# Patient Record
Sex: MALE | Race: BLACK OR AFRICAN AMERICAN | NOT HISPANIC OR LATINO | Employment: UNEMPLOYED | URBAN - METROPOLITAN AREA
[De-identification: names, ages, dates, MRNs, and addresses within clinical notes are randomized per-mention and may not be internally consistent; named-entity substitution may affect disease eponyms.]

---

## 2023-02-05 ENCOUNTER — HOSPITAL ENCOUNTER (INPATIENT)
Facility: HOSPITAL | Age: 38
LOS: 9 days | Discharge: NURSING, REHAB OR LONG-TERM CARE | DRG: 956 | End: 2023-02-14
Attending: EMERGENCY MEDICINE | Admitting: SURGERY
Payer: MEDICARE

## 2023-02-05 ENCOUNTER — ANESTHESIA (OUTPATIENT)
Dept: SURGERY | Facility: HOSPITAL | Age: 38
DRG: 956 | End: 2023-02-05
Payer: MEDICARE

## 2023-02-05 ENCOUNTER — ANESTHESIA EVENT (OUTPATIENT)
Dept: SURGERY | Facility: HOSPITAL | Age: 38
DRG: 956 | End: 2023-02-05
Payer: MEDICARE

## 2023-02-05 DIAGNOSIS — I25.10 CAD (CORONARY ARTERY DISEASE): ICD-10-CM

## 2023-02-05 DIAGNOSIS — S09.90XA INJURY OF HEAD, INITIAL ENCOUNTER: ICD-10-CM

## 2023-02-05 DIAGNOSIS — S02.0XXB OPEN FRACTURE OF FRONTAL BONE, INITIAL ENCOUNTER: ICD-10-CM

## 2023-02-05 DIAGNOSIS — Z91.89 1 GUN IN HOME: ICD-10-CM

## 2023-02-05 DIAGNOSIS — S62.92XB OPEN FRACTURE OF LEFT HAND, INITIAL ENCOUNTER: ICD-10-CM

## 2023-02-05 DIAGNOSIS — S72.002B: Primary | ICD-10-CM

## 2023-02-05 DIAGNOSIS — S01.01XA LACERATION OF SCALP, INITIAL ENCOUNTER: ICD-10-CM
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
## 2023-02-05 DIAGNOSIS — S72.352B TYPE I OR II OPEN DISPLACED COMMINUTED FRACTURE OF SHAFT OF LEFT FEMUR, INITIAL ENCOUNTER: ICD-10-CM

## 2023-02-05 LAB
ANION GAP SERPL CALC-SCNC: 12 MEQ/L
BASOPHILS # BLD AUTO: 0.06 X10(3)/MCL (ref 0–0.2)
BASOPHILS NFR BLD AUTO: 0.4 %
BUN SERPL-MCNC: 11.4 MG/DL (ref 8.9–20.6)
CALCIUM SERPL-MCNC: 8.5 MG/DL (ref 8.4–10.2)
CHLORIDE SERPL-SCNC: 106 MMOL/L (ref 98–107)
CO2 SERPL-SCNC: 22 MMOL/L (ref 22–29)
CORRECTED TEMPERATURE (PCO2): 43 MMHG (ref 35–45)
CORRECTED TEMPERATURE (PH): 7.36 (ref 7.35–7.45)
CORRECTED TEMPERATURE (PO2): 143 MMHG (ref 80–100)
CREAT SERPL-MCNC: 1.19 MG/DL (ref 0.73–1.18)
CREAT/UREA NIT SERPL: 10
EOSINOPHIL # BLD AUTO: 0.03 X10(3)/MCL (ref 0–0.9)
EOSINOPHIL NFR BLD AUTO: 0.2 %
ERYTHROCYTE [DISTWIDTH] IN BLOOD BY AUTOMATED COUNT: 13.6 % (ref 11.5–17)
GFR SERPLBLD CREATININE-BSD FMLA CKD-EPI: >60 MLS/MIN/1.73/M2
GLUCOSE SERPL-MCNC: 104 MG/DL (ref 74–100)
HCO3 UR-SCNC: 24.3 MMOL/L (ref 22–26)
HCT VFR BLD AUTO: 40.3 % (ref 42–52)
HGB BLD-MCNC: 13.4 G/DL (ref 12–16)
HGB BLD-MCNC: 13.7 GM/DL (ref 14–18)
IMM GRANULOCYTES # BLD AUTO: 0.06 X10(3)/MCL (ref 0–0.04)
IMM GRANULOCYTES NFR BLD AUTO: 0.4 %
LACTATE SERPL-SCNC: 0.9 MMOL/L (ref 0.5–2.2)
LYMPHOCYTES # BLD AUTO: 1.7 X10(3)/MCL (ref 0.6–4.6)
LYMPHOCYTES NFR BLD AUTO: 10.9 %
MAGNESIUM SERPL-MCNC: 2.6 MG/DL (ref 1.6–2.6)
MCH RBC QN AUTO: 28.7 PG
MCHC RBC AUTO-ENTMCNC: 34 MG/DL (ref 33–36)
MCV RBC AUTO: 84.3 FL (ref 80–94)
MONOCYTES # BLD AUTO: 1.58 X10(3)/MCL (ref 0.1–1.3)
MONOCYTES NFR BLD AUTO: 10.1 %
NEUTROPHILS # BLD AUTO: 12.18 X10(3)/MCL (ref 2.1–9.2)
NEUTROPHILS NFR BLD AUTO: 78 %
NRBC BLD AUTO-RTO: 0 %
PCO2 BLDA: 43 MMHG (ref 35–45)
PH SMN: 7.36 [PH] (ref 7.35–7.45)
PHOSPHATE SERPL-MCNC: 5.5 MG/DL (ref 2.3–4.7)
PLATELET # BLD AUTO: 263 X10(3)/MCL (ref 130–400)
PMV BLD AUTO: 10.6 FL (ref 7.4–10.4)
PO2 BLDA: 143 MMHG (ref 80–100)
POC BASE DEFICIT: -1.3 MMOL/L (ref -2–2)
POC COHB: 1.9 %
POC IONIZED CALCIUM: 1.02 MMOL/L (ref 1.12–1.23)
POC METHB: 1.3 % (ref 0.4–1.5)
POC O2HB: 96.4 % (ref 94–97)
POC SATURATED O2: 99.1 %
POC TEMPERATURE: 37 °C
POTASSIUM BLD-SCNC: 4.1 MMOL/L (ref 3.5–5)
POTASSIUM SERPL-SCNC: 3.7 MMOL/L (ref 3.5–5.1)
RBC # BLD AUTO: 4.78 X10(6)/MCL (ref 4.7–6.1)
SODIUM BLD-SCNC: 135 MMOL/L (ref 137–145)
SODIUM SERPL-SCNC: 140 MMOL/L (ref 136–145)
SPECIMEN SOURCE: ABNORMAL
TROPONIN I SERPL-MCNC: 8.44 NG/ML (ref 0–0.04)
WBC # SPEC AUTO: 15.6 X10(3)/MCL (ref 4.5–11.5)

## 2023-02-05 PROCEDURE — 85025 COMPLETE CBC W/AUTO DIFF WBC: CPT | Performed by: STUDENT IN AN ORGANIZED HEALTH CARE EDUCATION/TRAINING PROGRAM

## 2023-02-05 PROCEDURE — 37799 UNLISTED PX VASCULAR SURGERY: CPT

## 2023-02-05 PROCEDURE — 99223 1ST HOSP IP/OBS HIGH 75: CPT | Mod: ,,, | Performed by: NEUROLOGICAL SURGERY

## 2023-02-05 PROCEDURE — 20000000 HC ICU ROOM

## 2023-02-05 PROCEDURE — 63600175 PHARM REV CODE 636 W HCPCS

## 2023-02-05 PROCEDURE — 99900031 HC PATIENT EDUCATION (STAT)

## 2023-02-05 PROCEDURE — 27201423 OPTIME MED/SURG SUP & DEVICES STERILE SUPPLY: Performed by: ORTHOPAEDIC SURGERY

## 2023-02-05 PROCEDURE — 63600175 PHARM REV CODE 636 W HCPCS: Performed by: NURSE ANESTHETIST, CERTIFIED REGISTERED

## 2023-02-05 PROCEDURE — 37000008 HC ANESTHESIA 1ST 15 MINUTES: Performed by: ORTHOPAEDIC SURGERY

## 2023-02-05 PROCEDURE — 93005 ELECTROCARDIOGRAM TRACING: CPT

## 2023-02-05 PROCEDURE — 84484 ASSAY OF TROPONIN QUANT: CPT | Performed by: STUDENT IN AN ORGANIZED HEALTH CARE EDUCATION/TRAINING PROGRAM

## 2023-02-05 PROCEDURE — 99291 CRITICAL CARE FIRST HOUR: CPT | Mod: 25

## 2023-02-05 PROCEDURE — C1713 ANCHOR/SCREW BN/BN,TIS/BN: HCPCS | Performed by: ORTHOPAEDIC SURGERY

## 2023-02-05 PROCEDURE — 27000221 HC OXYGEN, UP TO 24 HOURS

## 2023-02-05 PROCEDURE — 94761 N-INVAS EAR/PLS OXIMETRY MLT: CPT

## 2023-02-05 PROCEDURE — 25000003 PHARM REV CODE 250: Performed by: NURSE ANESTHETIST, CERTIFIED REGISTERED

## 2023-02-05 PROCEDURE — 36000711: Performed by: ORTHOPAEDIC SURGERY

## 2023-02-05 PROCEDURE — 93010 ELECTROCARDIOGRAM REPORT: CPT | Mod: ,,, | Performed by: INTERNAL MEDICINE

## 2023-02-05 PROCEDURE — 25500020 PHARM REV CODE 255: Performed by: SURGERY

## 2023-02-05 PROCEDURE — 11012 DEB SKIN BONE AT FX SITE: CPT | Mod: 51,,, | Performed by: ORTHOPAEDIC SURGERY

## 2023-02-05 PROCEDURE — 63600175 PHARM REV CODE 636 W HCPCS: Performed by: SURGERY

## 2023-02-05 PROCEDURE — 99900035 HC TECH TIME PER 15 MIN (STAT)

## 2023-02-05 PROCEDURE — 27245 TREAT THIGH FRACTURE: CPT | Mod: LT,,, | Performed by: ORTHOPAEDIC SURGERY

## 2023-02-05 PROCEDURE — 36000710: Performed by: ORTHOPAEDIC SURGERY

## 2023-02-05 PROCEDURE — 25000003 PHARM REV CODE 250: Performed by: STUDENT IN AN ORGANIZED HEALTH CARE EDUCATION/TRAINING PROGRAM

## 2023-02-05 PROCEDURE — 83605 ASSAY OF LACTIC ACID: CPT | Performed by: SURGERY

## 2023-02-05 PROCEDURE — 80048 BASIC METABOLIC PNL TOTAL CA: CPT | Performed by: STUDENT IN AN ORGANIZED HEALTH CARE EDUCATION/TRAINING PROGRAM

## 2023-02-05 PROCEDURE — 83735 ASSAY OF MAGNESIUM: CPT | Performed by: STUDENT IN AN ORGANIZED HEALTH CARE EDUCATION/TRAINING PROGRAM

## 2023-02-05 PROCEDURE — 63600175 PHARM REV CODE 636 W HCPCS: Performed by: PHYSICIAN ASSISTANT

## 2023-02-05 PROCEDURE — 94002 VENT MGMT INPAT INIT DAY: CPT

## 2023-02-05 PROCEDURE — 25000003 PHARM REV CODE 250

## 2023-02-05 PROCEDURE — 37000009 HC ANESTHESIA EA ADD 15 MINS: Performed by: ORTHOPAEDIC SURGERY

## 2023-02-05 PROCEDURE — 27245 PR OPEN FIX INTER/SUBTROCH FX,IMPLNT: ICD-10-PCS | Mod: LT,,, | Performed by: ORTHOPAEDIC SURGERY

## 2023-02-05 PROCEDURE — 25000003 PHARM REV CODE 250: Performed by: SURGERY

## 2023-02-05 PROCEDURE — 99223 PR INITIAL HOSPITAL CARE,LEVL III: ICD-10-PCS | Mod: ,,, | Performed by: NEUROLOGICAL SURGERY

## 2023-02-05 PROCEDURE — 82803 BLOOD GASES ANY COMBINATION: CPT

## 2023-02-05 PROCEDURE — 84100 ASSAY OF PHOSPHORUS: CPT | Performed by: STUDENT IN AN ORGANIZED HEALTH CARE EDUCATION/TRAINING PROGRAM

## 2023-02-05 PROCEDURE — 36620 INSERTION CATHETER ARTERY: CPT | Performed by: ANESTHESIOLOGY

## 2023-02-05 PROCEDURE — 93010 EKG 12-LEAD: ICD-10-PCS | Mod: ,,, | Performed by: INTERNAL MEDICINE

## 2023-02-05 PROCEDURE — 20800000 HC ICU TRAUMA

## 2023-02-05 PROCEDURE — G0390 TRAUMA RESPONS W/HOSP CRITI: HCPCS

## 2023-02-05 PROCEDURE — 63600175 PHARM REV CODE 636 W HCPCS: Performed by: ORTHOPAEDIC SURGERY

## 2023-02-05 PROCEDURE — 63600175 PHARM REV CODE 636 W HCPCS: Performed by: STUDENT IN AN ORGANIZED HEALTH CARE EDUCATION/TRAINING PROGRAM

## 2023-02-05 PROCEDURE — 11012 PR DEBRIDE ASSOC OPEN FX/DISLO SKIN/MUS/BONE: ICD-10-PCS | Mod: 51,,, | Performed by: ORTHOPAEDIC SURGERY

## 2023-02-05 DEVICE — IMPLANTABLE DEVICE: Type: IMPLANTABLE DEVICE | Site: FEMUR | Status: FUNCTIONAL

## 2023-02-05 DEVICE — SCREW XL25 IM NAIL 42X5MM: Type: IMPLANTABLE DEVICE | Site: FEMUR | Status: FUNCTIONAL

## 2023-02-05 RX ORDER — MIDAZOLAM HYDROCHLORIDE 1 MG/ML
INJECTION INTRAMUSCULAR; INTRAVENOUS
Status: DISCONTINUED | OUTPATIENT
Start: 2023-02-05 | End: 2023-02-05

## 2023-02-05 RX ORDER — BISACODYL 10 MG
10 SUPPOSITORY, RECTAL RECTAL DAILY PRN
Status: DISCONTINUED | OUTPATIENT
Start: 2023-02-05 | End: 2023-02-14 | Stop reason: HOSPADM

## 2023-02-05 RX ORDER — TRANEXAMIC ACID 100 MG/ML
INJECTION, SOLUTION INTRAVENOUS CONTINUOUS PRN
Status: DISCONTINUED | OUTPATIENT
Start: 2023-02-05 | End: 2023-02-05

## 2023-02-05 RX ORDER — MEPERIDINE HYDROCHLORIDE 25 MG/ML
12.5 INJECTION INTRAMUSCULAR; INTRAVENOUS; SUBCUTANEOUS ONCE
Status: CANCELLED | OUTPATIENT
Start: 2023-02-05 | End: 2023-02-05

## 2023-02-05 RX ORDER — OXYCODONE HYDROCHLORIDE 5 MG/1
5 TABLET ORAL EVERY 4 HOURS PRN
Status: DISCONTINUED | OUTPATIENT
Start: 2023-02-05 | End: 2023-02-14 | Stop reason: HOSPADM

## 2023-02-05 RX ORDER — PHENYLEPHRINE HYDROCHLORIDE 10 MG/ML
INJECTION INTRAVENOUS
Status: DISCONTINUED | OUTPATIENT
Start: 2023-02-05 | End: 2023-02-05

## 2023-02-05 RX ORDER — HYDROMORPHONE HYDROCHLORIDE 2 MG/ML
0.5 INJECTION, SOLUTION INTRAMUSCULAR; INTRAVENOUS; SUBCUTANEOUS
Status: DISCONTINUED | OUTPATIENT
Start: 2023-02-05 | End: 2023-02-08

## 2023-02-05 RX ORDER — INSULIN ASPART 100 [IU]/ML
1-10 INJECTION, SOLUTION INTRAVENOUS; SUBCUTANEOUS EVERY 6 HOURS PRN
Status: DISCONTINUED | OUTPATIENT
Start: 2023-02-05 | End: 2023-02-14 | Stop reason: HOSPADM

## 2023-02-05 RX ORDER — FENTANYL CITRATE 50 UG/ML
INJECTION, SOLUTION INTRAMUSCULAR; INTRAVENOUS
Status: DISCONTINUED | OUTPATIENT
Start: 2023-02-05 | End: 2023-02-05

## 2023-02-05 RX ORDER — ROCURONIUM BROMIDE 10 MG/ML
INJECTION, SOLUTION INTRAVENOUS
Status: DISCONTINUED | OUTPATIENT
Start: 2023-02-05 | End: 2023-02-05

## 2023-02-05 RX ORDER — METHOCARBAMOL 750 MG/1
750 TABLET, FILM COATED ORAL 3 TIMES DAILY
Status: DISCONTINUED | OUTPATIENT
Start: 2023-02-05 | End: 2023-02-06

## 2023-02-05 RX ORDER — ENOXAPARIN SODIUM 100 MG/ML
40 INJECTION SUBCUTANEOUS EVERY 12 HOURS
Status: DISCONTINUED | OUTPATIENT
Start: 2023-02-05 | End: 2023-02-06

## 2023-02-05 RX ORDER — SODIUM CHLORIDE 9 MG/ML
INJECTION, SOLUTION INTRAVENOUS CONTINUOUS
Status: DISCONTINUED | OUTPATIENT
Start: 2023-02-05 | End: 2023-02-08

## 2023-02-05 RX ORDER — HYDROMORPHONE HYDROCHLORIDE 2 MG/ML
0.4 INJECTION, SOLUTION INTRAMUSCULAR; INTRAVENOUS; SUBCUTANEOUS EVERY 5 MIN PRN
Status: CANCELLED | OUTPATIENT
Start: 2023-02-05

## 2023-02-05 RX ORDER — DEXMEDETOMIDINE HYDROCHLORIDE 4 UG/ML
0-1.4 INJECTION, SOLUTION INTRAVENOUS CONTINUOUS
Status: DISCONTINUED | OUTPATIENT
Start: 2023-02-05 | End: 2023-02-07

## 2023-02-05 RX ORDER — FENTANYL CITRATE 50 UG/ML
INJECTION, SOLUTION INTRAMUSCULAR; INTRAVENOUS
Status: COMPLETED
Start: 2023-02-05 | End: 2023-02-05

## 2023-02-05 RX ORDER — FAMOTIDINE 20 MG/1
20 TABLET, FILM COATED ORAL 2 TIMES DAILY
Status: DISCONTINUED | OUTPATIENT
Start: 2023-02-05 | End: 2023-02-08

## 2023-02-05 RX ORDER — METOPROLOL TARTRATE 1 MG/ML
5 INJECTION, SOLUTION INTRAVENOUS ONCE
Status: COMPLETED | OUTPATIENT
Start: 2023-02-05 | End: 2023-02-05

## 2023-02-05 RX ORDER — QUETIAPINE FUMARATE 100 MG/1
200 TABLET, FILM COATED ORAL DAILY
Status: DISCONTINUED | OUTPATIENT
Start: 2023-02-05 | End: 2023-02-14 | Stop reason: HOSPADM

## 2023-02-05 RX ORDER — CEFAZOLIN SODIUM 1 G/3ML
INJECTION, POWDER, FOR SOLUTION INTRAMUSCULAR; INTRAVENOUS
Status: DISCONTINUED | OUTPATIENT
Start: 2023-02-05 | End: 2023-02-05

## 2023-02-05 RX ORDER — DOCUSATE SODIUM 100 MG/1
100 CAPSULE, LIQUID FILLED ORAL 2 TIMES DAILY
Status: DISCONTINUED | OUTPATIENT
Start: 2023-02-05 | End: 2023-02-14 | Stop reason: HOSPADM

## 2023-02-05 RX ORDER — TRANEXAMIC ACID 100 MG/ML
INJECTION, SOLUTION INTRAVENOUS
Status: COMPLETED
Start: 2023-02-05 | End: 2023-02-05

## 2023-02-05 RX ORDER — FLUOXETINE HYDROCHLORIDE 20 MG/1
40 CAPSULE ORAL DAILY
Status: DISCONTINUED | OUTPATIENT
Start: 2023-02-06 | End: 2023-02-14 | Stop reason: HOSPADM

## 2023-02-05 RX ORDER — LEVETIRACETAM 500 MG/1
500 TABLET ORAL 2 TIMES DAILY
Status: DISCONTINUED | OUTPATIENT
Start: 2023-02-05 | End: 2023-02-05

## 2023-02-05 RX ORDER — ONDANSETRON 2 MG/ML
4 INJECTION INTRAMUSCULAR; INTRAVENOUS ONCE
Status: CANCELLED | OUTPATIENT
Start: 2023-02-05 | End: 2023-02-05

## 2023-02-05 RX ORDER — LORAZEPAM 1 MG/1
1 TABLET ORAL EVERY 6 HOURS PRN
Status: DISCONTINUED | OUTPATIENT
Start: 2023-02-05 | End: 2023-02-06

## 2023-02-05 RX ORDER — TALC
6 POWDER (GRAM) TOPICAL NIGHTLY PRN
Status: DISCONTINUED | OUTPATIENT
Start: 2023-02-05 | End: 2023-02-14 | Stop reason: HOSPADM

## 2023-02-05 RX ORDER — POLYETHYLENE GLYCOL 3350 17 G/17G
17 POWDER, FOR SOLUTION ORAL 2 TIMES DAILY
Status: DISCONTINUED | OUTPATIENT
Start: 2023-02-05 | End: 2023-02-14 | Stop reason: HOSPADM

## 2023-02-05 RX ORDER — PROPOFOL 10 MG/ML
INJECTION, EMULSION INTRAVENOUS
Status: COMPLETED
Start: 2023-02-05 | End: 2023-02-05

## 2023-02-05 RX ORDER — GLUCAGON 1 MG
1 KIT INJECTION
Status: DISCONTINUED | OUTPATIENT
Start: 2023-02-05 | End: 2023-02-14 | Stop reason: HOSPADM

## 2023-02-05 RX ORDER — LAMOTRIGINE 25 MG/1
25 TABLET ORAL DAILY
Status: DISCONTINUED | OUTPATIENT
Start: 2023-02-06 | End: 2023-02-14 | Stop reason: HOSPADM

## 2023-02-05 RX ORDER — ONDANSETRON 2 MG/ML
4 INJECTION INTRAMUSCULAR; INTRAVENOUS EVERY 6 HOURS PRN
Status: DISCONTINUED | OUTPATIENT
Start: 2023-02-05 | End: 2023-02-14 | Stop reason: HOSPADM

## 2023-02-05 RX ORDER — MORPHINE SULFATE 4 MG/ML
2 INJECTION, SOLUTION INTRAMUSCULAR; INTRAVENOUS
Status: DISPENSED | OUTPATIENT
Start: 2023-02-05 | End: 2023-02-08

## 2023-02-05 RX ORDER — CEFAZOLIN SODIUM 2 G/50ML
2 SOLUTION INTRAVENOUS
Status: COMPLETED | OUTPATIENT
Start: 2023-02-05 | End: 2023-02-06

## 2023-02-05 RX ORDER — ACETAMINOPHEN 325 MG/1
650 TABLET ORAL EVERY 4 HOURS
Status: DISPENSED | OUTPATIENT
Start: 2023-02-05 | End: 2023-02-10

## 2023-02-05 RX ORDER — VANCOMYCIN HYDROCHLORIDE 1 G/20ML
INJECTION, POWDER, LYOPHILIZED, FOR SOLUTION INTRAVENOUS
Status: DISCONTINUED | OUTPATIENT
Start: 2023-02-05 | End: 2023-02-05 | Stop reason: HOSPADM

## 2023-02-05 RX ADMIN — HYDROMORPHONE HYDROCHLORIDE 0.5 MG: 2 INJECTION, SOLUTION INTRAMUSCULAR; INTRAVENOUS; SUBCUTANEOUS at 03:02

## 2023-02-05 RX ADMIN — PROPOFOL: 10 INJECTION, EMULSION INTRAVENOUS at 10:02

## 2023-02-05 RX ADMIN — ACETAMINOPHEN 650 MG: 325 TABLET, FILM COATED ORAL at 02:02

## 2023-02-05 RX ADMIN — POLYETHYLENE GLYCOL 3350 17 G: 17 POWDER, FOR SOLUTION ORAL at 09:02

## 2023-02-05 RX ADMIN — METOPROLOL TARTRATE 5 MG: 1 INJECTION, SOLUTION INTRAVENOUS at 10:02

## 2023-02-05 RX ADMIN — ROCURONIUM BROMIDE 80 MG: 10 INJECTION, SOLUTION INTRAVENOUS at 11:02

## 2023-02-05 RX ADMIN — IOPAMIDOL 100 ML: 755 INJECTION, SOLUTION INTRAVENOUS at 11:02

## 2023-02-05 RX ADMIN — SODIUM CHLORIDE: 9 INJECTION, SOLUTION INTRAVENOUS at 11:02

## 2023-02-05 RX ADMIN — MIDAZOLAM HYDROCHLORIDE 2 MG: 1 INJECTION, SOLUTION INTRAMUSCULAR; INTRAVENOUS at 11:02

## 2023-02-05 RX ADMIN — METHOCARBAMOL TABLETS 750 MG: 750 TABLET, COATED ORAL at 09:02

## 2023-02-05 RX ADMIN — DEXMEDETOMIDINE HYDROCHLORIDE 0.6 MCG/KG/HR: 400 INJECTION INTRAVENOUS at 06:02

## 2023-02-05 RX ADMIN — TRANEXAMIC ACID 0.64 MG/KG/HR: 100 INJECTION, SOLUTION INTRAVENOUS at 11:02

## 2023-02-05 RX ADMIN — FENTANYL CITRATE 100 MCG: 50 INJECTION, SOLUTION INTRAMUSCULAR; INTRAVENOUS at 11:02

## 2023-02-05 RX ADMIN — TRANEXAMIC ACID: 100 INJECTION, SOLUTION INTRAVENOUS at 10:02

## 2023-02-05 RX ADMIN — CEFAZOLIN SODIUM 2 G: 2 SOLUTION INTRAVENOUS at 01:02

## 2023-02-05 RX ADMIN — CEFAZOLIN SODIUM 2 G: 2 SOLUTION INTRAVENOUS at 09:02

## 2023-02-05 RX ADMIN — QUETIAPINE FUMARATE 200 MG: 100 TABLET ORAL at 04:02

## 2023-02-05 RX ADMIN — FENTANYL CITRATE 100 MCG: 50 INJECTION, SOLUTION INTRAMUSCULAR; INTRAVENOUS at 10:02

## 2023-02-05 RX ADMIN — ACETAMINOPHEN 650 MG: 325 TABLET, FILM COATED ORAL at 09:02

## 2023-02-05 RX ADMIN — CEFAZOLIN 2 G: 330 INJECTION, POWDER, FOR SOLUTION INTRAMUSCULAR; INTRAVENOUS at 11:02

## 2023-02-05 RX ADMIN — SODIUM CHLORIDE, POTASSIUM CHLORIDE, SODIUM LACTATE AND CALCIUM CHLORIDE 1000 ML: 600; 310; 30; 20 INJECTION, SOLUTION INTRAVENOUS at 09:02

## 2023-02-05 RX ADMIN — ACETAMINOPHEN 650 MG: 325 TABLET, FILM COATED ORAL at 06:02

## 2023-02-05 RX ADMIN — FENTANYL CITRATE 50 MCG: 50 INJECTION, SOLUTION INTRAMUSCULAR; INTRAVENOUS at 11:02

## 2023-02-05 RX ADMIN — SODIUM CHLORIDE, POTASSIUM CHLORIDE, SODIUM LACTATE AND CALCIUM CHLORIDE 1000 ML: 600; 310; 30; 20 INJECTION, SOLUTION INTRAVENOUS at 10:02

## 2023-02-05 RX ADMIN — DEXMEDETOMIDINE HYDROCHLORIDE 0.6 MCG/KG/HR: 400 INJECTION INTRAVENOUS at 09:02

## 2023-02-05 RX ADMIN — SODIUM CHLORIDE, SODIUM GLUCONATE, SODIUM ACETATE, POTASSIUM CHLORIDE AND MAGNESIUM CHLORIDE: 526; 502; 368; 37; 30 INJECTION, SOLUTION INTRAVENOUS at 11:02

## 2023-02-05 RX ADMIN — FAMOTIDINE 20 MG: 20 TABLET, FILM COATED ORAL at 09:02

## 2023-02-05 RX ADMIN — PROPOFOL 50 MCG/KG/MIN: 10 INJECTION, EMULSION INTRAVENOUS at 01:02

## 2023-02-05 RX ADMIN — ENOXAPARIN SODIUM 40 MG: 40 INJECTION SUBCUTANEOUS at 04:02

## 2023-02-05 RX ADMIN — METHOCARBAMOL TABLETS 750 MG: 750 TABLET, COATED ORAL at 03:02

## 2023-02-05 RX ADMIN — PHENYLEPHRINE HYDROCHLORIDE 100 MCG: 10 INJECTION INTRAVENOUS at 11:02

## 2023-02-05 RX ADMIN — DOCUSATE SODIUM 100 MG: 100 CAPSULE, LIQUID FILLED ORAL at 09:02
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
## 2023-02-05 NOTE — ANESTHESIA PREPROCEDURE EVALUATION
02/05/2023  Gera Chavez is a 37 y.o., male   Pre-operative evaluation for Procedure(s) (LRB):  INSERTION, INTRAMEDULLARY NOEL, FEMUR (Left)    Resp 20   SpO2 96%     No past medical history on file.    Patient Active Problem List   Diagnosis    Fracture of proximal end of left femur, open type I or II, initial encounter       Review of patient's allergies indicates:  Not on File    No current outpatient medications    No past surgical history on file.    Social History     Socioeconomic History    Marital status: Single       No results found for: WBC, HGB, HCT, MCV, PLT       BMP  No results found for: NA, K, CHLORIDE, CO2, GLUCOSE, BUN, CREATININE, CALCIUM, ANIONGAP, ESTGFRAFRICA, EGFRNONAA     INR  No results for input(s): PT, INR, PROTIME, APTT in the last 72 hours.        Diagnostic Studies:      EKG:  No results found for this or any previous visit.    .      Pre-op Assessment          Review of Systems      Physical Exam  General: Presents as Level 1 Trauma from ER. Intubated on Propofol  Airway:  Mallampati: II   Mouth Opening: Normal  TM Distance: Normal  Tongue: Normal    Dental:  Intact    Chest/Lungs:  Clear to auscultation, Normal Respiratory Rate    Heart:  Rate: Normal  Rhythm: Regular Rhythm        Anesthesia Plan  Type of Anesthesia, risks & benefits discussed:    Anesthesia Type: Gen ETT  Intra-op Monitoring Plan: Standard ASA Monitors and Art Line  Post Op Pain Control Plan: multimodal analgesia  Induction:  IV and Inhalation  Airway Plan: Direct, Post-Induction  Informed Consent: Informed consent signed with the Patient and all parties understand the risks and agree with anesthesia plan.  All questions answered.   ASA Score: 2 Emergent  Day of Surgery Review of History & Physical: H&P Update referred to the surgeon/provider.  Anesthesia Plan Notes: Emergency. Post op Vent  Planned    Ready For Surgery From Anesthesia Perspective.     .
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
## 2023-02-05 NOTE — TRANSFER OF CARE
"Anesthesia Transfer of Care Note    Patient: Gera Chavez    Procedure(s) Performed: Procedure(s) (LRB):  INSERTION, INTRAMEDULLARY NOEL, FEMUR (Left)    Patient location: ICU    Anesthesia Type: general    Transport from OR: Continuos invasive BP monitoring in transport. Transported from OR intubated on 100% O2 by AMBU with assisted ventilation. Continuous ECG monitoring in transport. Continuous SpO2 monitoring in transport. Upon arrival to PACU/ICU, patient attached to ventilator and auscultated to confirm bilateral breath sounds and adequate TV    Post pain: adequate analgesia    Post assessment: no apparent anesthetic complications    Post vital signs: stable    Level of consciousness: sedated    Nausea/Vomiting: no nausea/vomiting    Complications: none    Transfer of care protocol was followed      Last vitals:   Visit Vitals  BP (!) 172/112   Pulse 102   Temp 36.8 °C (98.2 °F) (Oral)   Resp (!) 24   Ht 6' 3" (1.905 m)   Wt 100 kg (220 lb 7.4 oz)   SpO2 99%   BMI 27.56 kg/m²     "

## 2023-02-05 NOTE — NURSING
Nurses Note -- 4 Eyes      2/5/2023   1:00 PM      Skin assessed during: Admit      [x] No Pressure Injuries Present    [x]Prevention Measures Documented      [] Yes- Altered Skin Integrity Present or Discovered   [] LDA Added if Not in Epic (Describe Wound)   [] New Altered Skin Integrity was Present on Admit and Documented in LDA   [] Wound Image Taken    Wound Care Consulted? No    Attending Nurse:  Bandar Larios RN     Second RN/Staff Member:  Grey Rosa RN

## 2023-02-05 NOTE — ANESTHESIA POSTPROCEDURE EVALUATION
Anesthesia Post Evaluation    Patient: Gera Chavez    Procedure(s) Performed: Procedure(s) (LRB):  INSERTION, INTRAMEDULLARY NOEL, FEMUR (Left)    Final Anesthesia Type: general      Patient location during evaluation: ICU  Patient participation: No - Unable to Participate, Intubation  Level of consciousness: awake  Post-procedure vital signs: reviewed and stable  Pain management: adequate  Airway patency: patent    PONV status at discharge: vomiting (controlled) and nausea (controlled)  Anesthetic complications: no      Cardiovascular status: hemodynamically stable  Respiratory status: intubated and ventilator  Hydration status: euvolemic  Follow-up not needed.  Comments:              Vitals Value Taken Time   /92 02/05/23 1401   Temp 36.7 °C (98 °F) 02/05/23 1300   Pulse 105 02/05/23 1410   Resp 21 02/05/23 1410   SpO2 97 % 02/05/23 1410   Vitals shown include unvalidated device data.      No case tracking events are documented in the log.      Pain/Mp Score: Pain Rating Prior to Med Admin: 0 (2/5/2023  1:51 PM)

## 2023-02-05 NOTE — OP NOTE
OPERATIVE REPORT    Patient: Gera Chavez   : 1985    MRN: [id number]  Date: 2023      Surgeon:Tuan Zepeda DO  Assistant: Tono Mars was essential, part of the procedure including deep hardware placement and deep closure.  No senior assistant was availible  Preoperative Diagnosis: : Open left subtrochanteric femur fracture  Postoperative Diagnosis: Same  Procedure:  Treatment left subtrochanteric femur fracture with intramedullary nail CPT 51150  Excisional debridement open fracture left femur-CPT 55917  Anesthesiologist: Brian Bird MD  OR Staff: Circulator: Trice Jones RN; Lisha Schuster RN  Physician Assistant: Kailey Mars PA-C  Radiology Technologist: RT Blayne  Scrub Person: Rod Salmon  Implants: * No implants in log *  EBL: 200cc  Complications: None  Disposition: To PACU, stable    Indications: Gera Chavez is a 37 y.o. male presenting with the aforementioned injuries. The procedure is indicated to best obtain and maintain stability of the femur while allowing early ROM.  The patient is intubated and sedated.  Patient is intubated patient is brought to the OR emergently for open fracture included risks and benefits, expected outcomes, and alternatives to surgical intervention.  Specific risks discussed included, but were not limited to: superficial or deep infection, wound healing complications, DVT/PE, significant bleeding requiring transfusion, damage to named anatomic structures in the immediate area including named neurovascular structures, implant failure, and general risks of anesthesia.  The patient voiced understanding and written as well as verbal consent was obtained by myself prior to the procedure.    Procedure in Detail:  The patient's left lower extremity was marked by me in the preoperative area.  He was taken to the operating room, and after satisfactory induction of anesthesia, the patient was placed in the supine position  with a small bump under the ipsilateral hip.   The ipsilateral lower extremity was then sterilely prepped and draped in the usual sterile fashion.  Standard time out procedure was performed confirming the correct surgeon, site, side, patient ID, procedure, and administration of antibiotics.       Attention is drawn to the left thigh patient has a wound in his groin area this area is inspected and left open.  We created incision in the lateral aspect completed excisional debridement of bone muscle fascia necrotic tissue with a 15 blade rongeur and curette.  This was copiously irrigated.    A small longitudinal incision was made proximal to the tip of the greater trochanter. Incision continued through the fascia of the abductors. Bovie was used for hemostasis.  We bluntly felt down to the tip of the greater trochanter, and a guide wire was placed in the appropriate starting position at the greater trochanter.  Once this position was confirmed with  C-arm  in multiple planes, the wire was advanced into the proximal femur. The starting reamer was then used through protected soft tissues to create the entry hole over the guide wire.  Next, a long beaded-tip reaming wire was placed.  C-arm images in multiple planes confirmed successful crossing of the fracture site and intramedullary location of the wire in the distal segment.  Femur length was measured over the wire.  Reduction of the fracture maintained using open techniques, and the canal was sequentially reamed over the wire to a final size of 1.5mm over the final nail size.  A Synthes Recon nail was assembled to its jig, and inserted over the guide wire.  The nail was seated fully, and the wire was removed.  C-arm images confirmed good alignment and reduction of the fracture.      This fracture is highly comminuted.  With the nail at the appropriate depth, the jig was used to place 2 proximal interlocking screws. These screws were placed in recon fashion.  C-arm  confirmed good position of these screws. Two distal interlocking screw were then placed through a small incision using free-hand perfect Quechan technique.      Final C-arm images confirmed good reduction and alignment of the fracture, and good position of all hardware.  The leg clinically appeared to have normal rotational alignment as compared to C-arm images of the opposite side.     The hip was evaluated under C-arm fluoroscopy and no fractures were noted.  The knee was examined and did not show any signs of ligamentous laxity.       All wounds were copiously irrigated and closed in a layered fashion after it was.  Vancomycin was placed in the surgical wound bed.  Sterile soft dressings were applied.  The patient remained stable throughout the entire procedure, and was awakened from anesthesia and extubated without obvious complication.  He was transported to the recovery room in apparently stable condition.    All sponge and needle counts were correct at the end of the case.  I was present and participated in all aspects of the procedure.    Prognosis:  The patient will be kept WBAT on the ipsilateral extremity.  DVT prophylaxis is indicated for this patient and procedure.  The patient is at risk of pain, infection, and nonunion, and we will watch for all of these, amongst other issues, as the patient continues to heal.      This note/OR report was created with the assistance of  voice recognition software or phone  dictation.  There may be transcription errors as a result of using this technology however minimal. Effort has been made to assure accuracy of transcription but any obvious errors or omissions should be clarified with the author of the document.       Tuan Zepeda, DO  Orthopedic Trauma Surgery
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
## 2023-02-06 LAB
ALBUMIN SERPL-MCNC: 2.7 G/DL (ref 3.5–5)
ALBUMIN/GLOB SERPL: 1.2 RATIO (ref 1.1–2)
ALP SERPL-CCNC: 44 UNIT/L (ref 40–150)
ALT SERPL-CCNC: 36 UNIT/L (ref 0–55)
APTT PPP: 32.9 SECONDS (ref 23.2–33.7)
APTT PPP: 35.5 SECONDS (ref 23.2–33.7)
AST SERPL-CCNC: 125 UNIT/L (ref 5–34)
AV INDEX (PROSTH): 0.74
AV MEAN GRADIENT: 5 MMHG
AV PEAK GRADIENT: 9 MMHG
AV VALVE AREA: 3.37 CM2
AV VELOCITY RATIO: 0.73
BASOPHILS # BLD AUTO: 0.02 X10(3)/MCL (ref 0–0.2)
BASOPHILS # BLD AUTO: 0.06 X10(3)/MCL (ref 0–0.2)
BASOPHILS NFR BLD AUTO: 0.3 %
BASOPHILS NFR BLD AUTO: 0.6 %
BILIRUBIN DIRECT+TOT PNL SERPL-MCNC: 0.7 MG/DL
BSA FOR ECHO PROCEDURE: 2.3 M2
BUN SERPL-MCNC: 12.7 MG/DL (ref 8.9–20.6)
CALCIUM SERPL-MCNC: 7.3 MG/DL (ref 8.4–10.2)
CHLORIDE SERPL-SCNC: 110 MMOL/L (ref 98–107)
CO2 SERPL-SCNC: 21 MMOL/L (ref 22–29)
CORRECTED TEMPERATURE (PCO2): 38 MMHG (ref 35–45)
CORRECTED TEMPERATURE (PCO2): 47 MMHG (ref 35–45)
CORRECTED TEMPERATURE (PH): 7.35 (ref 7.35–7.45)
CORRECTED TEMPERATURE (PH): 7.42 (ref 7.35–7.45)
CORRECTED TEMPERATURE (PO2): 82 MMHG (ref 80–100)
CORRECTED TEMPERATURE (PO2): 89 MMHG (ref 80–100)
CREAT SERPL-MCNC: 1.06 MG/DL (ref 0.73–1.18)
CRP SERPL-MCNC: 136.8 MG/L
CV ECHO LV RWT: 0.49 CM
DOP CALC AO PEAK VEL: 1.5 M/S
DOP CALC AO VTI: 27 CM
DOP CALC LVOT AREA: 4.5 CM2
DOP CALC LVOT DIAMETER: 2.4 CM
DOP CALC LVOT PEAK VEL: 1.1 M/S
DOP CALC LVOT STROKE VOLUME: 90.88 CM3
DOP CALC MV VTI: 15 CM
DOP CALCLVOT PEAK VEL VTI: 20.1 CM
E WAVE DECELERATION TIME: 190 MSEC
E/A RATIO: 1.49
E/E' RATIO: 10.1 M/S
ECHO LV POSTERIOR WALL: 1.21 CM (ref 0.6–1.1)
EJECTION FRACTION: 55 %
EOSINOPHIL # BLD AUTO: 0.03 X10(3)/MCL (ref 0–0.9)
EOSINOPHIL # BLD AUTO: 0.04 X10(3)/MCL (ref 0–0.9)
EOSINOPHIL NFR BLD AUTO: 0.4 %
EOSINOPHIL NFR BLD AUTO: 0.4 %
ERYTHROCYTE [DISTWIDTH] IN BLOOD BY AUTOMATED COUNT: 13.7 % (ref 11.5–17)
ERYTHROCYTE [DISTWIDTH] IN BLOOD BY AUTOMATED COUNT: 13.8 % (ref 11.5–17)
FRACTIONAL SHORTENING: 28 % (ref 28–44)
GFR SERPLBLD CREATININE-BSD FMLA CKD-EPI: >60 MLS/MIN/1.73/M2
GLOBULIN SER-MCNC: 2.2 GM/DL (ref 2.4–3.5)
GLUCOSE SERPL-MCNC: 117 MG/DL (ref 74–100)
HCO3 UR-SCNC: 24.6 MMOL/L (ref 22–26)
HCO3 UR-SCNC: 25.9 MMOL/L (ref 22–26)
HCT VFR BLD AUTO: 27.1 % (ref 42–52)
HCT VFR BLD AUTO: 29 % (ref 42–52)
HGB BLD-MCNC: 10.1 G/DL (ref 12–16)
HGB BLD-MCNC: 10.4 G/DL (ref 12–16)
HGB BLD-MCNC: 9.2 GM/DL (ref 14–18)
HGB BLD-MCNC: 9.8 GM/DL (ref 14–18)
IMM GRANULOCYTES # BLD AUTO: 0.02 X10(3)/MCL (ref 0–0.04)
IMM GRANULOCYTES # BLD AUTO: 0.06 X10(3)/MCL (ref 0–0.04)
IMM GRANULOCYTES NFR BLD AUTO: 0.3 %
IMM GRANULOCYTES NFR BLD AUTO: 0.6 %
INR BLD: 1.32 (ref 0–1.3)
INTERVENTRICULAR SEPTUM: 1.01 CM (ref 0.6–1.1)
LEFT ATRIUM SIZE: 3.9 CM
LEFT ATRIUM VOLUME INDEX MOD: 26.8 ML/M2
LEFT ATRIUM VOLUME MOD: 61.3 CM3
LEFT INTERNAL DIMENSION IN SYSTOLE: 3.53 CM (ref 2.1–4)
LEFT VENTRICLE DIASTOLIC VOLUME INDEX: 48.91 ML/M2
LEFT VENTRICLE DIASTOLIC VOLUME: 112 ML
LEFT VENTRICLE MASS INDEX: 88 G/M2
LEFT VENTRICLE SYSTOLIC VOLUME INDEX: 22.7 ML/M2
LEFT VENTRICLE SYSTOLIC VOLUME: 51.9 ML
LEFT VENTRICULAR INTERNAL DIMENSION IN DIASTOLE: 4.89 CM (ref 3.5–6)
LEFT VENTRICULAR MASS: 202.36 G
LV LATERAL E/E' RATIO: 10.1 M/S
LV SEPTAL E/E' RATIO: 10.1 M/S
LVOT MG: 3 MMHG
LVOT MV: 0.74 CM/S
LYMPHOCYTES # BLD AUTO: 1.55 X10(3)/MCL (ref 0.6–4.6)
LYMPHOCYTES # BLD AUTO: 1.74 X10(3)/MCL (ref 0.6–4.6)
LYMPHOCYTES NFR BLD AUTO: 16.1 %
LYMPHOCYTES NFR BLD AUTO: 20.7 %
MCH RBC QN AUTO: 28.6 PG
MCH RBC QN AUTO: 29.1 PG
MCHC RBC AUTO-ENTMCNC: 33.8 MG/DL (ref 33–36)
MCHC RBC AUTO-ENTMCNC: 33.9 MG/DL (ref 33–36)
MCV RBC AUTO: 84.5 FL (ref 80–94)
MCV RBC AUTO: 85.8 FL (ref 80–94)
MONOCYTES # BLD AUTO: 0.8 X10(3)/MCL (ref 0.1–1.3)
MONOCYTES # BLD AUTO: 0.87 X10(3)/MCL (ref 0.1–1.3)
MONOCYTES NFR BLD AUTO: 10.7 %
MONOCYTES NFR BLD AUTO: 8 %
MV MEAN GRADIENT: 1 MMHG
MV PEAK A VEL: 0.68 M/S
MV PEAK E VEL: 1.01 M/S
MV PEAK GRADIENT: 2 MMHG
MV VALVE AREA BY CONTINUITY EQUATION: 6.06 CM2
NEUTROPHILS # BLD AUTO: 5.05 X10(3)/MCL (ref 2.1–9.2)
NEUTROPHILS # BLD AUTO: 8.07 X10(3)/MCL (ref 2.1–9.2)
NEUTROPHILS NFR BLD AUTO: 67.6 %
NEUTROPHILS NFR BLD AUTO: 74.3 %
NRBC BLD AUTO-RTO: 0 %
NRBC BLD AUTO-RTO: 0 %
PCO2 BLDA: 38 MMHG (ref 35–45)
PCO2 BLDA: 47 MMHG (ref 35–45)
PH SMN: 7.35 [PH] (ref 7.35–7.45)
PH SMN: 7.42 [PH] (ref 7.35–7.45)
PLATELET # BLD AUTO: 192 X10(3)/MCL (ref 130–400)
PLATELET # BLD AUTO: 201 X10(3)/MCL (ref 130–400)
PMV BLD AUTO: 10.5 FL (ref 7.4–10.4)
PMV BLD AUTO: 10.6 FL (ref 7.4–10.4)
PO2 BLDA: 82 MMHG (ref 80–100)
PO2 BLDA: 89 MMHG (ref 80–100)
POC BASE DEFICIT: 0 MMOL/L (ref -2–2)
POC BASE DEFICIT: 0.2 MMOL/L (ref -2–2)
POC COHB: 2 %
POC COHB: 2.4 %
POC IONIZED CALCIUM: 1.01 MMOL/L (ref 1.12–1.23)
POC IONIZED CALCIUM: 1.03 MMOL/L (ref 1.12–1.23)
POC METHB: 0.9 % (ref 0.4–1.5)
POC METHB: 1.3 % (ref 0.4–1.5)
POC O2HB: 95.4 % (ref 94–97)
POC O2HB: 95.7 % (ref 94–97)
POC SATURATED O2: 95.4 %
POC SATURATED O2: 97 %
POC TEMPERATURE: 37 °C
POC TEMPERATURE: 37 °C
POCT GLUCOSE: 121 MG/DL (ref 70–110)
POTASSIUM BLD-SCNC: 4 MMOL/L (ref 3.5–5)
POTASSIUM BLD-SCNC: 4.1 MMOL/L (ref 3.5–5)
POTASSIUM SERPL-SCNC: 4.1 MMOL/L (ref 3.5–5.1)
PREALB SERPL-MCNC: 22.4 MG/DL (ref 18–45)
PROT SERPL-MCNC: 4.9 GM/DL (ref 6.4–8.3)
PROTHROMBIN TIME: 16.2 SECONDS (ref 12.5–14.5)
RA PRESSURE: 3 MMHG
RA WIDTH: 4.43 CM
RBC # BLD AUTO: 3.16 X10(6)/MCL (ref 4.7–6.1)
RBC # BLD AUTO: 3.43 X10(6)/MCL (ref 4.7–6.1)
RV MID DIAMA: 26.5 CM
SODIUM BLD-SCNC: 137 MMOL/L (ref 137–145)
SODIUM BLD-SCNC: 137 MMOL/L (ref 137–145)
SODIUM SERPL-SCNC: 140 MMOL/L (ref 136–145)
SPECIMEN SOURCE: ABNORMAL
SPECIMEN SOURCE: ABNORMAL
TDI LATERAL: 0.1 M/S
TDI SEPTAL: 0.1 M/S
TDI: 0.1 M/S
TRICUSPID ANNULAR PLANE SYSTOLIC EXCURSION: 2.75 CM
TROPONIN I SERPL-MCNC: 3.79 NG/ML (ref 0–0.04)
TROPONIN I SERPL-MCNC: 3.94 NG/ML (ref 0–0.04)
TROPONIN I SERPL-MCNC: 4.96 NG/ML (ref 0–0.04)
TROPONIN I SERPL-MCNC: 8.51 NG/ML (ref 0–0.04)
WBC # SPEC AUTO: 10.8 X10(3)/MCL (ref 4.5–11.5)
WBC # SPEC AUTO: 7.5 X10(3)/MCL (ref 4.5–11.5)

## 2023-02-06 PROCEDURE — 25000003 PHARM REV CODE 250: Performed by: STUDENT IN AN ORGANIZED HEALTH CARE EDUCATION/TRAINING PROGRAM

## 2023-02-06 PROCEDURE — 82803 BLOOD GASES ANY COMBINATION: CPT

## 2023-02-06 PROCEDURE — 94003 VENT MGMT INPAT SUBQ DAY: CPT

## 2023-02-06 PROCEDURE — 37799 UNLISTED PX VASCULAR SURGERY: CPT

## 2023-02-06 PROCEDURE — 86140 C-REACTIVE PROTEIN: CPT

## 2023-02-06 PROCEDURE — 84134 ASSAY OF PREALBUMIN: CPT

## 2023-02-06 PROCEDURE — 94640 AIRWAY INHALATION TREATMENT: CPT

## 2023-02-06 PROCEDURE — 94660 CPAP INITIATION&MGMT: CPT | Mod: XB

## 2023-02-06 PROCEDURE — 63600175 PHARM REV CODE 636 W HCPCS

## 2023-02-06 PROCEDURE — 80053 COMPREHEN METABOLIC PANEL: CPT

## 2023-02-06 PROCEDURE — 85730 THROMBOPLASTIN TIME PARTIAL: CPT | Performed by: SURGERY

## 2023-02-06 PROCEDURE — 63600175 PHARM REV CODE 636 W HCPCS: Performed by: SURGERY

## 2023-02-06 PROCEDURE — 85025 COMPLETE CBC W/AUTO DIFF WBC: CPT

## 2023-02-06 PROCEDURE — 85610 PROTHROMBIN TIME: CPT | Performed by: NURSE PRACTITIONER

## 2023-02-06 PROCEDURE — 63600175 PHARM REV CODE 636 W HCPCS: Performed by: NURSE PRACTITIONER

## 2023-02-06 PROCEDURE — 84484 ASSAY OF TROPONIN QUANT: CPT | Performed by: STUDENT IN AN ORGANIZED HEALTH CARE EDUCATION/TRAINING PROGRAM

## 2023-02-06 PROCEDURE — 85730 THROMBOPLASTIN TIME PARTIAL: CPT | Performed by: NURSE PRACTITIONER

## 2023-02-06 PROCEDURE — 63600175 PHARM REV CODE 636 W HCPCS: Performed by: STUDENT IN AN ORGANIZED HEALTH CARE EDUCATION/TRAINING PROGRAM

## 2023-02-06 PROCEDURE — 94761 N-INVAS EAR/PLS OXIMETRY MLT: CPT

## 2023-02-06 PROCEDURE — 85025 COMPLETE CBC W/AUTO DIFF WBC: CPT | Performed by: NURSE PRACTITIONER

## 2023-02-06 PROCEDURE — 27000221 HC OXYGEN, UP TO 24 HOURS

## 2023-02-06 PROCEDURE — 20800000 HC ICU TRAUMA

## 2023-02-06 PROCEDURE — 63600175 PHARM REV CODE 636 W HCPCS: Performed by: PHYSICIAN ASSISTANT

## 2023-02-06 PROCEDURE — 99900031 HC PATIENT EDUCATION (STAT)

## 2023-02-06 PROCEDURE — 99900035 HC TECH TIME PER 15 MIN (STAT)

## 2023-02-06 PROCEDURE — 25000242 PHARM REV CODE 250 ALT 637 W/ HCPCS: Performed by: SURGERY

## 2023-02-06 PROCEDURE — 20000000 HC ICU ROOM

## 2023-02-06 PROCEDURE — 27000190 HC CPAP FULL FACE MASK W/VALVE

## 2023-02-06 PROCEDURE — 25000242 PHARM REV CODE 250 ALT 637 W/ HCPCS: Performed by: STUDENT IN AN ORGANIZED HEALTH CARE EDUCATION/TRAINING PROGRAM

## 2023-02-06 PROCEDURE — 25000003 PHARM REV CODE 250

## 2023-02-06 RX ORDER — NOREPINEPHRINE BITARTRATE/D5W 8 MG/250ML
0-3 PLASTIC BAG, INJECTION (ML) INTRAVENOUS CONTINUOUS
Status: DISCONTINUED | OUTPATIENT
Start: 2023-02-06 | End: 2023-02-07

## 2023-02-06 RX ORDER — METHOCARBAMOL 100 MG/ML
750 INJECTION, SOLUTION INTRAMUSCULAR; INTRAVENOUS EVERY 8 HOURS
Status: COMPLETED | OUTPATIENT
Start: 2023-02-06 | End: 2023-02-09

## 2023-02-06 RX ORDER — BUDESONIDE 0.5 MG/2ML
0.5 INHALANT ORAL EVERY 12 HOURS
Status: DISCONTINUED | OUTPATIENT
Start: 2023-02-06 | End: 2023-02-11

## 2023-02-06 RX ORDER — HEPARIN SODIUM,PORCINE/D5W 25000/250
20 INTRAVENOUS SOLUTION INTRAVENOUS CONTINUOUS
Status: DISCONTINUED | OUTPATIENT
Start: 2023-02-06 | End: 2023-02-08

## 2023-02-06 RX ORDER — DIPHENHYDRAMINE HYDROCHLORIDE 50 MG/ML
25 INJECTION INTRAMUSCULAR; INTRAVENOUS EVERY 6 HOURS PRN
Status: DISCONTINUED | OUTPATIENT
Start: 2023-02-06 | End: 2023-02-14 | Stop reason: HOSPADM

## 2023-02-06 RX ORDER — METOPROLOL TARTRATE 25 MG/1
25 TABLET, FILM COATED ORAL 2 TIMES DAILY
Status: DISCONTINUED | OUTPATIENT
Start: 2023-02-06 | End: 2023-02-14 | Stop reason: HOSPADM

## 2023-02-06 RX ADMIN — METHOCARBAMOL 750 MG: 100 INJECTION INTRAMUSCULAR; INTRAVENOUS at 09:02

## 2023-02-06 RX ADMIN — ACETAMINOPHEN 650 MG: 325 TABLET, FILM COATED ORAL at 01:02

## 2023-02-06 RX ADMIN — DEXMEDETOMIDINE HYDROCHLORIDE 0.5 MCG/KG/HR: 400 INJECTION INTRAVENOUS at 07:02

## 2023-02-06 RX ADMIN — FAMOTIDINE 20 MG: 20 TABLET, FILM COATED ORAL at 08:02

## 2023-02-06 RX ADMIN — HYDROMORPHONE HYDROCHLORIDE 0.5 MG: 2 INJECTION, SOLUTION INTRAMUSCULAR; INTRAVENOUS; SUBCUTANEOUS at 05:02

## 2023-02-06 RX ADMIN — OXYCODONE 5 MG: 5 TABLET ORAL at 08:02

## 2023-02-06 RX ADMIN — MORPHINE SULFATE 2 MG: 4 INJECTION INTRAVENOUS at 10:02

## 2023-02-06 RX ADMIN — NOREPINEPHRINE BITARTRATE 0.04 MCG/KG/MIN: 8 INJECTION, SOLUTION INTRAVENOUS at 01:02

## 2023-02-06 RX ADMIN — ENOXAPARIN SODIUM 40 MG: 40 INJECTION SUBCUTANEOUS at 09:02

## 2023-02-06 RX ADMIN — POLYETHYLENE GLYCOL 3350 17 G: 17 POWDER, FOR SOLUTION ORAL at 08:02

## 2023-02-06 RX ADMIN — RACEPINEPHRINE HYDROCHLORIDE 0.5 ML: 11.25 SOLUTION RESPIRATORY (INHALATION) at 08:02

## 2023-02-06 RX ADMIN — BUDESONIDE 0.5 MG: 0.5 INHALANT RESPIRATORY (INHALATION) at 08:02

## 2023-02-06 RX ADMIN — DEXMEDETOMIDINE HYDROCHLORIDE 1 MCG/KG/HR: 400 INJECTION INTRAVENOUS at 05:02

## 2023-02-06 RX ADMIN — CEFAZOLIN SODIUM 2 G: 2 SOLUTION INTRAVENOUS at 05:02

## 2023-02-06 RX ADMIN — ACETAMINOPHEN 650 MG: 325 TABLET, FILM COATED ORAL at 09:02

## 2023-02-06 RX ADMIN — HEPARIN SODIUM 20 UNITS/KG/HR: 10000 INJECTION, SOLUTION INTRAVENOUS at 05:02

## 2023-02-06 RX ADMIN — RACEPINEPHRINE HYDROCHLORIDE 0.5 ML: 11.25 SOLUTION RESPIRATORY (INHALATION) at 04:02

## 2023-02-06 RX ADMIN — BUDESONIDE 0.5 MG: 0.5 INHALANT RESPIRATORY (INHALATION) at 09:02

## 2023-02-06 RX ADMIN — DOCUSATE SODIUM 100 MG: 100 CAPSULE, LIQUID FILLED ORAL at 08:02

## 2023-02-06 RX ADMIN — METHOCARBAMOL 750 MG: 100 INJECTION INTRAMUSCULAR; INTRAVENOUS at 02:02

## 2023-02-06 RX ADMIN — DEXMEDETOMIDINE HYDROCHLORIDE 0.5 MCG/KG/HR: 400 INJECTION INTRAVENOUS at 09:02

## 2023-02-06 NOTE — PROGRESS NOTES
"Cardiology would like to begin heparin bolus and drip per protocol.    Spoke with Dr. Scott Pacheco from a neurosurgical perspective to begin.    Last CT head negative with the exception of the following that needs to be added formally as an addendum by Radiology:  "Linear right frontal fracture".  "
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
## 2023-02-06 NOTE — PT/OT/SLP PROGRESS
Occupational Therapy      Patient Name:  Gera Chavez   MRN:  [id number]    Positioning consult received.  Positioning assessment completed.  PUP kit ordered.  No pressure related skin breakdown noted.  RN present for 4 eyes.  Attempted to utilize turn feature on bed but discontinued due to safety concerns and poor positioning of pt (likely related to body habitus).    Pt on bipap and not medically appropriate on this date to assess ADLs or functional mobility.  OT to f/u.    Restraints reapplied and all lines intact upon OT departure.  2/6/2023

## 2023-02-06 NOTE — PLAN OF CARE
Pt sleeping hard, unable to awake with verbal stimuli for Initial dc assessment. Will try again tomorrrow.
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
## 2023-02-07 LAB
ALBUMIN SERPL-MCNC: 2.5 G/DL (ref 3.5–5)
ALBUMIN/GLOB SERPL: 1.1 RATIO (ref 1.1–2)
ALP SERPL-CCNC: 53 UNIT/L (ref 40–150)
ALT SERPL-CCNC: 34 UNIT/L (ref 0–55)
APTT PPP: 73.3 SECONDS (ref 23.2–33.7)
APTT PPP: 76.1 SECONDS (ref 23.2–33.7)
APTT PPP: 79.9 SECONDS (ref 23.2–33.7)
AST SERPL-CCNC: 116 UNIT/L (ref 5–34)
BASOPHILS # BLD AUTO: 0.04 X10(3)/MCL (ref 0–0.2)
BASOPHILS NFR BLD AUTO: 0.5 %
BILIRUBIN DIRECT+TOT PNL SERPL-MCNC: 0.6 MG/DL
BUN SERPL-MCNC: 13.2 MG/DL (ref 8.9–20.6)
CALCIUM SERPL-MCNC: 7.5 MG/DL (ref 8.4–10.2)
CHLORIDE SERPL-SCNC: 112 MMOL/L (ref 98–107)
CO2 SERPL-SCNC: 22 MMOL/L (ref 22–29)
CORRECTED TEMPERATURE (PCO2): 36 MMHG (ref 35–45)
CORRECTED TEMPERATURE (PH): 7.43 (ref 7.35–7.45)
CORRECTED TEMPERATURE (PO2): 73 MMHG (ref 80–100)
CREAT SERPL-MCNC: 0.87 MG/DL (ref 0.73–1.18)
CRP SERPL-MCNC: >240 MG/L
EOSINOPHIL # BLD AUTO: 0.04 X10(3)/MCL (ref 0–0.9)
EOSINOPHIL NFR BLD AUTO: 0.5 %
ERYTHROCYTE [DISTWIDTH] IN BLOOD BY AUTOMATED COUNT: 13.7 % (ref 11.5–17)
GFR SERPLBLD CREATININE-BSD FMLA CKD-EPI: >60 MLS/MIN/1.73/M2
GLOBULIN SER-MCNC: 2.3 GM/DL (ref 2.4–3.5)
GLUCOSE SERPL-MCNC: 110 MG/DL (ref 74–100)
HCO3 UR-SCNC: 23.9 MMOL/L (ref 22–26)
HCT VFR BLD AUTO: 23.9 % (ref 42–52)
HGB BLD-MCNC: 8.2 GM/DL (ref 14–18)
HGB BLD-MCNC: 8.3 G/DL (ref 12–16)
IMM GRANULOCYTES # BLD AUTO: 0.04 X10(3)/MCL (ref 0–0.04)
IMM GRANULOCYTES NFR BLD AUTO: 0.5 %
LYMPHOCYTES # BLD AUTO: 2.01 X10(3)/MCL (ref 0.6–4.6)
LYMPHOCYTES NFR BLD AUTO: 23.2 %
MAGNESIUM SERPL-MCNC: 2.4 MG/DL (ref 1.6–2.6)
MCH RBC QN AUTO: 29.3 PG
MCHC RBC AUTO-ENTMCNC: 34.3 MG/DL (ref 33–36)
MCV RBC AUTO: 85.4 FL (ref 80–94)
MONOCYTES # BLD AUTO: 0.7 X10(3)/MCL (ref 0.1–1.3)
MONOCYTES NFR BLD AUTO: 8.1 %
NEUTROPHILS # BLD AUTO: 5.83 X10(3)/MCL (ref 2.1–9.2)
NEUTROPHILS NFR BLD AUTO: 67.2 %
NRBC BLD AUTO-RTO: 0 %
PCO2 BLDA: 36 MMHG (ref 35–45)
PH SMN: 7.43 [PH] (ref 7.35–7.45)
PHOSPHATE SERPL-MCNC: 1.9 MG/DL (ref 2.3–4.7)
PLATELET # BLD AUTO: 169 X10(3)/MCL (ref 130–400)
PMV BLD AUTO: 10.6 FL (ref 7.4–10.4)
PO2 BLDA: 73 MMHG (ref 80–100)
POC BASE DEFICIT: -0.3 MMOL/L (ref -2–2)
POC COHB: 2.1 %
POC IONIZED CALCIUM: 1.05 MMOL/L (ref 1.12–1.23)
POC METHB: 1.1 % (ref 0.4–1.5)
POC O2HB: 95.4 % (ref 94–97)
POC SATURATED O2: 94.9 %
POC TEMPERATURE: 37 °C
POCT GLUCOSE: 117 MG/DL (ref 70–110)
POTASSIUM BLD-SCNC: 3.9 MMOL/L (ref 3.5–5)
POTASSIUM SERPL-SCNC: 4.3 MMOL/L (ref 3.5–5.1)
PREALB SERPL-MCNC: 15.9 MG/DL (ref 18–45)
PROT SERPL-MCNC: 4.8 GM/DL (ref 6.4–8.3)
RBC # BLD AUTO: 2.8 X10(6)/MCL (ref 4.7–6.1)
SODIUM BLD-SCNC: 135 MMOL/L (ref 137–145)
SODIUM SERPL-SCNC: 143 MMOL/L (ref 136–145)
SPECIMEN SOURCE: ABNORMAL
TROPONIN I SERPL-MCNC: 2.12 NG/ML (ref 0–0.04)
TROPONIN I SERPL-MCNC: 3 NG/ML (ref 0–0.04)
WBC # SPEC AUTO: 8.7 X10(3)/MCL (ref 4.5–11.5)

## 2023-02-07 PROCEDURE — 94761 N-INVAS EAR/PLS OXIMETRY MLT: CPT

## 2023-02-07 PROCEDURE — 94640 AIRWAY INHALATION TREATMENT: CPT

## 2023-02-07 PROCEDURE — 80053 COMPREHEN METABOLIC PANEL: CPT

## 2023-02-07 PROCEDURE — 85730 THROMBOPLASTIN TIME PARTIAL: CPT | Performed by: SURGERY

## 2023-02-07 PROCEDURE — 93010 ELECTROCARDIOGRAM REPORT: CPT | Mod: ,,, | Performed by: INTERNAL MEDICINE

## 2023-02-07 PROCEDURE — 97167 OT EVAL HIGH COMPLEX 60 MIN: CPT

## 2023-02-07 PROCEDURE — 63600175 PHARM REV CODE 636 W HCPCS: Performed by: NURSE PRACTITIONER

## 2023-02-07 PROCEDURE — 85730 THROMBOPLASTIN TIME PARTIAL: CPT | Performed by: STUDENT IN AN ORGANIZED HEALTH CARE EDUCATION/TRAINING PROGRAM

## 2023-02-07 PROCEDURE — 86140 C-REACTIVE PROTEIN: CPT

## 2023-02-07 PROCEDURE — 27000221 HC OXYGEN, UP TO 24 HOURS

## 2023-02-07 PROCEDURE — 83735 ASSAY OF MAGNESIUM: CPT | Performed by: NURSE PRACTITIONER

## 2023-02-07 PROCEDURE — 51701 INSERT BLADDER CATHETER: CPT

## 2023-02-07 PROCEDURE — 82803 BLOOD GASES ANY COMBINATION: CPT

## 2023-02-07 PROCEDURE — 25000242 PHARM REV CODE 250 ALT 637 W/ HCPCS: Performed by: SURGERY

## 2023-02-07 PROCEDURE — 85025 COMPLETE CBC W/AUTO DIFF WBC: CPT

## 2023-02-07 PROCEDURE — 97163 PT EVAL HIGH COMPLEX 45 MIN: CPT

## 2023-02-07 PROCEDURE — 84484 ASSAY OF TROPONIN QUANT: CPT | Performed by: STUDENT IN AN ORGANIZED HEALTH CARE EDUCATION/TRAINING PROGRAM

## 2023-02-07 PROCEDURE — 84100 ASSAY OF PHOSPHORUS: CPT | Performed by: STUDENT IN AN ORGANIZED HEALTH CARE EDUCATION/TRAINING PROGRAM

## 2023-02-07 PROCEDURE — 37799 UNLISTED PX VASCULAR SURGERY: CPT

## 2023-02-07 PROCEDURE — 63600175 PHARM REV CODE 636 W HCPCS

## 2023-02-07 PROCEDURE — 99900035 HC TECH TIME PER 15 MIN (STAT)

## 2023-02-07 PROCEDURE — 92610 EVALUATE SWALLOWING FUNCTION: CPT

## 2023-02-07 PROCEDURE — 25000003 PHARM REV CODE 250

## 2023-02-07 PROCEDURE — 63600175 PHARM REV CODE 636 W HCPCS: Performed by: STUDENT IN AN ORGANIZED HEALTH CARE EDUCATION/TRAINING PROGRAM

## 2023-02-07 PROCEDURE — 93005 ELECTROCARDIOGRAM TRACING: CPT

## 2023-02-07 PROCEDURE — 93010 EKG 12-LEAD: ICD-10-PCS | Mod: ,,, | Performed by: INTERNAL MEDICINE

## 2023-02-07 PROCEDURE — 63600175 PHARM REV CODE 636 W HCPCS: Performed by: SURGERY

## 2023-02-07 PROCEDURE — 25000003 PHARM REV CODE 250: Performed by: STUDENT IN AN ORGANIZED HEALTH CARE EDUCATION/TRAINING PROGRAM

## 2023-02-07 PROCEDURE — 51798 US URINE CAPACITY MEASURE: CPT

## 2023-02-07 PROCEDURE — 84134 ASSAY OF PREALBUMIN: CPT

## 2023-02-07 PROCEDURE — 11000001 HC ACUTE MED/SURG PRIVATE ROOM

## 2023-02-07 RX ADMIN — HEPARIN SODIUM 20 UNITS/KG/HR: 10000 INJECTION, SOLUTION INTRAVENOUS at 05:02

## 2023-02-07 RX ADMIN — LAMOTRIGINE 25 MG: 25 TABLET ORAL at 08:02

## 2023-02-07 RX ADMIN — METHOCARBAMOL 750 MG: 100 INJECTION INTRAMUSCULAR; INTRAVENOUS at 01:02

## 2023-02-07 RX ADMIN — METHOCARBAMOL 750 MG: 100 INJECTION INTRAMUSCULAR; INTRAVENOUS at 10:02

## 2023-02-07 RX ADMIN — OXYCODONE 5 MG: 5 TABLET ORAL at 03:02

## 2023-02-07 RX ADMIN — METOPROLOL TARTRATE 25 MG: 25 TABLET, FILM COATED ORAL at 08:02

## 2023-02-07 RX ADMIN — OXYCODONE 5 MG: 5 TABLET ORAL at 09:02

## 2023-02-07 RX ADMIN — BUDESONIDE 0.5 MG: 0.5 INHALANT RESPIRATORY (INHALATION) at 08:02

## 2023-02-07 RX ADMIN — BUDESONIDE 0.5 MG: 0.5 INHALANT RESPIRATORY (INHALATION) at 11:02

## 2023-02-07 RX ADMIN — POLYETHYLENE GLYCOL 3350 17 G: 17 POWDER, FOR SOLUTION ORAL at 08:02

## 2023-02-07 RX ADMIN — ACETAMINOPHEN 650 MG: 325 TABLET, FILM COATED ORAL at 10:02

## 2023-02-07 RX ADMIN — HEPARIN SODIUM 20 UNITS/KG/HR: 10000 INJECTION, SOLUTION INTRAVENOUS at 03:02

## 2023-02-07 RX ADMIN — DEXMEDETOMIDINE HYDROCHLORIDE 0.7 MCG/KG/HR: 400 INJECTION INTRAVENOUS at 03:02

## 2023-02-07 RX ADMIN — FLUOXETINE HYDROCHLORIDE 40 MG: 20 CAPSULE ORAL at 08:02

## 2023-02-07 RX ADMIN — DEXMEDETOMIDINE HYDROCHLORIDE 0.5 MCG/KG/HR: 400 INJECTION INTRAVENOUS at 05:02

## 2023-02-07 RX ADMIN — FAMOTIDINE 20 MG: 20 TABLET, FILM COATED ORAL at 08:02

## 2023-02-07 RX ADMIN — QUETIAPINE FUMARATE 200 MG: 100 TABLET ORAL at 08:02

## 2023-02-07 RX ADMIN — SODIUM CHLORIDE: 9 INJECTION, SOLUTION INTRAVENOUS at 05:02

## 2023-02-07 RX ADMIN — ACETAMINOPHEN 650 MG: 325 TABLET, FILM COATED ORAL at 05:02

## 2023-02-07 RX ADMIN — HYDROMORPHONE HYDROCHLORIDE 0.5 MG: 2 INJECTION, SOLUTION INTRAMUSCULAR; INTRAVENOUS; SUBCUTANEOUS at 07:02

## 2023-02-07 RX ADMIN — ACETAMINOPHEN 650 MG: 325 TABLET, FILM COATED ORAL at 09:02

## 2023-02-07 RX ADMIN — DOCUSATE SODIUM 100 MG: 100 CAPSULE, LIQUID FILLED ORAL at 08:02

## 2023-02-07 RX ADMIN — OXYCODONE 5 MG: 5 TABLET ORAL at 08:02

## 2023-02-07 RX ADMIN — MORPHINE SULFATE 2 MG: 4 INJECTION INTRAVENOUS at 10:02

## 2023-02-07 RX ADMIN — METHOCARBAMOL 750 MG: 100 INJECTION INTRAMUSCULAR; INTRAVENOUS at 05:02

## 2023-02-07 RX ADMIN — Medication 6 MG: at 08:02

## 2023-02-07 RX ADMIN — ACETAMINOPHEN 650 MG: 325 TABLET, FILM COATED ORAL at 01:02
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
## 2023-02-07 NOTE — NURSING
Nurses Note -- 4 Eyes      2/7/2023   5:22 AM      Skin assessed during: Daily Assessment      [x] No Pressure Injuries Present    [x]Prevention Measures Documented      [] Yes- Altered Skin Integrity Present or Discovered   [] LDA Added if Not in Epic (Describe Wound)   [] New Altered Skin Integrity was Present on Admit and Documented in LDA   [] Wound Image Taken    Wound Care Consulted? No    Attending Nurse:  Talia Mae RN     Second RN/Staff Member:  Jazmyn Valenzuela RN
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
## 2023-02-07 NOTE — NURSING
Nurses Note -- 4 Eyes      2/6/2022  4:00 PM      Skin assessed during: Daily Assessment      [x] No Pressure Injuries Present    [x]Prevention Measures Documented      [] Yes- Altered Skin Integrity Present or Discovered   [] LDA Added if Not in Epic (Describe Wound)   [] New Altered Skin Integrity was Present on Admit and Documented in LDA   [] Wound Image Taken    Wound Care Consulted? No    Attending Nurse:  Davide Conway RN     Second RN/Staff Member: IFRAH Cardona RN

## 2023-02-07 NOTE — PROGRESS NOTES
"Inpatient Nutrition Evaluation    Admit Date: 2/5/2023   Total duration of encounter: 2 days    Nutrition Recommendation/Prescription     Continue current diet as tolerated.  If po intake <50% of meals, will need to consider adding ONS.    Nutrition Assessment     Chart Review    Reason Seen: malnutrition screening tool (MST)    Malnutrition Screening Tool Results   Have you recently lost weight without trying?: Unsure  Have you been eating poorly because of a decreased appetite?: No   MST Score: 2     Diagnosis:  Left proximal femoral shaft fracture  Left 5th metacarpal fracture  Pulmonary contusion  Multiple scalp lacerations    Relevant Medical History: none noted    Nutrition-Related Medications: NS @ 75ml/hr, docusate, miralax    Nutrition-Related Labs:  2/7 Cl 112, Phos 1.9, PAB 15.9, CRP >240    Diet Order: Diet Easy to Chew  Oral Supplement Order: none  Appetite/Oral Intake: good/not applicable  Factors Affecting Nutritional Intake: none identified  Food/Cheondoism/Cultural Preferences: none reported  Food Allergies: none reported       Wound(s):   traumatic injuries noted    Comments    2/7/23: Noted MST indicates unsure wt loss PTA. No wt changes. Pt currently with good appetite, awaiting first solid food meal.    Anthropometrics    Height: 6' 3" (190.5 cm)    Last Weight: 100 kg (220 lb 7.4 oz) (02/05/23 1015)    BMI (Calculated): 27.6  BMI Classification: overweight (BMI 25-29.9)        Ideal Body Weight (IBW), Male: 196 lb     % Ideal Body Weight, Male (lb): 112.48 %                          Usual Weight Provided By: not applicable    Wt Readings from Last 3 Encounters:   02/05/23 1015 100 kg (220 lb 7.4 oz)      Weight Change(s) Since Admission:  Admit Weight: 100 kg (220 lb 7.4 oz) (02/05/23 1015)    Patient Education    Not applicable.    Monitoring & Evaluation     Dietitian will monitor energy intake.  Nutrition Risk/Follow-Up: low (follow-up in 5-7 days)  Patients assigned 'low nutrition risk' " status do not qualify for a full nutritional assessment but will be monitored and re-evaluated in a 5-7 day time period. Please consult if re-evaluation needed sooner.

## 2023-02-07 NOTE — NURSING
Nurses Note -- 4 Eyes      2/7/2023   5:13 PM      Skin assessed during: Daily Assessment      [x] No Pressure Injuries Present    [x]Prevention Measures Documented      [] Yes- Altered Skin Integrity Present or Discovered   [] LDA Added if Not in Epic (Describe Wound)   [] New Altered Skin Integrity was Present on Admit and Documented in LDA   [] Wound Image Taken    Wound Care Consulted? No    Attending Nurse:  Davide Conway RN     Second RN/Staff Member:  MARIANA Moreno RN

## 2023-02-07 NOTE — PLAN OF CARE
Problem: Occupational Therapy  Goal: Occupational Therapy Goal  Description: Goals to be met by: 3/7/23     Patient will increase functional independence with ADLs by performing:    UE Dressing with Modified [place].  LE Dressing with Modified [place].  Toileting from toilet with Modified [place] for hygiene and clothing management.   Toilet transfer to toilet with Modified [place].    Outcome: Ongoing, Progressing

## 2023-02-07 NOTE — PT/OT/SLP EVAL
"Speech Language Pathology Department  Clinical Swallow Evaluation    Patient Name:  Gera Chavez   MRN:  [id number]  Admitting Diagnosis: Fracture of proximal end of left femur, open type I or II, initial encounter    Recommendations:     General recommendations:  SLP follow up x1 and Speech/Language and Cognitive Evaluation  Diet recommendations:  Easy to Chew Diet - IDDSI Level 7, Liquid Diet Level: Thin   Swallow strategies/precautions: small bites/sips and slow rate  Precautions: Standard,      History:     No past medical history on file.  Past Surgical History:   Procedure Laterality Date    INTRAMEDULLARY RODDING OF FEMUR Left 2/5/2023    Procedure: INSERTION, INTRAMEDULLARY NOEL, FEMUR;  Surgeon: Tuan Zepeda DO;  Location: [facility];  Service: Orthopedics;  Laterality: Left;     SAH  Prior Intubation HX:  s/p extubation  Home Diet: Regular and thin liquids  Current Method of Nutrition: NPO  Patient complaint: denies    Subjective     Patient awake and alert.  Patient goals: "to eat"   Spiritual/Cultural/Yazidi Beliefs/Practices that affect care: no  Pain/Comfort:  0  Respiratory Status: oxymask    Objective:     Oral Musculature Evaluation  Oral Musculature: WFL  Dentition: present and adequate  Secretion Management: adequate    Consistency Fed By Oral Symptoms Pharyngeal Symptoms   Thin liquid by straw SLP None None   Puree SLP None None   Chewable solid SLP None None     Assessment:     No signs/sx of aspiration observed at bedside. REC: initiate diet.     Goals:   Multidisciplinary Problems       SLP Goals       Not on file                  Patient Education:     Patient provided with verbal education regarding POC.  Understanding was verbalized.    Plan:     SLP Follow-Up:      Patient to be seen:      Plan of Care expires:     Plan of Care reviewed with:  patient      Time Tracking:     SLP Treatment Date:    2/7/23  Speech Start Time:   0900  Speech Stop Time:      0915  Speech Total Time (min):   " 15    Billable minutes:  Swallow and Oral Function Evaluation, 15      02/07/2023
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
## 2023-02-07 NOTE — PT/OT/SLP PROGRESS
Occupational Therapy      Patient Name:  Gera Chavez   MRN:  [id number]    Discussed with ortho MD and ortho PA, pt is NWB L hand.  OT to fabricate splint 2/8 per ortho request.      2/7/2023
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
## 2023-02-07 NOTE — PLAN OF CARE
Problem: Physical Therapy  Goal: Physical Therapy Goal  Description: Goals to be met by: 23     Patient will increase functional independence with mobility by performin. Supine to sit with Stand-by Assistance  2. Sit to supine with Stand-by Assistance  3. Sit to stand transfer with Stand-by Assistance  4. Gait  x 100 feet with Modified [place] using LRAD.. pending tolerance, ortho clearance of hand, and devices provided in half-way    Outcome: Ongoing, Progressing

## 2023-02-07 NOTE — PLAN OF CARE
Problem: Adult Inpatient Plan of Care  Goal: Plan of Care Review  Outcome: Ongoing, Progressing  Goal: Patient-Specific Goal (Individualized)  Outcome: Ongoing, Progressing  Goal: Absence of Hospital-Acquired Illness or Injury  Outcome: Ongoing, Progressing  Goal: Optimal Comfort and Wellbeing  Outcome: Ongoing, Progressing  Goal: Readiness for Transition of Care  Outcome: Ongoing, Progressing     Problem: Infection  Goal: Absence of Infection Signs and Symptoms  Outcome: Ongoing, Progressing     Problem: Impaired Wound Healing  Goal: Optimal Wound Healing  Outcome: Ongoing, Progressing     Problem: Fall Injury Risk  Goal: Absence of Fall and Fall-Related Injury  Outcome: Ongoing, Progressing     Problem: Skin Injury Risk Increased  Goal: Skin Health and Integrity  Outcome: Ongoing, Progressing     Problem: Adjustment to Illness (Delirium)  Goal: Optimal Coping  Outcome: Ongoing, Progressing     Problem: Altered Behavior (Delirium)  Goal: Improved Behavioral Control  Outcome: Ongoing, Progressing     Problem: Attention and Thought Clarity Impairment (Delirium)  Goal: Improved Attention and Thought Clarity  Outcome: Ongoing, Progressing     Problem: Sleep Disturbance (Delirium)  Goal: Improved Sleep  Outcome: Ongoing, Progressing     Problem: Pain Acute  Goal: Acceptable Pain Control and Functional Ability  Outcome: Ongoing, Progressing
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
## 2023-02-08 LAB
ALBUMIN SERPL-MCNC: 2.3 G/DL (ref 3.5–5)
ALBUMIN/GLOB SERPL: 0.7 RATIO (ref 1.1–2)
ALP SERPL-CCNC: 46 UNIT/L (ref 40–150)
ALT SERPL-CCNC: 32 UNIT/L (ref 0–55)
APPEARANCE UR: CLEAR
APTT PPP: 53.5 SECONDS (ref 23.2–33.7)
AST SERPL-CCNC: 93 UNIT/L (ref 5–34)
BACTERIA #/AREA URNS AUTO: NORMAL /HPF
BASOPHILS # BLD AUTO: 0.07 X10(3)/MCL (ref 0–0.2)
BASOPHILS NFR BLD AUTO: 0.7 %
BILIRUB UR QL STRIP.AUTO: NEGATIVE MG/DL
BILIRUBIN DIRECT+TOT PNL SERPL-MCNC: 0.7 MG/DL
BUN SERPL-MCNC: 9 MG/DL (ref 8.9–20.6)
CALCIUM SERPL-MCNC: 8.4 MG/DL (ref 8.4–10.2)
CHLORIDE SERPL-SCNC: 107 MMOL/L (ref 98–107)
CO2 SERPL-SCNC: 18 MMOL/L (ref 22–29)
COLOR UR AUTO: YELLOW
CREAT SERPL-MCNC: 0.71 MG/DL (ref 0.73–1.18)
EOSINOPHIL # BLD AUTO: 0.06 X10(3)/MCL (ref 0–0.9)
EOSINOPHIL NFR BLD AUTO: 0.6 %
ERYTHROCYTE [DISTWIDTH] IN BLOOD BY AUTOMATED COUNT: 13.3 % (ref 11.5–17)
GFR SERPLBLD CREATININE-BSD FMLA CKD-EPI: >60 MLS/MIN/1.73/M2
GLOBULIN SER-MCNC: 3.3 GM/DL (ref 2.4–3.5)
GLUCOSE SERPL-MCNC: 113 MG/DL (ref 74–100)
GLUCOSE UR QL STRIP.AUTO: ABNORMAL MG/DL
HCT VFR BLD AUTO: 21.4 % (ref 42–52)
HGB BLD-MCNC: 7.4 GM/DL (ref 14–18)
IMM GRANULOCYTES # BLD AUTO: 0.19 X10(3)/MCL (ref 0–0.04)
IMM GRANULOCYTES NFR BLD AUTO: 1.8 %
KETONES UR QL STRIP.AUTO: ABNORMAL MG/DL
LEUKOCYTE ESTERASE UR QL STRIP.AUTO: NEGATIVE UNIT/L
LYMPHOCYTES # BLD AUTO: 1.59 X10(3)/MCL (ref 0.6–4.6)
LYMPHOCYTES NFR BLD AUTO: 15.3 %
MCH RBC QN AUTO: 29 PG
MCHC RBC AUTO-ENTMCNC: 34.6 MG/DL (ref 33–36)
MCV RBC AUTO: 83.9 FL (ref 80–94)
MONOCYTES # BLD AUTO: 0.79 X10(3)/MCL (ref 0.1–1.3)
MONOCYTES NFR BLD AUTO: 7.6 %
NEUTROPHILS # BLD AUTO: 7.71 X10(3)/MCL (ref 2.1–9.2)
NEUTROPHILS NFR BLD AUTO: 74 %
NITRITE UR QL STRIP.AUTO: NEGATIVE
NRBC BLD AUTO-RTO: 0 %
PH UR STRIP.AUTO: 5.5 [PH]
PHOSPHATE SERPL-MCNC: 2.5 MG/DL (ref 2.3–4.7)
PLATELET # BLD AUTO: 162 X10(3)/MCL (ref 130–400)
PMV BLD AUTO: 11.9 FL (ref 7.4–10.4)
POCT GLUCOSE: 116 MG/DL (ref 70–110)
POCT GLUCOSE: 133 MG/DL (ref 70–110)
POTASSIUM SERPL-SCNC: 3.8 MMOL/L (ref 3.5–5.1)
PROT SERPL-MCNC: 5.6 GM/DL (ref 6.4–8.3)
PROT UR QL STRIP.AUTO: ABNORMAL MG/DL
RBC # BLD AUTO: 2.55 X10(6)/MCL (ref 4.7–6.1)
RBC #/AREA URNS AUTO: <5 /HPF
RBC UR QL AUTO: NEGATIVE UNIT/L
SODIUM SERPL-SCNC: 136 MMOL/L (ref 136–145)
SP GR UR STRIP.AUTO: 1.02 (ref 1–1.03)
SQUAMOUS #/AREA URNS AUTO: <5 /HPF
UROBILINOGEN UR STRIP-ACNC: 1 MG/DL
WBC # SPEC AUTO: 10.4 X10(3)/MCL (ref 4.5–11.5)
WBC #/AREA URNS AUTO: <5 /HPF

## 2023-02-08 PROCEDURE — 51798 US URINE CAPACITY MEASURE: CPT

## 2023-02-08 PROCEDURE — 85730 THROMBOPLASTIN TIME PARTIAL: CPT | Performed by: STUDENT IN AN ORGANIZED HEALTH CARE EDUCATION/TRAINING PROGRAM

## 2023-02-08 PROCEDURE — 99900035 HC TECH TIME PER 15 MIN (STAT)

## 2023-02-08 PROCEDURE — 20800000 HC ICU TRAUMA

## 2023-02-08 PROCEDURE — 63600175 PHARM REV CODE 636 W HCPCS: Performed by: SURGERY

## 2023-02-08 PROCEDURE — 94640 AIRWAY INHALATION TREATMENT: CPT

## 2023-02-08 PROCEDURE — 25000003 PHARM REV CODE 250: Performed by: STUDENT IN AN ORGANIZED HEALTH CARE EDUCATION/TRAINING PROGRAM

## 2023-02-08 PROCEDURE — 63600175 PHARM REV CODE 636 W HCPCS: Performed by: NURSE PRACTITIONER

## 2023-02-08 PROCEDURE — 25000003 PHARM REV CODE 250

## 2023-02-08 PROCEDURE — 11000001 HC ACUTE MED/SURG PRIVATE ROOM

## 2023-02-08 PROCEDURE — 97530 THERAPEUTIC ACTIVITIES: CPT

## 2023-02-08 PROCEDURE — 92523 SPEECH SOUND LANG COMPREHEN: CPT

## 2023-02-08 PROCEDURE — 25000003 PHARM REV CODE 250: Performed by: NURSE PRACTITIONER

## 2023-02-08 PROCEDURE — 85025 COMPLETE CBC W/AUTO DIFF WBC: CPT

## 2023-02-08 PROCEDURE — 63600175 PHARM REV CODE 636 W HCPCS: Performed by: STUDENT IN AN ORGANIZED HEALTH CARE EDUCATION/TRAINING PROGRAM

## 2023-02-08 PROCEDURE — 80053 COMPREHEN METABOLIC PANEL: CPT

## 2023-02-08 PROCEDURE — 97535 SELF CARE MNGMENT TRAINING: CPT

## 2023-02-08 PROCEDURE — 81001 URINALYSIS AUTO W/SCOPE: CPT | Performed by: STUDENT IN AN ORGANIZED HEALTH CARE EDUCATION/TRAINING PROGRAM

## 2023-02-08 PROCEDURE — 25000242 PHARM REV CODE 250 ALT 637 W/ HCPCS: Performed by: SURGERY

## 2023-02-08 PROCEDURE — 84100 ASSAY OF PHOSPHORUS: CPT | Performed by: STUDENT IN AN ORGANIZED HEALTH CARE EDUCATION/TRAINING PROGRAM

## 2023-02-08 PROCEDURE — 94761 N-INVAS EAR/PLS OXIMETRY MLT: CPT

## 2023-02-08 RX ORDER — FLUOXETINE HYDROCHLORIDE 20 MG/1
CAPSULE ORAL
COMMUNITY
End: 2023-08-14

## 2023-02-08 RX ORDER — CLONAZEPAM 1 MG/1
1 TABLET ORAL 2 TIMES DAILY PRN
Status: ON HOLD | COMMUNITY
End: 2023-06-26 | Stop reason: HOSPADM

## 2023-02-08 RX ORDER — DEXTROAMPHETAMINE SACCHARATE, AMPHETAMINE ASPARTATE MONOHYDRATE, DEXTROAMPHETAMINE SULFATE AND AMPHETAMINE SULFATE 6.25; 6.25; 6.25; 6.25 MG/1; MG/1; MG/1; MG/1
25 CAPSULE, EXTENDED RELEASE ORAL 2 TIMES DAILY
Status: ON HOLD | COMMUNITY
End: 2023-06-19

## 2023-02-08 RX ORDER — INSULIN GLARGINE 100 [IU]/ML
INJECTION, SOLUTION SUBCUTANEOUS
COMMUNITY
Start: 2023-01-18 | End: 2024-01-29

## 2023-02-08 RX ORDER — AZITHROMYCIN 250 MG/1
500 TABLET, FILM COATED ORAL DAILY
Status: ON HOLD | COMMUNITY
End: 2023-06-19

## 2023-02-08 RX ORDER — ENOXAPARIN SODIUM 100 MG/ML
40 INJECTION SUBCUTANEOUS EVERY 12 HOURS
Status: DISCONTINUED | OUTPATIENT
Start: 2023-02-08 | End: 2023-02-14 | Stop reason: HOSPADM

## 2023-02-08 RX ORDER — TESTOSTERONE CYPIONATE 200 MG/ML
INJECTION, SOLUTION INTRAMUSCULAR
COMMUNITY
Start: 2023-01-11 | End: 2023-08-14

## 2023-02-08 RX ORDER — AMOXICILLIN AND CLAVULANATE POTASSIUM 875; 125 MG/1; MG/1
1 TABLET, FILM COATED ORAL
Status: ON HOLD | COMMUNITY
End: 2023-06-26 | Stop reason: HOSPADM

## 2023-02-08 RX ORDER — LAMOTRIGINE 25 MG/1
TABLET ORAL
COMMUNITY
End: 2023-08-14

## 2023-02-08 RX ORDER — POTASSIUM CHLORIDE 1500 MG/1
TABLET, EXTENDED RELEASE ORAL
COMMUNITY
End: 2023-08-14

## 2023-02-08 RX ORDER — LISINOPRIL 40 MG/1
40 TABLET ORAL
COMMUNITY
Start: 2023-01-19 | End: 2023-08-14

## 2023-02-08 RX ORDER — QUETIAPINE FUMARATE 200 MG/1
200 TABLET, FILM COATED ORAL NIGHTLY
COMMUNITY
Start: 2023-01-19 | End: 2023-09-14

## 2023-02-08 RX ORDER — MIRTAZAPINE 7.5 MG/1
TABLET, FILM COATED ORAL
COMMUNITY
End: 2023-08-14

## 2023-02-08 RX ORDER — ATORVASTATIN CALCIUM 20 MG/1
20 TABLET, FILM COATED ORAL DAILY
COMMUNITY
End: 2023-09-14

## 2023-02-08 RX ORDER — ALBUTEROL SULFATE 90 UG/1
2 AEROSOL, METERED RESPIRATORY (INHALATION) EVERY 4 HOURS PRN
COMMUNITY
Start: 2023-01-11 | End: 2023-08-14

## 2023-02-08 RX ORDER — PROCHLORPERAZINE MALEATE 10 MG
TABLET ORAL
Status: ON HOLD | COMMUNITY
End: 2023-06-26 | Stop reason: HOSPADM

## 2023-02-08 RX ORDER — AMLODIPINE BESYLATE 5 MG/1
5 TABLET ORAL DAILY
COMMUNITY
End: 2024-01-29

## 2023-02-08 RX ORDER — LATANOPROST 50 UG/ML
1 SOLUTION/ DROPS OPHTHALMIC DAILY
COMMUNITY

## 2023-02-08 RX ORDER — FLUOXETINE HYDROCHLORIDE 40 MG/1
40 CAPSULE ORAL DAILY
COMMUNITY

## 2023-02-08 RX ORDER — HYDRALAZINE HYDROCHLORIDE 20 MG/ML
10 INJECTION INTRAMUSCULAR; INTRAVENOUS EVERY 4 HOURS PRN
Status: DISCONTINUED | OUTPATIENT
Start: 2023-02-08 | End: 2023-02-08

## 2023-02-08 RX ORDER — INSULIN GLARGINE 100 [IU]/ML
INJECTION, SOLUTION SUBCUTANEOUS
COMMUNITY
End: 2024-01-29

## 2023-02-08 RX ORDER — TAMSULOSIN HYDROCHLORIDE 0.4 MG/1
0.4 CAPSULE ORAL DAILY
Status: DISCONTINUED | OUTPATIENT
Start: 2023-02-08 | End: 2023-02-14 | Stop reason: HOSPADM

## 2023-02-08 RX ORDER — IPRATROPIUM BROMIDE 42 UG/1
SPRAY, METERED NASAL
COMMUNITY
Start: 2023-01-11 | End: 2023-08-14

## 2023-02-08 RX ORDER — HYDROMORPHONE HYDROCHLORIDE 2 MG/ML
0.5 INJECTION, SOLUTION INTRAMUSCULAR; INTRAVENOUS; SUBCUTANEOUS
Status: DISCONTINUED | OUTPATIENT
Start: 2023-02-08 | End: 2023-02-10

## 2023-02-08 RX ADMIN — LAMOTRIGINE 25 MG: 25 TABLET ORAL at 08:02

## 2023-02-08 RX ADMIN — POLYETHYLENE GLYCOL 3350 17 G: 17 POWDER, FOR SOLUTION ORAL at 08:02

## 2023-02-08 RX ADMIN — TAMSULOSIN HYDROCHLORIDE 0.4 MG: 0.4 CAPSULE ORAL at 08:02

## 2023-02-08 RX ADMIN — HYDROMORPHONE HYDROCHLORIDE 0.5 MG: 2 INJECTION, SOLUTION INTRAMUSCULAR; INTRAVENOUS; SUBCUTANEOUS at 08:02

## 2023-02-08 RX ADMIN — METHOCARBAMOL 750 MG: 100 INJECTION INTRAMUSCULAR; INTRAVENOUS at 09:02

## 2023-02-08 RX ADMIN — ACETAMINOPHEN 650 MG: 325 TABLET, FILM COATED ORAL at 02:02

## 2023-02-08 RX ADMIN — OXYCODONE 5 MG: 5 TABLET ORAL at 10:02

## 2023-02-08 RX ADMIN — METHOCARBAMOL 750 MG: 100 INJECTION INTRAMUSCULAR; INTRAVENOUS at 06:02

## 2023-02-08 RX ADMIN — QUETIAPINE FUMARATE 200 MG: 100 TABLET ORAL at 08:02

## 2023-02-08 RX ADMIN — METOPROLOL TARTRATE 25 MG: 25 TABLET, FILM COATED ORAL at 08:02

## 2023-02-08 RX ADMIN — OXYCODONE 5 MG: 5 TABLET ORAL at 03:02

## 2023-02-08 RX ADMIN — FLUOXETINE HYDROCHLORIDE 40 MG: 20 CAPSULE ORAL at 08:02

## 2023-02-08 RX ADMIN — METHOCARBAMOL 750 MG: 100 INJECTION INTRAMUSCULAR; INTRAVENOUS at 02:02

## 2023-02-08 RX ADMIN — BUDESONIDE 0.5 MG: 0.5 INHALANT RESPIRATORY (INHALATION) at 07:02

## 2023-02-08 RX ADMIN — ACETAMINOPHEN 650 MG: 325 TABLET, FILM COATED ORAL at 06:02

## 2023-02-08 RX ADMIN — ENOXAPARIN SODIUM 40 MG: 40 INJECTION SUBCUTANEOUS at 08:02

## 2023-02-08 RX ADMIN — DOCUSATE SODIUM 100 MG: 100 CAPSULE, LIQUID FILLED ORAL at 08:02

## 2023-02-08 RX ADMIN — BUDESONIDE 0.5 MG: 0.5 INHALANT RESPIRATORY (INHALATION) at 08:02

## 2023-02-08 RX ADMIN — OXYCODONE 5 MG: 5 TABLET ORAL at 11:02

## 2023-02-08 RX ADMIN — ACETAMINOPHEN 650 MG: 325 TABLET, FILM COATED ORAL at 10:02
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
## 2023-02-08 NOTE — PLAN OF CARE
Problem: Physical Therapy  Goal: Physical Therapy Goal  Description: Goals to be met by: 23     Patient will increase functional independence with mobility by performin. Supine to sit with Stand-by Assistance  2. Sit to supine with Stand-by Assistance  3. Sit to stand transfer with Stand-by Assistance using platform walker vs equipment that will be allowed in [facility]  4. Gait  x 100 feet with Modified [place] using platform walker vs equipment that will be allowed in [facility]  Outcome: Ongoing, Progressing

## 2023-02-08 NOTE — PLAN OF CARE
Problem: Adult Inpatient Plan of Care  Goal: Plan of Care Review  Outcome: Ongoing, Progressing  Goal: Patient-Specific Goal (Individualized)  Outcome: Ongoing, Progressing  Goal: Absence of Hospital-Acquired Illness or Injury  Outcome: Ongoing, Progressing  Goal: Optimal Comfort and Wellbeing  Outcome: Ongoing, Progressing  Goal: Readiness for Transition of Care  Outcome: Ongoing, Progressing     Problem: Infection  Goal: Absence of Infection Signs and Symptoms  Outcome: Ongoing, Progressing     Problem: Impaired Wound Healing  Goal: Optimal Wound Healing  Outcome: Ongoing, Progressing     Problem: Fall Injury Risk  Goal: Absence of Fall and Fall-Related Injury  Outcome: Ongoing, Progressing     Problem: Skin Injury Risk Increased  Goal: Skin Health and Integrity  Outcome: Ongoing, Progressing     Problem: Adjustment to Illness (Delirium)  Goal: Optimal Coping  Outcome: Ongoing, Progressing     Problem: Pain Acute  Goal: Acceptable Pain Control and Functional Ability  Outcome: Ongoing, Progressing
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
## 2023-02-08 NOTE — PT/OT/SLP PROGRESS
Physical Therapy  Treatment    Gera Chavez   MRN: [id number]   Admitting Diagnosis: Fracture of proximal end of left femur, open type I or II, initial encounter    PT Received On: 02/08/23  PT Start Time: 0935     PT Stop Time: 0955    PT Total Time (min): 20 min       Billable Minutes:  Therapeutic Activity 20    Treatment Type: Treatment  PT/PTA: PT     PTA Visit Number: 1       General Precautions: Standard,    Orthopedic Precautions:  (WBAT L LE, pending WBing orders from ortho for L hand)  Braces:    Respiratory Status: Room air    Spiritual, Cultural Beliefs, Adventist Practices, Values that Affect Care: no    Subjective:  Communicated with RN prior to session.    Functional Mobility:  Sup>sit, mod A. Sitting balance, SBA. Sit>std w/ platform walker, min A for safety. Pt took a few steps to bedside commode for +BM, mod A for t/f. Pt then performed sit>std w/ platform walker from commode, min A. Pt took a few steps to bedside recliner w/ mod A for safety. Pt positioned for comfort in bedside recliner w/ B LE elevated.      Patient left up in chair with all lines intact.    Assessment:  Gera Chavez is a 37 y.o. male with better tolerance to activity today and was able to take some steps with a platform walker. Pt would benefit from rehab placement if he does not go to long term.. I am also unsure what equipment pt will be able to have in long term. Platform walker is the ideal DME pt would need for discharge if this is possible..    Rehab identified problem list/impairments: weakness, impaired endurance, impaired functional mobility, impaired balance, pain    Rehab potential is good.    Activity tolerance: Good    Discharge recommendations:  (pt would benefit from rehab placement if pt does not go to long term)      Barriers to discharge:      Equipment recommendations:       GOALS:   Multidisciplinary Problems       Physical Therapy Goals          Problem: Physical Therapy    Goal Priority Disciplines Outcome Goal  Variances Interventions   Physical Therapy Goal     PT, PT/OT Ongoing, Progressing     Description: Goals to be met by: 23     Patient will increase functional independence with mobility by performin. Supine to sit with Stand-by Assistance  2. Sit to supine with Stand-by Assistance  3. Sit to stand transfer with Stand-by Assistance using platform walker vs equipment that will be allowed in correction  4. Gait  x 100 feet with Modified [place] using platform walker vs equipment that will be allowed in correction                       PLAN:    Patient to be seen 6 x/week to address the above listed problems via gait training, therapeutic activities, therapeutic exercises  Plan of Care expires: 23  Plan of Care reviewed with: patient         2023
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
## 2023-02-08 NOTE — NURSING
Nurses Note -- 4 Eyes      2/8/2023   4:54 AM      Skin assessed during: Daily Assessment      [x] No Pressure Injuries Present    [x]Prevention Measures Documented      [] Yes- Altered Skin Integrity Present or Discovered   [] LDA Added if Not in Epic (Describe Wound)   [] New Altered Skin Integrity was Present on Admit and Documented in LDA   [] Wound Image Taken    Wound Care Consulted? No    Attending Nurse:  Talia Mae RN     Second RN/Staff Member:  Jazmyn Valenzuela RN

## 2023-02-08 NOTE — PT/OT/SLP EVAL
"Speech Language Pathology Department  Cognitive-Communication Evaluation    Patient Name:  Gera Chavez   MRN:  [id number]  Admitting Diagnosis: Fracture of proximal end of left femur, open type I or II, initial encounter    Recommendations:     General recommendations:  Speech/Language and Cognitive Evaluation  Communication strategies:  provide increased time to answer    History:     No past medical history on file.  Past Surgical History:   Procedure Laterality Date    INTRAMEDULLARY RODDING OF FEMUR Left 2/5/2023    Procedure: INSERTION, INTRAMEDULLARY NOEL, FEMUR;  Surgeon: Tuan Zepeda DO;  Location: [facility];  Service: Orthopedics;  Laterality: Left;       Previous level of Function  Education: some high school  Occupation: unemployed  Lives: alone  Handed: Left  Glasses: no  Hearing Aids: no    Subjective     Patient awake, alert, and cooperative.  Patient goals: "to get better"   Spiritual/Cultural/[employer]ist Beliefs/Practices that affect care: no  Pain/Comfort: Pain Rating 1: 0/10    Objective:     SPEECH PRODUCTION  Phoneme Production: wfl  Voice Quality: wfl  Voice Production: wfl  Speech Rate: wfl  Loudness: wfl  Speech Intelligibility  Known Context: Greater that 90%  Unknown Context: Greater that 90%    AUDITORY COMPREHENSION  Identification:  Body parts: 100%  Objects: 100%  Following Directions:  1-Step: 100%  2-Step: 100%  Yes/No Questions:  Biographical: 100%  Environmental: 100%  Simple: 100%  Complex: 100%    VERBAL EXPRESSION  Automatic Speech:  Days of the week: 100%  Months of the year: 100%  Repetition:  Phonemes: 100%  Multi-syllabic words: 100%  Phrases: 100%  Phrase Completion: 100%  Confrontation Naming  Body Parts: 100%  Objects: 100%  Pictures: 100%  Wh- Questions:  Object name: 100%  Object function: 100%    COGNITION  Orientation:  Person: 100%  Place: impaired  Time: impaired  Situation: impaired  Memory:  Immediate: impaired  Delayed: impaired  Short Term: impaired  Long Term: " 90%  Problem Solving  Functional simple: impaired  Functional complex: impaired  Organization:  Convergent thinking: wnl  Divergent thinking: wnl    Assessment:     Pt presents with cognitive-linguistic deficits warranting further testing. Goals and POC to be determined once testing is completed.     Goals:   Multidisciplinary Problems       SLP Goals       Not on file                  Patient Education:     Patient provided with verbal education regarding POC.  Understanding was verbalized.    Plan:     SLP Follow-Up:      Patient to be seen:      Plan of Care expires:     Plan of Care reviewed with:  patient      Time Tracking:     SLP Treatment Date:    2/8/23  Speech Start Time:   0820  Speech Stop Time:      0840  Speech Total Time (min):   20    Billable minutes:  Evaluation of Speech Sound Production with Comprehension and Expression, 20 02/08/2023

## 2023-02-09 LAB
ALBUMIN SERPL-MCNC: 2.5 G/DL (ref 3.5–5)
ALBUMIN/GLOB SERPL: 0.7 RATIO (ref 1.1–2)
ALP SERPL-CCNC: 45 UNIT/L (ref 40–150)
ALT SERPL-CCNC: 31 UNIT/L (ref 0–55)
AST SERPL-CCNC: 69 UNIT/L (ref 5–34)
BASOPHILS # BLD AUTO: 0.02 X10(3)/MCL (ref 0–0.2)
BASOPHILS NFR BLD AUTO: 0.3 %
BILIRUBIN DIRECT+TOT PNL SERPL-MCNC: 1.1 MG/DL
BUN SERPL-MCNC: 5.9 MG/DL (ref 8.9–20.6)
CALCIUM SERPL-MCNC: 8.5 MG/DL (ref 8.4–10.2)
CHLORIDE SERPL-SCNC: 105 MMOL/L (ref 98–107)
CO2 SERPL-SCNC: 21 MMOL/L (ref 22–29)
CREAT SERPL-MCNC: 0.76 MG/DL (ref 0.73–1.18)
EOSINOPHIL # BLD AUTO: 0.07 X10(3)/MCL (ref 0–0.9)
EOSINOPHIL NFR BLD AUTO: 1.2 %
ERYTHROCYTE [DISTWIDTH] IN BLOOD BY AUTOMATED COUNT: 13.3 % (ref 11.5–17)
GFR SERPLBLD CREATININE-BSD FMLA CKD-EPI: >60 MLS/MIN/1.73/M2
GLOBULIN SER-MCNC: 3.6 GM/DL (ref 2.4–3.5)
GLUCOSE SERPL-MCNC: 117 MG/DL (ref 74–100)
HCT VFR BLD AUTO: 21 % (ref 42–52)
HGB BLD-MCNC: 7.3 GM/DL (ref 14–18)
IMM GRANULOCYTES # BLD AUTO: 0.04 X10(3)/MCL (ref 0–0.04)
IMM GRANULOCYTES NFR BLD AUTO: 0.7 %
LYMPHOCYTES # BLD AUTO: 1.23 X10(3)/MCL (ref 0.6–4.6)
LYMPHOCYTES NFR BLD AUTO: 21.3 %
MCH RBC QN AUTO: 29.3 PG
MCHC RBC AUTO-ENTMCNC: 34.8 MG/DL (ref 33–36)
MCV RBC AUTO: 84.3 FL (ref 80–94)
MONOCYTES # BLD AUTO: 0.63 X10(3)/MCL (ref 0.1–1.3)
MONOCYTES NFR BLD AUTO: 10.9 %
NEUTROPHILS # BLD AUTO: 3.78 X10(3)/MCL (ref 2.1–9.2)
NEUTROPHILS NFR BLD AUTO: 65.6 %
NRBC BLD AUTO-RTO: 0.7 %
PLATELET # BLD AUTO: 214 X10(3)/MCL (ref 130–400)
PMV BLD AUTO: 10.5 FL (ref 7.4–10.4)
POTASSIUM SERPL-SCNC: 3.9 MMOL/L (ref 3.5–5.1)
PROT SERPL-MCNC: 6.1 GM/DL (ref 6.4–8.3)
RBC # BLD AUTO: 2.49 X10(6)/MCL (ref 4.7–6.1)
SODIUM SERPL-SCNC: 135 MMOL/L (ref 136–145)
WBC # SPEC AUTO: 5.8 X10(3)/MCL (ref 4.5–11.5)

## 2023-02-09 PROCEDURE — 94660 CPAP INITIATION&MGMT: CPT

## 2023-02-09 PROCEDURE — 99900031 HC PATIENT EDUCATION (STAT)

## 2023-02-09 PROCEDURE — 11000001 HC ACUTE MED/SURG PRIVATE ROOM

## 2023-02-09 PROCEDURE — 97535 SELF CARE MNGMENT TRAINING: CPT | Mod: CO

## 2023-02-09 PROCEDURE — 63600175 PHARM REV CODE 636 W HCPCS: Performed by: NURSE PRACTITIONER

## 2023-02-09 PROCEDURE — 99900035 HC TECH TIME PER 15 MIN (STAT)

## 2023-02-09 PROCEDURE — 25000003 PHARM REV CODE 250

## 2023-02-09 PROCEDURE — 27000221 HC OXYGEN, UP TO 24 HOURS

## 2023-02-09 PROCEDURE — 25000003 PHARM REV CODE 250: Performed by: STUDENT IN AN ORGANIZED HEALTH CARE EDUCATION/TRAINING PROGRAM

## 2023-02-09 PROCEDURE — 85025 COMPLETE CBC W/AUTO DIFF WBC: CPT

## 2023-02-09 PROCEDURE — 94761 N-INVAS EAR/PLS OXIMETRY MLT: CPT

## 2023-02-09 PROCEDURE — 25000003 PHARM REV CODE 250: Performed by: NURSE PRACTITIONER

## 2023-02-09 PROCEDURE — 94640 AIRWAY INHALATION TREATMENT: CPT

## 2023-02-09 PROCEDURE — 25000242 PHARM REV CODE 250 ALT 637 W/ HCPCS: Performed by: SURGERY

## 2023-02-09 PROCEDURE — 97110 THERAPEUTIC EXERCISES: CPT | Mod: CQ

## 2023-02-09 PROCEDURE — 80053 COMPREHEN METABOLIC PANEL: CPT

## 2023-02-09 PROCEDURE — 20800000 HC ICU TRAUMA

## 2023-02-09 PROCEDURE — 63600175 PHARM REV CODE 636 W HCPCS: Performed by: SURGERY

## 2023-02-09 RX ADMIN — DOCUSATE SODIUM 100 MG: 100 CAPSULE, LIQUID FILLED ORAL at 09:02

## 2023-02-09 RX ADMIN — TAMSULOSIN HYDROCHLORIDE 0.4 MG: 0.4 CAPSULE ORAL at 09:02

## 2023-02-09 RX ADMIN — OXYCODONE 5 MG: 5 TABLET ORAL at 12:02

## 2023-02-09 RX ADMIN — ENOXAPARIN SODIUM 40 MG: 40 INJECTION SUBCUTANEOUS at 08:02

## 2023-02-09 RX ADMIN — ACETAMINOPHEN 650 MG: 325 TABLET, FILM COATED ORAL at 02:02

## 2023-02-09 RX ADMIN — FLUOXETINE HYDROCHLORIDE 40 MG: 20 CAPSULE ORAL at 09:02

## 2023-02-09 RX ADMIN — METOPROLOL TARTRATE 25 MG: 25 TABLET, FILM COATED ORAL at 09:02

## 2023-02-09 RX ADMIN — BUDESONIDE 0.5 MG: 0.5 INHALANT RESPIRATORY (INHALATION) at 08:02

## 2023-02-09 RX ADMIN — Medication 6 MG: at 11:02

## 2023-02-09 RX ADMIN — METOPROLOL TARTRATE 25 MG: 25 TABLET, FILM COATED ORAL at 08:02

## 2023-02-09 RX ADMIN — ENOXAPARIN SODIUM 40 MG: 40 INJECTION SUBCUTANEOUS at 09:02

## 2023-02-09 RX ADMIN — POLYETHYLENE GLYCOL 3350 17 G: 17 POWDER, FOR SOLUTION ORAL at 09:02

## 2023-02-09 RX ADMIN — QUETIAPINE FUMARATE 200 MG: 100 TABLET ORAL at 09:02

## 2023-02-09 RX ADMIN — OXYCODONE 5 MG: 5 TABLET ORAL at 06:02

## 2023-02-09 RX ADMIN — OXYCODONE 5 MG: 5 TABLET ORAL at 10:02

## 2023-02-09 RX ADMIN — LAMOTRIGINE 25 MG: 25 TABLET ORAL at 09:02

## 2023-02-09 RX ADMIN — BUDESONIDE 0.5 MG: 0.5 INHALANT RESPIRATORY (INHALATION) at 07:02

## 2023-02-09 RX ADMIN — ACETAMINOPHEN 650 MG: 325 TABLET, FILM COATED ORAL at 09:02

## 2023-02-09 RX ADMIN — ACETAMINOPHEN 650 MG: 325 TABLET, FILM COATED ORAL at 06:02

## 2023-02-09 RX ADMIN — METHOCARBAMOL 750 MG: 100 INJECTION INTRAMUSCULAR; INTRAVENOUS at 05:02

## 2023-02-09 NOTE — PLAN OF CARE
Problem: Adult Inpatient Plan of Care  Goal: Plan of Care Review  Outcome: Ongoing, Progressing  Goal: Patient-Specific Goal (Individualized)  Outcome: Ongoing, Progressing  Goal: Absence of Hospital-Acquired Illness or Injury  Outcome: Ongoing, Progressing  Goal: Optimal Comfort and Wellbeing  Outcome: Ongoing, Progressing  Goal: Readiness for Transition of Care  Outcome: Ongoing, Progressing    Attempted to call patient's mother but no answer. Message left instructing to call back for further questions  .

## 2023-02-09 NOTE — PLAN OF CARE
Spoke to  Peter Mortensen [phone] this am in regards to dc plan. Pt is not booked or arrested. He stated to dc pt as appropriate but to please keep him or  Duane Devine [phone] aware of dc plan and when pt will be dc. I informed him PT/OT recommending acute rehab and that pt requesting to go to [facility] for acute rehab and that his mother will drive him to acute rehab. Peter stated he has been in communication with pt's mom and ok if pt dc to acute rehab in [place] and someone drive pt to facility. He asked that the acute rehab facility have his and Duane's contact info and notify them when leaving acute rehab facility.  List of acute rehab provided to pt and he is in agreement with anyone of the facilities in the area that can accept him.  Referral sent to [facility] and [facility] in [place] via [email or web address].  I left a VM with pt's mom to return my call to verify if she would drive pt to rehab. Pt provided me his 2 sister's contact info to call  Yesi Hinojosa (older sister) [phone]; Chioma Chavez (younger sister) [phone].  I spoke to Yesi by phone and she stated that pt's mom would drive pt to rehab.

## 2023-02-09 NOTE — PT/OT/SLP PROGRESS
Occupational Therapy  Treatment    Gera Chavez   MRN: [id number]   Admitting Diagnosis: Fracture of proximal end of left femur, open type I or II, initial encounter    OT Date of Treatment: 02/09/23   OT Start Time: 0830  OT Stop Time: 0857  OT Total Time (min): 27 min     Billable Minutes:  Self Care/Home Management 2  Total Minutes: 27           HUBERT Visit Number: 2    General Precautions: Standard, fall  Orthopedic Precautions:    Braces:           Subjective:  Communicated with RN prior to session.  118HR, 96o2, 129/74  Pain 8/10 L LE    Objective:  Pt. UIC upon entry.  (Sit to stand-Mod A)  Pt. Ambulating from BS chair to sink HHA with adherence given to NWB of L hand however pivoting at times due to increased pain. Pt. Requiring Min A for standing balance at sink while performing grooming task (brushing teeth) with minimal assistance required for setup.   Pt. Then t/f to BS chair with Mod A.  Patient found with: SCD    Patient left up in chair with all lines intact and call button in reach    ASSESSMENT:  Gera Chavez is a 37 y.o. male with a medical diagnosis of Fracture of proximal end of left femur, open type I or II, initial encounter Limited endurance and activity tolerance due to increased pain in L LE. Continue POC    Rehab potential is good    Activity tolerance: Good    Discharge recommendations:       Equipment recommendations:       GOALS:   Multidisciplinary Problems       Occupational Therapy Goals          Problem: Occupational Therapy    Goal Priority Disciplines Outcome Interventions   Occupational Therapy Goal     OT, PT/OT Ongoing, Progressing    Description: Goals to be met by: 3/7/23     Patient will increase functional independence with ADLs by performing:    UE Dressing with Modified [place].  LE Dressing with Modified [place].  Toileting from toilet with Modified [place] for hygiene and clothing management.   Toilet transfer to toilet with Modified [place].                          Plan:  Patient to be seen 5 x/week to address the above listed problems via self-care/home management  Plan of Care expires: 03/07/23  Plan of Care reviewed with: patient         02/09/2023

## 2023-02-09 NOTE — PT/OT/SLP PROGRESS
Physical Therapy Treatment    Patient Name:  Gera Chavez   MRN:  [id number]    Recommendations:     Discharge Recommendations:  (pt would benefit from rehab placement if pt does not go to [facility])  Discharge Equipment Recommendations:    Barriers to discharge:  medical condition,placement    Assessment:     Gera Chavez is a 37 y.o. male admitted with a medical diagnosis of Fracture of proximal end of left femur, open type I or II, initial encounter.  He presents with the following impairments/functional limitations: weakness, impaired endurance, gait instability, impaired functional mobility, impaired self care skills .  TX limited secondary to low H&H and c/o of dizziness upon standing.    Rehab Prognosis: Good; patient would benefit from acute skilled PT services to address these deficits and reach maximum level of function.    Recent Surgery: Procedure(s) (LRB):  INSERTION, INTRAMEDULLARY NOEL, FEMUR (Left) 4 Days Post-Op    Plan:     During this hospitalization, patient to be seen 6 x/week to address the identified rehab impairments via gait training, therapeutic activities, therapeutic exercises and progress toward the following goals:    Plan of Care Expires:  03/20/23    Subjective     Chief Complaint: dizziness, LLE pain  Patient/Family Comments/goals:   Pain/Comfort:         Objective:     Communicated with RN prior to session.  Patient found up in chair with   upon PT entry to room.     General Precautions: Standard,    Orthopedic Precautions: WBAT LLE, NWB L Hand, able to WB through elbow)  Braces:    Respiratory Status: Room air     Functional Mobility:  Transfers:     Sit to Stand:  moderate assistance with platform walker      AM-PAC 6 CLICK MOBILITY          Treatment & Education:  Performed sit<>stand TF with PRW modAx2. Performed AAROM LLE and AROM RLE 10X1 (Hip flex, DF/PF, knee flex, abd)    Patient left up in chair with all lines intact, call button in reach, and RN notified..    GOALS:    Multidisciplinary Problems       Physical Therapy Goals          Problem: Physical Therapy    Goal Priority Disciplines Outcome Goal Variances Interventions   Physical Therapy Goal     PT, PT/OT Ongoing, Progressing     Description: Goals to be met by: 23     Patient will increase functional independence with mobility by performin. Supine to sit with Stand-by Assistance  2. Sit to supine with Stand-by Assistance  3. Sit to stand transfer with Stand-by Assistance using platform walker vs equipment that will be allowed in group home  4. Gait  x 100 feet with Modified [place] using platform walker vs equipment that will be allowed in group home                       Time Tracking:     PT Received On: 23  PT Start Time: 1451     PT Stop Time: 1510  PT Total Time (min): 19 min     Billable Minutes: Therapeutic Exercise 19    Treatment Type: Treatment  PT/PTA: PTA     PTA Visit Number: 2     2023
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
## 2023-02-10 LAB
ABO + RH BLD: NORMAL
ABORH RETYPE: NORMAL
ALBUMIN SERPL-MCNC: 2.5 G/DL (ref 3.5–5)
ALBUMIN/GLOB SERPL: 0.9 RATIO (ref 1.1–2)
ALP SERPL-CCNC: 44 UNIT/L (ref 40–150)
ALT SERPL-CCNC: 32 UNIT/L (ref 0–55)
AST SERPL-CCNC: 72 UNIT/L (ref 5–34)
BASOPHILS # BLD AUTO: 0.04 X10(3)/MCL (ref 0–0.2)
BASOPHILS NFR BLD AUTO: 0.5 %
BILIRUBIN DIRECT+TOT PNL SERPL-MCNC: 1.1 MG/DL
BLD PROD TYP BPU: NORMAL
BLOOD UNIT EXPIRATION DATE: NORMAL
BLOOD UNIT TYPE CODE: 7300
BUN SERPL-MCNC: 6.7 MG/DL (ref 8.9–20.6)
CALCIUM SERPL-MCNC: 8.3 MG/DL (ref 8.4–10.2)
CHLORIDE SERPL-SCNC: 105 MMOL/L (ref 98–107)
CO2 SERPL-SCNC: 21 MMOL/L (ref 22–29)
CREAT SERPL-MCNC: 0.72 MG/DL (ref 0.73–1.18)
CROSSMATCH INTERPRETATION: NORMAL
DISPENSE STATUS: NORMAL
EOSINOPHIL # BLD AUTO: 0.2 X10(3)/MCL (ref 0–0.9)
EOSINOPHIL NFR BLD AUTO: 2.4 %
ERYTHROCYTE [DISTWIDTH] IN BLOOD BY AUTOMATED COUNT: 13.4 % (ref 11.5–17)
GFR SERPLBLD CREATININE-BSD FMLA CKD-EPI: >60 MLS/MIN/1.73/M2
GLOBULIN SER-MCNC: 2.9 GM/DL (ref 2.4–3.5)
GLUCOSE SERPL-MCNC: 111 MG/DL (ref 74–100)
GROUP & RH: NORMAL
HCT VFR BLD AUTO: 18.3 % (ref 42–52)
HGB BLD-MCNC: 6.4 GM/DL (ref 14–18)
IMM GRANULOCYTES # BLD AUTO: 0.1 X10(3)/MCL (ref 0–0.04)
IMM GRANULOCYTES NFR BLD AUTO: 1.2 %
INDIRECT COOMBS GEL: NORMAL
LYMPHOCYTES # BLD AUTO: 1.85 X10(3)/MCL (ref 0.6–4.6)
LYMPHOCYTES NFR BLD AUTO: 22.6 %
MCH RBC QN AUTO: 29.5 PG
MCHC RBC AUTO-ENTMCNC: 35 MG/DL (ref 33–36)
MCV RBC AUTO: 84.3 FL (ref 80–94)
MONOCYTES # BLD AUTO: 1.02 X10(3)/MCL (ref 0.1–1.3)
MONOCYTES NFR BLD AUTO: 12.5 %
NEUTROPHILS # BLD AUTO: 4.96 X10(3)/MCL (ref 2.1–9.2)
NEUTROPHILS NFR BLD AUTO: 60.8 %
NRBC BLD AUTO-RTO: 2.3 %
PLATELET # BLD AUTO: 252 X10(3)/MCL (ref 130–400)
PMV BLD AUTO: 10.8 FL (ref 7.4–10.4)
POTASSIUM SERPL-SCNC: 3.6 MMOL/L (ref 3.5–5.1)
PROT SERPL-MCNC: 5.4 GM/DL (ref 6.4–8.3)
RBC # BLD AUTO: 2.17 X10(6)/MCL (ref 4.7–6.1)
SODIUM SERPL-SCNC: 137 MMOL/L (ref 136–145)
UNIT NUMBER: NORMAL
WBC # SPEC AUTO: 8.2 X10(3)/MCL (ref 4.5–11.5)

## 2023-02-10 PROCEDURE — 27000221 HC OXYGEN, UP TO 24 HOURS

## 2023-02-10 PROCEDURE — 25000003 PHARM REV CODE 250: Performed by: NURSE PRACTITIONER

## 2023-02-10 PROCEDURE — 97116 GAIT TRAINING THERAPY: CPT | Mod: CQ

## 2023-02-10 PROCEDURE — 94640 AIRWAY INHALATION TREATMENT: CPT

## 2023-02-10 PROCEDURE — 97110 THERAPEUTIC EXERCISES: CPT | Mod: CQ

## 2023-02-10 PROCEDURE — 85025 COMPLETE CBC W/AUTO DIFF WBC: CPT

## 2023-02-10 PROCEDURE — 97535 SELF CARE MNGMENT TRAINING: CPT | Mod: CO

## 2023-02-10 PROCEDURE — 11000001 HC ACUTE MED/SURG PRIVATE ROOM

## 2023-02-10 PROCEDURE — 94660 CPAP INITIATION&MGMT: CPT

## 2023-02-10 PROCEDURE — 94761 N-INVAS EAR/PLS OXIMETRY MLT: CPT

## 2023-02-10 PROCEDURE — 25000242 PHARM REV CODE 250 ALT 637 W/ HCPCS: Performed by: SURGERY

## 2023-02-10 PROCEDURE — P9016 RBC LEUKOCYTES REDUCED: HCPCS | Performed by: STUDENT IN AN ORGANIZED HEALTH CARE EDUCATION/TRAINING PROGRAM

## 2023-02-10 PROCEDURE — 25000003 PHARM REV CODE 250

## 2023-02-10 PROCEDURE — 63600175 PHARM REV CODE 636 W HCPCS: Performed by: NURSE PRACTITIONER

## 2023-02-10 PROCEDURE — 25000003 PHARM REV CODE 250: Performed by: STUDENT IN AN ORGANIZED HEALTH CARE EDUCATION/TRAINING PROGRAM

## 2023-02-10 PROCEDURE — 99900031 HC PATIENT EDUCATION (STAT)

## 2023-02-10 PROCEDURE — 80053 COMPREHEN METABOLIC PANEL: CPT

## 2023-02-10 PROCEDURE — 86923 COMPATIBILITY TEST ELECTRIC: CPT | Performed by: STUDENT IN AN ORGANIZED HEALTH CARE EDUCATION/TRAINING PROGRAM

## 2023-02-10 PROCEDURE — 20800000 HC ICU TRAUMA

## 2023-02-10 PROCEDURE — 86900 BLOOD TYPING SEROLOGIC ABO: CPT | Performed by: STUDENT IN AN ORGANIZED HEALTH CARE EDUCATION/TRAINING PROGRAM

## 2023-02-10 PROCEDURE — 36430 TRANSFUSION BLD/BLD COMPNT: CPT

## 2023-02-10 PROCEDURE — 94799 UNLISTED PULMONARY SVC/PX: CPT

## 2023-02-10 PROCEDURE — 99900035 HC TECH TIME PER 15 MIN (STAT)

## 2023-02-10 RX ORDER — FLUOXETINE HYDROCHLORIDE 20 MG/1
40 CAPSULE ORAL DAILY
Status: DISCONTINUED | OUTPATIENT
Start: 2023-02-10 | End: 2023-02-10

## 2023-02-10 RX ORDER — QUETIAPINE FUMARATE 100 MG/1
200 TABLET, FILM COATED ORAL NIGHTLY
Status: DISCONTINUED | OUTPATIENT
Start: 2023-02-10 | End: 2023-02-10

## 2023-02-10 RX ORDER — HYDROCODONE BITARTRATE AND ACETAMINOPHEN 500; 5 MG/1; MG/1
TABLET ORAL
Status: DISCONTINUED | OUTPATIENT
Start: 2023-02-10 | End: 2023-02-14 | Stop reason: HOSPADM

## 2023-02-10 RX ORDER — MIRTAZAPINE 7.5 MG/1
7.5 TABLET, FILM COATED ORAL NIGHTLY
Status: DISCONTINUED | OUTPATIENT
Start: 2023-02-10 | End: 2023-02-14 | Stop reason: HOSPADM

## 2023-02-10 RX ORDER — CLONAZEPAM 1 MG/1
1 TABLET ORAL 2 TIMES DAILY PRN
Status: DISCONTINUED | OUTPATIENT
Start: 2023-02-10 | End: 2023-02-14 | Stop reason: HOSPADM

## 2023-02-10 RX ORDER — LISINOPRIL 20 MG/1
40 TABLET ORAL DAILY
Status: DISCONTINUED | OUTPATIENT
Start: 2023-02-10 | End: 2023-02-14 | Stop reason: HOSPADM

## 2023-02-10 RX ORDER — AMLODIPINE BESYLATE 5 MG/1
5 TABLET ORAL DAILY
Status: DISCONTINUED | OUTPATIENT
Start: 2023-02-10 | End: 2023-02-14 | Stop reason: HOSPADM

## 2023-02-10 RX ORDER — LAMOTRIGINE 25 MG/1
25 TABLET ORAL DAILY
Status: DISCONTINUED | OUTPATIENT
Start: 2023-02-10 | End: 2023-02-10

## 2023-02-10 RX ADMIN — QUETIAPINE FUMARATE 200 MG: 100 TABLET ORAL at 08:02

## 2023-02-10 RX ADMIN — OXYCODONE 5 MG: 5 TABLET ORAL at 10:02

## 2023-02-10 RX ADMIN — ENOXAPARIN SODIUM 40 MG: 40 INJECTION SUBCUTANEOUS at 09:02

## 2023-02-10 RX ADMIN — CLONAZEPAM 1 MG: 1 TABLET ORAL at 11:02

## 2023-02-10 RX ADMIN — METOPROLOL TARTRATE 25 MG: 25 TABLET, FILM COATED ORAL at 08:02

## 2023-02-10 RX ADMIN — OXYCODONE 5 MG: 5 TABLET ORAL at 05:02

## 2023-02-10 RX ADMIN — OXYCODONE 5 MG: 5 TABLET ORAL at 02:02

## 2023-02-10 RX ADMIN — LISINOPRIL 40 MG: 20 TABLET ORAL at 10:02

## 2023-02-10 RX ADMIN — ACETAMINOPHEN 650 MG: 325 TABLET, FILM COATED ORAL at 02:02

## 2023-02-10 RX ADMIN — LAMOTRIGINE 25 MG: 25 TABLET ORAL at 09:02

## 2023-02-10 RX ADMIN — FLUOXETINE HYDROCHLORIDE 40 MG: 20 CAPSULE ORAL at 08:02

## 2023-02-10 RX ADMIN — MIRTAZAPINE 7.5 MG: 7.5 TABLET ORAL at 08:02

## 2023-02-10 RX ADMIN — OXYCODONE 5 MG: 5 TABLET ORAL at 04:02

## 2023-02-10 RX ADMIN — CLONAZEPAM 1 MG: 1 TABLET ORAL at 09:02

## 2023-02-10 RX ADMIN — BUDESONIDE 0.5 MG: 0.5 INHALANT RESPIRATORY (INHALATION) at 07:02

## 2023-02-10 RX ADMIN — TAMSULOSIN HYDROCHLORIDE 0.4 MG: 0.4 CAPSULE ORAL at 08:02

## 2023-02-10 RX ADMIN — ENOXAPARIN SODIUM 40 MG: 40 INJECTION SUBCUTANEOUS at 08:02

## 2023-02-10 RX ADMIN — ACETAMINOPHEN 650 MG: 325 TABLET, FILM COATED ORAL at 09:02

## 2023-02-10 RX ADMIN — OXYCODONE 5 MG: 5 TABLET ORAL at 08:02

## 2023-02-10 RX ADMIN — BUDESONIDE 0.5 MG: 0.5 INHALANT RESPIRATORY (INHALATION) at 08:02

## 2023-02-10 RX ADMIN — ACETAMINOPHEN 650 MG: 325 TABLET, FILM COATED ORAL at 05:02

## 2023-02-10 RX ADMIN — AMLODIPINE BESYLATE 5 MG: 5 TABLET ORAL at 10:02
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
## 2023-02-10 NOTE — PT/OT/SLP PROGRESS
Physical Therapy Treatment    Patient Name:  Gera Chavez   MRN:  [id number]    Recommendations:     Discharge Recommendations:  (pt would benefit from rehab placement if pt does not go to long term)  Discharge Equipment Recommendations:    Barriers to discharge:  placement    Assessment:     Gera Chavez is a 37 y.o. male admitted with a medical diagnosis of Fracture of proximal end of left femur, open type I or II, initial encounter.  He presents with the following impairments/functional limitations: weakness, impaired endurance, impaired self care skills, impaired functional mobility, gait instability, impaired balance, decreased coordination.    Rehab Prognosis: Good; patient would benefit from acute skilled PT services to address these deficits and reach maximum level of function.    Recent Surgery: Procedure(s) (LRB):  INSERTION, INTRAMEDULLARY NOEL, FEMUR (Left) 5 Days Post-Op    Plan:     During this hospitalization, patient to be seen 6 x/week to address the identified rehab impairments via gait training, therapeutic activities, therapeutic exercises and progress toward the following goals:    Plan of Care Expires:  03/20/23    Subjective     Chief Complaint: none   Patient/Family Comments/goals: RN  Pain/Comfort:         Objective:     Communicated with RN prior to session.  Patient found up in chair with   upon PT entry to room.     General Precautions: Standard,    Orthopedic Precautions:  (WBAT L LE, pending WBing orders from ortho for L hand)  Braces:    Respiratory Status: Room air  BP with Activity:125/84  HR: 120  O2:99%     Functional Mobility:  Transfers:     Sit to Stand:  moderate assistance with platform walker  Gait: Pt amb 30ft with PRW Amber. Cuing for L knee ext in stance phase   Balance: Standing balance with PRW Amber. Seated rest break needed.      AM-PAC 6 CLICK MOBILITY          Treatment & Education:  Performed BLE knee flex/ext 10x1 while UIC.    Patient left up in chair with all lines  intact and call button in reach..    GOALS:   Multidisciplinary Problems       Physical Therapy Goals          Problem: Physical Therapy    Goal Priority Disciplines Outcome Goal Variances Interventions   Physical Therapy Goal     PT, PT/OT Ongoing, Progressing     Description: Goals to be met by: 23     Patient will increase functional independence with mobility by performin. Supine to sit with Stand-by Assistance  2. Sit to supine with Stand-by Assistance  3. Sit to stand transfer with Stand-by Assistance using platform walker vs equipment that will be allowed in senior living  4. Gait  x 100 feet with Modified [place] using platform walker vs equipment that will be allowed in senior living                       Time Tracking:     PT Received On: 02/10/23  PT Start Time: 1216     PT Stop Time: 1239  PT Total Time (min): 23 min     Billable Minutes: Gait Training 15 and Therapeutic Exercise 8    Treatment Type: Treatment  PT/PTA: PTA     PTA Visit Number: 3     02/10/2023

## 2023-02-10 NOTE — PT/OT/SLP PROGRESS
Occupational Therapy  Treatment    Gera Chavez   MRN: [id number]   Admitting Diagnosis: Fracture of proximal end of left femur, open type I or II, initial encounter    OT Date of Treatment: 02/10/23   OT Start Time: 0830  OT Stop Time: 0857  OT Total Time (min): 27 min     Billable Minutes:  Self Care/Home Management 2  Total Minutes: 27     OT/HUBERT: HUBERT     HUBERT Visit Number: 3    General Precautions: Standard, fall  Orthopedic Precautions:    Braces:           Subjective:  Communicated with RN prior to session.  115HR, 98o2, 145/83  Pain L LE 6/10    Objective:  Pt. UIC upon entry.  (Sit to stand-Min A)  Pt. Performing stand step/ stand pivot t/f from BS to BSC using PRW requiring Mod A-Min A during toilet t/f for safe descend. Limited WB on L LE due to pain. Pt. Requiring total A for pericare in supported stance.   Patient found with: SCD    Patient left up in chair with all lines intact and call button in reach    ASSESSMENT:  Gera Chavez is a 37 y.o. male with a medical diagnosis of Fracture of proximal end of left femur, open type I or II, initial encounter Pt. Tolerated session well overall requiring intermittent cueing for adherence to NWB of L hand.         Activity tolerance: Good    Discharge recommendations:       Equipment recommendations:       GOALS:   Multidisciplinary Problems       Occupational Therapy Goals          Problem: Occupational Therapy    Goal Priority Disciplines Outcome Interventions   Occupational Therapy Goal     OT, PT/OT Ongoing, Progressing    Description: Goals to be met by: 3/7/23     Patient will increase functional independence with ADLs by performing:    UE Dressing with Modified [place].  LE Dressing with Modified [place].  Toileting from toilet with Modified [place] for hygiene and clothing management.   Toilet transfer to toilet with Modified [place].                         Plan:  Patient to be seen 5 x/week to address the above listed problems via  self-care/home management  Plan of Care expires: 03/07/23  Plan of Care reviewed with: patient         02/10/2023

## 2023-02-11 LAB
ALBUMIN SERPL-MCNC: 2.9 G/DL (ref 3.5–5)
ALBUMIN/GLOB SERPL: 0.9 RATIO (ref 1.1–2)
ALP SERPL-CCNC: 52 UNIT/L (ref 40–150)
ALT SERPL-CCNC: 37 UNIT/L (ref 0–55)
AST SERPL-CCNC: 56 UNIT/L (ref 5–34)
BASOPHILS # BLD AUTO: 0.06 X10(3)/MCL (ref 0–0.2)
BASOPHILS NFR BLD AUTO: 0.6 %
BILIRUBIN DIRECT+TOT PNL SERPL-MCNC: 1.5 MG/DL
BUN SERPL-MCNC: 6.8 MG/DL (ref 8.9–20.6)
CALCIUM SERPL-MCNC: 8.8 MG/DL (ref 8.4–10.2)
CHLORIDE SERPL-SCNC: 104 MMOL/L (ref 98–107)
CO2 SERPL-SCNC: 23 MMOL/L (ref 22–29)
CREAT SERPL-MCNC: 0.77 MG/DL (ref 0.73–1.18)
EOSINOPHIL # BLD AUTO: 0.24 X10(3)/MCL (ref 0–0.9)
EOSINOPHIL NFR BLD AUTO: 2.3 %
ERYTHROCYTE [DISTWIDTH] IN BLOOD BY AUTOMATED COUNT: 14 % (ref 11.5–17)
GFR SERPLBLD CREATININE-BSD FMLA CKD-EPI: >60 MLS/MIN/1.73/M2
GLOBULIN SER-MCNC: 3.1 GM/DL (ref 2.4–3.5)
GLUCOSE SERPL-MCNC: 108 MG/DL (ref 74–100)
HCT VFR BLD AUTO: 24 % (ref 42–52)
HGB BLD-MCNC: 8.1 GM/DL (ref 14–18)
IMM GRANULOCYTES # BLD AUTO: 0.18 X10(3)/MCL (ref 0–0.04)
IMM GRANULOCYTES NFR BLD AUTO: 1.7 %
LYMPHOCYTES # BLD AUTO: 2.03 X10(3)/MCL (ref 0.6–4.6)
LYMPHOCYTES NFR BLD AUTO: 19.4 %
MCH RBC QN AUTO: 29.1 PG
MCHC RBC AUTO-ENTMCNC: 33.8 MG/DL (ref 33–36)
MCV RBC AUTO: 86.3 FL (ref 80–94)
MONOCYTES # BLD AUTO: 0.77 X10(3)/MCL (ref 0.1–1.3)
MONOCYTES NFR BLD AUTO: 7.3 %
NEUTROPHILS # BLD AUTO: 7.21 X10(3)/MCL (ref 2.1–9.2)
NEUTROPHILS NFR BLD AUTO: 68.7 %
NRBC BLD AUTO-RTO: 2.6 %
PLATELET # BLD AUTO: 336 X10(3)/MCL (ref 130–400)
PMV BLD AUTO: 10.3 FL (ref 7.4–10.4)
POCT GLUCOSE: 104 MG/DL (ref 70–110)
POCT GLUCOSE: 104 MG/DL (ref 70–110)
POCT GLUCOSE: 107 MG/DL (ref 70–110)
POCT GLUCOSE: 99 MG/DL (ref 70–110)
POTASSIUM SERPL-SCNC: 3.6 MMOL/L (ref 3.5–5.1)
PROT SERPL-MCNC: 6 GM/DL (ref 6.4–8.3)
RBC # BLD AUTO: 2.78 X10(6)/MCL (ref 4.7–6.1)
SODIUM SERPL-SCNC: 140 MMOL/L (ref 136–145)
WBC # SPEC AUTO: 10.5 X10(3)/MCL (ref 4.5–11.5)

## 2023-02-11 PROCEDURE — 25000003 PHARM REV CODE 250: Performed by: STUDENT IN AN ORGANIZED HEALTH CARE EDUCATION/TRAINING PROGRAM

## 2023-02-11 PROCEDURE — 11000001 HC ACUTE MED/SURG PRIVATE ROOM

## 2023-02-11 PROCEDURE — 85025 COMPLETE CBC W/AUTO DIFF WBC: CPT

## 2023-02-11 PROCEDURE — 25000003 PHARM REV CODE 250

## 2023-02-11 PROCEDURE — 63600175 PHARM REV CODE 636 W HCPCS: Performed by: NURSE PRACTITIONER

## 2023-02-11 PROCEDURE — 21400001 HC TELEMETRY ROOM

## 2023-02-11 PROCEDURE — 80053 COMPREHEN METABOLIC PANEL: CPT

## 2023-02-11 PROCEDURE — 25000003 PHARM REV CODE 250: Performed by: NURSE PRACTITIONER

## 2023-02-11 RX ORDER — POTASSIUM CHLORIDE 20 MEQ/1
40 TABLET, EXTENDED RELEASE ORAL ONCE
Status: COMPLETED | OUTPATIENT
Start: 2023-02-11 | End: 2023-02-11

## 2023-02-11 RX ADMIN — CLONAZEPAM 1 MG: 1 TABLET ORAL at 08:02

## 2023-02-11 RX ADMIN — OXYCODONE 5 MG: 5 TABLET ORAL at 11:02

## 2023-02-11 RX ADMIN — OXYCODONE 5 MG: 5 TABLET ORAL at 12:02

## 2023-02-11 RX ADMIN — METOPROLOL TARTRATE 25 MG: 25 TABLET, FILM COATED ORAL at 08:02

## 2023-02-11 RX ADMIN — AMLODIPINE BESYLATE 5 MG: 5 TABLET ORAL at 08:02

## 2023-02-11 RX ADMIN — TAMSULOSIN HYDROCHLORIDE 0.4 MG: 0.4 CAPSULE ORAL at 08:02

## 2023-02-11 RX ADMIN — ENOXAPARIN SODIUM 40 MG: 40 INJECTION SUBCUTANEOUS at 08:02

## 2023-02-11 RX ADMIN — OXYCODONE 5 MG: 5 TABLET ORAL at 08:02

## 2023-02-11 RX ADMIN — Medication 6 MG: at 08:02

## 2023-02-11 RX ADMIN — FLUOXETINE HYDROCHLORIDE 40 MG: 20 CAPSULE ORAL at 08:02

## 2023-02-11 RX ADMIN — LAMOTRIGINE 25 MG: 25 TABLET ORAL at 08:02

## 2023-02-11 RX ADMIN — OXYCODONE 5 MG: 5 TABLET ORAL at 04:02

## 2023-02-11 RX ADMIN — OXYCODONE 5 MG: 5 TABLET ORAL at 06:02

## 2023-02-11 RX ADMIN — OXYCODONE 5 MG: 5 TABLET ORAL at 01:02

## 2023-02-11 RX ADMIN — QUETIAPINE FUMARATE 200 MG: 100 TABLET ORAL at 08:02

## 2023-02-11 RX ADMIN — POTASSIUM CHLORIDE 40 MEQ: 1500 TABLET, EXTENDED RELEASE ORAL at 08:02

## 2023-02-11 RX ADMIN — LISINOPRIL 40 MG: 20 TABLET ORAL at 08:02

## 2023-02-11 NOTE — NURSING
Nurses Note -- 4 Eyes      2/11/2023   1:48 PM      Skin assessed during: Admit      [x] No Pressure Injuries Present    []Prevention Measures Documented      [] Yes- Altered Skin Integrity Present or Discovered   [] LDA Added if Not in Epic (Describe Wound)   [] New Altered Skin Integrity was Present on Admit and Documented in LDA   [] Wound Image Taken    Wound Care Consulted? No    Attending Nurse:  Gonzalez Robles LPN     Second RN/Staff Member:  Kay Thompson RN
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
## 2023-02-11 NOTE — NURSING
Nurses Note -- 4 Eyes      2/11/2023   2:14 AM      Skin assessed during: Daily Assessment      [x] No Pressure Injuries Present    [x]Prevention Measures Documented      [] Yes- Altered Skin Integrity Present or Discovered   [] LDA Added if Not in Epic (Describe Wound)   [] New Altered Skin Integrity was Present on Admit and Documented in LDA   [] Wound Image Taken    Wound Care Consulted? No    Attending Nurse:  Bella Rogers RN     Second RN/Staff Member:  Cheyenne Ernst RN

## 2023-02-11 NOTE — PLAN OF CARE
Problem: Adult Inpatient Plan of Care  Goal: Plan of Care Review  Outcome: Ongoing, Progressing  Goal: Patient-Specific Goal (Individualized)  Outcome: Ongoing, Progressing  Goal: Absence of Hospital-Acquired Illness or Injury  Outcome: Ongoing, Progressing  Goal: Optimal Comfort and Wellbeing  Outcome: Ongoing, Progressing  Goal: Readiness for Transition of Care  Outcome: Ongoing, Progressing     Problem: Infection  Goal: Absence of Infection Signs and Symptoms  Outcome: Ongoing, Progressing     Problem: Impaired Wound Healing  Goal: Optimal Wound Healing  Outcome: Ongoing, Progressing     Problem: Noninvasive Ventilation Acute  Goal: Effective Unassisted Ventilation and Oxygenation  Outcome: Ongoing, Progressing     Problem: Fall Injury Risk  Goal: Absence of Fall and Fall-Related Injury  Outcome: Ongoing, Progressing     Problem: Skin Injury Risk Increased  Goal: Skin Health and Integrity  Outcome: Ongoing, Progressing     Problem: Adjustment to Illness (Delirium)  Goal: Optimal Coping  Outcome: Ongoing, Progressing     Problem: Altered Behavior (Delirium)  Goal: Improved Behavioral Control  Outcome: Ongoing, Progressing     Problem: Attention and Thought Clarity Impairment (Delirium)  Goal: Improved Attention and Thought Clarity  Outcome: Ongoing, Progressing     Problem: Sleep Disturbance (Delirium)  Goal: Improved Sleep  Outcome: Ongoing, Progressing     Problem: Pain Acute  Goal: Acceptable Pain Control and Functional Ability  Outcome: Ongoing, Progressing

## 2023-02-11 NOTE — PLAN OF CARE
Problem: Adult Inpatient Plan of Care  Goal: Plan of Care Review  2/11/2023 1518 by Gonzalez Robles LPN  Outcome: Ongoing, Progressing  2/11/2023 1352 by Gonzalez Robles LPN  Outcome: Ongoing, Progressing  Goal: Patient-Specific Goal (Individualized)  2/11/2023 1518 by Gonzalez Robles LPN  Outcome: Ongoing, Progressing  2/11/2023 1352 by Gonzalez Robles LPN  Outcome: Ongoing, Progressing  Goal: Absence of Hospital-Acquired Illness or Injury  2/11/2023 1518 by Gonzalez Robles LPN  Outcome: Ongoing, Progressing  2/11/2023 1352 by Gonzalez Robles LPN  Outcome: Ongoing, Progressing  Goal: Optimal Comfort and Wellbeing  2/11/2023 1518 by Gonzalez Robles LPN  Outcome: Ongoing, Progressing  2/11/2023 1352 by Gonzalez Robles LPN  Outcome: Ongoing, Progressing  Goal: Readiness for Transition of Care  2/11/2023 1518 by Gonzalez Robles LPN  Outcome: Ongoing, Progressing  2/11/2023 1352 by Gonzalez Robles LPN  Outcome: Ongoing, Progressing     Problem: Infection  Goal: Absence of Infection Signs and Symptoms  2/11/2023 1518 by Gonzalez Robles LPN  Outcome: Ongoing, Progressing  2/11/2023 1352 by Gonzalez Robles LPN  Outcome: Ongoing, Progressing     Problem: Impaired Wound Healing  Goal: Optimal Wound Healing  2/11/2023 1518 by Gonzalez Robles LPN  Outcome: Ongoing, Progressing  2/11/2023 1352 by Gonzalez Robles LPN  Outcome: Ongoing, Progressing     Problem: Noninvasive Ventilation Acute  Goal: Effective Unassisted Ventilation and Oxygenation  2/11/2023 1518 by Gonzalez Robles LPN  Outcome: Ongoing, Progressing  2/11/2023 1352 by Gonzalez Robles LPN  Outcome: Ongoing, Progressing     Problem: Fall Injury Risk  Goal: Absence of Fall and Fall-Related Injury  2/11/2023 1518 by Gonzalez Robles LPN  Outcome: Ongoing, Progressing  2/11/2023 1352 by Gonzalez Robles LPN  Outcome: Ongoing, Progressing     Problem: Skin Injury Risk Increased  Goal: Skin Health and Integrity  2/11/2023 1518 by Gonzalez Robles LPN  Outcome: Ongoing, Progressing  2/11/2023  1352 by Gonzalez Robles LPN  Outcome: Ongoing, Progressing     Problem: Adjustment to Illness (Delirium)  Goal: Optimal Coping  2/11/2023 1518 by Gonzalez Robles LPN  Outcome: Ongoing, Progressing  2/11/2023 1352 by Gonzalez Robles LPN  Outcome: Ongoing, Progressing     Problem: Altered Behavior (Delirium)  Goal: Improved Behavioral Control  2/11/2023 1518 by Gonzalez Robles LPN  Outcome: Ongoing, Progressing  2/11/2023 1352 by Gonzalez Robles LPN  Outcome: Ongoing, Progressing     Problem: Attention and Thought Clarity Impairment (Delirium)  Goal: Improved Attention and Thought Clarity  2/11/2023 1518 by Gonzalez Robles LPN  Outcome: Ongoing, Progressing  2/11/2023 1352 by Gonzalez Robles LPN  Outcome: Ongoing, Progressing     Problem: Sleep Disturbance (Delirium)  Goal: Improved Sleep  2/11/2023 1518 by Gonzalez Robles LPN  Outcome: Ongoing, Progressing  2/11/2023 1352 by Gonzalez Robles LPN  Outcome: Ongoing, Progressing     Problem: Pain Acute  Goal: Acceptable Pain Control and Functional Ability  2/11/2023 1518 by Gonzalez Robles LPN  Outcome: Ongoing, Progressing  2/11/2023 1352 by Gonzalez Robles LPN  Outcome: Ongoing, Progressing

## 2023-02-11 NOTE — NURSING
Attempted to call patient's mother, Bernice, for AM update. Number unavailable. Verified number in chart.

## 2023-02-11 NOTE — PLAN OF CARE
Problem: Adult Inpatient Plan of Care  Goal: Plan of Care Review  Outcome: Ongoing, Progressing  Goal: Patient-Specific Goal (Individualized)  Outcome: Ongoing, Progressing  Goal: Absence of Hospital-Acquired Illness or Injury  Outcome: Ongoing, Progressing  Goal: Optimal Comfort and Wellbeing  Outcome: Ongoing, Progressing  Goal: Readiness for Transition of Care  Outcome: Ongoing, Progressing     Problem: Infection  Goal: Absence of Infection Signs and Symptoms  Outcome: Ongoing, Progressing     Problem: Impaired Wound Healing  Goal: Optimal Wound Healing  Outcome: Ongoing, Progressing     Problem: Noninvasive Ventilation Acute  Goal: Effective Unassisted Ventilation and Oxygenation  Outcome: Ongoing, Progressing     Problem: Fall Injury Risk  Goal: Absence of Fall and Fall-Related Injury  Outcome: Ongoing, Progressing     Problem: Skin Injury Risk Increased  Goal: Skin Health and Integrity  Outcome: Ongoing, Progressing     Problem: Adjustment to Illness (Delirium)  Goal: Optimal Coping  Outcome: Ongoing, Progressing     Problem: Altered Behavior (Delirium)  Goal: Improved Behavioral Control  Outcome: Ongoing, Progressing     Problem: Attention and Thought Clarity Impairment (Delirium)  Goal: Improved Attention and Thought Clarity  Outcome: Ongoing, Progressing     Problem: Sleep Disturbance (Delirium)  Goal: Improved Sleep  Outcome: Ongoing, Progressing     Problem: Pain Acute  Goal: Acceptable Pain Control and Functional Ability  Outcome: Ongoing, Progressing     Problem: Communication Impairment (Mechanical Ventilation, Invasive)  Goal: Effective Communication  Outcome: Met     Problem: Device-Related Complication Risk (Mechanical Ventilation, Invasive)  Goal: Optimal Device Function  Outcome: Met     Problem: Inability to Wean (Mechanical Ventilation, Invasive)  Goal: Mechanical Ventilation Liberation  Outcome: Met     Problem: Nutrition Impairment (Mechanical Ventilation, Invasive)  Goal: Optimal Nutrition  Delivery  Outcome: Met     Problem: Skin and Tissue Injury (Mechanical Ventilation, Invasive)  Goal: Absence of Device-Related Skin and Tissue Injury  Outcome: Met     Problem: Ventilator-Induced Lung Injury (Mechanical Ventilation, Invasive)  Goal: Absence of Ventilator-Induced Lung Injury  Outcome: Met     Problem: Communication Impairment (Artificial Airway)  Goal: Effective Communication  Outcome: Met     Problem: Device-Related Complication Risk (Artificial Airway)  Goal: Optimal Device Function  Outcome: Met     Problem: Skin and Tissue Injury (Artificial Airway)  Goal: Absence of Device-Related Skin or Tissue Injury  Outcome: Met     Problem: Restraint, Nonbehavioral (Nonviolent)  Goal: Absence of Harm or Injury  Outcome: Met

## 2023-02-12 LAB
ALBUMIN SERPL-MCNC: 2.8 G/DL (ref 3.5–5)
ALBUMIN/GLOB SERPL: 1 RATIO (ref 1.1–2)
ALP SERPL-CCNC: 49 UNIT/L (ref 40–150)
ALT SERPL-CCNC: 31 UNIT/L (ref 0–55)
AST SERPL-CCNC: 37 UNIT/L (ref 5–34)
BASOPHILS # BLD AUTO: 0.05 X10(3)/MCL (ref 0–0.2)
BASOPHILS NFR BLD AUTO: 0.4 %
BILIRUBIN DIRECT+TOT PNL SERPL-MCNC: 1.4 MG/DL
BUN SERPL-MCNC: 8.7 MG/DL (ref 8.9–20.6)
CALCIUM SERPL-MCNC: 8.7 MG/DL (ref 8.4–10.2)
CHLORIDE SERPL-SCNC: 104 MMOL/L (ref 98–107)
CO2 SERPL-SCNC: 24 MMOL/L (ref 22–29)
CREAT SERPL-MCNC: 0.77 MG/DL (ref 0.73–1.18)
EOSINOPHIL # BLD AUTO: 0.2 X10(3)/MCL (ref 0–0.9)
EOSINOPHIL NFR BLD AUTO: 1.6 %
ERYTHROCYTE [DISTWIDTH] IN BLOOD BY AUTOMATED COUNT: 15.1 % (ref 11.5–17)
GFR SERPLBLD CREATININE-BSD FMLA CKD-EPI: >60 MLS/MIN/1.73/M2
GLOBULIN SER-MCNC: 2.9 GM/DL (ref 2.4–3.5)
GLUCOSE SERPL-MCNC: 99 MG/DL (ref 74–100)
HCT VFR BLD AUTO: 24.8 % (ref 42–52)
HGB BLD-MCNC: 8.3 GM/DL (ref 14–18)
IMM GRANULOCYTES # BLD AUTO: 0.27 X10(3)/MCL (ref 0–0.04)
IMM GRANULOCYTES NFR BLD AUTO: 2.2 %
LYMPHOCYTES # BLD AUTO: 2.18 X10(3)/MCL (ref 0.6–4.6)
LYMPHOCYTES NFR BLD AUTO: 17.9 %
MCH RBC QN AUTO: 29 PG
MCHC RBC AUTO-ENTMCNC: 33.5 MG/DL (ref 33–36)
MCV RBC AUTO: 86.7 FL (ref 80–94)
MONOCYTES # BLD AUTO: 1.07 X10(3)/MCL (ref 0.1–1.3)
MONOCYTES NFR BLD AUTO: 8.8 %
NEUTROPHILS # BLD AUTO: 8.41 X10(3)/MCL (ref 2.1–9.2)
NEUTROPHILS NFR BLD AUTO: 69.1 %
NRBC BLD AUTO-RTO: 1.6 %
PLATELET # BLD AUTO: 338 X10(3)/MCL (ref 130–400)
PMV BLD AUTO: 10.3 FL (ref 7.4–10.4)
POTASSIUM SERPL-SCNC: 3.8 MMOL/L (ref 3.5–5.1)
PROT SERPL-MCNC: 5.7 GM/DL (ref 6.4–8.3)
RBC # BLD AUTO: 2.86 X10(6)/MCL (ref 4.7–6.1)
SODIUM SERPL-SCNC: 140 MMOL/L (ref 136–145)
WBC # SPEC AUTO: 12.2 X10(3)/MCL (ref 4.5–11.5)

## 2023-02-12 PROCEDURE — 94761 N-INVAS EAR/PLS OXIMETRY MLT: CPT

## 2023-02-12 PROCEDURE — 99900035 HC TECH TIME PER 15 MIN (STAT)

## 2023-02-12 PROCEDURE — 25000003 PHARM REV CODE 250: Performed by: STUDENT IN AN ORGANIZED HEALTH CARE EDUCATION/TRAINING PROGRAM

## 2023-02-12 PROCEDURE — 97535 SELF CARE MNGMENT TRAINING: CPT | Mod: CO

## 2023-02-12 PROCEDURE — 85025 COMPLETE CBC W/AUTO DIFF WBC: CPT

## 2023-02-12 PROCEDURE — 63600175 PHARM REV CODE 636 W HCPCS: Performed by: NURSE PRACTITIONER

## 2023-02-12 PROCEDURE — 21400001 HC TELEMETRY ROOM

## 2023-02-12 PROCEDURE — 99900031 HC PATIENT EDUCATION (STAT)

## 2023-02-12 PROCEDURE — 80053 COMPREHEN METABOLIC PANEL: CPT

## 2023-02-12 PROCEDURE — 25000003 PHARM REV CODE 250: Performed by: NURSE PRACTITIONER

## 2023-02-12 PROCEDURE — 94660 CPAP INITIATION&MGMT: CPT

## 2023-02-12 PROCEDURE — 25000003 PHARM REV CODE 250

## 2023-02-12 RX ORDER — FAMOTIDINE 20 MG/1
20 TABLET, FILM COATED ORAL 2 TIMES DAILY
Status: DISCONTINUED | OUTPATIENT
Start: 2023-02-12 | End: 2023-02-14 | Stop reason: HOSPADM

## 2023-02-12 RX ORDER — METHOCARBAMOL 500 MG/1
500 TABLET, FILM COATED ORAL 4 TIMES DAILY
Status: DISCONTINUED | OUTPATIENT
Start: 2023-02-12 | End: 2023-02-14 | Stop reason: HOSPADM

## 2023-02-12 RX ADMIN — METHOCARBAMOL TABLETS 500 MG: 500 TABLET, COATED ORAL at 08:02

## 2023-02-12 RX ADMIN — OXYCODONE 5 MG: 5 TABLET ORAL at 04:02

## 2023-02-12 RX ADMIN — AMLODIPINE BESYLATE 5 MG: 5 TABLET ORAL at 08:02

## 2023-02-12 RX ADMIN — QUETIAPINE FUMARATE 200 MG: 100 TABLET ORAL at 08:02

## 2023-02-12 RX ADMIN — METOPROLOL TARTRATE 25 MG: 25 TABLET, FILM COATED ORAL at 08:02

## 2023-02-12 RX ADMIN — CLONAZEPAM 1 MG: 1 TABLET ORAL at 08:02

## 2023-02-12 RX ADMIN — FLUOXETINE HYDROCHLORIDE 40 MG: 20 CAPSULE ORAL at 08:02

## 2023-02-12 RX ADMIN — METHOCARBAMOL TABLETS 500 MG: 500 TABLET, COATED ORAL at 04:02

## 2023-02-12 RX ADMIN — ENOXAPARIN SODIUM 40 MG: 40 INJECTION SUBCUTANEOUS at 08:02

## 2023-02-12 RX ADMIN — FAMOTIDINE 20 MG: 20 TABLET, FILM COATED ORAL at 10:02

## 2023-02-12 RX ADMIN — TAMSULOSIN HYDROCHLORIDE 0.4 MG: 0.4 CAPSULE ORAL at 08:02

## 2023-02-12 RX ADMIN — OXYCODONE 5 MG: 5 TABLET ORAL at 08:02

## 2023-02-12 RX ADMIN — LISINOPRIL 40 MG: 20 TABLET ORAL at 08:02

## 2023-02-12 RX ADMIN — OXYCODONE 5 MG: 5 TABLET ORAL at 03:02

## 2023-02-12 RX ADMIN — MIRTAZAPINE 7.5 MG: 7.5 TABLET ORAL at 08:02

## 2023-02-12 RX ADMIN — FAMOTIDINE 20 MG: 20 TABLET, FILM COATED ORAL at 08:02

## 2023-02-12 RX ADMIN — DOCUSATE SODIUM 100 MG: 100 CAPSULE, LIQUID FILLED ORAL at 09:02

## 2023-02-12 RX ADMIN — LAMOTRIGINE 25 MG: 25 TABLET ORAL at 08:02

## 2023-02-12 NOTE — PLAN OF CARE
Problem: Adult Inpatient Plan of Care  Goal: Plan of Care Review  Outcome: Ongoing, Progressing  Goal: Patient-Specific Goal (Individualized)  Outcome: Ongoing, Progressing  Goal: Absence of Hospital-Acquired Illness or Injury  Outcome: Ongoing, Progressing  Goal: Optimal Comfort and Wellbeing  Outcome: Ongoing, Progressing  Goal: Readiness for Transition of Care  Outcome: Ongoing, Progressing     Problem: Infection  Goal: Absence of Infection Signs and Symptoms  Outcome: Ongoing, Progressing     Problem: Impaired Wound Healing  Goal: Optimal Wound Healing  Outcome: Ongoing, Progressing     Problem: Fall Injury Risk  Goal: Absence of Fall and Fall-Related Injury  Outcome: Ongoing, Progressing     Problem: Skin Injury Risk Increased  Goal: Skin Health and Integrity  Outcome: Ongoing, Progressing     Problem: Pain Acute  Goal: Acceptable Pain Control and Functional Ability  Outcome: Ongoing, Progressing
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
## 2023-02-12 NOTE — PT/OT/SLP PROGRESS
Occupational Therapy   Treatment    Name: Gera Chavez  MRN: [id number]  Admitting Diagnosis:  Fracture of proximal end of left femur, open type I or II, initial encounter  7 Days Post-Op    Recommendations:     Discharge Recommendations:  (TBD)  Discharge Equipment Recommendations:  platform, walker, rolling  Barriers to discharge:   (orthopedic pxns)    Assessment:     Gera Chavez is a 37 y.o. male with a medical diagnosis of Fracture of proximal end of left femur, open type I or II, initial encounter. Performance deficits affecting function are weakness, impaired endurance, impaired self care skills, pain, orthopedic precautions.     Rehab Prognosis:  Good; patient would benefit from acute skilled OT services to address these deficits and reach maximum level of function.       Plan:     Patient to be seen 5 x/week to address the above listed problems via self-care/home management  Plan of Care Expires: 03/07/23  Plan of Care Reviewed with: patient    Subjective     Pain/Comfort:  Pain Rating 1: 3/10  Location - Side 1: Left  Location 1: hand  Pain Addressed 1: Pre-medicate for activity, Reposition  Pain Rating 2: 5/10  Location - Side 2: Left  Location 2: leg  Pain Addressed 2: Pre-medicate for activity, Reposition    Objective:     Communicated with: RN prior to session.  Patient found supine upon OT entry to room.    General Precautions: Standard, fall    Orthopedic Precautions:LUE non weight bearing, LLE weight bearing as tolerated  Braces: N/A  Respiratory Status: Room air     Occupational Performance:     Bed Mobility:    Patient completed Supine to Sit with minimum assistance  Patient completed Sit to Supine with minimum assistance     Functional Mobility/Transfers:  Patient completed Sit <> Stand Transfer with minimum assistance  with  platform walker   Patient completed Toilet Transfer Step Transfer technique with minimum assistance with  platform walker  Functional Mobility: Min    Activities of Daily  Living:  Toileting-Min A for safety with safe descend on low surface.      [facility] 6 Click ADL:      Treatment & Education:  Educated Pt on NWB on to LUE-with good adherence during tx.    Patient left HOB elevated with call button in reach    GOALS:   Multidisciplinary Problems       Occupational Therapy Goals          Problem: Occupational Therapy    Goal Priority Disciplines Outcome Interventions   Occupational Therapy Goal     OT, PT/OT Ongoing, Progressing    Description: Goals to be met by: 3/7/23     Patient will increase functional independence with ADLs by performing:    UE Dressing with Modified [place].  LE Dressing with Modified [place].  Toileting from toilet with Modified [place] for hygiene and clothing management.   Toilet transfer to toilet with Modified [place].                         Time Tracking:     OT Date of Treatment: 02/12/23  OT Start Time: 0943  OT Stop Time: 1003  OT Total Time (min): 20 min    Billable Minutes:Self Care/Home Management 20    OT/HUBERT: HUBERT GASPAR Visit Number: 4    2/12/2023

## 2023-02-13 LAB
ALBUMIN SERPL-MCNC: 2.8 G/DL (ref 3.5–5)
ALBUMIN/GLOB SERPL: 0.8 RATIO (ref 1.1–2)
ALP SERPL-CCNC: 52 UNIT/L (ref 40–150)
ALT SERPL-CCNC: 26 UNIT/L (ref 0–55)
AST SERPL-CCNC: 25 UNIT/L (ref 5–34)
BASOPHILS # BLD AUTO: 0.06 X10(3)/MCL (ref 0–0.2)
BASOPHILS NFR BLD AUTO: 0.5 %
BILIRUBIN DIRECT+TOT PNL SERPL-MCNC: 1.2 MG/DL
BUN SERPL-MCNC: 7 MG/DL (ref 8.9–20.6)
CALCIUM SERPL-MCNC: 9 MG/DL (ref 8.4–10.2)
CHLORIDE SERPL-SCNC: 102 MMOL/L (ref 98–107)
CO2 SERPL-SCNC: 25 MMOL/L (ref 22–29)
CREAT SERPL-MCNC: 0.81 MG/DL (ref 0.73–1.18)
CRP SERPL-MCNC: 63.1 MG/L
EOSINOPHIL # BLD AUTO: 0.24 X10(3)/MCL (ref 0–0.9)
EOSINOPHIL NFR BLD AUTO: 2 %
ERYTHROCYTE [DISTWIDTH] IN BLOOD BY AUTOMATED COUNT: 15.6 % (ref 11.5–17)
GFR SERPLBLD CREATININE-BSD FMLA CKD-EPI: >60 MLS/MIN/1.73/M2
GLOBULIN SER-MCNC: 3.6 GM/DL (ref 2.4–3.5)
GLUCOSE SERPL-MCNC: 127 MG/DL (ref 74–100)
HCT VFR BLD AUTO: 24.5 % (ref 42–52)
HGB BLD-MCNC: 8 GM/DL (ref 14–18)
IMM GRANULOCYTES # BLD AUTO: 0.34 X10(3)/MCL (ref 0–0.04)
IMM GRANULOCYTES NFR BLD AUTO: 2.8 %
LYMPHOCYTES # BLD AUTO: 2.51 X10(3)/MCL (ref 0.6–4.6)
LYMPHOCYTES NFR BLD AUTO: 20.6 %
MCH RBC QN AUTO: 28.6 PG
MCHC RBC AUTO-ENTMCNC: 32.7 MG/DL (ref 33–36)
MCV RBC AUTO: 87.5 FL (ref 80–94)
MONOCYTES # BLD AUTO: 1.04 X10(3)/MCL (ref 0.1–1.3)
MONOCYTES NFR BLD AUTO: 8.5 %
NEUTROPHILS # BLD AUTO: 7.98 X10(3)/MCL (ref 2.1–9.2)
NEUTROPHILS NFR BLD AUTO: 65.6 %
NRBC BLD AUTO-RTO: 2.1 %
PLATELET # BLD AUTO: 347 X10(3)/MCL (ref 130–400)
PMV BLD AUTO: 9.8 FL (ref 7.4–10.4)
POCT GLUCOSE: 104 MG/DL (ref 70–110)
POCT GLUCOSE: 117 MG/DL (ref 70–110)
POCT GLUCOSE: 143 MG/DL (ref 70–110)
POCT GLUCOSE: 151 MG/DL (ref 70–110)
POTASSIUM SERPL-SCNC: 3.7 MMOL/L (ref 3.5–5.1)
PREALB SERPL-MCNC: 21.7 MG/DL (ref 18–45)
PROT SERPL-MCNC: 6.4 GM/DL (ref 6.4–8.3)
RBC # BLD AUTO: 2.8 X10(6)/MCL (ref 4.7–6.1)
SODIUM SERPL-SCNC: 140 MMOL/L (ref 136–145)
WBC # SPEC AUTO: 12.2 X10(3)/MCL (ref 4.5–11.5)

## 2023-02-13 PROCEDURE — 96125 COGNITIVE TEST BY HC PRO: CPT

## 2023-02-13 PROCEDURE — 80053 COMPREHEN METABOLIC PANEL: CPT

## 2023-02-13 PROCEDURE — 25000003 PHARM REV CODE 250: Performed by: STUDENT IN AN ORGANIZED HEALTH CARE EDUCATION/TRAINING PROGRAM

## 2023-02-13 PROCEDURE — 86140 C-REACTIVE PROTEIN: CPT

## 2023-02-13 PROCEDURE — 97116 GAIT TRAINING THERAPY: CPT | Mod: CQ

## 2023-02-13 PROCEDURE — 25000003 PHARM REV CODE 250

## 2023-02-13 PROCEDURE — 63600175 PHARM REV CODE 636 W HCPCS: Performed by: NURSE PRACTITIONER

## 2023-02-13 PROCEDURE — 21400001 HC TELEMETRY ROOM

## 2023-02-13 PROCEDURE — 84134 ASSAY OF PREALBUMIN: CPT

## 2023-02-13 PROCEDURE — 97535 SELF CARE MNGMENT TRAINING: CPT

## 2023-02-13 PROCEDURE — 25000003 PHARM REV CODE 250: Performed by: NURSE PRACTITIONER

## 2023-02-13 PROCEDURE — 85025 COMPLETE CBC W/AUTO DIFF WBC: CPT

## 2023-02-13 RX ADMIN — TAMSULOSIN HYDROCHLORIDE 0.4 MG: 0.4 CAPSULE ORAL at 09:02

## 2023-02-13 RX ADMIN — METHOCARBAMOL TABLETS 500 MG: 500 TABLET, COATED ORAL at 12:02

## 2023-02-13 RX ADMIN — METHOCARBAMOL TABLETS 500 MG: 500 TABLET, COATED ORAL at 08:02

## 2023-02-13 RX ADMIN — METHOCARBAMOL TABLETS 500 MG: 500 TABLET, COATED ORAL at 09:02

## 2023-02-13 RX ADMIN — QUETIAPINE FUMARATE 200 MG: 100 TABLET ORAL at 09:02

## 2023-02-13 RX ADMIN — Medication 6 MG: at 08:02

## 2023-02-13 RX ADMIN — OXYCODONE 5 MG: 5 TABLET ORAL at 06:02

## 2023-02-13 RX ADMIN — FAMOTIDINE 20 MG: 20 TABLET, FILM COATED ORAL at 08:02

## 2023-02-13 RX ADMIN — METHOCARBAMOL TABLETS 500 MG: 500 TABLET, COATED ORAL at 04:02

## 2023-02-13 RX ADMIN — OXYCODONE 5 MG: 5 TABLET ORAL at 04:02

## 2023-02-13 RX ADMIN — LISINOPRIL 40 MG: 20 TABLET ORAL at 09:02

## 2023-02-13 RX ADMIN — METOPROLOL TARTRATE 25 MG: 25 TABLET, FILM COATED ORAL at 09:02

## 2023-02-13 RX ADMIN — LAMOTRIGINE 25 MG: 25 TABLET ORAL at 09:02

## 2023-02-13 RX ADMIN — CLONAZEPAM 1 MG: 1 TABLET ORAL at 08:02

## 2023-02-13 RX ADMIN — AMLODIPINE BESYLATE 5 MG: 5 TABLET ORAL at 09:02

## 2023-02-13 RX ADMIN — FAMOTIDINE 20 MG: 20 TABLET, FILM COATED ORAL at 09:02

## 2023-02-13 RX ADMIN — ENOXAPARIN SODIUM 40 MG: 40 INJECTION SUBCUTANEOUS at 09:02

## 2023-02-13 RX ADMIN — OXYCODONE 5 MG: 5 TABLET ORAL at 12:02

## 2023-02-13 RX ADMIN — MIRTAZAPINE 7.5 MG: 7.5 TABLET ORAL at 08:02

## 2023-02-13 RX ADMIN — OXYCODONE 5 MG: 5 TABLET ORAL at 09:02

## 2023-02-13 RX ADMIN — FLUOXETINE HYDROCHLORIDE 40 MG: 20 CAPSULE ORAL at 09:02

## 2023-02-13 RX ADMIN — ENOXAPARIN SODIUM 40 MG: 40 INJECTION SUBCUTANEOUS at 08:02

## 2023-02-13 RX ADMIN — OXYCODONE 5 MG: 5 TABLET ORAL at 02:02

## 2023-02-13 RX ADMIN — METOPROLOL TARTRATE 25 MG: 25 TABLET, FILM COATED ORAL at 08:02

## 2023-02-13 NOTE — PLAN OF CARE
Zuly Abdi updates me that she has arranged the Peer to Peer for tomorrow with pts insurer.   Pts mother and pts sister Arabella are at bedside and updated. Arabella tells me she is planning on calling his insurer to see if this will enhance the info that the insurer will be considering. I updated her that I thought the peer to peer was for the afternoon tomorrow. She plans to call prior.   We discussed plan if the insurer declines after the peer to peer  I will order platform walker from [employer] and have contacted [facility] an outpatient therapy provider in [place]. They confirmed they accepts pts insurer. Arabella confirms she knows where this is etc.   Mother confirms she can transport home when discharged.  Will follow up post peer to peer.

## 2023-02-13 NOTE — PT/OT/SLP PROGRESS
Physical Therapy         Treatment        Gera Chavez   MRN: [id number]     PT Received On: 02/13/23  PT Start Time: 1025     PT Stop Time: 1040    PT Total Time (min): 15 min       Billable Minutes:  Gait [id number]  Total Minutes: 15    Treatment Type: Treatment  PT/PTA: PTA     PTA Visit Number: 4       General Precautions: Standard, fall  Orthopedic Precautions: Orthopedic Precautions : LUE non weight bearing, LLE weight bearing as tolerated   Braces:      Spiritual, Cultural Beliefs, Pent[employer]alism Practices, Values that Affect Care: no    Subjective:  Communicated with NSG prior to session.    Pain/Comfort  Location - Side 1: Left  Location 1: hand  Pain Addressed 1: Reposition    Objective:  Patient found UIC with LLE propped on pillow.     Functional Mobility:  Bed Mobility:   Supine to sit: Activity did not occur   Sit to supine: Activity did not occur   Rolling: Activity did not occur   Scooting: Standby Assistance    Balance:   Static Sit: GOOD+: Takes MAXIMAL challenges from all directions.    Dynamic Sit:  GOOD-: Incosistently Maintains balance through MODERATE excursions of active trunk movement,     Static Stand: FAIR: Maintains without assist but unable to take challenges  Dynamic stand: POOR+: Needs MIN (minimal ) assist during gait    Transfer Training:  Sit to stand:Minimal Assistance with Rolling Walker and Platform . 2 trials from bedside chair. Pt required increased time.     Gait Training:  Pt amb 22ft with PFRW 2x Amber. Pt required increased time and vcs to maintain NWB on L hand.      Stair Training:  Pt ascended/descended 3 stair(s) with No Assistive Device with right handrail with Moderate Assistance.       Additional Treatment:    Activity Tolerance:  Patient limited by fatigue and Patient limited by pain    Patient left up in chair with call button in reach.    Assessment:  Gera Chavez is a 37 y.o. male with a medical diagnosis of Fracture of proximal end of left femur, open type I or II,  initial encounter. He presents with decreased safety awareness and remains a fall risk.   Pt would benefit from further rehab therapy services to increase overall independence level and assist in getting pt closer to PLOF.  CM stated she spoke wit pt family who also is wanting rehab placement. Pt is a great rehab candidate.     Rehab potential is excellent.    Activity tolerance: Excellent    Discharge recommendations: Discharge Facility/Level of Care Needs: rehabilitation facility     Equipment recommendations: Equipment Needed After Discharge: walker, rolling, platform, bath bench     GOALS:   Multidisciplinary Problems       Physical Therapy Goals          Problem: Physical Therapy    Goal Priority Disciplines Outcome Goal Variances Interventions   Physical Therapy Goal     PT, PT/OT Ongoing, Progressing     Description: Goals to be met by: 23     Patient will increase functional independence with mobility by performin. Supine to sit with Stand-by Assistance  2. Sit to supine with Stand-by Assistance  3. Sit to stand transfer with Stand-by Assistance using platform walker vs equipment that will be allowed in [facility]  4. Gait  x 100 feet with Modified [place] using platform walker vs equipment that will be allowed in [facility]                       PLAN:    Patient to be seen 6 x/week  to address the above listed problems via gait training, therapeutic activities, therapeutic exercises  Plan of Care expires: 23  Plan of Care reviewed with: patient         2023

## 2023-02-13 NOTE — PLAN OF CARE
Spoke with pt who tells me that he was thinking going to his moms home when discharged. We reviewed info on his demographic. He tells me the address [address] is where he lived long ago. He states his address in [facility] in [place].   We reviewed insurer; pt has [employer] medicare and a secondary medicaid take charge program. Pt confirms he is on medicare due to a disability. He tells me he is schizophrenic. He states he is having no issues with the schizophrenia at this time, denying any hallucinations. Pt goes to a day program on Thursdays through [employer].   Pt tells me his sister Yesi needs to be called and updated and confirms her number . She lives in [place].  He tells me his mothers number is   I called Yesi who tells me they are wanting rehab, his mother is not able to assist pt much. I told her that she should call pt to let him know this.   Followed up with referral to [facility]  spoke with Nicole who confirms she submitted to [employer] on 2/10 and has not received response yet.  I called Susan at [facility]  opt 2 ext [phone] and left voice message requesting update on the above submission by [facility].   Pt is using a left platform walker.   Becky notes;  Peter Mortensen [phone]  please keep him or  Duane Devine [phone] aware of  dc plans  Will follow for Human response. If they decline, I will order platform walker and set up outpt rehab in the [facility]. This plan reveiwed with Yesi via phone and with pt.
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
## 2023-02-13 NOTE — PT/OT/SLP EVAL
"Speech Language Pathology Department  Standardized Cognitive Testing    Patient Name:  Gera Chavez   MRN:  [id number]  Admitting Diagnosis: Fracture of proximal end of left femur, open type I or II, initial encounter    Recommendations:     General recommendations:  SLP intervention not indicated  Communication strategies:  none    History:     No past medical history on file.    Past Surgical History:   Procedure Laterality Date    INTRAMEDULLARY RODDING OF FEMUR Left 2/5/2023    Procedure: INSERTION, INTRAMEDULLARY NOEL, FEMUR;  Surgeon: Tuan Zepeda DO;  Location: [facility];  Service: Orthopedics;  Laterality: Left;       Subjective     Patient awake, alert, and oriented x4 .  Patient goals: "to get better"  Spiritual/Cultural/Adventism Beliefs/Practices that affect care: no  Pain/Comfort: Pain Rating 1: 0/10  Respiratory Status: room air     Objective:     Standardized cognitive testing was completed using the Brief Test of Head Injury (BTHI).  Results are as follows:    ITEM CLUSTER  Raw Score  Percent Correct   Orientation/Attention 7 87   Following Commands 8 100   Linguistic Organization 9 100   Reading Comprehension 4 100   Naming 4 100   Memory 10 83   Visual-Spatial Skills 5 100   BTHI Total Raw Score 47 94        Assessment:     Pt presents within normal limits on BTHI. SLP to sign off.     Goals:   Multidisciplinary Problems       SLP Goals       Not on file                  Patient Education:     Patient provided with verbal education regarding POC.  Understanding was verbalized.    Plan:     SLP Follow-Up:       Patient to be seen:      Plan of Care expires:     Plan of Care reviewed with:  patient     Time Tracking:     SLP Treatment Date:    2/13/23  Speech Start Time:   1320  Speech Stop Time:      1340  Speech Total Time (min):   20    Billable minutes:   Cognitive Skills Intervention, 20      02/13/2023           "

## 2023-02-13 NOTE — PLAN OF CARE
Bhavna from  notified at 0800 that [facility] is offering a peer to peer for rehab placment. The deadline is tomorrow at noon. The phone number is  [phone]. Notified Sonali with [facility]. She states that one of their NP will make the call.
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
## 2023-02-13 NOTE — PLAN OF CARE
Registration notified to update address to [address] [place] [address] and phone number is

## 2023-02-14 VITALS
BODY MASS INDEX: 27.41 KG/M2 | OXYGEN SATURATION: 100 % | SYSTOLIC BLOOD PRESSURE: 97 MMHG | WEIGHT: 220.44 LBS | RESPIRATION RATE: 18 BRPM | HEIGHT: 75 IN | HEART RATE: 117 BPM | TEMPERATURE: 98 F | DIASTOLIC BLOOD PRESSURE: 58 MMHG

## 2023-02-14 LAB
ALBUMIN SERPL-MCNC: 3.1 G/DL (ref 3.5–5)
ALBUMIN/GLOB SERPL: 1 RATIO (ref 1.1–2)
ALP SERPL-CCNC: 56 UNIT/L (ref 40–150)
ALT SERPL-CCNC: 25 UNIT/L (ref 0–55)
AST SERPL-CCNC: 22 UNIT/L (ref 5–34)
BASOPHILS # BLD AUTO: 0.05 X10(3)/MCL (ref 0–0.2)
BASOPHILS NFR BLD AUTO: 0.4 %
BILIRUBIN DIRECT+TOT PNL SERPL-MCNC: 1.3 MG/DL
BUN SERPL-MCNC: 7.9 MG/DL (ref 8.9–20.6)
CALCIUM SERPL-MCNC: 8.8 MG/DL (ref 8.4–10.2)
CHLORIDE SERPL-SCNC: 103 MMOL/L (ref 98–107)
CO2 SERPL-SCNC: 24 MMOL/L (ref 22–29)
CREAT SERPL-MCNC: 0.83 MG/DL (ref 0.73–1.18)
EOSINOPHIL # BLD AUTO: 0.25 X10(3)/MCL (ref 0–0.9)
EOSINOPHIL NFR BLD AUTO: 2.1 %
ERYTHROCYTE [DISTWIDTH] IN BLOOD BY AUTOMATED COUNT: 15.9 % (ref 11.5–17)
GFR SERPLBLD CREATININE-BSD FMLA CKD-EPI: >60 MLS/MIN/1.73/M2
GLOBULIN SER-MCNC: 3.1 GM/DL (ref 2.4–3.5)
GLUCOSE SERPL-MCNC: 103 MG/DL (ref 74–100)
HCT VFR BLD AUTO: 25 % (ref 42–52)
HGB BLD-MCNC: 8.2 GM/DL (ref 14–18)
IMM GRANULOCYTES # BLD AUTO: 0.33 X10(3)/MCL (ref 0–0.04)
IMM GRANULOCYTES NFR BLD AUTO: 2.8 %
LYMPHOCYTES # BLD AUTO: 2.41 X10(3)/MCL (ref 0.6–4.6)
LYMPHOCYTES NFR BLD AUTO: 20.3 %
MCH RBC QN AUTO: 29 PG
MCHC RBC AUTO-ENTMCNC: 32.8 MG/DL (ref 33–36)
MCV RBC AUTO: 88.3 FL (ref 80–94)
MONOCYTES # BLD AUTO: 1.04 X10(3)/MCL (ref 0.1–1.3)
MONOCYTES NFR BLD AUTO: 8.7 %
NEUTROPHILS # BLD AUTO: 7.81 X10(3)/MCL (ref 2.1–9.2)
NEUTROPHILS NFR BLD AUTO: 65.7 %
NRBC BLD AUTO-RTO: 1.5 %
PLATELET # BLD AUTO: 366 X10(3)/MCL (ref 130–400)
PMV BLD AUTO: 9.8 FL (ref 7.4–10.4)
POCT GLUCOSE: 116 MG/DL (ref 70–110)
POTASSIUM SERPL-SCNC: 4.1 MMOL/L (ref 3.5–5.1)
PROT SERPL-MCNC: 6.2 GM/DL (ref 6.4–8.3)
RBC # BLD AUTO: 2.83 X10(6)/MCL (ref 4.7–6.1)
SODIUM SERPL-SCNC: 137 MMOL/L (ref 136–145)
WBC # SPEC AUTO: 11.9 X10(3)/MCL (ref 4.5–11.5)

## 2023-02-14 PROCEDURE — 25000003 PHARM REV CODE 250

## 2023-02-14 PROCEDURE — 85025 COMPLETE CBC W/AUTO DIFF WBC: CPT

## 2023-02-14 PROCEDURE — 63600175 PHARM REV CODE 636 W HCPCS: Performed by: NURSE PRACTITIONER

## 2023-02-14 PROCEDURE — 97168 OT RE-EVAL EST PLAN CARE: CPT

## 2023-02-14 PROCEDURE — 94761 N-INVAS EAR/PLS OXIMETRY MLT: CPT

## 2023-02-14 PROCEDURE — 94660 CPAP INITIATION&MGMT: CPT

## 2023-02-14 PROCEDURE — 97535 SELF CARE MNGMENT TRAINING: CPT

## 2023-02-14 PROCEDURE — 99900035 HC TECH TIME PER 15 MIN (STAT)

## 2023-02-14 PROCEDURE — 25000003 PHARM REV CODE 250: Performed by: NURSE PRACTITIONER

## 2023-02-14 PROCEDURE — 25000003 PHARM REV CODE 250: Performed by: STUDENT IN AN ORGANIZED HEALTH CARE EDUCATION/TRAINING PROGRAM

## 2023-02-14 PROCEDURE — 27000221 HC OXYGEN, UP TO 24 HOURS

## 2023-02-14 PROCEDURE — 80053 COMPREHEN METABOLIC PANEL: CPT

## 2023-02-14 PROCEDURE — 97116 GAIT TRAINING THERAPY: CPT | Mod: CQ

## 2023-02-14 RX ADMIN — METHOCARBAMOL TABLETS 500 MG: 500 TABLET, COATED ORAL at 08:02

## 2023-02-14 RX ADMIN — METHOCARBAMOL TABLETS 500 MG: 500 TABLET, COATED ORAL at 04:02

## 2023-02-14 RX ADMIN — TAMSULOSIN HYDROCHLORIDE 0.4 MG: 0.4 CAPSULE ORAL at 08:02

## 2023-02-14 RX ADMIN — METHOCARBAMOL TABLETS 500 MG: 500 TABLET, COATED ORAL at 12:02

## 2023-02-14 RX ADMIN — ENOXAPARIN SODIUM 40 MG: 40 INJECTION SUBCUTANEOUS at 08:02

## 2023-02-14 RX ADMIN — FLUOXETINE HYDROCHLORIDE 40 MG: 20 CAPSULE ORAL at 08:02

## 2023-02-14 RX ADMIN — OXYCODONE 5 MG: 5 TABLET ORAL at 04:02

## 2023-02-14 RX ADMIN — METOPROLOL TARTRATE 25 MG: 25 TABLET, FILM COATED ORAL at 08:02

## 2023-02-14 RX ADMIN — FAMOTIDINE 20 MG: 20 TABLET, FILM COATED ORAL at 09:02

## 2023-02-14 RX ADMIN — OXYCODONE 5 MG: 5 TABLET ORAL at 02:02

## 2023-02-14 RX ADMIN — OXYCODONE 5 MG: 5 TABLET ORAL at 12:02

## 2023-02-14 RX ADMIN — OXYCODONE 5 MG: 5 TABLET ORAL at 05:02

## 2023-02-14 RX ADMIN — OXYCODONE 5 MG: 5 TABLET ORAL at 08:02

## 2023-02-14 RX ADMIN — LISINOPRIL 40 MG: 20 TABLET ORAL at 08:02

## 2023-02-14 RX ADMIN — AMLODIPINE BESYLATE 5 MG: 5 TABLET ORAL at 08:02

## 2023-02-14 RX ADMIN — QUETIAPINE FUMARATE 200 MG: 100 TABLET ORAL at 08:02

## 2023-02-14 RX ADMIN — LAMOTRIGINE 25 MG: 25 TABLET ORAL at 08:02
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
## 2023-02-14 NOTE — PLAN OF CARE
Problem: Adult Inpatient Plan of Care  Goal: Plan of Care Review  Outcome: Ongoing, Progressing  Goal: Patient-Specific Goal (Individualized)  Outcome: Ongoing, Progressing  Goal: Absence of Hospital-Acquired Illness or Injury  Outcome: Ongoing, Progressing     Problem: Infection  Goal: Absence of Infection Signs and Symptoms  Outcome: Ongoing, Progressing     Problem: Impaired Wound Healing  Goal: Optimal Wound Healing  Outcome: Ongoing, Progressing     Problem: Fall Injury Risk  Goal: Absence of Fall and Fall-Related Injury  Outcome: Ongoing, Progressing     Problem: Skin Injury Risk Increased  Goal: Skin Health and Integrity  Outcome: Ongoing, Progressing
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
## 2023-02-14 NOTE — PLAN OF CARE
Problem: Adult Inpatient Plan of Care  Goal: Plan of Care Review  Outcome: Met  Goal: Patient-Specific Goal (Individualized)  Outcome: Met  Goal: Absence of Hospital-Acquired Illness or Injury  Outcome: Met  Goal: Optimal Comfort and Wellbeing  Outcome: Met  Goal: Readiness for Transition of Care  Outcome: Met     Problem: Infection  Goal: Absence of Infection Signs and Symptoms  Outcome: Met     Problem: Impaired Wound Healing  Goal: Optimal Wound Healing  Outcome: Met     Problem: Fall Injury Risk  Goal: Absence of Fall and Fall-Related Injury  Outcome: Met     Problem: Skin Injury Risk Increased  Goal: Skin Health and Integrity  Outcome: Met     Problem: Pain Acute  Goal: Acceptable Pain Control and Functional Ability  Outcome: Met
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
## 2023-02-14 NOTE — PLAN OF CARE
Pt wants family to transport to [facility]. Family wants to transport.  Stephanie confirms this is fine, Nicole at [facility] says this is fine.  has discharge order and med rec done. I have faxed it to  [facility] at .   Nicole at [facility] says to have nurse call report to  at 5 pm and then send pt .   Packet completed, mother confirms she will had off to staff at [facility]

## 2023-02-14 NOTE — PT/OT/SLP PROGRESS
Physical Therapy         Treatment        Gera Chavez   MRN: [id number]     PT Received On: 02/14/23  PT Start Time: 1203     PT Stop Time: 1212    PT Total Time (min): 9 min       Billable Minutes:  Gait Training9  Total Minutes: 9    Treatment Type: Treatment  PT/PTA: PTA     PTA Visit Number: 5       General Precautions: Standard, fall  Orthopedic Precautions: Orthopedic Precautions : LUE non weight bearing, LLE weight bearing as tolerated   Braces:      Spiritual, Cultural Beliefs, Mormon Practices, Values that Affect Care: no    Subjective:  Communicated with NSG prior to session.    Pain/Comfort  Pain Rating 1: 0/10    Objective:  Patient found sitting EOB, with family present.     Functional Mobility:  Bed Mobility:     Scooting: Standby Assistance    Balance:   Static Sit: NORMAL: No deviations seen in posture held statically  Dynamic Sit:  GOOD-: Incosistently Maintains balance through MODERATE excursions of active trunk movement,     Static Stand: GOOD-: Takes MODERATE challenges from all directions inconsistently  Dynamic stand: FAIR: Needs CONTACT GUARD during gait    Transfer Training:  Sit to stand:Contact Guard Assistance with Rolling Walker and Platform . From EOB    Gait Training:  Pt amb 80ft with PFRW CGA. Pt with slow calixto but had no LOB. Pt required vcs to maintain NWB status on L hand.       Additional Treatment:      Activity Tolerance:  Patient tolerated treatment well    Patient left sitting edge of bed with call button in reach.    Assessment:  Gera Chavez is a 37 y.o. male with a medical diagnosis of Fracture of proximal end of left femur, open type I or II, initial encounter. Pt progressing well with PT.   Pt would benefit from further rehab therapy services to increase overall independence level and assist in getting pt closer to PLOF.    Pt pt not accepted into rehab, he'd need PFRW upon d/c and supervision/assistance at home to ensure safety.     Rehab potential is  excellent.    Activity tolerance: Excellent    Discharge recommendations: Discharge Facility/Level of Care Needs: rehabilitation facility, home with home health, outpatient PT     Equipment recommendations: Equipment Needed After Discharge: walker, rolling, platform, bath bench     GOALS:   Multidisciplinary Problems       Physical Therapy Goals          Problem: Physical Therapy    Goal Priority Disciplines Outcome Goal Variances Interventions   Physical Therapy Goal     PT, PT/OT Ongoing, Progressing     Description: Goals to be met by: 23     Patient will increase functional independence with mobility by performin. Supine to sit with Stand-by Assistance  2. Sit to supine with Stand-by Assistance  3. Sit to stand transfer with Stand-by Assistance using platform walker vs equipment that will be allowed in shelter  4. Gait  x 100 feet with Modified [place] using platform walker vs equipment that will be allowed in shelter                       PLAN:    Patient to be seen 6 x/week  to address the above listed problems via gait training, therapeutic activities, therapeutic exercises  Plan of Care expires: 23  Plan of Care reviewed with: patient         2023

## 2023-02-14 NOTE — PROGRESS NOTES
"Inpatient Nutrition Evaluation    Admit Date: 2/5/2023   Total duration of encounter: 9 days    Nutrition Recommendation/Prescription     Continue current diet as tolerated.  Monitor wt, labs, and intake.    Nutrition Assessment     Chart Review    Reason Seen: malnutrition screening tool (MST) and follow-up    Malnutrition Screening Tool Results   Have you recently lost weight without trying?: Unsure  Have you been eating poorly because of a decreased appetite?: No   MST Score: 2     Diagnosis:  Left proximal femoral shaft fracture  Left 5th metacarpal fracture  Pulmonary contusion  Multiple scalp lacerations    Relevant Medical History: none noted    Nutrition-Related Medications: docusate sodium    Nutrition-Related Labs:  2/7 Cl 112, Phos 1.9, PAB 15.9, CRP >240  2/14: Glu 103, GFR>60    Diet Order: Diet Easy to Chew  Oral Supplement Order: none  Appetite/Oral Intake: good/% of meals  Factors Affecting Nutritional Intake: none identified  Food/Oriental orthodox/Cultural Preferences: none reported  Food Allergies: none reported    Skin Integrity: wound, incision  Wound(s):   traumatic injuries noted    Comments    2/7/23: Noted MST indicates unsure wt loss PTA. No wt changes. Pt currently with good appetite, awaiting first solid food meal.    2/14/23: Pt reports good appetite/intake, denies n/v.     Anthropometrics    Height: 6' 3" (190.5 cm) Height Method: Stated  Last Weight: 100 kg (220 lb 7.4 oz) (02/07/23 1300) Weight Method: Estimated  BMI (Calculated): 27.6  BMI Classification: overweight (BMI 25-29.9)        Ideal Body Weight (IBW), Male: 196 lb     % Ideal Body Weight, Male (lb): 112.48 %                          Usual Weight Provided By: not applicable    Wt Readings from Last 3 Encounters:   02/07/23 1300 100 kg (220 lb 7.4 oz)   02/05/23 1015 100 kg (220 lb 7.4 oz)      Weight Change(s) Since Admission:  Admit Weight: 100 kg (220 lb 7.4 oz) (02/05/23 1015)  2/14: no new wt noted     Patient " Education    Not applicable.    Monitoring & Evaluation     Dietitian will monitor energy intake.  Nutrition Risk/Follow-Up: low (follow-up in 5-7 days)  Patients assigned 'low nutrition risk' status do not qualify for a full nutritional assessment but will be monitored and re-evaluated in a 5-7 day time period. Please consult if re-evaluation needed sooner.

## 2023-02-14 NOTE — PLAN OF CARE
Zuly Abdi completed the Peer to Peer and updates me that pt is approved for rehab.   I called [facility] and spoke with Nicole. Updates sent. She has beds and will let me know today if they can take today. They do not need a covid  I called [facility] to cancel deliver of walker with platform.   Family, pt, nurse and  updated  Will anticipate Nicole will call me soon to confirm they can take today etc. I have talked with Stephanie and pt will go by [facility].

## 2023-02-14 NOTE — PLAN OF CARE
Problem: Occupational Therapy  Goal: Occupational Therapy Goal  Description: Goals to be met by: 3/7/23     Patient will increase functional independence with ADLs by performing:    UE Dressing with Modified [place].  LE Dressing with Modified [place].  Toileting from toilet with Modified [place] for hygiene and clothing management.   Toilet transfer to toilet with Modified [place].    Outcome: Ongoing, Progressing

## 2023-02-14 NOTE — PLAN OF CARE
Walter Atkins [phone] phoned concerning patient discharge. He is the patient's . He states patient must be transported to [facility] via ambulance. Pt is placed in will call with [facility].

## 2023-02-14 NOTE — PLAN OF CARE
[facility] will call pts Reese regarding the copay for the platform walker. I called reese  and let her know, she will watch for the call  [phone] from [employer] called and confirmed she spoke with Reese and [facility] will deliver Platform walker today to pts room  I will send referral for outpt therapy to [facility]   fax  when dc.  Peer to Peer is still planned for this afternoon.

## 2023-02-14 NOTE — PT/OT/SLP RE-EVAL
Occupational Therapy   Re-evaluation    Name: Gera Chavez  MRN: [id number]  Admitting Diagnosis:  Fracture of proximal end of left femur, open type I or II, initial encounter  Recent Surgery: Procedure(s) (LRB):  INSERTION, INTRAMEDULLARY NOEL, FEMUR (Left) 9 Days Post-Op    Recommendations:     Discharge Recommendations: rehabilitation facility  Discharge Equipment Recommendations: bath bench, platform, walker, rolling  Barriers to discharge:       Assessment:     Gera Chavez is a 37 y.o. male with a medical diagnosis of Fracture of proximal end of left femur, open type I or II, initial encounter.  He presents with continued improvement in mobility and ADL's.  Performance deficits affecting function are weakness, impaired endurance, impaired self care skills, impaired functional mobility, decreased upper extremity function, decreased lower extremity function.      Rehab Prognosis:  good; patient would benefit from acute skilled OT services to address these deficits and reach maximum level of function.       Plan:     Patient to be seen 5 x/week to address the above listed problems via self-care/home management, therapeutic activities, therapeutic exercises  Plan of Care Expires: 03/07/23  Plan of Care Reviewed with: patient    Subjective     Chief Complaint: none stated  Patient/Family stated goals: wants to hopefully go to rehab  Communicated with: nsg and PTA prior to session.  Pain/Comfort:  Pain Rating 1: 0/10    Objective:     Communicated with: nsg and PTA prior to session.  Patient found sitting edge of bed with:   upon OT entry to room.    General Precautions: Standard, fall  Orthopedic Precautions: LUE non weight bearing, LLE weight bearing as tolerated  Braces:  (pre-sandy L ulnar gutter splint)  Respiratory Status: Room air    Occupational Performance:    Bed Mobility:        Functional Mobility/Transfers:  Patient completed Sit <> Stand Transfer with stand by assistance  with  platform walker    Functional Mobility: ambulated to BR with PW with SBA    Activities of Daily Living:  Doffed and donned socks EOB with mod I, increased time, able to use LUE to assist with task  Toilet transfer with SBA, grab bars    Cognitive/Visual Perceptual:  intact    Physical Exam:  RUE WFL shoulder and elbow, able to use L digits 1-3 to assist with ADL's and socks    AMPA 6 Click:  [facility] Total Score:      Treatment & Education:  Educated on L splint donning and doffing, purpose, wear at all times except skin check and cleaning; educated on safe shower transfer and use of seat in tub, safety with mobility, POC.     Patient left sitting edge of bed with call button in reach and family present    GOALS:   Multidisciplinary Problems       Occupational Therapy Goals          Problem: Occupational Therapy    Goal Priority Disciplines Outcome Interventions   Occupational Therapy Goal     OT, PT/OT Ongoing, Progressing    Description: Goals to be met by: 3/7/23     Patient will increase functional independence with ADLs by performing:    UE Dressing with Modified [place].  LE Dressing with Modified [place].  Toileting from toilet with Modified [place] for hygiene and clothing management.   Toilet transfer to toilet with Modified [place].                         History:     No past medical history on file.      Past Surgical History:   Procedure Laterality Date    INTRAMEDULLARY RODDING OF FEMUR Left 2/5/2023    Procedure: INSERTION, INTRAMEDULLARY NOEL, FEMUR;  Surgeon: Tuan Zepeda DO;  Location: [facility];  Service: Orthopedics;  Laterality: Left;       Time Tracking:     OT Date of Treatment: 02/14/23  OT Start Time: 1338  OT Stop Time: 1408  OT Total Time (min): 30 min    Billable Minutes:Re-eval 15 min   Self Care/Home Management 15 min    2/14/2023
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
## 2023-02-28 ENCOUNTER — HOSPITAL ENCOUNTER (OUTPATIENT)
Dept: RADIOLOGY | Facility: CLINIC | Age: 38
Discharge: HOME | End: 2023-02-28
Attending: ORTHOPAEDIC SURGERY
Payer: MEDICARE

## 2023-02-28 ENCOUNTER — OFFICE VISIT (OUTPATIENT)
Dept: ORTHOPEDICS | Facility: CLINIC | Age: 38
End: 2023-02-28
Payer: MEDICARE

## 2023-02-28 VITALS
SYSTOLIC BLOOD PRESSURE: 80 MMHG | HEART RATE: 101 BPM | HEIGHT: 75 IN | BODY MASS INDEX: 24.87 KG/M2 | WEIGHT: 200 LBS | TEMPERATURE: 98 F | DIASTOLIC BLOOD PRESSURE: 46 MMHG

## 2023-02-28 DIAGNOSIS — S62.92XA CLOSED FRACTURE OF LEFT HAND, INITIAL ENCOUNTER: ICD-10-CM

## 2023-02-28 DIAGNOSIS — S72.002E: Primary | ICD-10-CM

## 2023-02-28 DIAGNOSIS — S72.002E: ICD-10-CM

## 2023-02-28 DIAGNOSIS — T14.8XXA FRACTURE: ICD-10-CM

## 2023-02-28 PROCEDURE — 4010F PR ACE/ARB THEARPY RXD/TAKEN: ICD-10-PCS | Mod: CPTII,,, | Performed by: ORTHOPAEDIC SURGERY

## 2023-02-28 PROCEDURE — 99024 PR POST-OP FOLLOW-UP VISIT: ICD-10-PCS | Mod: ,,, | Performed by: ORTHOPAEDIC SURGERY

## 2023-02-28 PROCEDURE — 1159F MED LIST DOCD IN RCRD: CPT | Mod: CPTII,,, | Performed by: ORTHOPAEDIC SURGERY

## 2023-02-28 PROCEDURE — 3078F DIAST BP <80 MM HG: CPT | Mod: CPTII,,, | Performed by: ORTHOPAEDIC SURGERY

## 2023-02-28 PROCEDURE — 73130 X-RAY EXAM OF HAND: CPT | Mod: LT,,, | Performed by: ORTHOPAEDIC SURGERY

## 2023-02-28 PROCEDURE — 3074F SYST BP LT 130 MM HG: CPT | Mod: CPTII,,, | Performed by: ORTHOPAEDIC SURGERY

## 2023-02-28 PROCEDURE — 1159F PR MEDICATION LIST DOCUMENTED IN MEDICAL RECORD: ICD-10-PCS | Mod: CPTII,,, | Performed by: ORTHOPAEDIC SURGERY

## 2023-02-28 PROCEDURE — 73502 X-RAY EXAM HIP UNI 2-3 VIEWS: CPT | Mod: LT,,, | Performed by: ORTHOPAEDIC SURGERY

## 2023-02-28 PROCEDURE — 3074F PR MOST RECENT SYSTOLIC BLOOD PRESSURE < 130 MM HG: ICD-10-PCS | Mod: CPTII,,, | Performed by: ORTHOPAEDIC SURGERY

## 2023-02-28 PROCEDURE — 4010F ACE/ARB THERAPY RXD/TAKEN: CPT | Mod: CPTII,,, | Performed by: ORTHOPAEDIC SURGERY

## 2023-02-28 PROCEDURE — 73130 XR HAND COMPLETE 3 VIEW LEFT: ICD-10-PCS | Mod: LT,,, | Performed by: ORTHOPAEDIC SURGERY

## 2023-02-28 PROCEDURE — 3078F PR MOST RECENT DIASTOLIC BLOOD PRESSURE < 80 MM HG: ICD-10-PCS | Mod: CPTII,,, | Performed by: ORTHOPAEDIC SURGERY

## 2023-02-28 PROCEDURE — 3008F PR BODY MASS INDEX (BMI) DOCUMENTED: ICD-10-PCS | Mod: CPTII,,, | Performed by: ORTHOPAEDIC SURGERY

## 2023-02-28 PROCEDURE — 73502 XR HIP WITH PELVIS WHEN PERFORMED, 2 OR 3 VIEWS LEFT: ICD-10-PCS | Mod: LT,,, | Performed by: ORTHOPAEDIC SURGERY

## 2023-02-28 PROCEDURE — 99024 POSTOP FOLLOW-UP VISIT: CPT | Mod: ,,, | Performed by: ORTHOPAEDIC SURGERY

## 2023-02-28 PROCEDURE — 3008F BODY MASS INDEX DOCD: CPT | Mod: CPTII,,, | Performed by: ORTHOPAEDIC SURGERY

## 2023-02-28 RX ORDER — OXYCODONE HYDROCHLORIDE 5 MG/1
5 TABLET ORAL EVERY 6 HOURS PRN
Qty: 28 TABLET | Refills: 0 | Status: SHIPPED | OUTPATIENT
Start: 2023-02-28 | End: 2023-03-13 | Stop reason: SDUPTHER
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
## 2023-03-03 LAB
GLUCOSE SERPL-MCNC: 93 MG/DL (ref 70–110)
HCO3 UR-SCNC: 25.6 MMOL/L (ref 24–28)
HCT VFR BLD CALC: 36 %PCV (ref 36–54)
HGB BLD-MCNC: 12 G/DL
PCO2 BLDA: 39.8 MMHG (ref 35–45)
PH SMN: 7.42 [PH] (ref 7.35–7.45)
PO2 BLDA: 269 MMHG (ref 80–100)
POC BE: 1 MMOL/L
POC IONIZED CALCIUM: 1.06 MMOL/L (ref 1.06–1.42)
POC SATURATED O2: 100 % (ref 95–100)
POC TCO2: 27 MMOL/L (ref 23–27)
POTASSIUM BLD-SCNC: 4 MMOL/L (ref 3.5–5.1)
SAMPLE: ABNORMAL
SODIUM BLD-SCNC: 138 MMOL/L (ref 136–145)

## 2023-03-13 RX ORDER — OXYCODONE HYDROCHLORIDE 5 MG/1
5 TABLET ORAL EVERY 8 HOURS PRN
Qty: 21 TABLET | Refills: 0 | Status: SHIPPED | OUTPATIENT
Start: 2023-03-13 | End: 2023-03-20 | Stop reason: SDUPTHER

## 2023-03-20 RX ORDER — OXYCODONE HYDROCHLORIDE 5 MG/1
5 TABLET ORAL EVERY 8 HOURS PRN
Qty: 21 TABLET | Refills: 0 | Status: SHIPPED | OUTPATIENT
Start: 2023-03-20 | End: 2023-03-27

## 2023-05-09 ENCOUNTER — ANESTHESIA EVENT (OUTPATIENT)
Dept: SURGERY | Facility: HOSPITAL | Age: 38
End: 2023-05-09
Payer: MEDICARE

## 2023-05-09 ENCOUNTER — HOSPITAL ENCOUNTER (OUTPATIENT)
Facility: HOSPITAL | Age: 38
Discharge: HOME | End: 2023-05-09
Attending: ORTHOPAEDIC SURGERY | Admitting: ORTHOPAEDIC SURGERY
Payer: MEDICARE

## 2023-05-09 ENCOUNTER — HOSPITAL ENCOUNTER (OUTPATIENT)
Dept: RADIOLOGY | Facility: CLINIC | Age: 38
Discharge: HOME | End: 2023-05-09
Attending: ORTHOPAEDIC SURGERY
Payer: MEDICARE

## 2023-05-09 ENCOUNTER — HOSPITAL ENCOUNTER (OUTPATIENT)
Dept: RADIOLOGY | Facility: CLINIC | Age: 38
Discharge: HOME | End: 2023-05-09
Attending: PHYSICIAN ASSISTANT
Payer: MEDICARE

## 2023-05-09 ENCOUNTER — ANESTHESIA (OUTPATIENT)
Dept: SURGERY | Facility: HOSPITAL | Age: 38
End: 2023-05-09
Payer: MEDICARE

## 2023-05-09 ENCOUNTER — OFFICE VISIT (OUTPATIENT)
Dept: ORTHOPEDICS | Facility: CLINIC | Age: 38
End: 2023-05-09
Payer: MEDICARE

## 2023-05-09 VITALS
BODY MASS INDEX: 24.87 KG/M2 | HEART RATE: 103 BPM | SYSTOLIC BLOOD PRESSURE: 167 MMHG | WEIGHT: 200 LBS | DIASTOLIC BLOOD PRESSURE: 86 MMHG | HEIGHT: 75 IN

## 2023-05-09 DIAGNOSIS — S72.002B: Primary | ICD-10-CM

## 2023-05-09 DIAGNOSIS — S62.92XA CLOSED FRACTURE OF LEFT HAND, INITIAL ENCOUNTER: ICD-10-CM

## 2023-05-09 DIAGNOSIS — S72.002E: Primary | ICD-10-CM

## 2023-05-09 DIAGNOSIS — S72.002E: ICD-10-CM

## 2023-05-09 LAB — POCT GLUCOSE: 84 MG/DL (ref 70–110)

## 2023-05-09 PROCEDURE — 3077F SYST BP >= 140 MM HG: CPT | Mod: CPTII,,, | Performed by: ORTHOPAEDIC SURGERY

## 2023-05-09 PROCEDURE — 4010F ACE/ARB THERAPY RXD/TAKEN: CPT | Mod: CPTII,,, | Performed by: ORTHOPAEDIC SURGERY

## 2023-05-09 PROCEDURE — 25000003 PHARM REV CODE 250

## 2023-05-09 PROCEDURE — 71000033 HC RECOVERY, INTIAL HOUR: Performed by: ORTHOPAEDIC SURGERY

## 2023-05-09 PROCEDURE — 3077F PR MOST RECENT SYSTOLIC BLOOD PRESSURE >= 140 MM HG: ICD-10-PCS | Mod: CPTII,,, | Performed by: ORTHOPAEDIC SURGERY

## 2023-05-09 PROCEDURE — 37000008 HC ANESTHESIA 1ST 15 MINUTES: Performed by: ORTHOPAEDIC SURGERY

## 2023-05-09 PROCEDURE — 25000003 PHARM REV CODE 250: Performed by: NURSE ANESTHETIST, CERTIFIED REGISTERED

## 2023-05-09 PROCEDURE — 99024 PR POST-OP FOLLOW-UP VISIT: ICD-10-PCS | Mod: ,,, | Performed by: ORTHOPAEDIC SURGERY

## 2023-05-09 PROCEDURE — 71000015 HC POSTOP RECOV 1ST HR: Performed by: ORTHOPAEDIC SURGERY

## 2023-05-09 PROCEDURE — 4010F PR ACE/ARB THEARPY RXD/TAKEN: ICD-10-PCS | Mod: CPTII,,, | Performed by: ORTHOPAEDIC SURGERY

## 2023-05-09 PROCEDURE — 20680 PR REMOVAL DEEP IMPLANT: ICD-10-PCS | Mod: AS,,, | Performed by: PHYSICIAN ASSISTANT

## 2023-05-09 PROCEDURE — 25000003 PHARM REV CODE 250: Performed by: PHYSICIAN ASSISTANT

## 2023-05-09 PROCEDURE — 88300 SURGICAL PATH GROSS: CPT | Performed by: ORTHOPAEDIC SURGERY

## 2023-05-09 PROCEDURE — 1159F MED LIST DOCD IN RCRD: CPT | Mod: CPTII,,, | Performed by: ORTHOPAEDIC SURGERY

## 2023-05-09 PROCEDURE — 99024 POSTOP FOLLOW-UP VISIT: CPT | Mod: ,,, | Performed by: ORTHOPAEDIC SURGERY

## 2023-05-09 PROCEDURE — 37000009 HC ANESTHESIA EA ADD 15 MINS: Performed by: ORTHOPAEDIC SURGERY

## 2023-05-09 PROCEDURE — 73130 X-RAY EXAM OF HAND: CPT | Mod: LT,,, | Performed by: PHYSICIAN ASSISTANT

## 2023-05-09 PROCEDURE — D9220A PRA ANESTHESIA: Mod: CRNA,,, | Performed by: NURSE ANESTHETIST, CERTIFIED REGISTERED

## 2023-05-09 PROCEDURE — 20680 PR REMOVAL DEEP IMPLANT: ICD-10-PCS | Mod: ,,, | Performed by: ORTHOPAEDIC SURGERY

## 2023-05-09 PROCEDURE — 36000707: Performed by: ORTHOPAEDIC SURGERY

## 2023-05-09 PROCEDURE — 63600175 PHARM REV CODE 636 W HCPCS: Performed by: NURSE ANESTHETIST, CERTIFIED REGISTERED

## 2023-05-09 PROCEDURE — D9220A PRA ANESTHESIA: Mod: ANES,,, | Performed by: STUDENT IN AN ORGANIZED HEALTH CARE EDUCATION/TRAINING PROGRAM

## 2023-05-09 PROCEDURE — 20680 REMOVAL OF IMPLANT DEEP: CPT | Mod: AS,,, | Performed by: PHYSICIAN ASSISTANT

## 2023-05-09 PROCEDURE — D9220A PRA ANESTHESIA: ICD-10-PCS | Mod: CRNA,,, | Performed by: NURSE ANESTHETIST, CERTIFIED REGISTERED

## 2023-05-09 PROCEDURE — 25000003 PHARM REV CODE 250: Performed by: ORTHOPAEDIC SURGERY

## 2023-05-09 PROCEDURE — 82962 GLUCOSE BLOOD TEST: CPT | Performed by: ORTHOPAEDIC SURGERY

## 2023-05-09 PROCEDURE — 3008F BODY MASS INDEX DOCD: CPT | Mod: CPTII,,, | Performed by: ORTHOPAEDIC SURGERY

## 2023-05-09 PROCEDURE — 73502 XR HIP WITH PELVIS WHEN PERFORMED, 2 OR 3 VIEWS LEFT: ICD-10-PCS | Mod: LT,,, | Performed by: ORTHOPAEDIC SURGERY

## 2023-05-09 PROCEDURE — 20680 REMOVAL OF IMPLANT DEEP: CPT | Mod: ,,, | Performed by: ORTHOPAEDIC SURGERY

## 2023-05-09 PROCEDURE — 3008F PR BODY MASS INDEX (BMI) DOCUMENTED: ICD-10-PCS | Mod: CPTII,,, | Performed by: ORTHOPAEDIC SURGERY

## 2023-05-09 PROCEDURE — 3079F DIAST BP 80-89 MM HG: CPT | Mod: CPTII,,, | Performed by: ORTHOPAEDIC SURGERY

## 2023-05-09 PROCEDURE — 73130 XR HAND COMPLETE 3 VIEW LEFT: ICD-10-PCS | Mod: LT,,, | Performed by: PHYSICIAN ASSISTANT

## 2023-05-09 PROCEDURE — 3079F PR MOST RECENT DIASTOLIC BLOOD PRESSURE 80-89 MM HG: ICD-10-PCS | Mod: CPTII,,, | Performed by: ORTHOPAEDIC SURGERY

## 2023-05-09 PROCEDURE — 1159F PR MEDICATION LIST DOCUMENTED IN MEDICAL RECORD: ICD-10-PCS | Mod: CPTII,,, | Performed by: ORTHOPAEDIC SURGERY

## 2023-05-09 PROCEDURE — D9220A PRA ANESTHESIA: ICD-10-PCS | Mod: ANES,,, | Performed by: STUDENT IN AN ORGANIZED HEALTH CARE EDUCATION/TRAINING PROGRAM

## 2023-05-09 PROCEDURE — 73502 X-RAY EXAM HIP UNI 2-3 VIEWS: CPT | Mod: LT,,, | Performed by: ORTHOPAEDIC SURGERY

## 2023-05-09 PROCEDURE — 36000706: Performed by: ORTHOPAEDIC SURGERY

## 2023-05-09 RX ORDER — ONDANSETRON 2 MG/ML
4 INJECTION INTRAMUSCULAR; INTRAVENOUS EVERY 6 HOURS PRN
Status: DISCONTINUED | OUTPATIENT
Start: 2023-05-09 | End: 2023-05-09 | Stop reason: HOSPADM

## 2023-05-09 RX ORDER — MIDAZOLAM HYDROCHLORIDE 1 MG/ML
INJECTION INTRAMUSCULAR; INTRAVENOUS
Status: DISCONTINUED | OUTPATIENT
Start: 2023-05-09 | End: 2023-05-09

## 2023-05-09 RX ORDER — FENTANYL CITRATE 50 UG/ML
INJECTION, SOLUTION INTRAMUSCULAR; INTRAVENOUS
Status: DISCONTINUED | OUTPATIENT
Start: 2023-05-09 | End: 2023-05-09

## 2023-05-09 RX ORDER — HYDROCODONE BITARTRATE AND ACETAMINOPHEN 7.5; 325 MG/1; MG/1
1 TABLET ORAL EVERY 6 HOURS PRN
Qty: 28 TABLET | Refills: 0 | Status: SHIPPED | OUTPATIENT
Start: 2023-05-09 | End: 2023-05-10 | Stop reason: SDUPTHER

## 2023-05-09 RX ORDER — HYDROCODONE BITARTRATE AND ACETAMINOPHEN 5; 325 MG/1; MG/1
1 TABLET ORAL EVERY 4 HOURS PRN
Status: DISCONTINUED | OUTPATIENT
Start: 2023-05-09 | End: 2023-05-09 | Stop reason: HOSPADM

## 2023-05-09 RX ORDER — METHOCARBAMOL 750 MG/1
750 TABLET, FILM COATED ORAL 3 TIMES DAILY
Status: DISCONTINUED | OUTPATIENT
Start: 2023-05-09 | End: 2023-05-09 | Stop reason: HOSPADM

## 2023-05-09 RX ORDER — ONDANSETRON 2 MG/ML
INJECTION INTRAMUSCULAR; INTRAVENOUS
Status: DISCONTINUED | OUTPATIENT
Start: 2023-05-09 | End: 2023-05-09

## 2023-05-09 RX ORDER — KETOROLAC TROMETHAMINE 30 MG/ML
15 INJECTION, SOLUTION INTRAMUSCULAR; INTRAVENOUS ONCE
Status: DISCONTINUED | OUTPATIENT
Start: 2023-05-09 | End: 2023-05-09 | Stop reason: HOSPADM

## 2023-05-09 RX ORDER — CEFAZOLIN SODIUM 1 G/3ML
INJECTION, POWDER, FOR SOLUTION INTRAMUSCULAR; INTRAVENOUS
Status: DISCONTINUED | OUTPATIENT
Start: 2023-05-09 | End: 2023-05-09

## 2023-05-09 RX ORDER — MORPHINE SULFATE 4 MG/ML
4 INJECTION, SOLUTION INTRAMUSCULAR; INTRAVENOUS EVERY 6 HOURS PRN
Status: DISCONTINUED | OUTPATIENT
Start: 2023-05-09 | End: 2023-05-09 | Stop reason: HOSPADM

## 2023-05-09 RX ORDER — KETOROLAC TROMETHAMINE 10 MG/1
10 TABLET, FILM COATED ORAL EVERY 6 HOURS
Qty: 20 TABLET | Refills: 0 | Status: SHIPPED | OUTPATIENT
Start: 2023-05-09 | End: 2023-05-14

## 2023-05-09 RX ORDER — HYDROCODONE BITARTRATE AND ACETAMINOPHEN 10; 325 MG/1; MG/1
1 TABLET ORAL EVERY 4 HOURS PRN
Status: DISCONTINUED | OUTPATIENT
Start: 2023-05-09 | End: 2023-05-09 | Stop reason: HOSPADM

## 2023-05-09 RX ORDER — GLYCOPYRROLATE 0.2 MG/ML
INJECTION INTRAMUSCULAR; INTRAVENOUS
Status: DISCONTINUED | OUTPATIENT
Start: 2023-05-09 | End: 2023-05-09

## 2023-05-09 RX ORDER — MUPIROCIN 20 MG/G
OINTMENT TOPICAL
Status: CANCELLED | OUTPATIENT
Start: 2023-05-09

## 2023-05-09 RX ORDER — MUPIROCIN 20 MG/G
OINTMENT TOPICAL
Status: DISCONTINUED | OUTPATIENT
Start: 2023-05-09 | End: 2023-05-09 | Stop reason: HOSPADM

## 2023-05-09 RX ORDER — SODIUM CHLORIDE 0.9 % (FLUSH) 0.9 %
10 SYRINGE (ML) INJECTION
Status: DISCONTINUED | OUTPATIENT
Start: 2023-05-09 | End: 2023-05-09 | Stop reason: HOSPADM

## 2023-05-09 RX ORDER — DEXAMETHASONE SODIUM PHOSPHATE 4 MG/ML
INJECTION, SOLUTION INTRA-ARTICULAR; INTRALESIONAL; INTRAMUSCULAR; INTRAVENOUS; SOFT TISSUE
Status: DISCONTINUED | OUTPATIENT
Start: 2023-05-09 | End: 2023-05-09

## 2023-05-09 RX ORDER — PROPOFOL 10 MG/ML
VIAL (ML) INTRAVENOUS
Status: DISCONTINUED | OUTPATIENT
Start: 2023-05-09 | End: 2023-05-09

## 2023-05-09 RX ORDER — LIDOCAINE HYDROCHLORIDE 10 MG/ML
INJECTION, SOLUTION INTRAVENOUS
Status: DISCONTINUED | OUTPATIENT
Start: 2023-05-09 | End: 2023-05-09

## 2023-05-09 RX ORDER — KETOROLAC TROMETHAMINE 10 MG/1
10 TABLET, FILM COATED ORAL ONCE
Status: COMPLETED | OUTPATIENT
Start: 2023-05-09 | End: 2023-05-09

## 2023-05-09 RX ADMIN — SODIUM CHLORIDE, SODIUM GLUCONATE, SODIUM ACETATE, POTASSIUM CHLORIDE AND MAGNESIUM CHLORIDE: 526; 502; 368; 37; 30 INJECTION, SOLUTION INTRAVENOUS at 02:05

## 2023-05-09 RX ADMIN — CEFAZOLIN 2 G: 330 INJECTION, POWDER, FOR SOLUTION INTRAMUSCULAR; INTRAVENOUS at 03:05

## 2023-05-09 RX ADMIN — METHOCARBAMOL 750 MG: 750 TABLET ORAL at 04:05

## 2023-05-09 RX ADMIN — ONDANSETRON HYDROCHLORIDE 4 MG: 2 SOLUTION INTRAMUSCULAR; INTRAVENOUS at 03:05

## 2023-05-09 RX ADMIN — FENTANYL CITRATE 100 MCG: 50 INJECTION, SOLUTION INTRAMUSCULAR; INTRAVENOUS at 02:05

## 2023-05-09 RX ADMIN — PROPOFOL 200 MG: 10 INJECTION, EMULSION INTRAVENOUS at 02:05

## 2023-05-09 RX ADMIN — HYDROCODONE BITARTRATE AND ACETAMINOPHEN 1 TABLET: 5; 325 TABLET ORAL at 04:05

## 2023-05-09 RX ADMIN — PROPOFOL 100 MG: 10 INJECTION, EMULSION INTRAVENOUS at 03:05

## 2023-05-09 RX ADMIN — LIDOCAINE HYDROCHLORIDE 50 MG: 10 INJECTION, SOLUTION INTRAVENOUS at 02:05

## 2023-05-09 RX ADMIN — DEXAMETHASONE SODIUM PHOSPHATE 4 MG: 4 INJECTION, SOLUTION INTRA-ARTICULAR; INTRALESIONAL; INTRAMUSCULAR; INTRAVENOUS; SOFT TISSUE at 03:05

## 2023-05-09 RX ADMIN — MIDAZOLAM 2 MG: 1 INJECTION INTRAMUSCULAR; INTRAVENOUS at 02:05

## 2023-05-09 RX ADMIN — KETOROLAC TROMETHAMINE 10 MG: 10 TABLET, FILM COATED ORAL at 04:05

## 2023-05-09 RX ADMIN — GLYCOPYRROLATE 0.2 MG: 0.2 INJECTION INTRAMUSCULAR; INTRAVENOUS at 02:05

## 2023-05-09 RX ADMIN — PROPOFOL 200 MG: 10 INJECTION, EMULSION INTRAVENOUS at 03:05

## 2023-05-09 NOTE — ANESTHESIA POSTPROCEDURE EVALUATION
Anesthesia Post Evaluation    Patient: Gera Chavez    Procedure(s) Performed: Procedure(s) (LRB):  REMOVAL, HARDWARE, LOWER EXTREMITY (Left)    Final Anesthesia Type: general      Patient location during evaluation: PACU  Patient participation: Yes- Able to Participate  Level of consciousness: awake and alert  Post-procedure vital signs: reviewed and stable  Pain management: adequate  Airway patency: patent    PONV status at discharge: No PONV  Anesthetic complications: no      Respiratory status: unassisted  Hydration status: euvolemic  Follow-up not needed.          Vitals Value Taken Time   /89 05/09/23 1616   Temp 36.7 °C (98.1 °F) 05/09/23 1520   Pulse 95 05/09/23 1621   Resp 18 05/09/23 1603   SpO2 98 % 05/09/23 1621   Vitals shown include unvalidated device data.      Event Time   Out of Recovery 15:50:00         Pain/Mp Score: Pain Rating Prior to Med Admin: 7 (5/9/2023  4:03 PM)  Mp Score: 10 (5/9/2023  4:02 PM)  Modified Mp Score: 17 (5/9/2023  4:02 PM)

## 2023-05-09 NOTE — OP NOTE
OPERATIVE REPORT    Patient: Gera Chavez   : 1985    MRN: [id number]  Date: 2023      Surgeon:Tuan Zepeda DO  Assistant: Tono Mars was essential, part of the procedure including deep hardware placement and deep closure.  No senior assistant or resident was availible  Preoperative Diagnosis:  Left femur symptomatic implant, deep  Postoperative Diagnosis: Same  Procedure:  Removal Left femur deep implant under anesthesia - 19202  Anesthesiologist: No responsible provider has been recorded for the case.  OR Staff: * No surgical staff found *  Implants:   Implant Name Type Inv. Item Serial No.  Lot No. LRB No. Used Action   Explanted Hardware Left Femur Screw      Left 1 Explanted     EBL: 10cc  Complications: None  Disposition: To PACU, stable    Indications: Gera Chavez is a 37 y.o. male presenting with the aforementioned injuries/findings. The procedure is indicated to best alleviate symptoms of the implant remaining in place.  The patient is awake and alert. After thorough discussion of the risks, benefits, expected outcomes, and alternatives to surgical intervention, the patient agreed to proceed with surgical treatment.  Specific risks discussed included, but were not limited to: superficial or deep infection, wound healing complications, DVT/PE, significant bleeding requiring transfusion, damage to named anatomic structures in the immediate area including named neurovascular structures, failure to alleviate symptoms, refracture, and general risks of anesthesia.  The patient voiced understanding and written as well as verbal consent was obtained by myself prior to the procedure.    Procedure Note:  The patient was brought back to the OR and placed supine on the OR table. After successful induction of anesthesia by anesthesia staff, the patient was positioned in the supine position and all bony prominences were padded appropriately.  The surgical field was then  provisionally cleansed and then prepped and draped in the usual sterile fashion.    At this time a time-out was performed, with the correct patient, site, and procedure identified.  The universal time out as well as sign your site protocols were followed.  Preoperative antibiotics were verified as administered.     The previous surgical sites were utilized.  Attention to hemostasis was paid using electrocautery.  We were able to dissect down to the implant on the left femur without difficulty.  The appropriate manual screwdrivers were utilized to remove the implant(s) without difficulty.  Final C-arm images were obtained and saved to the Mettl system after the implant(s) were removed.    An abscess/infection was not noted.  We did not need to perform any debridement for infection.    The incisions were then irrigated using copious sterile saline. Vancomycin was in bed placed in the wound then closed in layered fashion.  The surgical sites were sterilely cleansed and dressed.    The patient was then subsequently extubated and transferred to to PACU in a stable condition.    All sponge and needle counts were correct at the end of the case.  I was present and participated in all aspects of the procedure.    Prognosis:  The patient will be kept WBAT on the ipsilateral extremity.  DVT prophylaxis is not indicated for this patient and procedure.  The patient is at risk of the aforementioned and this was reviewed with the patient again postoperatively.        This note/OR report was created with the assistance of  voice recognition software or phone  dictation.  There may be transcription errors as a result of using this technology however minimal. Effort has been made to assure accuracy of transcription but any obvious errors or omissions should be clarified with the author of the document.       Tuan Zepeda,   Orthopedic Trauma Surgery

## 2023-05-09 NOTE — PROGRESS NOTES
Subjective:       Patient ID: Gera Chavez is a 37 y.o. male.  Chief Complaint   Patient presents with    Left Femur - Follow-up    Follow-up     3 mos IMN Lt subtroch Femur fx, Lt 5th MC fx, sx 2/7-5/7/23 pt wearing boot pt taking pain meds.        HPI:  Patient is 3 months out from a left subtrochanteric femur nail on left 5th metacarpal fracture.  He has been lost to follow-up.  He lives approximally 40 miles away which is too far for him get to the office.  He has some pain distally where a screw is backing out.  It has been 2 months trying to come into our office.  He has a lot of social concerns and difficulties with travel.  Patient denies numbness tingling or fevers or chills.    ROS:  Constitutional: Denies fever chills  Eyes: No change in vision  ENT: No ringing or current infections  CV: No chest pain  Resp: No labored breathing  MSK: Pain evident at site of injury located in HPI,   Integ: No signs of abrasions or lacerations  Neuro: No numbness or tingling  Lymphatic: No swelling outside the area of injury     Current Outpatient Medications on File Prior to Visit   Medication Sig Dispense Refill    albuterol (PROVENTIL/VENTOLIN HFA) 90 mcg/actuation inhaler Inhale 2 puffs into the lungs every 4 (four) hours as needed.      ALBUTEROL INHL Inhale 90 mcg into the lungs every 4 (four) hours as needed.      amLODIPine (NORVASC) 5 MG tablet Take 5 mg by mouth once daily.      amoxicillin-clavulanate 875-125mg (AUGMENTIN) 875-125 mg per tablet Take 1 tablet by mouth every 12 (twelve) hours.      atorvastatin (LIPITOR) 20 MG tablet Take 20 mg by mouth once daily.      azithromycin (Z-CELESTINA) 250 MG tablet Take 500 mg by mouth once daily.      clonazePAM (KLONOPIN) 1 MG tablet Take 1 mg by mouth 2 (two) times daily as needed for Anxiety.      dextroamphetamine-amphetamine (ADDERALL XR) 25 MG 24 hr capsule Take 25 mg by mouth 2 (two) times a day.      FLUoxetine 20 MG capsule fluoxetine 20 mg capsule       "FLUoxetine 40 MG capsule Take 40 mg by mouth once daily.      insulin glargine 100 units/mL SubQ pen Lantus Solostar U-100 Insulin 100 unit/mL (3 mL) subcutaneous pen      ipratropium (ATROVENT) 42 mcg (0.06 %) nasal spray by Each Nostril route.      lamoTRIgine (LAMICTAL) 25 MG tablet lamotrigine 25 mg tablet      LANTUS SOLOSTAR U-100 INSULIN glargine 100 units/mL SubQ pen Inject into the skin.      latanoprost 0.005 % ophthalmic solution latanoprost 0.005 % eye drops      lisinopriL (PRINIVIL,ZESTRIL) 40 MG tablet Take 40 mg by mouth.      mirtazapine (REMERON) 7.5 MG Tab mirtazapine 7.5 mg tablet      potassium chloride (K-TAB) 20 mEq potassium chloride ER 20 mEq tablet,extended release   TAKE 1 TABLET BY MOUTH EVERY DAY      prochlorperazine (COMPAZINE) 10 MG tablet prochlorperazine maleate 10 mg tablet      QUEtiapine (SEROQUEL) 200 MG Tab Take 200 mg by mouth every evening.      testosterone cypionate (DEPOTESTOTERONE CYPIONATE) 200 mg/mL injection SMARTSIG:Milliliter(s) IM       No current facility-administered medications on file prior to visit.          Objective:      BP (!) 167/86   Pulse 103   Ht 6' 3" (1.905 m)   Wt 90.7 kg (200 lb)   BMI 25.00 kg/m²   General the patient is alert and oriented x3 no acute distress nontoxic-appearing appropriate affect.    Constitutional: Vital signs are examined and stable.  Resp: No signs of labored breathing                 LLE: -Skin:  Some skin irritation over the where the screws backing out No signs of new abrasions or lacerations, no scars           -MSK: Hip and Knee F/E, EHL/FHL, Gastroc/Tib anterior Strength 5/5           -Neuro:  Sensation intact to light touch L3-S1 dermatomes           -Lymphatic: No signs of lymphadenopathy           -CV: Capillary refill is less than 2 seconds. DP/PT pulses 2/4. Compartments soft and compressible    Body mass index is 25 kg/m².  Ideal body weight: 84.5 kg (186 lb 4.6 oz)  Adjusted ideal body weight: 87 kg (191 lb " 12.4 oz)  No results found for: HGBA1C  Hgb   Date Value Ref Range Status   02/14/2023 8.2 (L) 14.0 - 18.0 gm/dL Final   02/13/2023 8.0 (L) 14.0 - 18.0 gm/dL Final     No results found for: [id number]  WBC   Date Value Ref Range Status   02/14/2023 11.9 (H) 4.5 - 11.5 x10(3)/mcL Final   02/13/2023 12.2 (H) 4.5 - 11.5 x10(3)/mcL Final       Radiology:  Four views left femur skeletally mature individual showing self dynamization of the screw healing at the fracture site of the subtroch area and deep hardware backing out causing skin irritation.        Assessment:         1. Type I or II open fracture of left hip with routine healing, subsequent encounter  X-Ray Hip 2 or 3 views Left (with Pelvis when performed)      2. Closed fracture of left hand, initial encounter  X-Ray Hand Complete Left              Plan:         No follow-ups on file.    Gera was seen today for follow-up and follow-up.    Diagnoses and all orders for this visit:    Type I or II open fracture of left hip with routine healing, subsequent encounter  -     X-Ray Hip 2 or 3 views Left (with Pelvis when performed); Future    Closed fracture of left hand, initial encounter  -     X-Ray Hand Complete Left; Future      Patient reports patient reports to the clinic today after not being seen for months due to transportation issues.  He reports today via EMS as a transportation.  He is ambulatory.  He has a screw backing out and self dynamize it.  He has no means to get home more come back we will send him down for  outpatient procedure today vs tomorrow. His last meal was at 7am.    I explained that surgery and the nature of their condition are not without risks. These include, but are not limited to, bleeding, infection, neurovascular compromise, malunion, nonunion, hardware complications, wound complications, scarring, cosmetic defects, need for later and/or repeated surgeries, pain, loss of ROM, loss of function, PTOA, deformity, stance/gait  and/or functional abnormalities, thromboembolic complications, compartment syndrome, loss of limb, loss of life, anesthetic complications, and other imponderables. I explained that these can occur despite the adequacy of treatments rendered, and that their risks are heightened given the nature of their condition. They verbalized understanding. They would like to continue with surgery at this time. If appropriate family was involved with surgical discussion.         This note/OR report was created with the assistance of  voice recognition software or phone  dictation.  There may be transcription errors as a result of using this technology however minimal. Effort has been made to assure accuracy of transcription but any obvious errors or omissions should be clarified with the author of the document.     Tuan Zepeda DO  Orthopedic Trauma Surgery  05/09/2023      No future appointments.

## 2023-05-09 NOTE — DISCHARGE SUMMARY
"[facility] Orthopaedics - Periop Services  Discharge Note  Short Stay    Procedure(s) (LRB):  REMOVAL, HARDWARE, LOWER EXTREMITY (Left)      OUTCOME: Patient tolerated treatment/procedure well without complication and is now ready for discharge.    DISPOSITION: Home or Self Care    FINAL DIAGNOSIS:  Fracture of proximal end of left femur, open type I or II, initial encounter    FOLLOWUP: In clinic    DISCHARGE INSTRUCTIONS:    Discharge Procedure Orders   CRUTCHES FOR HOME USE     Order Specific Question Answer Comments   Type: Axillary    Height: 5' 11" (1.803 m)    Weight: 90.7 kg (199 lb 15.3 oz)    Length of need (1-99 months): 3 months     Remove dressing in 24 hours   Order Comments: Replace with large band aid over incision daily     Notify your health care provider if you experience any of the following:  temperature >100.4     Notify your health care provider if you experience any of the following:  persistent nausea and vomiting or diarrhea     Notify your health care provider if you experience any of the following:  severe uncontrolled pain     Activity as tolerated        TIME SPENT ON DISCHARGE: 15 minutes    The above findings, diagnostics, and treatment plan were discussed with Dr. Zepeda who is in agreement with the plan of care except as stated in additional documentation.     Alexandra Blair Lemaire, PA-C [facility]   Orthopedic Trauma    "

## 2023-05-09 NOTE — ANESTHESIA PROCEDURE NOTES
LMA Placement    Date/Time: 5/9/2023 3:01 PM  Performed by: Tawanna Schuster CRNA  Authorized by: Cj Salmon MD     Intubation:     Induction:  Intravenous    Intubated:  Postinduction    Mask Ventilation:  Not attempted    Attempts:  1    Attempted By:  CRNA    Difficult Airway Encountered?: No      Airway Device:  Supraglottic airway/LMA    Airway Device Size:  4.0    Style/Cuff Inflation:  Cuffed (inflated to minimal occlusive pressure)    Placement Verified By:  Capnometry    Complicating Factors:  None    Findings Post-Intubation:  BS equal bilateral and atraumatic/condition of teeth unchanged

## 2023-05-09 NOTE — ANESTHESIA PREPROCEDURE EVALUATION
05/09/2023  Gera Chavez is a 37 y.o., male.    Other Medical History   Encounter for blood transfusion Hypertension   Diabetes mellitus Sleep apnea   Asthma Generalized anxiety disorder   Unspecified glaucoma Depression   Schizophrenia, unspecified      Surgical History  INTRAMEDULLARY RODDING OF FEMUR UVULOPALATOPHARYNGOPLASTY     Historical labs 2/2023:  Na 137, K 4.1, Cr 0.83  8.2 / 25 / 366k     tte nl lvef, mild MR, mild TR  Ham sandwich at 7a; will delay 8h    Luis Antonio RODRIGUEZ    Pre-op Assessment    I have reviewed the Patient Summary Reports.     I have reviewed the Nursing Notes. I have reviewed the NPO Status.   I have reviewed the Medications.     Review of Systems  Anesthesia Hx:   Denies Personal Hx of Anesthesia complications.   Cardiovascular:   Hypertension    Pulmonary:   Asthma Sleep Apnea    Neurological:  Neurology Normal    Endocrine:   Diabetes    Psych:   Psychiatric History anxiety depression schizophrenia         Physical Exam  General: Well nourished, Cooperative and Alert    Airway:  Mallampati: II   Mouth Opening: Normal  TM Distance: Normal  Tongue: Normal    Dental:  Intact        Anesthesia Plan  Type of Anesthesia, risks & benefits discussed:    Anesthesia Type: Gen Supraglottic Airway  Intra-op Monitoring Plan: Standard ASA Monitors  Post Op Pain Control Plan: multimodal analgesia  Induction:  IV  Informed Consent: Informed consent signed with the Patient and all parties understand the risks and agree with anesthesia plan.  All questions answered.   ASA Score: 3  Day of Surgery Review of History & Physical: H&P Update referred to the surgeon/provider.    Ready For Surgery From Anesthesia Perspective.     .

## 2023-05-09 NOTE — DISCHARGE INSTRUCTIONS
Please call your DR. If you have problems with pain, bleeding, any signs of infection such as fever 102 or greater, procedure area redness, swelling, drainage, hard or hot to touch or anything of concern. Keep dressing clean, dry and intact for 24 hours  then remove dressing. You can gently wash area in shower at that time but do not submerge. Apply band-aids and change every  day. No powders, lotions or creams to incisions. Keep extremity elevated to help with pain and swelling. Use ice 20-30 minute periods with 10 minute breaks  for 2 days to help with pain and swelling and that as needed. No driving or legal decisions for 24 hours. Clear liquid diet and advance to regular as tolerated.   Weight bearing as tolerated.

## 2023-05-09 NOTE — H&P (VIEW-ONLY)
Subjective:       Patient ID: Gera Chavez is a 37 y.o. male.  Chief Complaint   Patient presents with    Left Femur - Follow-up    Follow-up     3 mos IMN Lt subtroch Femur fx, Lt 5th MC fx, sx 2/7-5/7/23 pt wearing boot pt taking pain meds.        HPI:  Patient is 3 months out from a left subtrochanteric femur nail on left 5th metacarpal fracture.  He has been lost to follow-up.  He lives approximally 40 miles away which is too far for him get to the office.  He has some pain distally where a screw is backing out.  It has been 2 months trying to come into our office.  He has a lot of social concerns and difficulties with travel.  Patient denies numbness tingling or fevers or chills.    ROS:  Constitutional: Denies fever chills  Eyes: No change in vision  ENT: No ringing or current infections  CV: No chest pain  Resp: No labored breathing  MSK: Pain evident at site of injury located in HPI,   Integ: No signs of abrasions or lacerations  Neuro: No numbness or tingling  Lymphatic: No swelling outside the area of injury     Current Outpatient Medications on File Prior to Visit   Medication Sig Dispense Refill    albuterol (PROVENTIL/VENTOLIN HFA) 90 mcg/actuation inhaler Inhale 2 puffs into the lungs every 4 (four) hours as needed.      ALBUTEROL INHL Inhale 90 mcg into the lungs every 4 (four) hours as needed.      amLODIPine (NORVASC) 5 MG tablet Take 5 mg by mouth once daily.      amoxicillin-clavulanate 875-125mg (AUGMENTIN) 875-125 mg per tablet Take 1 tablet by mouth every 12 (twelve) hours.      atorvastatin (LIPITOR) 20 MG tablet Take 20 mg by mouth once daily.      azithromycin (Z-CELESTINA) 250 MG tablet Take 500 mg by mouth once daily.      clonazePAM (KLONOPIN) 1 MG tablet Take 1 mg by mouth 2 (two) times daily as needed for Anxiety.      dextroamphetamine-amphetamine (ADDERALL XR) 25 MG 24 hr capsule Take 25 mg by mouth 2 (two) times a day.      FLUoxetine 20 MG capsule fluoxetine 20 mg capsule       "FLUoxetine 40 MG capsule Take 40 mg by mouth once daily.      insulin glargine 100 units/mL SubQ pen Lantus Solostar U-100 Insulin 100 unit/mL (3 mL) subcutaneous pen      ipratropium (ATROVENT) 42 mcg (0.06 %) nasal spray by Each Nostril route.      lamoTRIgine (LAMICTAL) 25 MG tablet lamotrigine 25 mg tablet      LANTUS SOLOSTAR U-100 INSULIN glargine 100 units/mL SubQ pen Inject into the skin.      latanoprost 0.005 % ophthalmic solution latanoprost 0.005 % eye drops      lisinopriL (PRINIVIL,ZESTRIL) 40 MG tablet Take 40 mg by mouth.      mirtazapine (REMERON) 7.5 MG Tab mirtazapine 7.5 mg tablet      potassium chloride (K-TAB) 20 mEq potassium chloride ER 20 mEq tablet,extended release   TAKE 1 TABLET BY MOUTH EVERY DAY      prochlorperazine (COMPAZINE) 10 MG tablet prochlorperazine maleate 10 mg tablet      QUEtiapine (SEROQUEL) 200 MG Tab Take 200 mg by mouth every evening.      testosterone cypionate (DEPOTESTOTERONE CYPIONATE) 200 mg/mL injection SMARTSIG:Milliliter(s) IM       No current facility-administered medications on file prior to visit.          Objective:      BP (!) 167/86   Pulse 103   Ht 6' 3" (1.905 m)   Wt 90.7 kg (200 lb)   BMI 25.00 kg/m²   General the patient is alert and oriented x3 no acute distress nontoxic-appearing appropriate affect.    Constitutional: Vital signs are examined and stable.  Resp: No signs of labored breathing                 LLE: -Skin:  Some skin irritation over the where the screws backing out No signs of new abrasions or lacerations, no scars           -MSK: Hip and Knee F/E, EHL/FHL, Gastroc/Tib anterior Strength 5/5           -Neuro:  Sensation intact to light touch L3-S1 dermatomes           -Lymphatic: No signs of lymphadenopathy           -CV: Capillary refill is less than 2 seconds. DP/PT pulses 2/4. Compartments soft and compressible    Body mass index is 25 kg/m².  Ideal body weight: 84.5 kg (186 lb 4.6 oz)  Adjusted ideal body weight: 87 kg (191 lb " 12.4 oz)  No results found for: HGBA1C  Hgb   Date Value Ref Range Status   02/14/2023 8.2 (L) 14.0 - 18.0 gm/dL Final   02/13/2023 8.0 (L) 14.0 - 18.0 gm/dL Final     No results found for: [id number]  WBC   Date Value Ref Range Status   02/14/2023 11.9 (H) 4.5 - 11.5 x10(3)/mcL Final   02/13/2023 12.2 (H) 4.5 - 11.5 x10(3)/mcL Final       Radiology:  Four views left femur skeletally mature individual showing self dynamization of the screw healing at the fracture site of the subtroch area and deep hardware backing out causing skin irritation.        Assessment:         1. Type I or II open fracture of left hip with routine healing, subsequent encounter  X-Ray Hip 2 or 3 views Left (with Pelvis when performed)      2. Closed fracture of left hand, initial encounter  X-Ray Hand Complete Left              Plan:         No follow-ups on file.    Gera was seen today for follow-up and follow-up.    Diagnoses and all orders for this visit:    Type I or II open fracture of left hip with routine healing, subsequent encounter  -     X-Ray Hip 2 or 3 views Left (with Pelvis when performed); Future    Closed fracture of left hand, initial encounter  -     X-Ray Hand Complete Left; Future      Patient reports patient reports to the clinic today after not being seen for months due to transportation issues.  He reports today via EMS as a transportation.  He is ambulatory.  He has a screw backing out and self dynamize it.  He has no means to get home more come back we will send him down for  outpatient procedure today vs tomorrow. His last meal was at 7am.    I explained that surgery and the nature of their condition are not without risks. These include, but are not limited to, bleeding, infection, neurovascular compromise, malunion, nonunion, hardware complications, wound complications, scarring, cosmetic defects, need for later and/or repeated surgeries, pain, loss of ROM, loss of function, PTOA, deformity, stance/gait  and/or functional abnormalities, thromboembolic complications, compartment syndrome, loss of limb, loss of life, anesthetic complications, and other imponderables. I explained that these can occur despite the adequacy of treatments rendered, and that their risks are heightened given the nature of their condition. They verbalized understanding. They would like to continue with surgery at this time. If appropriate family was involved with surgical discussion.         This note/OR report was created with the assistance of  voice recognition software or phone  dictation.  There may be transcription errors as a result of using this technology however minimal. Effort has been made to assure accuracy of transcription but any obvious errors or omissions should be clarified with the author of the document.     Tuan Zepeda DO  Orthopedic Trauma Surgery  05/09/2023      No future appointments.

## 2023-05-09 NOTE — TRANSFER OF CARE
Anesthesia Transfer of Care Note    Patient: Gera Chavez    Procedure(s) Performed: Procedure(s) (LRB):  REMOVAL, HARDWARE, LOWER EXTREMITY (Left)    Patient location: PACU    Anesthesia Type: general    Transport from OR: Transported from OR on room air with adequate spontaneous ventilation    Post pain: adequate analgesia    Post assessment: no apparent anesthetic complications and tolerated procedure well    Post vital signs: stable    Level of consciousness: sedated and responds to stimulation    Nausea/Vomiting: no nausea/vomiting    Complications: none    Transfer of care protocol was followed

## 2023-05-10 LAB — POCT GLUCOSE: 93 MG/DL (ref 70–110)

## 2023-05-11 RX ORDER — HYDROCODONE BITARTRATE AND ACETAMINOPHEN 7.5; 325 MG/1; MG/1
1 TABLET ORAL EVERY 6 HOURS PRN
Qty: 28 TABLET | Refills: 0 | Status: SHIPPED | OUTPATIENT
Start: 2023-05-11 | End: 2023-05-18

## 2023-05-12 VITALS
OXYGEN SATURATION: 96 % | BODY MASS INDEX: 27.99 KG/M2 | HEART RATE: 104 BPM | WEIGHT: 199.94 LBS | SYSTOLIC BLOOD PRESSURE: 147 MMHG | TEMPERATURE: 98 F | HEIGHT: 71 IN | RESPIRATION RATE: 18 BRPM | DIASTOLIC BLOOD PRESSURE: 90 MMHG

## 2023-05-12 LAB — VIEW PATHOLOGY REPORT (RELIAPATH): NORMAL

## 2023-06-04 ENCOUNTER — HOSPITAL ENCOUNTER (INPATIENT)
Facility: HOSPITAL | Age: 38
LOS: 22 days | Discharge: PSYCHIATRIC OR SUBSTANCE USE FACILITY | DRG: 956 | End: 2023-06-26
Attending: SURGERY | Admitting: SURGERY
Payer: MEDICARE

## 2023-06-04 DIAGNOSIS — S32.810A CLOSED PELVIC RING FRACTURE, INITIAL ENCOUNTER: Primary | ICD-10-CM

## 2023-06-04 DIAGNOSIS — S32.022A CLOSED UNSTABLE BURST FRACTURE OF SECOND LUMBAR VERTEBRA, INITIAL ENCOUNTER: ICD-10-CM

## 2023-06-04 DIAGNOSIS — S32.028A OTHER CLOSED FRACTURE OF SECOND LUMBAR VERTEBRA, INITIAL ENCOUNTER: ICD-10-CM

## 2023-06-04 DIAGNOSIS — S32.442A: ICD-10-CM

## 2023-06-04 DIAGNOSIS — S52.501A CLOSED FRACTURE OF DISTAL END OF RIGHT RADIUS, UNSPECIFIED FRACTURE MORPHOLOGY, INITIAL ENCOUNTER: ICD-10-CM

## 2023-06-04 DIAGNOSIS — T50.905A DRUG-INDUCED LONG QT SYNDROME: ICD-10-CM

## 2023-06-04 DIAGNOSIS — I45.81 DRUG-INDUCED LONG QT SYNDROME: ICD-10-CM

## 2023-06-04 DIAGNOSIS — R00.0 TACHYCARDIA: ICD-10-CM

## 2023-06-04 DIAGNOSIS — T14.90XA TRAUMA: ICD-10-CM

## 2023-06-04 LAB
ABS NEUT (OLG): 26.66 X10(3)/MCL (ref 2.1–9.2)
ALBUMIN SERPL-MCNC: 3.7 G/DL (ref 3.5–5)
ALBUMIN/GLOB SERPL: 1.2 RATIO (ref 1.1–2)
ALLENS TEST BLOOD GAS (OHS): YES
ALP SERPL-CCNC: 134 UNIT/L (ref 40–150)
ALT SERPL-CCNC: 54 UNIT/L (ref 0–55)
AMPHET UR QL SCN: NEGATIVE
APPEARANCE UR: CLEAR
APTT PPP: 26.8 SECONDS (ref 23.2–33.7)
AST SERPL-CCNC: 76 UNIT/L (ref 5–34)
BACTERIA #/AREA URNS AUTO: NORMAL /HPF
BARBITURATE SCN PRESENT UR: NEGATIVE
BASE EXCESS BLD CALC-SCNC: -5.2 MMOL/L (ref -2–2)
BENZODIAZ UR QL SCN: NEGATIVE
BILIRUB UR QL STRIP.AUTO: NEGATIVE MG/DL
BILIRUBIN DIRECT+TOT PNL SERPL-MCNC: 1.2 MG/DL
BLOOD GAS SAMPLE TYPE (OHS): ABNORMAL
BUN SERPL-MCNC: 12.8 MG/DL (ref 8.9–20.6)
CA-I BLD-SCNC: 1.08 MMOL/L (ref 1.12–1.23)
CALCIUM SERPL-MCNC: 8.3 MG/DL (ref 8.4–10.2)
CANNABINOIDS UR QL SCN: NEGATIVE
CHLORIDE SERPL-SCNC: 113 MMOL/L (ref 98–107)
CO2 BLDA-SCNC: 23 MMOL/L
CO2 SERPL-SCNC: 20 MMOL/L (ref 22–29)
COCAINE UR QL SCN: NEGATIVE
COHGB MFR BLDA: 3.1 % (ref 0.5–1.5)
COLOR UR: ABNORMAL
CREAT SERPL-MCNC: 1.07 MG/DL (ref 0.73–1.18)
ERYTHROCYTE [DISTWIDTH] IN BLOOD BY AUTOMATED COUNT: 15.9 % (ref 11.5–17)
ETHANOL SERPL-MCNC: <10 MG/DL
FENTANYL UR QL SCN: POSITIVE
FIO2 BLOOD GAS (OHS): 80 %
GFR SERPLBLD CREATININE-BSD FMLA CKD-EPI: >60 MLS/MIN/1.73/M2
GLOBULIN SER-MCNC: 3.1 GM/DL (ref 2.4–3.5)
GLUCOSE SERPL-MCNC: 185 MG/DL (ref 74–100)
GLUCOSE UR QL STRIP.AUTO: NEGATIVE MG/DL
GROUP & RH: NORMAL
HCO3 BLDA-SCNC: 21.6 MMOL/L (ref 22–26)
HCT VFR BLD AUTO: 40 % (ref 42–52)
HGB BLD-MCNC: 13.3 G/DL (ref 14–18)
INDIRECT COOMBS GEL: NORMAL
INR BLD: 1.03 (ref 0–1.3)
INSTRUMENT WBC (OLG): 28.67 X10(3)/MCL
KETONES UR QL STRIP.AUTO: NEGATIVE MG/DL
LACTATE SERPL-SCNC: 3.9 MMOL/L (ref 0.5–2.2)
LACTATE SERPL-SCNC: 4.1 MMOL/L (ref 0.5–2.2)
LEUKOCYTE ESTERASE UR QL STRIP.AUTO: NEGATIVE UNIT/L
LYMPHOCYTES NFR BLD MANUAL: 1.15 X10(3)/MCL
LYMPHOCYTES NFR BLD MANUAL: 4 %
MAGNESIUM SERPL-MCNC: 1.8 MG/DL (ref 1.6–2.6)
MCH RBC QN AUTO: 28.5 PG (ref 27–31)
MCHC RBC AUTO-ENTMCNC: 33.3 G/DL (ref 33–36)
MCV RBC AUTO: 85.8 FL (ref 80–94)
MDMA UR QL SCN: NEGATIVE
MECH RR (OHS): 20 B/MIN
MECH VT (OHS): 500 ML
METHGB MFR BLDA: 1.2 % (ref 0.4–1.5)
MODE (OHS): ABNORMAL
MONOCYTES NFR BLD MANUAL: 0.86 X10(3)/MCL (ref 0.1–1.3)
MONOCYTES NFR BLD MANUAL: 3 %
NEUTROPHILS NFR BLD MANUAL: 93 %
NITRITE UR QL STRIP.AUTO: NEGATIVE
NRBC BLD AUTO-RTO: 0.1 %
O2 HB BLOOD GAS (OHS): 94.4 % (ref 94–97)
OPIATES UR QL SCN: POSITIVE
OXYGEN DEVICE BLOOD GAS (OHS): ABNORMAL
OXYHGB MFR BLDA: 13.3 G/DL (ref 12–16)
PCO2 BLDA: 46 MMHG (ref 35–45)
PCP UR QL: NEGATIVE
PEEP (OHS): 5 CMH2O
PH BLDA: 7.28 [PH] (ref 7.35–7.45)
PH UR STRIP.AUTO: 6 [PH]
PH UR: 6 [PH] (ref 3–11)
PHOSPHATE SERPL-MCNC: 5 MG/DL (ref 2.3–4.7)
PLATELET # BLD AUTO: 189 X10(3)/MCL (ref 130–400)
PLATELET # BLD EST: NORMAL 10*3/UL
PMV BLD AUTO: 10.1 FL (ref 7.4–10.4)
PO2 BLDA: 87 MMHG (ref 80–100)
POTASSIUM BLOOD GAS (OHS): 6 MMOL/L (ref 3.5–5)
POTASSIUM SERPL-SCNC: 5.6 MMOL/L (ref 3.5–5.1)
PROT SERPL-MCNC: 6.8 GM/DL (ref 6.4–8.3)
PROT UR QL STRIP.AUTO: ABNORMAL MG/DL
PROTHROMBIN TIME: 13.4 SECONDS (ref 12.5–14.5)
PS (OHS): 10 CMH2O
RBC # BLD AUTO: 4.66 X10(6)/MCL (ref 4.7–6.1)
RBC #/AREA URNS AUTO: NORMAL /HPF
RBC MORPH BLD: NORMAL
RBC UR QL AUTO: ABNORMAL UNIT/L
SAMPLE SITE BLOOD GAS (OHS): ABNORMAL
SAO2 % BLDA: 95.3 %
SODIUM BLOOD GAS (OHS): 135 MMOL/L (ref 137–145)
SODIUM SERPL-SCNC: 144 MMOL/L (ref 136–145)
SP GR UR STRIP.AUTO: 1.02 (ref 1–1.03)
SPECIMEN OUTDATE: NORMAL
SQUAMOUS #/AREA URNS AUTO: NORMAL /HPF
UROBILINOGEN UR STRIP-ACNC: 0.2 MG/DL
WBC # SPEC AUTO: 28.67 X10(3)/MCL (ref 4.5–11.5)
WBC #/AREA URNS AUTO: NORMAL /HPF

## 2023-06-04 PROCEDURE — G0390 TRAUMA RESPONS W/HOSP CRITI: HCPCS

## 2023-06-04 PROCEDURE — 80053 COMPREHEN METABOLIC PANEL: CPT | Performed by: SURGERY

## 2023-06-04 PROCEDURE — 86900 BLOOD TYPING SEROLOGIC ABO: CPT | Performed by: SURGERY

## 2023-06-04 PROCEDURE — 83735 ASSAY OF MAGNESIUM: CPT | Performed by: STUDENT IN AN ORGANIZED HEALTH CARE EDUCATION/TRAINING PROGRAM

## 2023-06-04 PROCEDURE — 63600175 PHARM REV CODE 636 W HCPCS

## 2023-06-04 PROCEDURE — 27200966 HC CLOSED SUCTION SYSTEM

## 2023-06-04 PROCEDURE — 94640 AIRWAY INHALATION TREATMENT: CPT

## 2023-06-04 PROCEDURE — 25000003 PHARM REV CODE 250: Performed by: STUDENT IN AN ORGANIZED HEALTH CARE EDUCATION/TRAINING PROGRAM

## 2023-06-04 PROCEDURE — 99900031 HC PATIENT EDUCATION (STAT)

## 2023-06-04 PROCEDURE — 25000242 PHARM REV CODE 250 ALT 637 W/ HCPCS: Performed by: HOSPITALIST

## 2023-06-04 PROCEDURE — 99900035 HC TECH TIME PER 15 MIN (STAT)

## 2023-06-04 PROCEDURE — 20800000 HC ICU TRAUMA

## 2023-06-04 PROCEDURE — 99291 CRITICAL CARE FIRST HOUR: CPT

## 2023-06-04 PROCEDURE — 25000003 PHARM REV CODE 250: Performed by: EMERGENCY MEDICINE

## 2023-06-04 PROCEDURE — 20000000 HC ICU ROOM

## 2023-06-04 PROCEDURE — 85730 THROMBOPLASTIN TIME PARTIAL: CPT | Performed by: SURGERY

## 2023-06-04 PROCEDURE — 25000003 PHARM REV CODE 250: Performed by: NEUROLOGICAL SURGERY

## 2023-06-04 PROCEDURE — 82077 ASSAY SPEC XCP UR&BREATH IA: CPT | Performed by: SURGERY

## 2023-06-04 PROCEDURE — 94002 VENT MGMT INPAT INIT DAY: CPT

## 2023-06-04 PROCEDURE — 25000003 PHARM REV CODE 250: Performed by: SURGERY

## 2023-06-04 PROCEDURE — 83605 ASSAY OF LACTIC ACID: CPT | Performed by: SURGERY

## 2023-06-04 PROCEDURE — 86923 COMPATIBILITY TEST ELECTRIC: CPT | Mod: 91 | Performed by: NEUROLOGICAL SURGERY

## 2023-06-04 PROCEDURE — 82803 BLOOD GASES ANY COMBINATION: CPT

## 2023-06-04 PROCEDURE — 27000221 HC OXYGEN, UP TO 24 HOURS

## 2023-06-04 PROCEDURE — 87040 BLOOD CULTURE FOR BACTERIA: CPT | Performed by: SURGERY

## 2023-06-04 PROCEDURE — 84100 ASSAY OF PHOSPHORUS: CPT | Performed by: STUDENT IN AN ORGANIZED HEALTH CARE EDUCATION/TRAINING PROGRAM

## 2023-06-04 PROCEDURE — 81001 URINALYSIS AUTO W/SCOPE: CPT | Performed by: SURGERY

## 2023-06-04 PROCEDURE — 87040 BLOOD CULTURE FOR BACTERIA: CPT | Performed by: STUDENT IN AN ORGANIZED HEALTH CARE EDUCATION/TRAINING PROGRAM

## 2023-06-04 PROCEDURE — 94761 N-INVAS EAR/PLS OXIMETRY MLT: CPT

## 2023-06-04 PROCEDURE — 96374 THER/PROPH/DIAG INJ IV PUSH: CPT

## 2023-06-04 PROCEDURE — 85025 COMPLETE CBC W/AUTO DIFF WBC: CPT | Performed by: SURGERY

## 2023-06-04 PROCEDURE — 36600 WITHDRAWAL OF ARTERIAL BLOOD: CPT

## 2023-06-04 PROCEDURE — 85027 COMPLETE CBC AUTOMATED: CPT | Performed by: SURGERY

## 2023-06-04 PROCEDURE — 86923 COMPATIBILITY TEST ELECTRIC: CPT | Performed by: SURGERY

## 2023-06-04 PROCEDURE — 63600175 PHARM REV CODE 636 W HCPCS: Performed by: SURGERY

## 2023-06-04 PROCEDURE — 99900026 HC AIRWAY MAINTENANCE (STAT)

## 2023-06-04 PROCEDURE — 80307 DRUG TEST PRSMV CHEM ANLYZR: CPT | Performed by: SURGERY

## 2023-06-04 PROCEDURE — 85610 PROTHROMBIN TIME: CPT | Performed by: SURGERY

## 2023-06-04 RX ORDER — PROPOFOL 10 MG/ML
INJECTION, EMULSION INTRAVENOUS
Status: COMPLETED
Start: 2023-06-04 | End: 2023-06-04

## 2023-06-04 RX ORDER — FAMOTIDINE 10 MG/ML
20 INJECTION INTRAVENOUS 2 TIMES DAILY
Status: DISCONTINUED | OUTPATIENT
Start: 2023-06-04 | End: 2023-06-20

## 2023-06-04 RX ORDER — OXYCODONE HYDROCHLORIDE 5 MG/1
5 TABLET ORAL EVERY 4 HOURS PRN
Status: DISCONTINUED | OUTPATIENT
Start: 2023-06-04 | End: 2023-06-04

## 2023-06-04 RX ORDER — SODIUM CHLORIDE 9 MG/ML
INJECTION, SOLUTION INTRAVENOUS ONCE
Status: COMPLETED | OUTPATIENT
Start: 2023-06-04 | End: 2023-06-04

## 2023-06-04 RX ORDER — FENTANYL CITRATE-0.9 % NACL/PF 10 MCG/ML
0-250 PLASTIC BAG, INJECTION (ML) INTRAVENOUS CONTINUOUS
Status: DISCONTINUED | OUTPATIENT
Start: 2023-06-04 | End: 2023-06-16

## 2023-06-04 RX ORDER — SODIUM CHLORIDE 9 MG/ML
INJECTION, SOLUTION INTRAVENOUS CONTINUOUS
Status: DISCONTINUED | OUTPATIENT
Start: 2023-06-04 | End: 2023-06-04

## 2023-06-04 RX ORDER — SODIUM CHLORIDE 9 MG/ML
INJECTION, SOLUTION INTRAVENOUS
Status: COMPLETED | OUTPATIENT
Start: 2023-06-04 | End: 2023-06-04

## 2023-06-04 RX ORDER — IPRATROPIUM BROMIDE AND ALBUTEROL SULFATE 2.5; .5 MG/3ML; MG/3ML
3 SOLUTION RESPIRATORY (INHALATION) EVERY 6 HOURS
Status: DISCONTINUED | OUTPATIENT
Start: 2023-06-04 | End: 2023-06-26 | Stop reason: HOSPADM

## 2023-06-04 RX ORDER — SODIUM CHLORIDE 9 MG/ML
INJECTION, SOLUTION INTRAVENOUS CONTINUOUS
Status: DISCONTINUED | OUTPATIENT
Start: 2023-06-04 | End: 2023-06-05

## 2023-06-04 RX ORDER — FENTANYL CITRATE 50 UG/ML
INJECTION, SOLUTION INTRAMUSCULAR; INTRAVENOUS
Status: COMPLETED
Start: 2023-06-04 | End: 2023-06-04

## 2023-06-04 RX ORDER — FERROUS SULFATE, DRIED 160(50) MG
1 TABLET, EXTENDED RELEASE ORAL 2 TIMES DAILY
Status: DISCONTINUED | OUTPATIENT
Start: 2023-06-04 | End: 2023-06-12

## 2023-06-04 RX ORDER — PROPOFOL 10 MG/ML
0-50 INJECTION, EMULSION INTRAVENOUS CONTINUOUS
Status: DISCONTINUED | OUTPATIENT
Start: 2023-06-04 | End: 2023-06-04

## 2023-06-04 RX ORDER — ACETAMINOPHEN 325 MG/1
650 TABLET ORAL EVERY 4 HOURS
Status: DISCONTINUED | OUTPATIENT
Start: 2023-06-04 | End: 2023-06-04

## 2023-06-04 RX ORDER — PROPOFOL 10 MG/ML
0-50 INJECTION, EMULSION INTRAVENOUS CONTINUOUS
Status: DISCONTINUED | OUTPATIENT
Start: 2023-06-04 | End: 2023-06-16

## 2023-06-04 RX ORDER — MORPHINE SULFATE 4 MG/ML
2 INJECTION, SOLUTION INTRAMUSCULAR; INTRAVENOUS
Status: DISCONTINUED | OUTPATIENT
Start: 2023-06-04 | End: 2023-06-04

## 2023-06-04 RX ADMIN — PROPOFOL 50 MCG/KG/MIN: 10 INJECTION, EMULSION INTRAVENOUS at 04:06

## 2023-06-04 RX ADMIN — IPRATROPIUM BROMIDE AND ALBUTEROL SULFATE 3 ML: 2.5; .5 SOLUTION RESPIRATORY (INHALATION) at 07:06

## 2023-06-04 RX ADMIN — SODIUM CHLORIDE: 9 INJECTION, SOLUTION INTRAVENOUS at 06:06

## 2023-06-04 RX ADMIN — FAMOTIDINE 20 MG: 10 INJECTION, SOLUTION INTRAVENOUS at 08:06

## 2023-06-04 RX ADMIN — SODIUM CHLORIDE: 9 INJECTION, SOLUTION INTRAVENOUS at 04:06

## 2023-06-04 RX ADMIN — SODIUM CHLORIDE 125 ML/HR: 9 INJECTION, SOLUTION INTRAVENOUS at 04:06

## 2023-06-04 RX ADMIN — SODIUM CHLORIDE: 9 INJECTION, SOLUTION INTRAVENOUS at 07:06

## 2023-06-04 RX ADMIN — Medication 100 MCG/HR: at 05:06

## 2023-06-04 RX ADMIN — PROPOFOL 50 MCG/KG/MIN: 10 INJECTION, EMULSION INTRAVENOUS at 05:06

## 2023-06-04 RX ADMIN — PROPOFOL 40 MCG/KG/MIN: 10 INJECTION, EMULSION INTRAVENOUS at 09:06

## 2023-06-04 RX ADMIN — SODIUM CHLORIDE: 9 INJECTION, SOLUTION INTRAVENOUS at 05:06

## 2023-06-04 RX ADMIN — Medication 1 TABLET: at 08:06

## 2023-06-04 RX ADMIN — FENTANYL CITRATE 100 MCG: 50 INJECTION, SOLUTION INTRAMUSCULAR; INTRAVENOUS at 04:06

## 2023-06-04 NOTE — CONSULTS
"   Trauma Surgery   History and Physical/ Activation Note    Patient Name: Gera Chavez  MRN: [id number]   YOB: 1985  Date: 06/04/2023    LEVEL 1 TRAUMA   Pre hospital report  Mechanism: jump off bridge onto land  Injuries: L ptx open book pelvic fx  Vitals: labile blood pressure 100s to 200, nontachycardic  Treatment: intubation chest tube R subclavian CVC  Subjective:   History of present illness: Patient is an approximately 36 y/o M who reportedly jumped off a bridge this morning witnessed by a bystander who called 911. He has open book pelvic fx, L2 burst fx, L ptx with chest tube placed a OSH. Was intubated to accommodate for air med ride but GCS was 15 at OSH. He was started on vecuronium gtt.    Primary Survey:  A intubated   B B/l equal   C 2+ periph pulses   D GCS unobtainable   E exposed, log-rolled and examined (see below)   F BP: sbp > 200  HR: 120s  RR: wnl  Temp: **     VITAL SIGNS: 24 HR MIN & MAX LAST   Temp  Min: 99 °F (37.2 °C)  Max: 99 °F (37.2 °C)  99 °F (37.2 °C)   BP  Min: 115/70  Max: 162/96  115/70    Pulse  Min: 120  Max: 123  (!) 120    Resp  Min: 22  Max: 30  (!) 22    SpO2  Min: 93 %  Max: 96 %  (!) 93 %      HT: 5' 11" (180.3 cm)  WT: 121 kg (266 lb 12.1 oz)  BMI: 37.2     FAST: negative for free fluid    Medications/transfusions received en-route: vecuronium  Medications/transfusions received in trauma bay: fentanyl prop    Scheduled Meds:   famotidine (PF)  20 mg Intravenous BID     Continuous Infusions:   sodium chloride 0.9% 125 mL/hr (06/04/23 1605)    sodium chloride 0.9%      fentanyl      propofoL       PRN Meds:sodium chloride 0.9%    ROS: unable to assess secondary to patients condition     Allergies: unable to obtain secondary to acuity of the patient  PMH: unable to obtain secondary to acuity of the patient  PSH: unable to obtain secondary to acuity of the patient  Social history: unable to obtain secondary to acuity of the patient  Objective:   Secondary " Survey:   General: Well developed, well nourished, no acute distress, AAOx3  Neuro: CNII-XII grossly intact, paralyzed unobtainable GCS  HEENT:  Normocephalic, atraumatic, PERRL, EOMI, ears normal, neck supple, cervical collar in place  CV: tachy 120s hypertensive L subclavian CVC in place  Pulse: 2+ RP b/l, 2+ DP b/l   Resp:  intubated mechanically ventilated breathing comfortably, L chest tube in place minimal output chest stable  GI:  Abdomen soft, non-distended  :  Normal external genitalia, no blood at urethral meatus.   Rectal: Normal tone, no gross blood.  Extremities: not moving extremities, no obvious gross deformities, FROM BUE and BLE, pelvic binder in place  Back/Spine: no palpable step offs or deformities  Skin/wounds:  Warm, well perfused    Labs:  Troponin:  No results for input(s): TROPONINI in the last 72 hours.  CBC:  No results for input(s): WBC, RBC, HGB, HCT, PLT, MCV, MCH, MCHC in the last 72 hours.  CMP:  No results for input(s): GLU, CALCIUM, ALBUMIN, PROT, NA, K, CO2, CL, BUN, CREATININE, ALKPHOS, ALT, AST, BILITOT in the last 72 hours.  Lactic Acid:  No results for input(s): LACTATE in the last 72 hours.  ETOH:  No results for input(s): ETHANOL in the last 72 hours.   Urine Drug Screen:  No results for input(s): COCAINE, OPIATE, BARBITURATE, AMPHETAMINE, FENTANYL, CANNABINOIDS, MDMA in the last 72 hours.    Invalid input(s): BENZODIAZEPINE, PHENCYCLIDINE   ABG:  No results for input(s): PH, PCO2, PO2, HCO3, BE, POCSATURATED in the last 72 hours.  I have reviewed all pertinent lab results within the past 24 hours.    Imaging:  CXR neg  CTH neg  CT Cspine neg  CT Chest L posterior rib fx small L ptx, L2 burst fx  CT a/p multiple comminuted sacral L pelvic fx, radiolucent foci w/I sacrum, (mets vs trauma), old fx L femur, L2 burst fs, enlarged L psoas muscle 2/2 hematoma,L kidney cyst, L pleural effusion with infiltrative changes, acute L rib fx w/ evidence perofration into pleura, no  visible pneumo  I have reviewed all pertinent imaging results/findings within the past 24 hours.    Assessment & Plan:   38 y/o M transferred after jump off bridge with pelvic ring fx including open book fx, L2 burst fx, L pneumothorax s/p chest tube. Intubated prior to air med.    Consults:  NSGY  Orthopedics     Neuro:  - GCS unobtainable  - Multimodal pain control   - C-Collar Yes exchanged in ED  - discontinue paralytic  - propofol and fent gtt  - NSGY for L2 burst fx likely operative intervention this week  - PEC by ED     Pulmonary:  - mechanical ventilation  - daily ABG and CXR  - Chest tube to continuous wall suction  - likely extubate after surgery today  - lidocaine patches for rib fx     Cardiovascular:  - continuous cardiac monitoring     Renal:  - Strict I&Os  - Titus  - need outpt kindey u/s for L renal cyst     FEN/GI:  - IVF: LR  - Diet: NPO  - Daily CMP  - Replace electrolytes as needed based on daily labs     Heme/ID:  - Daily CBC  - None    Endocrine:  - BG <180    Musculoskeletal:  - PT/OT when able to participate  - WB status:   RUE: WBAT  LUE: WBAT  RLE: NWB  LLE: NWB  - Tertiary when appropriate  - orthopedics consulted for multiple pelvic fractures  - continue pelvic binder for now     Wounds:  - Local wound care     Precautions:  Spinal    Prophylaxis:  GI: H2B  Seizure: Not indicated  DVT: SCDs     LDA:  ETT  OGT  Titus  PIV  Binder  Collar    Disposition:  TICU for q1 nc.    Melissa Titus MD  [employer] [facility]

## 2023-06-04 NOTE — PROGRESS NOTES
*full consultation to follow        Level 1 trauma activation transferred from outside facility after a fall from a bridge.  He was intubated prior to transfer.  Upon arrival was found to be in a pelvic binder noted to have left-sided sacral fracture with pubic symphysis disruption and left acetabulum fractures.  Hemodynamically stable.  No hip dislocation.  I spoke with Dr. Harden and he will be admitted to the ICU with consultation to Neurosurgery for L2 compression fracture.  I have also spoken with my partner Dr. Tuan Zepeda who will be in the operating room tomorrow and we will plan to perform stabilization of his pelvic ring.  Nonweightbearing bilateral lower extremities.  Okay for Lovenox from an orthopedic standpoint.  Please call with any questions.      This note/OR report was created with the assistance of  voice recognition software or phone  dictation.  There may be transcription errors as a result of using this technology however minimal. Effort has been made to assure accuracy of transcription but any obvious errors or omissions should be clarified with the author of the document.         Compa Bernal MD  Orthopedic Trauma  [facility]

## 2023-06-04 NOTE — ED PROVIDER NOTES
Encounter Date: 6/4/2023    SCRIBE #1 NOTE: I, Billie De Leon, am scribing for, and in the presence of,  Tony Espinoza MD. I have scribed the following portions of the note - Other sections scribed: HPI, ROS, PE.     History   No chief complaint on file.    37 year old male presents to ED, via [facility], from [facility] after jumping off a bridge, falling 25 feet, and landing on dry land.  Pt arrived intubated (8.0 ET tube 27cm at the lip), with a atkins catheter in place, a pelvic binder, and with a chest tube in place on the left side. Per transfer report pt has a bilateral open book pelvis fracture and L spine fractures.  EMS reports that the incident happened about 3 hours ago and that they gave 100 mcg of Fentanyl, PTA. C-collar was cleared at [facility].    The history is provided by the EMS personnel and medical records. The history is limited by the condition of the patient.   Injury   The incident occurred 2 to 3 hours ago. The incident occurred in the street. Injury mechanism: jump. Context: off of a bridge, 25 feet. The wounds were self-inflicted. Means of arrival: [employer].   Review of patient's allergies indicates:   Allergen Reactions    Iodine     Trazodone      Past Medical History:   Diagnosis Date    Asthma     Depression     Diabetes mellitus     Encounter for blood transfusion     Generalized anxiety disorder     Hypertension     Schizophrenia, unspecified     Sleep apnea     Unspecified glaucoma      Past Surgical History:   Procedure Laterality Date    INTRAMEDULLARY RODDING OF FEMUR Left 02/05/2023    Procedure: INSERTION, INTRAMEDULLARY NOEL, FEMUR;  Surgeon: Tuan Zepeda DO;  Location: [facility] OR;  Service: Orthopedics;  Laterality: Left;    REMOVAL OF HARDWARE FROM LOWER EXTREMITY Left 5/9/2023    Procedure: REMOVAL, HARDWARE, LOWER EXTREMITY;  Surgeon: Tuan Zepeda DO;  Location: [facility] OR;  Service: Orthopedics;  Laterality: Left;    UVULOPALATOPHARYNGOPLASTY       Family History    Problem Relation Age of Onset    No Known Problems Mother     No Known Problems Father      Social History     Tobacco Use    Smoking status: Every Day     Packs/day: 0.50     Years: 10.00     Pack years: 5.00     Types: Cigarettes    Smokeless tobacco: Never   Substance Use Topics    Alcohol use: Not Currently    Drug use: Never     Review of Systems   Unable to perform ROS: Intubated     Physical Exam     Initial Vitals   BP Pulse Resp Temp SpO2   06/04/23 1555 06/04/23 1555 06/04/23 1555 06/04/23 1600 06/04/23 1555   (!) 145/80 (!) 122 (!) 30 99 °F (37.2 °C) 96 %      MAP       --                Physical Exam    Nursing note and vitals reviewed.  Constitutional: No distress. He is intubated.   HENT:   Head: Normocephalic and atraumatic.   Right Ear: Tympanic membrane normal.   Left Ear: Tympanic membrane normal.   Mouth/Throat: Oropharynx is clear and moist.   Eyes: Conjunctivae and EOM are normal. Pupils are equal, round, and reactive to light.   Pinpoint pupils   Neck: Trachea normal. Neck supple. Carotid bruit is not present. No JVD present.   Normal range of motion.  Cardiovascular:  Normal rate and regular rhythm.           No murmur heard.  Pulses:       Femoral pulses are 2+ on the right side and 2+ on the left side.       Dorsalis pedis pulses are 2+ on the right side and 2+ on the left side.   Pulmonary/Chest: Breath sounds normal. He is intubated. No respiratory distress. He exhibits no tenderness.   Abdominal: Abdomen is soft. Bowel sounds are normal. He exhibits no distension. There is no abdominal tenderness.   Genitourinary:    Genitourinary Comments: Absent rectal tone     Musculoskeletal:         General: Normal range of motion.      Cervical back: Normal range of motion and neck supple.      Lumbar back: Normal. No tenderness. Normal range of motion.      Comments: No obvious deformities          ED Course   Critical Care    Date/Time: 6/4/2023 4:42 PM  Performed by: Tony Espinoza  MD  Authorized by: Salvador Sauer MD   Direct patient critical care time: 20 minutes  Additional history critical care time: 5 minutes  Ordering / reviewing critical care time: 5 minutes  Documentation critical care time: 10 minutes  Consulting other physicians critical care time: 10 minutes  Total critical care time (exclusive of procedural time) : 50 minutes  Critical care was necessary to treat or prevent imminent or life-threatening deterioration of the following conditions: trauma.  Critical care was time spent personally by me on the following activities: examination of patient, evaluation of patient's response to treatment, obtaining history from patient or surrogate, ordering and performing treatments and interventions, ordering and review of laboratory studies, ordering and review of radiographic studies and re-evaluation of patient's condition.      Labs Reviewed   CBC WITH DIFFERENTIAL - Abnormal; Notable for the following components:       Result Value    WBC 28.67 (*)     RBC 4.66 (*)     Hgb 13.3 (*)     Hct 40.0 (*)     All other components within normal limits   BLOOD CULTURE [facility]   CBC W/ AUTO DIFFERENTIAL    Narrative:     The following orders were created for panel order CBC auto differential.  Procedure                               Abnormality         Status                     ---------                               -----------         ------                     CBC with Differential[[id number]]        Abnormal            Final result               Manual Differential[[id number]]                              In process                   Please view results for these tests on the individual orders.   URINALYSIS, REFLEX TO URINE CULTURE   DRUG SCREEN, URINE (BEAKER)   MAGNESIUM   PHOSPHORUS   MANUAL DIFFERENTIAL   TYPE & SCREEN          Imaging Results              X-Ray Chest 1 View (Final result)  Result time 06/04/23 16:18:46      Final result by Harjinder Strong MD (06/04/23 16:18:46)                    Impression:      1. Endotracheal tube tip near the origin of the right mainstem bronchus and can be retracted slightly.  Additional tubing overlying the thoracic inlet may be an enteric tube.  2. Right upper lung consolidation.  Patchy opacities in the left lower lung may be atelectasis or contusion.      Electronically signed by: Harjinder Strong  Date:    06/04/2023  Time:    16:18               Narrative:    EXAMINATION:  XR CHEST 1 VIEW    CLINICAL HISTORY:  r/o bleeding or hemorrhage;    TECHNIQUE:  Frontal view(s) of the chest.    COMPARISON:  Radiography 02/08/2023    FINDINGS:  Endotracheal tube tip lies near the origin of the right mainstem bronchus.  Left chest tube tip in the perihilar region.  Additional tube tip noted near the thoracic inlet.  This may be an enteric tube.  Lungs are mildly hypoinflated with right upper lung consolidation.  Patchy opacity in the lower left lung.  No definitive pneumothorax.  Cardiac silhouette within normal limits for technique.                                       X-Ray Pelvis Routine AP (In process)                      Medications   0.9%  NaCl infusion (has no administration in time range)   fentaNYL 2500 mcg in 0.9% sodium chloride 250 mL infusion premix (titrating) (has no administration in time range)   famotidine (PF) injection 20 mg (has no administration in time range)   propofol (DIPRIVAN) 10 mg/mL infusion (has no administration in time range)   albuterol-ipratropium 2.5 mg-0.5 mg/3 mL nebulizer solution 3 mL (has no administration in time range)   0.9%  NaCl infusion (125 mL/hr Intravenous New Bag 6/4/23 1605)   fentaNYL (SUBLIMAZE) 50 mcg/mL injection (50 mcg  Given 6/4/23 1606)              Scribe Attestation:   Scribe #1: I performed the above scribed service and the documentation accurately describes the services I performed. I attest to the accuracy of the note.    Attending Attestation:           Physician Attestation for Scribe:  Physician  Attestation Statement for Scribe #1: I, Tony Espinoza MD, reviewed documentation, as scribed by Billie De Leon in my presence, and it is both accurate and complete.       Medical Decision Making  Differential diagnosis includes but is not limited to: lumbar spinal fractures, rib fractures, pulmonary contusion, pelvic fracture, pelvic hematoma.    Amount and/or Complexity of Data Reviewed  Independent Historian: EMS     Details: EMS reports that the incident happened about 3 hours ago and that they gave 100 mcg of Fentanyl, PTA.  External Data Reviewed: notes.     Details: Per transfer report pt has a bilateral open book pelvis fracture and L spine fractures.  Reviewed CT of the head, C-spine, chest abdomen pelvis as well as blood work from outside facility.  Labs: ordered.  Radiology: ordered.     Details: Chest x-ray shows ET tube slightly deep and orogastric tube not in place.  Discussion of management or test interpretation with external provider(s): Trauma team also present on patient's arrival, co managed continue Trauma resuscitation and admission to the intensive care unit  Trauma surgeon informs me that he spoke with the orthopedist as well as Neurosurgery    Risk  Decision regarding hospitalization.    Critical Care  Total time providing critical care: 50 minutes                      Clinical Impression:   Final diagnoses:  [T14.90XA] Trauma        ED Disposition Condition    Admit                 Tony Espinoza MD  06/04/23 [id number]
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
## 2023-06-05 ENCOUNTER — ANESTHESIA (OUTPATIENT)
Dept: SURGERY | Facility: HOSPITAL | Age: 38
DRG: 956 | End: 2023-06-05
Payer: MEDICARE

## 2023-06-05 ENCOUNTER — ANESTHESIA EVENT (OUTPATIENT)
Dept: SURGERY | Facility: HOSPITAL | Age: 38
DRG: 956 | End: 2023-06-05
Payer: MEDICARE

## 2023-06-05 PROBLEM — S32.442A: Status: ACTIVE | Noted: 2023-06-05

## 2023-06-05 PROBLEM — S32.810A CLOSED PELVIC RING FRACTURE: Status: ACTIVE | Noted: 2023-06-05

## 2023-06-05 LAB
ALBUMIN SERPL-MCNC: 3.1 G/DL (ref 3.5–5)
ALBUMIN SERPL-MCNC: 3.3 G/DL (ref 3.5–5)
ALBUMIN/GLOB SERPL: 1.1 RATIO (ref 1.1–2)
ALBUMIN/GLOB SERPL: 1.4 RATIO (ref 1.1–2)
ALLENS TEST BLOOD GAS (OHS): YES
ALLENS TEST BLOOD GAS (OHS): YES
ALP SERPL-CCNC: 100 UNIT/L (ref 40–150)
ALP SERPL-CCNC: 81 UNIT/L (ref 40–150)
ALT SERPL-CCNC: 31 UNIT/L (ref 0–55)
ALT SERPL-CCNC: 45 UNIT/L (ref 0–55)
APPEARANCE UR: CLEAR
AST SERPL-CCNC: 63 UNIT/L (ref 5–34)
AST SERPL-CCNC: 73 UNIT/L (ref 5–34)
AV INDEX (PROSTH): 1.32
AV MEAN GRADIENT: 5 MMHG
AV PEAK GRADIENT: 10 MMHG
AV VALVE AREA: 5.48 CM2
AV VELOCITY RATIO: 0.99
BACTERIA #/AREA URNS AUTO: ABNORMAL /HPF
BASE EXCESS BLD CALC-SCNC: -1.8 MMOL/L
BASE EXCESS BLD CALC-SCNC: -3.3 MMOL/L
BASOPHILS # BLD AUTO: 0.05 X10(3)/MCL
BASOPHILS # BLD AUTO: 0.05 X10(3)/MCL
BASOPHILS NFR BLD AUTO: 0.4 %
BASOPHILS NFR BLD AUTO: 0.4 %
BILIRUB UR QL STRIP.AUTO: NEGATIVE MG/DL
BILIRUBIN DIRECT+TOT PNL SERPL-MCNC: 0.9 MG/DL
BILIRUBIN DIRECT+TOT PNL SERPL-MCNC: 1.7 MG/DL
BLOOD GAS SAMPLE TYPE (OHS): ABNORMAL
BLOOD GAS SAMPLE TYPE (OHS): ABNORMAL
BNP BLD-MCNC: <10 PG/ML
BSA FOR ECHO PROCEDURE: 2.46 M2
BUN SERPL-MCNC: 16.8 MG/DL (ref 8.9–20.6)
BUN SERPL-MCNC: 18.1 MG/DL (ref 8.9–20.6)
CA-I BLD-SCNC: 1.12 MMOL/L (ref 1.12–1.23)
CA-I BLD-SCNC: 1.12 MMOL/L (ref 1.12–1.23)
CALCIUM SERPL-MCNC: 7.8 MG/DL (ref 8.4–10.2)
CALCIUM SERPL-MCNC: 7.9 MG/DL (ref 8.4–10.2)
CHLORIDE SERPL-SCNC: 113 MMOL/L (ref 98–107)
CHLORIDE SERPL-SCNC: 115 MMOL/L (ref 98–107)
CO2 BLDA-SCNC: 25.1 MMOL/L
CO2 BLDA-SCNC: 26.2 MMOL/L
CO2 SERPL-SCNC: 19 MMOL/L (ref 22–29)
CO2 SERPL-SCNC: 21 MMOL/L (ref 22–29)
COLOR UR: YELLOW
CREAT SERPL-MCNC: 0.97 MG/DL (ref 0.73–1.18)
CREAT SERPL-MCNC: 1.09 MG/DL (ref 0.73–1.18)
CRP SERPL-MCNC: 131.9 MG/L
CV ECHO LV RWT: 0.48 CM
DOP CALC AO PEAK VEL: 1.62 M/S
DOP CALC AO VTI: 13.5 CM
DOP CALC LVOT AREA: 4.2 CM2
DOP CALC LVOT DIAMETER: 2.3 CM
DOP CALC LVOT PEAK VEL: 1.61 M/S
DOP CALC LVOT STROKE VOLUME: 73.92 CM3
DOP CALC MV VTI: 15.1 CM
DOP CALCLVOT PEAK VEL VTI: 17.8 CM
DRAWN BY BLOOD GAS (OHS): ABNORMAL
DRAWN BY BLOOD GAS (OHS): ABNORMAL
E WAVE DECELERATION TIME: 66 MSEC
E/A RATIO: 0.81
E/E' RATIO: 6.64 M/S
ECHO LV POSTERIOR WALL: 1.05 CM (ref 0.6–1.1)
EJECTION FRACTION: 65 %
EOSINOPHIL # BLD AUTO: 0.01 X10(3)/MCL (ref 0–0.9)
EOSINOPHIL # BLD AUTO: 0.03 X10(3)/MCL (ref 0–0.9)
EOSINOPHIL NFR BLD AUTO: 0.1 %
EOSINOPHIL NFR BLD AUTO: 0.2 %
ERYTHROCYTE [DISTWIDTH] IN BLOOD BY AUTOMATED COUNT: 15.9 % (ref 11.5–17)
ERYTHROCYTE [DISTWIDTH] IN BLOOD BY AUTOMATED COUNT: 16.2 % (ref 11.5–17)
ERYTHROCYTE [DISTWIDTH] IN BLOOD BY AUTOMATED COUNT: 16.2 % (ref 11.5–17)
FIO2 BLOOD GAS (OHS): 100 %
FIO2 BLOOD GAS (OHS): 60 %
FRACTIONAL SHORTENING: 27 % (ref 28–44)
GFR SERPLBLD CREATININE-BSD FMLA CKD-EPI: >60 MLS/MIN/1.73/M2
GFR SERPLBLD CREATININE-BSD FMLA CKD-EPI: >60 MLS/MIN/1.73/M2
GLOBULIN SER-MCNC: 2.3 GM/DL (ref 2.4–3.5)
GLOBULIN SER-MCNC: 2.7 GM/DL (ref 2.4–3.5)
GLUCOSE SERPL-MCNC: 91 MG/DL (ref 74–100)
GLUCOSE SERPL-MCNC: 96 MG/DL (ref 74–100)
GLUCOSE UR QL STRIP.AUTO: NEGATIVE MG/DL
HCO3 BLDA-SCNC: 23.6 MMOL/L
HCO3 BLDA-SCNC: 24.7 MMOL/L
HCT VFR BLD AUTO: 29.2 % (ref 42–52)
HCT VFR BLD AUTO: 32 % (ref 42–52)
HCT VFR BLD AUTO: 33.1 % (ref 42–52)
HGB BLD-MCNC: 11.2 G/DL (ref 14–18)
HGB BLD-MCNC: 11.2 G/DL (ref 14–18)
HGB BLD-MCNC: 9.9 G/DL (ref 14–18)
IMM GRANULOCYTES # BLD AUTO: 0.04 X10(3)/MCL (ref 0–0.04)
IMM GRANULOCYTES # BLD AUTO: 0.06 X10(3)/MCL (ref 0–0.04)
IMM GRANULOCYTES NFR BLD AUTO: 0.3 %
IMM GRANULOCYTES NFR BLD AUTO: 0.4 %
INTERVENTRICULAR SEPTUM: 1.07 CM (ref 0.6–1.1)
KETONES UR QL STRIP.AUTO: NEGATIVE MG/DL
LACTATE SERPL-SCNC: 2.2 MMOL/L (ref 0.5–2.2)
LACTATE SERPL-SCNC: 2.6 MMOL/L (ref 0.5–2.2)
LACTATE SERPL-SCNC: 3.7 MMOL/L (ref 0.5–2.2)
LACTATE SERPL-SCNC: 3.9 MMOL/L (ref 0.5–2.2)
LACTATE SERPL-SCNC: 5.5 MMOL/L (ref 0.5–2.2)
LEFT ATRIUM SIZE: 3.6 CM
LEFT ATRIUM VOLUME INDEX MOD: 34.5 ML/M2
LEFT ATRIUM VOLUME MOD: 82.1 CM3
LEFT INTERNAL DIMENSION IN SYSTOLE: 3.16 CM (ref 2.1–4)
LEFT VENTRICLE DIASTOLIC VOLUME INDEX: 35.67 ML/M2
LEFT VENTRICLE DIASTOLIC VOLUME: 84.9 ML
LEFT VENTRICLE MASS INDEX: 66 G/M2
LEFT VENTRICLE SYSTOLIC VOLUME INDEX: 16.7 ML/M2
LEFT VENTRICLE SYSTOLIC VOLUME: 39.7 ML
LEFT VENTRICULAR INTERNAL DIMENSION IN DIASTOLE: 4.34 CM (ref 3.5–6)
LEFT VENTRICULAR MASS: 156.88 G
LEUKOCYTE ESTERASE UR QL STRIP.AUTO: ABNORMAL UNIT/L
LV LATERAL E/E' RATIO: 4.88 M/S
LV SEPTAL E/E' RATIO: 10.38 M/S
LVOT MG: 5 MMHG
LVOT MV: 1.02 CM/S
LYMPHOCYTES # BLD AUTO: 1.4 X10(3)/MCL (ref 0.6–4.6)
LYMPHOCYTES # BLD AUTO: 2.05 X10(3)/MCL (ref 0.6–4.6)
LYMPHOCYTES NFR BLD AUTO: 10.9 %
LYMPHOCYTES NFR BLD AUTO: 15.4 %
MAGNESIUM SERPL-MCNC: 1.7 MG/DL (ref 1.6–2.6)
MCH RBC QN AUTO: 28.9 PG (ref 27–31)
MCH RBC QN AUTO: 29 PG (ref 27–31)
MCH RBC QN AUTO: 29.3 PG (ref 27–31)
MCHC RBC AUTO-ENTMCNC: 33.8 G/DL (ref 33–36)
MCHC RBC AUTO-ENTMCNC: 33.9 G/DL (ref 33–36)
MCHC RBC AUTO-ENTMCNC: 35 G/DL (ref 33–36)
MCV RBC AUTO: 83.8 FL (ref 80–94)
MCV RBC AUTO: 85.3 FL (ref 80–94)
MCV RBC AUTO: 85.6 FL (ref 80–94)
MECH RR (OHS): 24 B/MIN
MECH RR (OHS): 28 B/MIN
MECH VT (OHS): 500 ML
MECH VT (OHS): 500 ML
MODE (OHS): ABNORMAL
MODE (OHS): ABNORMAL
MONOCYTES # BLD AUTO: 0.77 X10(3)/MCL (ref 0.1–1.3)
MONOCYTES # BLD AUTO: 0.87 X10(3)/MCL (ref 0.1–1.3)
MONOCYTES NFR BLD AUTO: 6 %
MONOCYTES NFR BLD AUTO: 6.5 %
MV MEAN GRADIENT: 3 MMHG
MV PEAK A VEL: 1.02 M/S
MV PEAK E VEL: 0.83 M/S
MV PEAK GRADIENT: 6 MMHG
MV STENOSIS PRESSURE HALF TIME: 79 MS
MV VALVE AREA BY CONTINUITY EQUATION: 4.9 CM2
MV VALVE AREA P 1/2 METHOD: 2.78 CM2
NEUTROPHILS # BLD AUTO: 10.3 X10(3)/MCL (ref 2.1–9.2)
NEUTROPHILS # BLD AUTO: 10.55 X10(3)/MCL (ref 2.1–9.2)
NEUTROPHILS NFR BLD AUTO: 77.2 %
NEUTROPHILS NFR BLD AUTO: 82.2 %
NITRITE UR QL STRIP.AUTO: NEGATIVE
NRBC BLD AUTO-RTO: 0 %
NRBC BLD AUTO-RTO: 0.1 %
NRBC BLD AUTO-RTO: 0.2 %
OHS LV EJECTION FRACTION SIMPSONS BIPLANE MOD: 7 %
OXYGEN DEVICE BLOOD GAS (OHS): ABNORMAL
OXYGEN DEVICE BLOOD GAS (OHS): ABNORMAL
PCO2 BLDA: 48 MMHG (ref 35–45)
PCO2 BLDA: 49 MMHG (ref 35–45)
PEEP (OHS): 12 CMH2O
PEEP (OHS): 8 CMH2O
PH BLDA: 7.29 [PH] (ref 7.35–7.45)
PH BLDA: 7.32 [PH] (ref 7.35–7.45)
PH UR STRIP.AUTO: 5.5 [PH]
PHOSPHATE SERPL-MCNC: 4.6 MG/DL (ref 2.3–4.7)
PLATELET # BLD AUTO: 102 X10(3)/MCL (ref 130–400)
PLATELET # BLD AUTO: 138 X10(3)/MCL (ref 130–400)
PLATELET # BLD AUTO: 96 X10(3)/MCL (ref 130–400)
PMV BLD AUTO: 10.2 FL (ref 7.4–10.4)
PMV BLD AUTO: 10.2 FL (ref 7.4–10.4)
PMV BLD AUTO: 10.5 FL (ref 7.4–10.4)
PO2 BLDA: 58 MMHG (ref 80–100)
PO2 BLDA: 82 MMHG (ref 80–100)
POTASSIUM BLOOD GAS (OHS): 4.5 MMOL/L (ref 3.5–5)
POTASSIUM BLOOD GAS (OHS): 4.8 MMOL/L (ref 3.5–5)
POTASSIUM SERPL-SCNC: 5.1 MMOL/L (ref 3.5–5.1)
POTASSIUM SERPL-SCNC: 5.3 MMOL/L (ref 3.5–5.1)
PREALB SERPL-MCNC: 24.7 MG/DL (ref 18–45)
PROT SERPL-MCNC: 5.6 GM/DL (ref 6.4–8.3)
PROT SERPL-MCNC: 5.8 GM/DL (ref 6.4–8.3)
PROT UR QL STRIP.AUTO: ABNORMAL MG/DL
PS (OHS): 10 CMH2O
PS (OHS): 12 CMH2O
PV PEAK VELOCITY: 1.66 CM/S
RBC # BLD AUTO: 3.41 X10(6)/MCL (ref 4.7–6.1)
RBC # BLD AUTO: 3.82 X10(6)/MCL (ref 4.7–6.1)
RBC # BLD AUTO: 3.88 X10(6)/MCL (ref 4.7–6.1)
RBC #/AREA URNS AUTO: 14 /HPF
RBC UR QL AUTO: ABNORMAL UNIT/L
RIGHT VENTRICULAR END-DIASTOLIC DIMENSION: 2.19 CM
SAMPLE SITE BLOOD GAS (OHS): ABNORMAL
SAMPLE SITE BLOOD GAS (OHS): ABNORMAL
SAO2 % BLDA: 86 %
SAO2 % BLDA: 95 %
SODIUM BLOOD GAS (OHS): 141 MMOL/L (ref 137–145)
SODIUM BLOOD GAS (OHS): 141 MMOL/L (ref 137–145)
SODIUM SERPL-SCNC: 145 MMOL/L (ref 136–145)
SODIUM SERPL-SCNC: 145 MMOL/L (ref 136–145)
SP GR UR STRIP.AUTO: 1.01 (ref 1–1.03)
SQUAMOUS #/AREA URNS AUTO: <5 /HPF
TDI LATERAL: 0.17 M/S
TDI SEPTAL: 0.08 M/S
TDI: 0.13 M/S
TRICUSPID ANNULAR PLANE SYSTOLIC EXCURSION: 3.81 CM
UROBILINOGEN UR STRIP-ACNC: 0.2 MG/DL
WBC # SPEC AUTO: 12.47 X10(3)/MCL (ref 4.5–11.5)
WBC # SPEC AUTO: 12.84 X10(3)/MCL (ref 4.5–11.5)
WBC # SPEC AUTO: 13.34 X10(3)/MCL (ref 4.5–11.5)
WBC #/AREA URNS AUTO: 13 /HPF

## 2023-06-05 PROCEDURE — 86140 C-REACTIVE PROTEIN: CPT | Performed by: STUDENT IN AN ORGANIZED HEALTH CARE EDUCATION/TRAINING PROGRAM

## 2023-06-05 PROCEDURE — D9220A PRA ANESTHESIA: Mod: CRNA,,, | Performed by: STUDENT IN AN ORGANIZED HEALTH CARE EDUCATION/TRAINING PROGRAM

## 2023-06-05 PROCEDURE — 99223 1ST HOSP IP/OBS HIGH 75: CPT | Mod: ,,, | Performed by: NEUROLOGICAL SURGERY

## 2023-06-05 PROCEDURE — D9220A PRA ANESTHESIA: Mod: ANES,,, | Performed by: ANESTHESIOLOGY

## 2023-06-05 PROCEDURE — 25000003 PHARM REV CODE 250: Performed by: NURSE ANESTHETIST, CERTIFIED REGISTERED

## 2023-06-05 PROCEDURE — 27217 PR OPEN INTERN FIX ANTER PELV RING FX: ICD-10-PCS | Mod: AS,LT,, | Performed by: PHYSICIAN ASSISTANT

## 2023-06-05 PROCEDURE — 63600175 PHARM REV CODE 636 W HCPCS: Performed by: PHYSICIAN ASSISTANT

## 2023-06-05 PROCEDURE — P9045 ALBUMIN (HUMAN), 5%, 250 ML: HCPCS | Mod: JZ,JG | Performed by: SURGERY

## 2023-06-05 PROCEDURE — 36000711: Performed by: ORTHOPAEDIC SURGERY

## 2023-06-05 PROCEDURE — 37000009 HC ANESTHESIA EA ADD 15 MINS: Performed by: ORTHOPAEDIC SURGERY

## 2023-06-05 PROCEDURE — 99900025 HC BRONCHOSCOPY-ASST (STAT)

## 2023-06-05 PROCEDURE — C1713 ANCHOR/SCREW BN/BN,TIS/BN: HCPCS | Performed by: ORTHOPAEDIC SURGERY

## 2023-06-05 PROCEDURE — 27227 PR OPEN INTERN FIX ACETABULAR FX: ICD-10-PCS | Mod: AS,LT,, | Performed by: PHYSICIAN ASSISTANT

## 2023-06-05 PROCEDURE — 83880 ASSAY OF NATRIURETIC PEPTIDE: CPT | Performed by: SURGERY

## 2023-06-05 PROCEDURE — 87088 URINE BACTERIA CULTURE: CPT | Performed by: SURGERY

## 2023-06-05 PROCEDURE — 27216 TREAT PELVIC RING FRACTURE: CPT | Mod: 79,LT,, | Performed by: ORTHOPAEDIC SURGERY

## 2023-06-05 PROCEDURE — 94640 AIRWAY INHALATION TREATMENT: CPT

## 2023-06-05 PROCEDURE — 83605 ASSAY OF LACTIC ACID: CPT | Performed by: SURGERY

## 2023-06-05 PROCEDURE — 27217 TREAT PELVIC RING FRACTURE: CPT | Mod: AS,LT,, | Performed by: PHYSICIAN ASSISTANT

## 2023-06-05 PROCEDURE — 84134 ASSAY OF PREALBUMIN: CPT | Performed by: STUDENT IN AN ORGANIZED HEALTH CARE EDUCATION/TRAINING PROGRAM

## 2023-06-05 PROCEDURE — 27202055 HC BRONCHOSCOPE, DISP

## 2023-06-05 PROCEDURE — 63600175 PHARM REV CODE 636 W HCPCS: Performed by: NURSE ANESTHETIST, CERTIFIED REGISTERED

## 2023-06-05 PROCEDURE — 63600175 PHARM REV CODE 636 W HCPCS: Mod: JZ,JG | Performed by: SURGERY

## 2023-06-05 PROCEDURE — 27217 TREAT PELVIC RING FRACTURE: CPT | Mod: 79,LT,, | Performed by: ORTHOPAEDIC SURGERY

## 2023-06-05 PROCEDURE — 25000003 PHARM REV CODE 250: Performed by: STUDENT IN AN ORGANIZED HEALTH CARE EDUCATION/TRAINING PROGRAM

## 2023-06-05 PROCEDURE — 36000710: Performed by: ORTHOPAEDIC SURGERY

## 2023-06-05 PROCEDURE — 27000221 HC OXYGEN, UP TO 24 HOURS

## 2023-06-05 PROCEDURE — 27217 PR OPEN INTERN FIX ANTER PELV RING FX: ICD-10-PCS | Mod: 79,LT,, | Performed by: ORTHOPAEDIC SURGERY

## 2023-06-05 PROCEDURE — 20800000 HC ICU TRAUMA

## 2023-06-05 PROCEDURE — 27201423 OPTIME MED/SURG SUP & DEVICES STERILE SUPPLY: Performed by: ORTHOPAEDIC SURGERY

## 2023-06-05 PROCEDURE — 80053 COMPREHEN METABOLIC PANEL: CPT | Performed by: STUDENT IN AN ORGANIZED HEALTH CARE EDUCATION/TRAINING PROGRAM

## 2023-06-05 PROCEDURE — 85025 COMPLETE CBC W/AUTO DIFF WBC: CPT

## 2023-06-05 PROCEDURE — 99223 1ST HOSP IP/OBS HIGH 75: CPT | Mod: 57,,, | Performed by: ORTHOPAEDIC SURGERY

## 2023-06-05 PROCEDURE — D9220A PRA ANESTHESIA: ICD-10-PCS | Mod: ANES,,, | Performed by: ANESTHESIOLOGY

## 2023-06-05 PROCEDURE — D9220A PRA ANESTHESIA: ICD-10-PCS | Mod: CRNA,,, | Performed by: STUDENT IN AN ORGANIZED HEALTH CARE EDUCATION/TRAINING PROGRAM

## 2023-06-05 PROCEDURE — 27227 TREAT HIP FRACTURE(S): CPT | Mod: 79,LT,, | Performed by: ORTHOPAEDIC SURGERY

## 2023-06-05 PROCEDURE — 83605 ASSAY OF LACTIC ACID: CPT | Performed by: STUDENT IN AN ORGANIZED HEALTH CARE EDUCATION/TRAINING PROGRAM

## 2023-06-05 PROCEDURE — 27216 TREAT PELVIC RING FRACTURE: CPT | Mod: AS,LT,, | Performed by: PHYSICIAN ASSISTANT

## 2023-06-05 PROCEDURE — 83735 ASSAY OF MAGNESIUM: CPT | Performed by: STUDENT IN AN ORGANIZED HEALTH CARE EDUCATION/TRAINING PROGRAM

## 2023-06-05 PROCEDURE — 63600175 PHARM REV CODE 636 W HCPCS: Performed by: STUDENT IN AN ORGANIZED HEALTH CARE EDUCATION/TRAINING PROGRAM

## 2023-06-05 PROCEDURE — 63600175 PHARM REV CODE 636 W HCPCS: Performed by: ORTHOPAEDIC SURGERY

## 2023-06-05 PROCEDURE — 27227 PR OPEN INTERN FIX ACETABULAR FX: ICD-10-PCS | Mod: 79,LT,, | Performed by: ORTHOPAEDIC SURGERY

## 2023-06-05 PROCEDURE — 27216 PR PERCUT FIX POST PELV RING FX: ICD-10-PCS | Mod: 79,LT,, | Performed by: ORTHOPAEDIC SURGERY

## 2023-06-05 PROCEDURE — 27216 PR PERCUT FIX POST PELV RING FX: ICD-10-PCS | Mod: AS,LT,, | Performed by: PHYSICIAN ASSISTANT

## 2023-06-05 PROCEDURE — 37000008 HC ANESTHESIA 1ST 15 MINUTES: Performed by: ORTHOPAEDIC SURGERY

## 2023-06-05 PROCEDURE — 84100 ASSAY OF PHOSPHORUS: CPT | Performed by: STUDENT IN AN ORGANIZED HEALTH CARE EDUCATION/TRAINING PROGRAM

## 2023-06-05 PROCEDURE — 99900026 HC AIRWAY MAINTENANCE (STAT)

## 2023-06-05 PROCEDURE — 27200966 HC CLOSED SUCTION SYSTEM

## 2023-06-05 PROCEDURE — 94003 VENT MGMT INPAT SUBQ DAY: CPT

## 2023-06-05 PROCEDURE — 82803 BLOOD GASES ANY COMBINATION: CPT

## 2023-06-05 PROCEDURE — 25000003 PHARM REV CODE 250: Performed by: SURGERY

## 2023-06-05 PROCEDURE — 36600 WITHDRAWAL OF ARTERIAL BLOOD: CPT

## 2023-06-05 PROCEDURE — 85025 COMPLETE CBC W/AUTO DIFF WBC: CPT | Performed by: STUDENT IN AN ORGANIZED HEALTH CARE EDUCATION/TRAINING PROGRAM

## 2023-06-05 PROCEDURE — 81001 URINALYSIS AUTO W/SCOPE: CPT | Performed by: SURGERY

## 2023-06-05 PROCEDURE — 27227 TREAT HIP FRACTURE(S): CPT | Mod: AS,LT,, | Performed by: PHYSICIAN ASSISTANT

## 2023-06-05 PROCEDURE — 99900035 HC TECH TIME PER 15 MIN (STAT)

## 2023-06-05 PROCEDURE — 85027 COMPLETE CBC AUTOMATED: CPT | Performed by: STUDENT IN AN ORGANIZED HEALTH CARE EDUCATION/TRAINING PROGRAM

## 2023-06-05 PROCEDURE — 25000003 PHARM REV CODE 250: Performed by: ORTHOPAEDIC SURGERY

## 2023-06-05 PROCEDURE — 51702 INSERT TEMP BLADDER CATH: CPT

## 2023-06-05 PROCEDURE — 25000003 PHARM REV CODE 250: Performed by: NEUROLOGICAL SURGERY

## 2023-06-05 PROCEDURE — 80053 COMPREHEN METABOLIC PANEL: CPT | Performed by: SURGERY

## 2023-06-05 PROCEDURE — 94761 N-INVAS EAR/PLS OXIMETRY MLT: CPT

## 2023-06-05 PROCEDURE — 99223 PR INITIAL HOSPITAL CARE,LEVL III: ICD-10-PCS | Mod: 57,,, | Performed by: ORTHOPAEDIC SURGERY

## 2023-06-05 PROCEDURE — P9016 RBC LEUKOCYTES REDUCED: HCPCS | Performed by: SURGERY

## 2023-06-05 PROCEDURE — 20000000 HC ICU ROOM

## 2023-06-05 PROCEDURE — 25000242 PHARM REV CODE 250 ALT 637 W/ HCPCS: Performed by: HOSPITALIST

## 2023-06-05 PROCEDURE — 99223 PR INITIAL HOSPITAL CARE,LEVL III: ICD-10-PCS | Mod: ,,, | Performed by: NEUROLOGICAL SURGERY

## 2023-06-05 DEVICE — SCREW BONE 3.5X24MM SS: Type: IMPLANTABLE DEVICE | Site: PELVIS | Status: FUNCTIONAL

## 2023-06-05 DEVICE — GRAFT BONE SUB VITOSS BBT: Type: IMPLANTABLE DEVICE | Site: PELVIS | Status: FUNCTIONAL

## 2023-06-05 DEVICE — STAPLE BONE 20X20X20 EASYCLIP: Type: IMPLANTABLE DEVICE | Site: PELVIS | Status: FUNCTIONAL

## 2023-06-05 DEVICE — SCREW CORT SELF-TAP 3.5X22MM S: Type: IMPLANTABLE DEVICE | Site: PELVIS | Status: FUNCTIONAL

## 2023-06-05 DEVICE — SCREW BONE 3.5X38MM SS: Type: IMPLANTABLE DEVICE | Site: PELVIS | Status: FUNCTIONAL

## 2023-06-05 RX ORDER — DIPHENHYDRAMINE HYDROCHLORIDE 50 MG/ML
25 INJECTION INTRAMUSCULAR; INTRAVENOUS EVERY 6 HOURS PRN
Status: CANCELLED | OUTPATIENT
Start: 2023-06-05

## 2023-06-05 RX ORDER — MUPIROCIN 20 MG/G
OINTMENT TOPICAL 2 TIMES DAILY
Status: DISPENSED | OUTPATIENT
Start: 2023-06-05 | End: 2023-06-10

## 2023-06-05 RX ORDER — IPRATROPIUM BROMIDE AND ALBUTEROL SULFATE 2.5; .5 MG/3ML; MG/3ML
3 SOLUTION RESPIRATORY (INHALATION) ONCE AS NEEDED
Status: CANCELLED | OUTPATIENT
Start: 2023-06-05 | End: 2034-11-01

## 2023-06-05 RX ORDER — SODIUM CHLORIDE, SODIUM GLUCONATE, SODIUM ACETATE, POTASSIUM CHLORIDE AND MAGNESIUM CHLORIDE 30; 37; 368; 526; 502 MG/100ML; MG/100ML; MG/100ML; MG/100ML; MG/100ML
INJECTION, SOLUTION INTRAVENOUS CONTINUOUS
Status: CANCELLED | OUTPATIENT
Start: 2023-06-05 | End: 2023-07-05

## 2023-06-05 RX ORDER — MIDAZOLAM HYDROCHLORIDE 1 MG/ML
4 INJECTION INTRAMUSCULAR; INTRAVENOUS ONCE
Status: COMPLETED | OUTPATIENT
Start: 2023-06-05 | End: 2023-06-05

## 2023-06-05 RX ORDER — MIDAZOLAM HYDROCHLORIDE 5 MG/ML
INJECTION INTRAMUSCULAR; INTRAVENOUS
Status: DISCONTINUED | OUTPATIENT
Start: 2023-06-05 | End: 2023-06-05

## 2023-06-05 RX ORDER — MIDAZOLAM HYDROCHLORIDE 1 MG/ML
2 INJECTION INTRAMUSCULAR; INTRAVENOUS ONCE AS NEEDED
Status: CANCELLED | OUTPATIENT
Start: 2023-06-05 | End: 2034-11-01

## 2023-06-05 RX ORDER — FAMOTIDINE 10 MG/ML
20 INJECTION INTRAVENOUS ONCE
Status: CANCELLED | OUTPATIENT
Start: 2023-06-05 | End: 2023-06-05

## 2023-06-05 RX ORDER — FENTANYL CITRATE 50 UG/ML
INJECTION, SOLUTION INTRAMUSCULAR; INTRAVENOUS
Status: DISCONTINUED | OUTPATIENT
Start: 2023-06-05 | End: 2023-06-05

## 2023-06-05 RX ORDER — SODIUM CHLORIDE, SODIUM LACTATE, POTASSIUM CHLORIDE, CALCIUM CHLORIDE 600; 310; 30; 20 MG/100ML; MG/100ML; MG/100ML; MG/100ML
INJECTION, SOLUTION INTRAVENOUS CONTINUOUS
Status: DISCONTINUED | OUTPATIENT
Start: 2023-06-05 | End: 2023-06-20

## 2023-06-05 RX ORDER — HYDROMORPHONE HYDROCHLORIDE 2 MG/ML
0.2 INJECTION, SOLUTION INTRAMUSCULAR; INTRAVENOUS; SUBCUTANEOUS EVERY 5 MIN PRN
Status: CANCELLED | OUTPATIENT
Start: 2023-06-05

## 2023-06-05 RX ORDER — LORAZEPAM 2 MG/ML
0.25 INJECTION INTRAMUSCULAR ONCE AS NEEDED
Status: CANCELLED | OUTPATIENT
Start: 2023-06-05 | End: 2034-11-01

## 2023-06-05 RX ORDER — METOCLOPRAMIDE HYDROCHLORIDE 5 MG/ML
10 INJECTION INTRAMUSCULAR; INTRAVENOUS EVERY 10 MIN PRN
Status: CANCELLED | OUTPATIENT
Start: 2023-06-05

## 2023-06-05 RX ORDER — LIDOCAINE HYDROCHLORIDE 10 MG/ML
1 INJECTION, SOLUTION EPIDURAL; INFILTRATION; INTRACAUDAL; PERINEURAL ONCE
Status: CANCELLED | OUTPATIENT
Start: 2023-06-05 | End: 2023-06-05

## 2023-06-05 RX ORDER — ONDANSETRON 2 MG/ML
4 INJECTION INTRAMUSCULAR; INTRAVENOUS ONCE
Status: CANCELLED | OUTPATIENT
Start: 2023-06-05 | End: 2023-06-05

## 2023-06-05 RX ORDER — MEPERIDINE HYDROCHLORIDE 25 MG/ML
12.5 INJECTION INTRAMUSCULAR; INTRAVENOUS; SUBCUTANEOUS ONCE
Status: CANCELLED | OUTPATIENT
Start: 2023-06-05 | End: 2023-06-05

## 2023-06-05 RX ORDER — GABAPENTIN 300 MG/1
300 CAPSULE ORAL
Status: CANCELLED | OUTPATIENT
Start: 2023-06-05 | End: 2023-06-05

## 2023-06-05 RX ORDER — PROPOFOL 10 MG/ML
100 INJECTION, EMULSION INTRAVENOUS ONCE
Status: COMPLETED | OUTPATIENT
Start: 2023-06-05 | End: 2023-06-05

## 2023-06-05 RX ORDER — ONDANSETRON 2 MG/ML
4 INJECTION INTRAMUSCULAR; INTRAVENOUS DAILY PRN
Status: CANCELLED | OUTPATIENT
Start: 2023-06-05

## 2023-06-05 RX ORDER — CEFAZOLIN SODIUM 2 G/50ML
2 SOLUTION INTRAVENOUS
Status: COMPLETED | OUTPATIENT
Start: 2023-06-05 | End: 2023-06-06

## 2023-06-05 RX ORDER — HYDROCODONE BITARTRATE AND ACETAMINOPHEN 5; 325 MG/1; MG/1
1 TABLET ORAL
Status: CANCELLED | OUTPATIENT
Start: 2023-06-05

## 2023-06-05 RX ORDER — ONDANSETRON 4 MG/1
4 TABLET, ORALLY DISINTEGRATING ORAL ONCE
Status: CANCELLED | OUTPATIENT
Start: 2023-06-05 | End: 2023-06-05

## 2023-06-05 RX ORDER — ACETAMINOPHEN 650 MG/20.3ML
650 LIQUID ORAL EVERY 4 HOURS PRN
Status: DISCONTINUED | OUTPATIENT
Start: 2023-06-05 | End: 2023-06-12

## 2023-06-05 RX ORDER — ALBUMIN HUMAN 50 G/1000ML
50 SOLUTION INTRAVENOUS ONCE
Status: COMPLETED | OUTPATIENT
Start: 2023-06-05 | End: 2023-06-05

## 2023-06-05 RX ORDER — SODIUM CHLORIDE 9 MG/ML
INJECTION, SOLUTION INTRAVENOUS CONTINUOUS
Status: CANCELLED | OUTPATIENT
Start: 2023-06-05

## 2023-06-05 RX ORDER — HYDROMORPHONE HYDROCHLORIDE 2 MG/ML
INJECTION, SOLUTION INTRAMUSCULAR; INTRAVENOUS; SUBCUTANEOUS
Status: DISCONTINUED | OUTPATIENT
Start: 2023-06-05 | End: 2023-06-05

## 2023-06-05 RX ORDER — ACETAMINOPHEN 500 MG
1000 TABLET ORAL
Status: CANCELLED | OUTPATIENT
Start: 2023-06-05 | End: 2023-06-05

## 2023-06-05 RX ORDER — ROCURONIUM BROMIDE 10 MG/ML
INJECTION, SOLUTION INTRAVENOUS
Status: DISCONTINUED | OUTPATIENT
Start: 2023-06-05 | End: 2023-06-05

## 2023-06-05 RX ORDER — ROCURONIUM BROMIDE 10 MG/ML
50 INJECTION, SOLUTION INTRAVENOUS ONCE
Status: COMPLETED | OUTPATIENT
Start: 2023-06-05 | End: 2023-06-05

## 2023-06-05 RX ORDER — HYDROCODONE BITARTRATE AND ACETAMINOPHEN 500; 5 MG/1; MG/1
TABLET ORAL
Status: DISCONTINUED | OUTPATIENT
Start: 2023-06-05 | End: 2023-06-07

## 2023-06-05 RX ORDER — ACETAMINOPHEN 500 MG
1000 TABLET ORAL ONCE
Status: CANCELLED | OUTPATIENT
Start: 2023-06-05 | End: 2023-06-05

## 2023-06-05 RX ORDER — METOCLOPRAMIDE HYDROCHLORIDE 5 MG/ML
10 INJECTION INTRAMUSCULAR; INTRAVENOUS ONCE
Status: CANCELLED | OUTPATIENT
Start: 2023-06-05 | End: 2023-06-05

## 2023-06-05 RX ADMIN — SODIUM CHLORIDE, SODIUM GLUCONATE, SODIUM ACETATE, POTASSIUM CHLORIDE AND MAGNESIUM CHLORIDE: 526; 502; 368; 37; 30 INJECTION, SOLUTION INTRAVENOUS at 11:06

## 2023-06-05 RX ADMIN — SODIUM CHLORIDE, POTASSIUM CHLORIDE, SODIUM LACTATE AND CALCIUM CHLORIDE 1000 ML: 600; 310; 30; 20 INJECTION, SOLUTION INTRAVENOUS at 12:06

## 2023-06-05 RX ADMIN — CEFAZOLIN SODIUM 2 G: 2 SOLUTION INTRAVENOUS at 08:06

## 2023-06-05 RX ADMIN — HYDROMORPHONE HYDROCHLORIDE 1 MG: 2 INJECTION, SOLUTION INTRAMUSCULAR; INTRAVENOUS; SUBCUTANEOUS at 01:06

## 2023-06-05 RX ADMIN — MIDAZOLAM HYDROCHLORIDE 5 MG: 5 INJECTION, SOLUTION INTRAMUSCULAR; INTRAVENOUS at 11:06

## 2023-06-05 RX ADMIN — Medication 1 TABLET: at 08:06

## 2023-06-05 RX ADMIN — FAMOTIDINE 20 MG: 10 INJECTION, SOLUTION INTRAVENOUS at 09:06

## 2023-06-05 RX ADMIN — PROPOFOL 40 MCG/KG/MIN: 10 INJECTION, EMULSION INTRAVENOUS at 01:06

## 2023-06-05 RX ADMIN — ROCURONIUM BROMIDE 100 MG: 10 SOLUTION INTRAVENOUS at 11:06

## 2023-06-05 RX ADMIN — MUPIROCIN: 20 OINTMENT TOPICAL at 08:06

## 2023-06-05 RX ADMIN — SODIUM CHLORIDE, POTASSIUM CHLORIDE, SODIUM LACTATE AND CALCIUM CHLORIDE: 600; 310; 30; 20 INJECTION, SOLUTION INTRAVENOUS at 12:06

## 2023-06-05 RX ADMIN — PROPOFOL 45 MCG/KG/MIN: 10 INJECTION, EMULSION INTRAVENOUS at 09:06

## 2023-06-05 RX ADMIN — ACETAMINOPHEN 650 MG: 650 SOLUTION ORAL at 12:06

## 2023-06-05 RX ADMIN — SODIUM CHLORIDE, POTASSIUM CHLORIDE, SODIUM LACTATE AND CALCIUM CHLORIDE 1000 ML: 600; 310; 30; 20 INJECTION, SOLUTION INTRAVENOUS at 09:06

## 2023-06-05 RX ADMIN — ROCURONIUM BROMIDE 50 MG: 10 INJECTION, SOLUTION INTRAVENOUS at 04:06

## 2023-06-05 RX ADMIN — SODIUM CHLORIDE, SODIUM GLUCONATE, SODIUM ACETATE, POTASSIUM CHLORIDE AND MAGNESIUM CHLORIDE: 526; 502; 368; 37; 30 INJECTION, SOLUTION INTRAVENOUS at 01:06

## 2023-06-05 RX ADMIN — SODIUM CHLORIDE, POTASSIUM CHLORIDE, SODIUM LACTATE AND CALCIUM CHLORIDE: 600; 310; 30; 20 INJECTION, SOLUTION INTRAVENOUS at 04:06

## 2023-06-05 RX ADMIN — IPRATROPIUM BROMIDE AND ALBUTEROL SULFATE 3 ML: 2.5; .5 SOLUTION RESPIRATORY (INHALATION) at 07:06

## 2023-06-05 RX ADMIN — MUPIROCIN: 20 OINTMENT TOPICAL at 09:06

## 2023-06-05 RX ADMIN — FENTANYL CITRATE 100 MCG: 50 INJECTION, SOLUTION INTRAMUSCULAR; INTRAVENOUS at 11:06

## 2023-06-05 RX ADMIN — Medication 100 MCG/HR: at 09:06

## 2023-06-05 RX ADMIN — HYDROMORPHONE HYDROCHLORIDE 1 MG: 2 INJECTION, SOLUTION INTRAMUSCULAR; INTRAVENOUS; SUBCUTANEOUS at 12:06

## 2023-06-05 RX ADMIN — IPRATROPIUM BROMIDE AND ALBUTEROL SULFATE 3 ML: 2.5; .5 SOLUTION RESPIRATORY (INHALATION) at 08:06

## 2023-06-05 RX ADMIN — CEFAZOLIN SODIUM 2 G: 2 SOLUTION INTRAVENOUS at 11:06

## 2023-06-05 RX ADMIN — FAMOTIDINE 20 MG: 10 INJECTION, SOLUTION INTRAVENOUS at 08:06

## 2023-06-05 RX ADMIN — SODIUM CHLORIDE, POTASSIUM CHLORIDE, SODIUM LACTATE AND CALCIUM CHLORIDE 1000 ML: 600; 310; 30; 20 INJECTION, SOLUTION INTRAVENOUS at 07:06

## 2023-06-05 RX ADMIN — ROCURONIUM BROMIDE 50 MG: 10 SOLUTION INTRAVENOUS at 01:06

## 2023-06-05 RX ADMIN — PROPOFOL 50 MCG/KG/MIN: 10 INJECTION, EMULSION INTRAVENOUS at 09:06

## 2023-06-05 RX ADMIN — MIDAZOLAM 4 MG: 1 INJECTION INTRAMUSCULAR; INTRAVENOUS at 04:06

## 2023-06-05 RX ADMIN — PROPOFOL 40 MCG/KG/MIN: 10 INJECTION, EMULSION INTRAVENOUS at 07:06

## 2023-06-05 RX ADMIN — IPRATROPIUM BROMIDE AND ALBUTEROL SULFATE 3 ML: 2.5; .5 SOLUTION RESPIRATORY (INHALATION) at 12:06

## 2023-06-05 RX ADMIN — ALBUMIN (HUMAN) 50 G: 12.5 SOLUTION INTRAVENOUS at 07:06

## 2023-06-05 RX ADMIN — ACETAMINOPHEN 650 MG: 650 SOLUTION ORAL at 04:06

## 2023-06-05 RX ADMIN — PROPOFOL 45 MCG/KG/MIN: 10 INJECTION, EMULSION INTRAVENOUS at 04:06

## 2023-06-05 RX ADMIN — PROPOFOL 100 MG: 10 INJECTION, EMULSION INTRAVENOUS at 04:06

## 2023-06-05 NOTE — OP NOTE
OPERATIVE REPORT      Patient: Gera Chavez   : 1985    MRN: [id number]  Date: 2023      Surgeon:Tuan Zepeda DO  Assistant: Tono Mars was essential, part of the procedure including deep hardware placement and deep closure.  No senior assistant was availible  Preoperative Diagnosis:  Bilateral Pelvic ring fracture, left anterior column fracture, left posterior column fracture, left type 2 sacral body fracture hemipelvis dislocation  Postoperative Diagnosis: Same  Procedure:    left anterior column open reduction internal fixation 84151  Left Posterior pelvis percutaneous pinning CPT 90186  anterior pelvis open reduction internal fixation CPT 86729  Traction pin placement left distal femur   Anesthesiologist: Cristo Mills DO  OR Staff: Cell Saver : Jerman Ferreira  Circulator: Lilli Sandoval RN  Radiology Technologist: RT Eugenio  Scrub Person: Carol Castro  Implants: Osteocentric Post pelvis  Implant Name Type Inv. Item Serial No.  Lot No. LRB No. Used Action   STAPLE BONE [id number] EASYCLIP - [id number]  STAPLE BONE [id number] EASYCLIP  [employer] ESSIE. [id number] Left 1 Implanted   GRAFT BONE SUB VITOSS BBT - [id number]  GRAFT BONE SUB VITOSS BBT  [employer] ESSIE. [id number] Left 1 Implanted   MPS CURVED R108 PLATES     JAGDEEP  Left 1 Implanted   SCREW NEO SELF-TAP 3.5X20MM S - [id number]  SCREW NEO SELF-TAP 3.5X20MM S  [employer] ESSIE.  Left 2 Explanted   SCREW NEO SELF-TAP 3.5X22MM S - [id number]  SCREW NEO SELF-TAP 3.5X22MM S  [employer] ESSIE.  Left 1 Implanted   SCREW BONE 3.5X24MM SS - [id number]  SCREW BONE 3.5X24MM SS  [employer] ESSIE.  Left 1 Implanted   SCREW BONE 3.5X38MM SS - [id number]  SCREW BONE 3.5X38MM SS  [employer]SS[employer].  Left 1 Implanted   SCREW NEO SELF TAP 3.5X60MM - [id number]  SCREW NEO SELF TAP 3.5X60MM  [employer].  Left 1 Implanted   SCREW NEO SELF TAP 3.5X60MM - [id number]  SCREW NEO SELF TAP  3.5X60MM  [employer] ESSIE.  Left 2 Explanted   [id number] CORTICAL SCREW, SELF TAPPING    JAGDEEP  Left 1 Implanted   SCREW NEO SELF-TAP 3.5X30MM S - [id number]  SCREW NEO SELF-TAP 3.5X30MM S  [employer].  Left 1 Implanted   2.7MM GUIDEWIRE     [employer]  Left 3 Implanted and Explanted   85MM FULLY THREAD SCREW     [employer]  Left 1 Explanted   100MM FULL THREAD SCREW     [employer]  Left 1 Implanted   155MM FULL THREAD SCREW     [employer]  Left 1 Implanted   13MM WASHER     [employer]  Left 2 Implanted   3.5MM SCREW SIZE 60    [employer]  Left 1 Implanted     EBL:1000cc/ 300back  Complications: None  Disposition: To PACU, stable    Indications: Gera Chavez is a 37 y.o. male presenting with pelvic ring fractures meets surgical indication for stabilization. The procedure is indicated to earlier weight-bearing, stabilization of the pelvic ring helps control hemostasis.  The patient is intubated and sedated. After thorough discussion of the risks, benefits, expected outcomes, and alternatives to surgical intervention, the patient's family agreed to proceed with surgical treatment.  Specific risks discussed included, but were not limited to: superficial or deep infection, wound healing complications, DVT/PE, chronic pain, may need removal of hardware, may need fusion, significant bleeding requiring transfusion, damage to named anatomic structures in the immediate area including named neurovascular structures, infection, nonunion, malunion and general risks of anesthesia.  The patient voiced understanding and written as well as verbal consent was obtained by myself prior to the procedure.    Please refer to my consult note for full risks and benefits.  Patient has severe left hemipelvis fracture dislocation with the anterior column fracture posterior column fracture and APC pubic symphysis dislocation.  Patient  has significant injury to the bony structure of his pelvis which puts him at risk for long-term side effects including nonunion urogenital side effects painful ambulation, chronic instability among other risks stated in the consult note.  I discussed the severity of the injuries with his family they understand.  He will have a long prolonged time nonweightbearing.    Procedure Note:  The patient was brought back to the OR and placed supine on the OR table. After successful induction of anesthesia by anesthesia staff, the patient was positioned in the supine position and all bony prominences were padded appropriately.  The surgical field was then provisionally cleansed and then prepped and draped in the usual sterile fashion.    At this time a time-out was performed, with the correct patient, site, and procedure identified.  The universal time out as well as sign your site protocols were followed.  Preoperative antibiotics were verified as administered.       We started the procedure with traction on the left hemipelvis this was done with the distal femur 2-0 traction pin under intraop fluoroscopy placed 20 lb in-line traction to reduce the hemipelvis.    Attention is now drawn to the anterior aspect of the pelvic ring patient has pubic symphysis dislocation anterior column acetabular fracture.  We will week completed a Pfannenstiel approach to the pubic symphysis we then incised the linea alba while protecting the bladder and and viscera.  We then dissected down to the pubic symphysis and the corona mortis which was large and pulsating was then appropriate ligated.  Patient's fracture came into view has a segmental pelvic ring with the pubic symphysis dislocation and anterior column fracture.  I begun to clean the pubic symphysis followed by anatomic reduction with a staple I then reduced the anterior column fracture at this time and fixated this temporarily.  I then placed a plate across the pubic symphysis  completing our pelvic ring fixation and another plate across the anterior column fracture to fixate the anterior column.  Intraoperative fluoroscopy confirmed reduction and fixation.    I then turned my attention to the posterior pelvic ring shows a large amount of comminution.  It did reduced with the fixation of the anterior pelvic ring.  Following this we then placed 2 screws through the SI joint to provide stability.  OR confirmed position.  Satisfied length alignment rotation of the pelvic ring we will stage the posterior column at later date.    Patient received at least 1 unit of RBCs intraoperatively following 300 cc of cell Saver.      Final images were then taken satisfied length alignment rotation of the films we then proceeded with closure    The incision(s) was/were then irrigated using copious sterile saline and then vancomyocin was added to the wound bed for prophylaxis. The surgical wound was closed in layered fashion.  The surgical site(s) was/were were sterilely cleansed and dressed.    The patient was then subsequently transferred to to PACU in a stable condition.    All sponge and needle counts were correct at the end of the case.  I was present and participated in all aspects of the procedure.    Prognosis:  The patient will be kept NWB on the ipsilateral extremity for approximately 10 weeks .  Patient will receive DVT prophylaxis. We will stage posterior column fixation lateral after neuroSx .      This note/OR report was created with the assistance of  voice recognition software or phone  dictation.  There may be transcription errors as a result of using this technology however minimal. Effort has been made to assure accuracy of transcription but any obvious errors or omissions should be clarified with the author of the document.       Tuan Zepeda, DO  Orthopedic Trauma Surgery

## 2023-06-05 NOTE — TRANSFER OF CARE
"Anesthesia Transfer of Care Note    Patient: Gera Chavez    Procedure(s) Performed: Procedure(s) (LRB):  ORIF,PELVIS (Left)    Patient location: ICU    Anesthesia Type: general    Transport from OR: Transported from OR intubated on 100% O2 by AMBU with adequate controlled ventilation. Continuous ECG monitoring in transport. Continuous SpO2 monitoring in transport. Continuos invasive BP monitoring in transport    Post pain: adequate analgesia    Post assessment: no apparent anesthetic complications    Post vital signs: stable    Level of consciousness: sedated    Nausea/Vomiting: no nausea/vomiting    Complications: none    Transfer of care protocol was followed      Last vitals:   Visit Vitals  /64   Pulse (!) 112   Temp 37.7 °C (99.8 °F) (Oral)   Resp (!) 25   Ht 5' 10.98" (1.803 m)   Wt 121 kg (266 lb 12.1 oz)   SpO2 100%   BMI 37.22 kg/m²     "
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
## 2023-06-05 NOTE — PROGRESS NOTES
Inpatient Nutrition Assessment    Admit Date: 6/4/2023   Total duration of encounter: 1 day     Nutrition Recommendation/Prescription     Start tube feeding when appropriate.  Tube feeding recommendation:   Peptamen Intense VHP goal rate 50 ml/hr to provide  1000 kcal/d (59% est needs, 115% with meds)  93 g protein/d (59% est needs)  840 ml free water/d  (calculations based on estimated 20 hr/d run time)     Communication of Recommendations: reviewed with nurse    Nutrition Assessment     Malnutrition Assessment/Nutrition-Focused Physical Exam    Malnutrition Context: other (see comments) (does not meet criteria)     Energy Intake (Malnutrition): other (see comments) (unable to obtain)  Weight Loss (Malnutrition): other (see comments) (unable to obtain)  Subcutaneous Fat (Malnutrition): other (see comments) (does not meet criteria)           Muscle Mass (Malnutrition): other (see comments) (does not meet criteria)                          Fluid Accumulation (Malnutrition): other (see comments) (does not meet criteria)        A minimum of two characteristics is recommended for diagnosis of either severe or non-severe malnutrition.    Chart Review    Reason Seen: continuous nutrition monitoring    Malnutrition Screening Tool Results   Have you recently lost weight without trying?: No  Have you been eating poorly because of a decreased appetite?: No   MST Score: 0     Diagnosis:  L pelvic rim fx w/ open book pelvis  L pelvic wall hematoma  L2 burst fx  L apical PTX  L 4-11 rib fx  pulm contusion  (Fall from bridge)    Relevant Medical History: none noted    Nutrition-Related Medications: LR @ 125ml/hr, diprivan  Calorie Containing IV Medications: Diprivan @ 36 ml/hr (provides 950 kcal/d)    Nutrition-Related Labs:  6/5 K 5.3, Cl 115, .9, PAB 24.7    Diet/PN Order: Diet clear liquid  Diet NPO Except for: Medication  Oral Supplement Order: none  Tube Feeding Order: none  Appetite/Oral Intake: not applicable/not  "applicable  Factors Affecting Nutritional Intake: on mechanical ventilation  Food/Spiritism/Cultural Preferences: unable to obtain  Food Allergies: none reported       Wound(s):   incision noted    Comments    23: Discussed with RN. Will provide tube feeding recommendations for when appropriate to start tube feeding. Receiving kcal from meds.      Anthropometrics    Height: 5' 10.98" (180.3 cm)    Last Weight: 121 kg (266 lb 12.1 oz) (23 1555)    BMI (Calculated): 37.2  BMI Classification: obese grade II (BMI 35-39.9)        Ideal Body Weight (IBW), Male: 171.88 lb     % Ideal Body Weight, Male (lb): 155.09 %                          Usual Weight Provided By: unable to obtain usual weight    Wt Readings from Last 5 Encounters:   23 121 kg (266 lb 12.1 oz)   23 90.7 kg (199 lb 15.3 oz)   23 90.7 kg (200 lb)   23 90.7 kg (200 lb)   23 100 kg (220 lb 7.4 oz)     Weight Change(s) Since Admission:  Admit Weight: 121 kg (266 lb 12.1 oz) (23 1555)    Estimated Needs    Weight Used For Calorie Calculations: 121 kg (266 lb 12.1 oz)  Energy Calorie Requirements (kcal): 1331-1694kcal (11-14kcal/kg)  Energy Need Method: Kcal/kg  Weight Used For Protein Calculations: 78.2 kg (172 lb 5.7 oz) (IBW)  Protein Requirements: 157gm  Fluid Requirements (mL): 1694ml (1ml/kcal)  Temp (24hrs), Av.7 °F (37.6 °C), Min:98.8 °F (37.1 °C), Max:101.6 °F (38.7 °C)         Enteral Nutrition    Patient not receiving enteral nutrition at this time.    Parenteral Nutrition    Patient not receiving parenteral nutrition support at this time.    Evaluation of Received Nutrient Intake    Calories: not meeting estimated needs  Protein: not meeting estimated needs    Patient Education    Not applicable.    Nutrition Diagnosis     PES: Inadequate oral intake related to acute illness as evidenced by intubation since admit. (new)    Interventions/Goals     Intervention(s): modified composition of enteral " nutrition, modified rate of enteral nutrition, and collaboration with other providers  Goal: Meet greater than 75% of nutritional needs by follow-up. (new)    Monitoring & Evaluation     Dietitian will monitor energy intake.  Nutrition Risk/Follow-Up: high (follow-up in 1-4 days)   Please consult if re-assessment needed sooner.

## 2023-06-05 NOTE — NURSING
Nurses Note -- 4 Eyes      6/4/2023   7:22 PM      Skin assessed during: Admit      [] No Altered Skin Integrity Present    []Prevention Measures Documented      [] Yes- Altered Skin Integrity Present or Discovered   [] LDA Added if Not in Epic (Describe Wound)   [] New Altered Skin Integrity was Present on Admit and Documented in LDA   [] Wound Image Taken    Wound Care Consulted? No    Attending Nurse:  Tuan Neil RN     Second RN/Staff Member:  Jeannette Calero RN    Unable to visualize Sacral area due to pelvic binder in place.

## 2023-06-05 NOTE — DISCHARGE INSTRUCTIONS
POSTERIOR THORACO-LUMBAR/ LUMBAR FUSION  DISCHARGE INSTRUCTIONS      1.  Wear your TLSO Brace when sitting upright and when you are out of bed.    Your brace will likely be discontinued after 3 months.      2.   Keep your incision clean and dry.    Your sutures or staples will be removed at your first postoperative follow-up appointment in approximately 14 days.   Place clean gauze and tape over your wound daily until your sutures or staples are removed.  Wait at least 72 hours from the time of your surgery to take a shower.  After showering, pat your incision dry and replace the wound dressing.  Do not immerse your incision in water for 4-6 weeks (e.g. bath tub, hot tub, swimming pool).      3.  Activity restrictions:  No lifting greater than 15 pounds for 3 months.  No bending, stooping, or twisting.  Avoid sitting in one position for over 30 minutes at a time.  No impact exercise or contact sports for at least 3 months.  No driving for at least 2 weeks.  To resume driving short distances (<30 minutes), you must be off of your narcotic medications and be able to comfortably brake suddenly, should the need arise.    Get up and walk.      4.  Contact your Neurosurgeon if the following occurs:  Signs and symptoms of infection, including fever above 101.5 degrees Fahrenheit and/or chills.  Redness, swelling, warmth, or drainage from the incision.  Any lasting changes in sensation, numbness, and/or tingling.  Increased weakness or increased pain.  Swelling of the foot and/or lower leg with calf pain.    Neurosurgery has office hours Monday through Friday, 8:00 AM to 4:00 PM except for holidays. There is an answering service available during non-office hours, with 24 hours neurosurgery coverage.  Report to the Emergency Department if you need immediate medical assistance.      Because you have had a fusion, you are not to take steroidal medications or non-steroidal anti-inflammatory (NSAID) medications such as Motrin/  Ibuprofen or Naprosyn/ Naproxen unless agreed upon with your neurosurgeon.      Please contact Dr. Matos's office for any questions or concerns.  Typically, your first follow-up appointment after a posterior thoraco-lumbar/ lumbar fusion surgery is approximately 14 days from the date of your operation.  At this time, sutures or staples will be removed.  For your second postoperative appointment in 4-6 weeks, an x-ray of your lumbar spine will be arranged in Radiology before your neurosurgery appointment.

## 2023-06-05 NOTE — PROGRESS NOTES
[facility] ICU PROGRESS NOTE    HD# 1  Admission Summary:   In brief, Gera Chavez is a 37 y.o. male admitted on 6/4/2023 following fall from bridge at 25 ft.  He reportedly jumped off a bridge and landed on dry land.  He arrived intubated with a left-sided chest tube.  He was found to have a pelvic ring fracture including open book fracture, L2 burst fracture, left pneumothorax, multiple left-sided rib fractures.    Interval history:    No acute events overnight  Intubated and sedated in the ICU   Chest x-ray with right upper lobe opacities, we will perform bronchoscopy today    Consults:   Neurosurgery  Orthopedic surgery Injuries:   L pelvic rim fx w/ open book pelvis  L pelvic wall hematoma  L2 burst fx  L apical PTX  L 4-11 rib fx  pulm contusion.  Operations/Procedures:  Left chest tube placement at outside facility 6/4     Past medical history:  1. unknown    Medications: [x] Medications reviewed/updated   Home Meds:    Current Outpatient Medications   Medication Instructions    albuterol (PROVENTIL/VENTOLIN HFA) 90 mcg/actuation inhaler 2 puffs, Inhalation, Every 4 hours PRN    ALBUTEROL INHL 90 mcg, Inhalation, Every 4 hours PRN    amLODIPine (NORVASC) 5 mg, Oral, Daily    amoxicillin-clavulanate 875-125mg (AUGMENTIN) 875-125 mg per tablet 1 tablet, Oral, Every 12 hours (non-standard times)    atorvastatin (LIPITOR) 20 mg, Oral, Daily    azithromycin (Z-CELESTINA) 500 mg, Oral, Daily    clonazePAM (KLONOPIN) 1 mg, Oral, 2 times daily PRN    dextroamphetamine-amphetamine (ADDERALL XR) 25 MG 24 hr capsule 25 mg, Oral, 2 times daily    FLUoxetine 20 MG capsule fluoxetine 20 mg capsule    FLUoxetine 40 mg, Oral, Daily    insulin glargine 100 units/mL SubQ pen Lantus Solostar U-100 Insulin 100 unit/mL (3 mL) subcutaneous pen    ipratropium (ATROVENT) 42 mcg (0.06 %) nasal spray Each Nostril    lamoTRIgine (LAMICTAL) 25 MG tablet lamotrigine 25 mg tablet    LANTUS SOLOSTAR U-100 INSULIN glargine 100 units/mL SubQ pen  "Subcutaneous    latanoprost 0.005 % ophthalmic solution latanoprost 0.005 % eye drops    lisinopriL (PRINIVIL,ZESTRIL) 40 mg, Oral    mirtazapine (REMERON) 7.5 MG Tab mirtazapine 7.5 mg tablet    potassium chloride (K-TAB) 20 mEq potassium chloride ER 20 mEq tablet,extended release   TAKE 1 TABLET BY MOUTH EVERY DAY    prochlorperazine (COMPAZINE) 10 MG tablet prochlorperazine maleate 10 mg tablet    QUEtiapine (SEROQUEL) 200 mg, Oral, Nightly    testosterone cypionate (DEPOTESTOTERONE CYPIONATE) 200 mg/mL injection SMARTSIG:Milliliter(s) IM      Scheduled Meds:    albuterol-ipratropium  3 mL Nebulization Q6H    calcium-vitamin D3  1 tablet Oral BID    famotidine (PF)  20 mg Intravenous BID    mupirocin   Nasal BID     Continuous Infusions:    fentanyl 100 mcg/hr (23 1700)    lactated ringers 125 mL/hr at 23 0036    propofoL 40 mcg/kg/min (23 0740)     PRN Meds: acetaminophen     Vitals:  VITAL SIGNS: 24 HR MIN & MAX LAST   Temp  Min: 98.8 °F (37.1 °C)  Max: 101.6 °F (38.7 °C)  (!) 100.5 °F (38.1 °C)   BP  Min: 71/44  Max: 162/96  (!) 84/48    Pulse  Min: 101  Max: 125  (!) 120    Resp  Min: 0  Max: 30  (!) 26    SpO2  Min: 88 %  Max: 100 %  96 %      HT: 5' 11" (180.3 cm)  WT: 121 kg (266 lb 12.1 oz)  BMI: 37.2   Ideal Body Weight (IBW), Male: 172 lb  % Ideal Body Weight, Male (lb): 155.09 %        General  Exam: NAD, sedated and intubated     Neuro/Psych  GCS: 10T  Exam: moves all  ICP monitor: No  ICP treatment: ICP Treatment: N/A  C-Collar: Yes    Plan:   Q1 hour neurochecks  OR  with neurosurgery for T11 to L4 posterior lateral fusion with L1-2 laminectomy.  Aspen TLSO brace     HEENT  Exam: NC    Plan:   none     Pulmonary  Vitals: Resp  Av.3  Min: 0  Max: 30  SpO2  Av.9 %  Min: 88 %  Max: 100 %    Ventilator/Oxygen Settings:   Vent Mode: SIMV  Vt Set: 500 mL  Set Rate: (S) 28 BPM (per Dr. Sauer)  Pressure Support: (S) 12 cmH20 (increased during bronch)  I:E Ratio Measured: " 1:2.5     PaO2/FiO2 ratio (if ventilated):   RSBI RR/TV (if ventilated):   ABG: No results for input(s): PH, PO2, PCO2, HCO3, BE in the last 168 hours.       CXR:    X-Ray Chest 1 View    Result Date: 2023  As above.  No adverse interval change. Electronically signed by: Nikhil Auguste Date:    2023 Time:    06:49    X-Ray Chest 1 View    Result Date: 2023  1. Endotracheal tube tip near the origin of the right mainstem bronchus and can be retracted slightly.  Additional tubing overlying the thoracic inlet may be an enteric tube. 2. Right upper lung consolidation.  Patchy opacities in the left lower lung may be atelectasis or contusion. Electronically signed by: Harjinder Strong Date:    2023 Time:    16:18        Rib fractures: yes  Chest Tube: Left     Exam: mechanically ventilated  Incentive Spirometry/RT Treatments: RT    Plan:     Bronch today  OSH records not uploaded to EMR, need to further evaluate rib fractures and complete trauma work up, will order CT chest with 3D recon  Daily chest x-ray and ABGs while intubated     Cardiovascular  Vitals: Pulse  Av.1  Min: 101  Max: 125  BP  Min: 71/44  Max: 162/96  Vasoactive Agents: None  Exam: tachy    Plan:   Continuous cardiac monitoring while in the ICU  We will order echocardiogram, brain natriuretic peptide     Renal  Recent Labs     23  1556 23  0429   BUN 12.8 18.1   CREATININE 1.07 1.09       Recent Labs     23  1556 23  1657 23  2311 23  0429   LACTIC 3.9* 4.1* 3.7* 3.9*       Intake/Output - Last 3 Shifts         / 0700  / 0659 / 0700  / 0659 / 0700   0659    I.V. (mL/kg)  1091 (9)     Total Intake(mL/kg)  1091 (9)     Urine (mL/kg/hr)  1070     Chest Tube  440     Total Output  1510     Net  -419                     Intake/Output Summary (Last 24 hours) at 2023 0852  Last data filed at 2023 0452  Gross per 24 hour   Intake 1091 ml   Output 1510 ml   Net -419 ml          Titus: Yes     Plan:   I's and O's  Trend lactic acid     FEN/GI  Recent Labs     23  1556 23  0429    145   K 5.6* 5.3*   CO2 20* 21*   CALCIUM 8.3* 7.9*   MG 1.80 1.70   PHOS 5.0* 4.6   ALBUMIN 3.7 3.1*   BILITOT 1.2 0.9   AST 76* 73*   ALKPHOS 134 100   ALT 54 45       Diet: NPO    Last BM:  Prior to arrival    Abdominal Exam:  Soft, nondistended  Abdominal Dressing: None    Plan:   Replace electrolytes based on daily labs     Heme/Onc  Recent Labs     23  1556 23  0429   HGB 13.3* 11.2*   HCT 40.0* 33.1*    138   PTT 26.8  --    INR 1.03  --        Transfusions (over past 24h):  2 units of PRBC    Plan:   Post op labs  H&H stable   Transfuse greater than 7 hemoglobin or symptomatic     ID  Temp  Av.7 °F (37.6 °C)  Min: 98.8 °F (37.1 °C)  Max: 101.6 °F (38.7 °C)      Recent Labs     23  1556 23  0429   WBC 28.67* 13.34*       Cultures:  None new  Antibiotics:   Ancef 6/5    Plan:   WBC downtrending  CTM fever curve and WBC     Endocrine  Recent Labs     23  1556 23  0429   GLUCOSE 185* 91      No results for input(s): POCTGLUCOSE in the last 72 hours.     Insulin treatment: no medications    Plan:   BG <180     Musculoskeletal/Wounds  Weight bearing status:   RUE: WBAT  LUE: WBAT  RLE: NWB  LLE: NWB    [] Tertiary exam performed    Extremity/wound exam:   Plan:   OR with ortho 6/5     Precautions  Fall, Spinal, and Standard     Prophylaxis  Seizure: Not indicated.  DVT: Defer chemoprophylaxis to Neurosurgery   GI: H2B     Lines/drains/airway [x] LDA reviewed/updated   PIV  OGT  ETT  L chest tube  Titus  PIV  CVC    Plan:  Maintain peripheral access  Maintain ETT/OGT  Left chest tube to suction     Restraints  Face to face evaluation of need for restraints on rounds today:   Currently restrained? no     Disposition  Continue ongoing ICU level care.        2023 8:52 AM     The above findings, diagnostics, and treatment plan were discussed  with Dr. Jarret Sauer  who will follow with further assessments and recommendations. Please call with any questions, concerns, or clinical status changes.

## 2023-06-05 NOTE — CONSULTS
"   Trauma Surgery   History and Physical/ Activation Note    Patient Name: Gera Chavez  MRN: [id number]   YOB: 1985  Date: 06/05/2023    LEVEL 1 TRAUMA   Pre hospital report  Mechanism: jump off bridge onto land  Injuries: L ptx open book pelvic fx  Vitals: labile blood pressure 100s to 200, nontachycardic  Treatment: intubation chest tube R subclavian CVC  Subjective:   History of present illness: Patient is an approximately 36 y/o M who reportedly jumped off a bridge this morning witnessed by a bystander who called 911. He has open book pelvic fx, L2 burst fx, L ptx with chest tube placed a OSH. Was intubated to accommodate for air med ride but GCS was 15 at OSH. He was started on vecuronium gtt.    Primary Survey:  A intubated   B B/l equal   C 2+ periph pulses   D GCS unobtainable   E exposed, log-rolled and examined (see below)   F BP: sbp > 200  HR: 120s  RR: wnl  Temp: **     VITAL SIGNS: 24 HR MIN & MAX LAST   Temp  Min: 98.8 °F (37.1 °C)  Max: 101.6 °F (38.7 °C)  (!) 100.5 °F (38.1 °C)   BP  Min: 71/44  Max: 162/96  (!) 84/48    Pulse  Min: 101  Max: 125  (!) 114    Resp  Min: 0  Max: 30  (!) 26    SpO2  Min: 88 %  Max: 100 %  (!) 92 %      HT: 5' 11" (180.3 cm)  WT: 121 kg (266 lb 12.1 oz)  BMI: 37.2     FAST: negative for free fluid    Medications/transfusions received en-route: vecuronium  Medications/transfusions received in trauma bay: fentanyl prop    Scheduled Meds:   albuterol-ipratropium  3 mL Nebulization Q6H    calcium-vitamin D3  1 tablet Oral BID    famotidine (PF)  20 mg Intravenous BID    lactated ringers  1,000 mL Intravenous Once    mupirocin   Nasal BID     Continuous Infusions:   fentanyl 100 mcg/hr (06/04/23 1700)    lactated ringers 125 mL/hr at 06/05/23 0036    propofoL 45 mcg/kg/min (06/05/23 0449)     PRN Meds:acetaminophen    ROS: unable to assess secondary to patients condition     Allergies: unable to obtain secondary to acuity of the patient  PMH: unable to " obtain secondary to acuity of the patient  PSH: unable to obtain secondary to acuity of the patient  Social history: unable to obtain secondary to acuity of the patient  Objective:   Secondary Survey:   General: Well developed, well nourished, no acute distress, AAOx3  Neuro: CNII-XII grossly intact, paralyzed unobtainable GCS  HEENT:  Normocephalic, atraumatic, PERRL, EOMI, ears normal, neck supple, cervical collar in place  CV: tachy 120s hypertensive L subclavian CVC in place  Pulse: 2+ RP b/l, 2+ DP b/l   Resp:  intubated mechanically ventilated breathing comfortably, L chest tube in place minimal output chest stable  GI:  Abdomen soft, non-distended  :  Normal external genitalia, no blood at urethral meatus.   Rectal: Normal tone, no gross blood.  Extremities: not moving extremities, no obvious gross deformities, FROM BUE and BLE, pelvic binder in place  Back/Spine: no palpable step offs or deformities  Skin/wounds:  Warm, well perfused    Labs:  Troponin:  No results for input(s): TROPONINI in the last 72 hours.  CBC:  Recent Labs     06/04/23  1556 06/05/23  0429   WBC 28.67* 13.34*   RBC 4.66* 3.88*   HGB 13.3* 11.2*   HCT 40.0* 33.1*    138   MCV 85.8 85.3   MCH 28.5 28.9   MCHC 33.3 33.8     CMP:  Recent Labs     06/04/23  1556 06/05/23  0429   CALCIUM 8.3* 7.9*   ALBUMIN 3.7 3.1*    145   K 5.6* 5.3*   CO2 20* 21*   BUN 12.8 18.1   CREATININE 1.07 1.09   ALKPHOS 134 100   ALT 54 45   AST 76* 73*   BILITOT 1.2 0.9     Lactic Acid:  No results for input(s): LACTATE in the last 72 hours.  ETOH:  Recent Labs     06/04/23  1556   ETHANOL <10.0      Urine Drug Screen:  Recent Labs     06/04/23  1649   COCAINE Negative   OPIATE Positive*   FENTANYL Positive*   MDMA Negative      ABG:  No results for input(s): PH, PCO2, PO2, HCO3, BE, POCSATURATED in the last 72 hours.  I have reviewed all pertinent lab results within the past 24 hours.    Imaging:  CXR neg  CTH neg  CT Cspine neg  CT Chest L  posterior rib fx small L ptx, L2 burst fx  CT a/p multiple comminuted sacral L pelvic fx, radiolucent foci w/I sacrum, (mets vs trauma), old fx L femur, L2 burst fs, enlarged L psoas muscle 2/2 hematoma,L kidney cyst, L pleural effusion with infiltrative changes, acute L rib fx w/ evidence perofration into pleura, no visible pneumo  I have reviewed all pertinent imaging results/findings within the past 24 hours.    Assessment & Plan:   36 y/o M transferred after jump off bridge with pelvic ring fx including open book fx, L2 burst fx, L pneumothorax s/p chest tube. Intubated prior to air med.    Consults:  NSGY  Orthopedics     Neuro:  - GCS unobtainable  - Multimodal pain control   - C-Collar Yes exchanged in ED  - discontinue paralytic  - propofol and fent gtt  - NSGY for L2 burst fx likely operative intervention this week  - PEC by ED     Pulmonary:  - mechanical ventilation  - daily ABG and CXR  - Chest tube to continuous wall suction  - likely extubate after surgery today  - lidocaine patches for rib fx     Cardiovascular:  - continuous cardiac monitoring     Renal:  - Strict I&Os  - Titus  - need outpt [facility] u/s for L renal cyst     FEN/GI:  - IVF: LR  - Diet: NPO  - Daily CMP  - Replace electrolytes as needed based on daily labs     Heme/ID:  - Daily CBC  - None    Endocrine:  - BG <180    Musculoskeletal:  - PT/OT when able to participate  - WB status:   RUE: WBAT  LUE: WBAT  RLE: NWB  LLE: NWB  - Tertiary when appropriate  - orthopedics consulted for multiple pelvic fractures  - continue pelvic binder for now     Wounds:  - Local wound care     Precautions:  Spinal    Prophylaxis:  GI: H2B  Seizure: Not indicated  DVT: SCDs     LDA:  ETT  OGT  Titus  PIV  Binder  Collar    Disposition:  TICU for q1 nc.    Melissa Titus MD  [employer] [facility]

## 2023-06-05 NOTE — PLAN OF CARE
Spoke to patient Mother (Bernice), she stated that patient sees Dr. Kathi Grove as a PCP in [place], [place].      06/05/23 1054   Discharge Assessment   Assessment Type Discharge Planning Assessment   Confirmed/corrected address, phone number and insurance Yes   Confirmed Demographics Correct on Facesheet   Source of Information family   If unable to respond/provide information was family/caregiver contacted? Yes   Contact Name/Number Bernice Chavez   Mother   [phone]   Does patient/caregiver understand observation status Yes   Communicated FABIAN with patient/caregiver Yes;Date not available/Unable to determine   People in Home parent(s)   Prior to hospitilization cognitive status: Unable to Assess   Current cognitive status: Unable to Assess   Walking or Climbing Stairs ambulation difficulty, requires equipment   Equipment Currently Used at Home cane, straight   Readmission within 30 days? No   Do you currently have service(s) that help you manage your care at home? No   Do you take prescription medications? Yes   Do you have prescription coverage? Yes   Coverage [employer]   How do you get to doctors appointments? family or friend will provide;health plan transportation   Are you on dialysis? No   Do you take coumadin? No   Discharge Plan A   (To be determined)   Discharge Plan B   (To be determined)   Discharge Plan discussed with: Parent(s)   Name(s) and Number(s) Bernice Chavez     Spouse   [phone]

## 2023-06-05 NOTE — PROCEDURES
Trach Tube Exchange    Patient Name: Gera Chavez  MRN: [id number]  Date of Birth: [birth year]  Admit Date: 6/4/23  HD#2  Date of Procedure: 6/5/23    Procedure: Endotracheal Tube exchange  Indications: Air leak  Intubation method: exchange over bougie   Patient status: paralyzed (RSI)  Preoxygenation: mechanical ventilation   Sedatives: propofol 100mg  Paralytic: rocuronium   Equipment: Bougie guide wire  Tube size: Original tube 8.0 mm exchanged for 8.5  mm  Tube type: cuffed  Number of attempts: 1  Post-procedure assessment: auscultation, chest rise, ETCO2 monitor and CO2 detector  Breath sounds: equal and clear  Cuff inflated: yes  ETT to lip: 22 cm  Tube secured with: adhesive tape, bite block and ETT alexandre  EBL: none  Complications: Patient tolerated the procedure well with no immediate complications.  Condition: stable    CHITRA Erickson MD  Trauma Surgery - PGY1  6/5/2023 4:34 PM

## 2023-06-05 NOTE — PROGRESS NOTES
[facility] - [facility] ICU  Pulmonary Critical Care Note    Patient Name: Gera Chavez  MRN: [id number]  Admission Date: 6/4/2023  Hospital Length of Stay: 1 days  Code Status: Full Code  Attending Provider: Salvador Sauer MD  Primary Care Provider: Primary Doctor No     Subjective:     HPI:   Patient is a 37-year-old male with no known medical history, who was transferred from [facility] for level 1 trauma activation, after patient reportedly jumped off a bridge.  Sustained rib fractures x4 left-sided, pelvic ring fracture, acetabulum fracture, L2 burst fracture, and left pneumothorax with chest tube in place.  Jump witnessed by bystander who called 911.     Hospital Course/Significant events:  6/4 - intubated   6/5 - bronchoscopy     24 Hour Interval History:  No acute events overnight, remains intubated and sedated   CXR with opacifications this AM, bronched by surgery team    Past Medical History:   Diagnosis Date    Asthma     Depression     Diabetes mellitus     Encounter for blood transfusion     Generalized anxiety disorder     Hypertension     Schizophrenia, unspecified     Sleep apnea     Unspecified glaucoma        Past Surgical History:   Procedure Laterality Date    INTRAMEDULLARY RODDING OF FEMUR Left 02/05/2023    Procedure: INSERTION, INTRAMEDULLARY NOEL, FEMUR;  Surgeon: Tuan Zepeda DO;  Location: [facility];  Service: Orthopedics;  Laterality: Left;    REMOVAL OF HARDWARE FROM LOWER EXTREMITY Left 5/9/2023    Procedure: REMOVAL, HARDWARE, LOWER EXTREMITY;  Surgeon: Tuan Zepeda DO;  Location: [facility] OR;  Service: Orthopedics;  Laterality: Left;    UVULOPALATOPHARYNGOPLASTY         Social History     Socioeconomic History    Marital status: Single   Tobacco Use    Smoking status: Every Day     Packs/day: 0.50     Years: 10.00     Pack years: 5.00     Types: Cigarettes    Smokeless tobacco: Never   Substance and Sexual Activity    Alcohol use: Not Currently    Drug  use: Never    Sexual activity: Not Currently     Social Determinants of Health     Financial Resource Strain: Unknown    Difficulty of Paying Living Expenses: Patient refused   Food Insecurity: Unknown    Worried About Running Out of Food in the Last Year: Patient refused    Ran Out of Food in the Last Year: Patient refused   Transportation Needs: Unknown    Lack of Transportation (Medical): Patient refused    Lack of Transportation (Non-Medical): Patient refused   Physical Activity: Unknown    Days of Exercise per Week: Patient refused   Stress: Unknown    Feeling of Stress : Patient refused   Social Connections: Unknown    Frequency of Communication with Friends and Family: Patient refused    Frequency of Social Gatherings with Friends and Family: Patient refused    Attends Club or Organization Meetings: Patient refused    Marital Status: Never    Housing Stability: Unknown    Unable to Pay for Housing in the Last Year: Patient refused    Unstable Housing in the Last Year: Patient refused           Current Outpatient Medications   Medication Instructions    albuterol (PROVENTIL/VENTOLIN HFA) 90 mcg/actuation inhaler 2 puffs, Inhalation, Every 4 hours PRN    ALBUTEROL INHL 90 mcg, Inhalation, Every 4 hours PRN    amLODIPine (NORVASC) 5 mg, Oral, Daily    amoxicillin-clavulanate 875-125mg (AUGMENTIN) 875-125 mg per tablet 1 tablet, Oral, Every 12 hours (non-standard times)    atorvastatin (LIPITOR) 20 mg, Oral, Daily    azithromycin (Z-CELESTINA) 500 mg, Oral, Daily    clonazePAM (KLONOPIN) 1 mg, Oral, 2 times daily PRN    dextroamphetamine-amphetamine (ADDERALL XR) 25 MG 24 hr capsule 25 mg, Oral, 2 times daily    FLUoxetine 20 MG capsule fluoxetine 20 mg capsule    FLUoxetine 40 mg, Oral, Daily    insulin glargine 100 units/mL SubQ pen Lantus Solostar U-100 Insulin 100 unit/mL (3 mL) subcutaneous pen    ipratropium (ATROVENT) 42 mcg (0.06 %) nasal spray Each Nostril    lamoTRIgine (LAMICTAL) 25 MG tablet  lamotrigine 25 mg tablet    LANTUS SOLOSTAR U-100 INSULIN glargine 100 units/mL SubQ pen Subcutaneous    latanoprost 0.005 % ophthalmic solution latanoprost 0.005 % eye drops    lisinopriL (PRINIVIL,ZESTRIL) 40 mg, Oral    mirtazapine (REMERON) 7.5 MG Tab mirtazapine 7.5 mg tablet    potassium chloride (K-TAB) 20 mEq potassium chloride ER 20 mEq tablet,extended release   TAKE 1 TABLET BY MOUTH EVERY DAY    prochlorperazine (COMPAZINE) 10 MG tablet prochlorperazine maleate 10 mg tablet    QUEtiapine (SEROQUEL) 200 mg, Oral, Nightly    testosterone cypionate (DEPOTESTOTERONE CYPIONATE) 200 mg/mL injection SMARTSIG:Milliliter(s) IM       Current Inpatient Medications   albuterol-ipratropium  3 mL Nebulization Q6H    calcium-vitamin D3  1 tablet Oral BID    famotidine (PF)  20 mg Intravenous BID    mupirocin   Nasal BID       Current Intravenous Infusions   fentanyl 100 mcg/hr (06/04/23 1700)    lactated ringers 125 mL/hr at 06/05/23 0036    propofoL 40 mcg/kg/min (06/05/23 0740)         Review of Systems   Reason unable to perform ROS: intubated and sedated.        Objective:       Intake/Output Summary (Last 24 hours) at 6/5/2023 0915  Last data filed at 6/5/2023 0452  Gross per 24 hour   Intake 1091 ml   Output 1510 ml   Net -419 ml         Vital Signs (Most Recent):  Temp: (!) 100.5 °F (38.1 °C) (06/05/23 0400)  Pulse: (!) 120 (06/05/23 0818)  Resp: (!) 26 (06/05/23 0818)  BP: (!) 84/48 (06/05/23 0600)  SpO2: 96 % (06/05/23 0818)  Body mass index is 37.21 kg/m².  Weight: 121 kg (266 lb 12.1 oz) Vital Signs (24h Range):  Temp:  [98.8 °F (37.1 °C)-101.6 °F (38.7 °C)] 100.5 °F (38.1 °C)  Pulse:  [101-125] 120  Resp:  [0-30] 26  SpO2:  [88 %-100 %] 96 %  BP: ()/(42-96) 84/48     Physical Exam  Constitutional:       Comments: Intubated sedated   HENT:      Head: Normocephalic.      Mouth/Throat:      Mouth: Mucous membranes are moist.      Comments: ETT and OGT in palce  Cardiovascular:      Rate and Rhythm:  Regular rhythm. Tachycardia present.   Pulmonary:      Comments: Mechanically ventilated   Abdominal:      General: Abdomen is flat.      Palpations: Abdomen is soft.   Genitourinary:     Comments: Titus in place  Skin:     General: Skin is warm and dry.         Lines/Drains/Airways       Central Venous Catheter Line  Duration             Percutaneous Central Line Insertion/Assessment - Double Lumen  06/04/23 1530 Subclavian Left <1 day              Drain  Duration                  Chest Tube 06/04/23 1549 Left Midaxillary 28 Fr. <1 day         NG/OG Tube 06/04/23 1554 16 Fr. Center mouth <1 day         Urethral Catheter 06/04/23 1549 Straight-tip <1 day              Airway  Duration                  Airway - Non-Surgical 06/04/23 1549 Endotracheal Tube <1 day              Peripheral Intravenous Line  Duration                  Peripheral IV - Single Lumen 06/04/23 1549 18 G Left Forearm <1 day         Peripheral IV - Single Lumen 06/04/23 1554 18 G Right Antecubital <1 day                    Significant Labs:    Lab Results   Component Value Date    WBC 13.34 (H) 06/05/2023    HGB 11.2 (L) 06/05/2023    HCT 33.1 (L) 06/05/2023    MCV 85.3 06/05/2023     06/05/2023         BMP  Lab Results   Component Value Date     06/05/2023    K 5.3 (H) 06/05/2023    CHLORIDE 115 (H) 06/05/2023    CO2 21 (L) 06/05/2023    BUN 18.1 06/05/2023    CREATININE 1.09 06/05/2023    CALCIUM 7.9 (L) 06/05/2023    AGAP 12.0 02/05/2023       ABG  No results for input(s): PH, PO2, PCO2, HCO3, BE in the last 168 hours.      Mechanical Ventilation Support:  Vent Mode: SIMV (06/05/23 0818)  Set Rate: (S) 28 BPM (per Dr. Sauer) (06/05/23 0818)  Vt Set: 500 mL (06/05/23 0818)  Pressure Support: (S) 12 cmH20 (increased during bronch) (06/05/23 0818)  PEEP/CPAP: (S) 12 cmH20 (increased during bronch) (06/05/23 0818)  Oxygen Concentration (%): 80 (06/05/23 0818)  Peak Airway Pressure: 15 cmH20 (06/05/23 0818)  Total Ve: 12.9 L/m  (06/05/23 0818)  F/VT Ratio<105 (RSBI): (!) 61.03 (06/05/23 0818)    Significant Imaging:  I have reviewed the pertinent imaging within the past 24 hours.        Assessment/Plan:     Assessment  Pelvic ring fx  L rib fractures   L2 compression fracture   L pneumothorax with chest tube       Plan  Intubated and sedated, on propofol and fentanyl   Continue ICU level of care; pt admitted to trauma surgery, neurosurgery, orthopedic surgery on board   Planned for OR today with ortho for pelvic fixation   Pt under PEC    DVT Prophylaxis: held due to pelvic fx  GI Prophylaxis: pepcid     32 minutes of critical care was time spent personally by me on the following activities: development of treatment plan with patient or surrogate and bedside caregivers, discussions with consultants, evaluation of patient's response to treatment, examination of patient, ordering and performing treatments and interventions, ordering and review of laboratory studies, ordering and review of radiographic studies, pulse oximetry, re-evaluation of patient's condition.  This critical care time did not overlap with that of any other provider or involve time for any procedures.     Chucho Erickson MD  Pulmonary Critical Care Medicine  [facility] - [facility] ICU

## 2023-06-05 NOTE — PROCEDURES
Bronchoscopy Procedure    Patient Name: Gera Chavez  MRN: [id number]  YOB: 1985  Admit Date: 6/4/2023  HD#1  Date of Procedure: 06/05/2023    Procedure: Bronchoscopy, Diagnostic  Bronchoscopy, Therapeutic  Consent: emergent  Performed by: Dr. Salvador Sauer MD  Assisted by: Zuly Abdi PA-C  Indication: airway surveillance, secretion clearance, pulmonary contusions  Monitoring: EKG, blood pressure, pulse oximetry   Anesthesia: Moderate Sedation    Description of the Procedure:   The correct patient and procedure was identified by time out by all members of the team. The bronchocope was lubricated and introduced into the main airway via the endotracheal tube. The endotracheal tube was at the level of the halima and pulled back to 22cm at the lip. An anatomical survey was done of the main airways and the subsegmental bronchus.There was R airway collapse. Strong cough. Thick secretions with blood tinged noted, mainly in the R airway. Patient desatted to the 80s and the procedure was paused until his oxygen saturation increased. Several passes were done down all bronchi. A final pass through was done and the bronchoscope was removed.     Complications: None; patient tolerated the procedure well.    Estimated Blood Loss (EBL): Minimal           Specimens: None       Condition: Stable        Disposition: ICU - intubated and hemodynamically stable.    Plan: Repeat CXR and ABGs 6/6    Procedure: Bronchoscopy, Diagnostic  Bronchoscopy, Therapeutic      6/5/2023 8:31 AM     The above findings, diagnostics, and treatment plan were discussed with Dr. Jarret Sauer  who will follow with further assessments and recommendations. Please call with any questions, concerns, or clinical status changes.    Alert and oriented, no focal deficits, no motor or sensory deficits.

## 2023-06-05 NOTE — CONSULTS
[facility] - [facility] ICU  Orthopedic Trauma  Consult Note    Patient Name: Gera Chavez  MRN: [id number]  Admission Date: 6/4/2023  Hospital Length of Stay: 1 days  Attending Provider: Salvador Sauer MD  Primary Care Provider: Primary Doctor No        Inpatient consult to Orthopedics  Consult performed by: Tuan Zepeda DO  Consult ordered by: Melissa Titus MD      Subjective:         Chief Complaint: No chief complaint on file.       HPI:  Patient had a jumping incident where he landed on the apparently left side of his body and had a severe pelvic ring fracture.  Patient known to my service due to a subtrochanteric femur fracture that we are managing.  Currently the hardware appears to be intact of the left femur.  We will plan to take him to surgery for his pelvis and stages acetabular surgery.  Patient is currently intubated which limits our HPI    Past Medical History:   Diagnosis Date    Asthma     Depression     Diabetes mellitus     Encounter for blood transfusion     Generalized anxiety disorder     Hypertension     Schizophrenia, unspecified     Sleep apnea     Unspecified glaucoma        Past Surgical History:   Procedure Laterality Date    INTRAMEDULLARY RODDING OF FEMUR Left 02/05/2023    Procedure: INSERTION, INTRAMEDULLARY NOEL, FEMUR;  Surgeon: Tuan Zepeda DO;  Location: [facility];  Service: Orthopedics;  Laterality: Left;    REMOVAL OF HARDWARE FROM LOWER EXTREMITY Left 5/9/2023    Procedure: REMOVAL, HARDWARE, LOWER EXTREMITY;  Surgeon: Tuan Zepeda DO;  Location: [facility] OR;  Service: Orthopedics;  Laterality: Left;    UVULOPALATOPHARYNGOPLASTY         Review of patient's allergies indicates:   Allergen Reactions    Iodine     Trazodone        Current Facility-Administered Medications   Medication    acetaminophen oral solution 650 mg    albuterol-ipratropium 2.5 mg-0.5 mg/3 mL nebulizer solution 3 mL    calcium-vitamin D3 500 mg-5 mcg (200 unit) per tablet 1  "tablet    famotidine (PF) injection 20 mg    fentaNYL 2500 mcg in 0.9% sodium chloride 250 mL infusion premix (titrating)    lactated ringers bolus 1,000 mL    lactated ringers infusion    mupirocin 2 % ointment    propofol (DIPRIVAN) 10 mg/mL infusion     Family History       Problem Relation (Age of Onset)    No Known Problems Mother, Father          Tobacco Use    Smoking status: Every Day     Packs/day: 0.50     Years: 10.00     Pack years: 5.00     Types: Cigarettes    Smokeless tobacco: Never   Substance and Sexual Activity    Alcohol use: Not Currently    Drug use: Never    Sexual activity: Not Currently       ROS:  Constitutional: Denies fever chills  Eyes: No change in vision  ENT: No ringing or current infections  CV: No chest pain  Resp: No labored breathing  MSK: Pain evident at site of injury located in HPI,   Integ: No signs of abrasions or lacerations  Neuro: No numbness or tingling  Lymphatic: No swelling outside the area of injury   Objective:     Vital Signs (Most Recent):  Temp: (!) 100.5 °F (38.1 °C) (06/05/23 0400)  Pulse: (!) 114 (06/05/23 0600)  Resp: (!) 26 (06/05/23 0600)  BP: (!) 84/48 (06/05/23 0600)  SpO2: (!) 92 % (06/05/23 0600) Vital Signs (24h Range):  Temp:  [98.8 °F (37.1 °C)-101.6 °F (38.7 °C)] 100.5 °F (38.1 °C)  Pulse:  [101-125] 114  Resp:  [0-30] 26  SpO2:  [88 %-100 %] 92 %  BP: ()/(42-96) 84/48     Weight: 121 kg (266 lb 12.1 oz)  Height: 5' 11" (180.3 cm)  Body mass index is 37.21 kg/m².      Intake/Output Summary (Last 24 hours) at 6/5/2023 0710  Last data filed at 6/5/2023 0452  Gross per 24 hour   Intake 1091 ml   Output 1510 ml   Net -419 ml       Ortho/SPM Exam  General the patient is intubated.    Constitutional: Vital signs are examined and stable.  Resp: No signs of labored breathing                 BLE: -Skin:  Skin is intact no signs of open injuries.  Calc compartments are soft and compressible bilateral lower extremities capillary refill is brisk " dorsalis pedis posterior tibialis pulses intact bilaterally.  Sensation and motor unable to be tested.                      Significant Labs:   Recent Lab Results  (Last 5 results in the past 24 hours)        06/05/23  0639   06/05/23  0429   06/04/23  2311   06/04/23  1717   06/04/23  1657        Albumin/Globulin Ratio   1.1             Albumin   3.1             Alkaline Phosphatase   100             Allens Test Yes       Yes         ALT   45             AST   73             Base Excess, Blood gas -3.30       -5.20         Baso #   0.05             Basophil %   0.4             BILIRUBIN TOTAL   0.9             Sample Type Arterial Blood       Arterial Blood         BUN   18.1             Calcium   7.9             Ionized Calcium 1.12       1.08         Chloride   115             CO Hgb       3.1         CO2   21             Creatinine   1.09             CRP   131.90             Drawn by sd rrt               eGFR   >60             Eos #   0.01             Eosinophil %   0.1             FIO2, Blood gas 60       80         Globulin, Total   2.7             Glucose   91             HCO3, Blood gas 23.6       21.6         Hematocrit   33.1             Hemoglobin   11.2             Immature Grans (Abs)   0.06             Immature Granulocytes   0.4             Lactate, Jhonathan   3.9  Comment: Critical Result called and verified by verbal readback to: randy oliva on 06/05/2023 at 05:08. Reported by [id number].   3.7  Comment: Critical Result called and verified by verbal readback to: mariann myles on 06/05/2023 at 00:24. Reported by [id number].     4.1  Comment: Critical Result called and verified by verbal readback to: Isidro Calero on 06/04/2023 at 17:48. Reported by [id number].       Lymph #   2.05             LYMPH %   15.4             Magnesium   1.70             MCH   28.9             MCHC   33.8             MCV   85.3             Mech Vt 500       500         Met Hgb       1.2         MODE SIMV       SIMV         Mono #    0.87             Mono %   6.5             MPV   10.2             Neut #   10.30             Neut %   77.2             nRBC   0.1             O2 Hb, Blood Gas       94.4         Oxygen Device, Blood gas Ventilator       Ventilator         pCO2, Blood gas 49.0       46.0         PEEP 8.0       5.0         pH, Blood gas 7.290       7.280         Phosphorus   4.6             Platelets   138             pO2, Blood gas 58.0       87.0         Potassium   5.3             Potassium, Blood Gas 4.8       6.0         Prealbumin   24.7             PROTEIN TOTAL   5.8             PS 10.0       10.0         RBC   3.88             RDW   16.2             Mech RR 24       20         Sample site Left Radial Artery       Right Brachial Artery         sO2, Blood gas 86.0       95.3         Sodium   145             Sodium, Blood Gas 141       135         TOC2, Blood gas 25.1       23.0         THb, Blood gas       13.3         WBC   13.34                                  All pertinent labs within the past 24 hours have been reviewed.  Recent Lab Results  (Last 5 results in the past 72 hours)        06/05/23  0639   06/05/23  0429   06/04/23  2311   06/04/23  1717   06/04/23  1657        Albumin/Globulin Ratio   1.1             Albumin   3.1             Alkaline Phosphatase   100             Allens Test Yes       Yes         ALT   45             AST   73             Base Excess, Blood gas -3.30       -5.20         Baso #   0.05             Basophil %   0.4             BILIRUBIN TOTAL   0.9             Sample Type Arterial Blood       Arterial Blood         BUN   18.1             Calcium   7.9             Ionized Calcium 1.12       1.08         Chloride   115             CO Hgb       3.1         CO2   21             Creatinine   1.09             CRP   131.90             Drawn by sd rrt               eGFR   >60             Eos #   0.01             Eosinophil %   0.1             FIO2, Blood gas 60       80         Globulin, Total   2.7              Glucose   91             HCO3, Blood gas 23.6       21.6         Hematocrit   33.1             Hemoglobin   11.2             Immature Grans (Abs)   0.06             Immature Granulocytes   0.4             Lactate, Jhonathan   3.9  Comment: Critical Result called and verified by verbal readback to: randy oliva on 06/05/2023 at 05:08. Reported by [id number].   3.7  Comment: Critical Result called and verified by verbal readback to: mariann myles on 06/05/2023 at 00:24. Reported by [id number].     4.1  Comment: Critical Result called and verified by verbal readback to: Isidro Calero on 06/04/2023 at 17:48. Reported by [id number].       Lymph #   2.05             LYMPH %   15.4             Magnesium   1.70             MCH   28.9             MCHC   33.8             MCV   85.3             Mech Vt 500       500         Met Hgb       1.2         MODE SIMV       SIMV         Mono #   0.87             Mono %   6.5             MPV   10.2             Neut #   10.30             Neut %   77.2             nRBC   0.1             O2 Hb, Blood Gas       94.4         Oxygen Device, Blood gas Ventilator       Ventilator         pCO2, Blood gas 49.0       46.0         PEEP 8.0       5.0         pH, Blood gas 7.290       7.280         Phosphorus   4.6             Platelets   138             pO2, Blood gas 58.0       87.0         Potassium   5.3             Potassium, Blood Gas 4.8       6.0         Prealbumin   24.7             PROTEIN TOTAL   5.8             PS 10.0       10.0         RBC   3.88             RDW   16.2             Mech RR 24       20         Sample site Left Radial Artery       Right Brachial Artery         sO2, Blood gas 86.0       95.3         Sodium   145             Sodium, Blood Gas 141       135         TOC2, Blood gas 25.1       23.0         THb, Blood gas       13.3         WBC   13.34                                     Significant Imaging: I have reviewed all pertinent imaging results/findings.  X-Ray Chest 1  View    Result Date: 6/5/2023  EXAMINATION: XR CHEST 1 VIEW CLINICAL HISTORY: chest tube placement; TECHNIQUE: Single view of the chest COMPARISON: 06/04/2023 FINDINGS: Patient remains intubated.  Right upper lobe opacification is unchanged.     As above.  No adverse interval change. Electronically signed by: Nikhil Auguste Date:    06/05/2023 Time:    06:49    X-Ray Chest 1 View    Result Date: 6/4/2023  EXAMINATION: XR CHEST 1 VIEW CLINICAL HISTORY: r/o bleeding or hemorrhage; TECHNIQUE: Frontal view(s) of the chest. COMPARISON: Radiography 02/08/2023 FINDINGS: Endotracheal tube tip lies near the origin of the right mainstem bronchus.  Left chest tube tip in the perihilar region.  Additional tube tip noted near the thoracic inlet.  This may be an enteric tube.  Lungs are mildly hypoinflated with right upper lung consolidation.  Patchy opacity in the lower left lung.  No definitive pneumothorax.  Cardiac silhouette within normal limits for technique.     1. Endotracheal tube tip near the origin of the right mainstem bronchus and can be retracted slightly.  Additional tubing overlying the thoracic inlet may be an enteric tube. 2. Right upper lung consolidation.  Patchy opacities in the left lower lung may be atelectasis or contusion. Electronically signed by: Harjinder Strong Date:    06/04/2023 Time:    16:18    X-Ray Hand Complete Left    Result Date: 5/9/2023  See Provider Notes for results. IMPRESSION: Please see provider office/clinic notes for radiology interpretation This procedure was auto-finalized by: Virtual Radiologist    X-Ray Hip 2 or 3 views Left (with Pelvis when performed)    Result Date: 5/9/2023  See Provider Notes for results. IMPRESSION: Please see provider office/clinic notes for radiology interpretation This procedure was auto-finalized by: Virtual Radiologist    X-Ray Femur 2 View Left    Result Date: 6/4/2023  EXAMINATION: XR FEMUR 2 VIEW LEFT CLINICAL HISTORY: Fracture. TECHNIQUE: Two views.  COMPARISON: February 5, 2023. FINDINGS: There is left femoral intramedullary miguel which is anchored proximally and distally with screws transfixing a fracture of the proximal shaft of the femur.  The fracture line is partially visualized though interval considerable callus formation.  There are several adjacent metallic fragments.  No definite acute fracture or dislocation identified.     No definite acute osseous abnormality identified. Electronically signed by: Roberto Parks Date:    06/04/2023 Time:    17:32    CT Cervical Spine Without Contrast    Result Date: 6/5/2023  START OF REPORT: Technique: CT of the cervical spine was performed without intravenous contrast with axial as well as sagittal and coronal images. Comparison: None. Dosage Information: Automated exposure control was utilized. Clinical history: Fall from bridge-Polytrauma, blunt; evaluate for fractures, polytrauma Dx: Other closed fracture of second lumbar vertebra,. Findings: Position: Supine. Artifact: None. Lung apices: There is a left apical pneumothorax seen. There is opacity seen in the apical segment of the right upper lobe. This may be consistent with contusions or consolidation. Spine: Spinal canal: The spinal canal appears unremarkable. Spinal cord: The spinal cord appears unremarkable. Mineralization: Within normal limits. Rotation: No significant rotation is seen. Scoliosis: No significant scoliosis is seen. Vertebral Fusion: No vertebral fusion is identified. Listhesis: No significant listhesis is identified. Lordosis: Moderate straightening of the cervical lordosis is seen. This may be positional or reflect an element of myospasm. Intervertebral disc spaces: The intervertebral discs are preserved throughout. Osteophytes: No significant osteophytes are seen in the cervical spine. Endplate Sclerosis: No significant endplate sclerosis is seen. Uncovertebral degenerative changes: No significant uncovertebral degenerative changes are seen.  Facet degenerative changes: No significant facet degenerative changes are seen. Calcifications: None. Fractures: No acute cervical spine fracture dislocation or subluxation is seen. Orthopedic Hardware: None. Miscellaneous: Mastoid air cells: There is fluid seen in the right mastoid aircells. Soft Tissues: Unremarkable. Notifications: The results were discussed prior to dictation with the patient's nurse (Rocio) at 2023-06-05 01:01:41 CDT. Impression: 1. No acute cervical spine fracture dislocation or subluxation is seen. 2. There is a left apical pneumothorax seen. There is opacity seen in the apical segment of the right upper lobe. This may be consistent with contusions or consolidation. Consider additional evaluation with chest CT. 3. Details and findings as noted above.     CT Thoracic Spine Without Contrast    Result Date: 6/5/2023  START OF REPORT: Technique: CT of the thoracic spine without contrast with direct axial as well as sagittal and coronal reconstruction images. Comparison: None. Dosage Information: Automated Exposure Control was utilized. Clinical History: Fall from bridge-Polytrauma, blunt; evaluate for fractures, polytrauma Dx: Other closed fracture of second lumbar vertebra,. Findings: There is a left apical pneumothorax seen. There are bilateral consolidations seen in the visualized lung fields. These are consistent with contusions. Chest findings and rib findings discussed separately. Mineralization of the bony structures: Within normal limits. Bone marrow: Within normal limits. Vertebral Fusion: None. Curvature: Normal thoracic kyphosis. Fractures: No acute thoracic spine fracture dislocation or subluxation is identified. There are multiple rib fractures seen extending from the fourth to the 11th rib posteriorly on the left. Degenerative changes: No significant degenerative changes are identified. Intervertebral disc spaces: Preserved throughout. Osteophytes: Normal with no osteophytes.  Endplates: None. Facet degenerative changes: None. Impression: 1. No acute thoracic spine fracture dislocation or subluxation is identified. 2. Details and other findings as above.     CT Lumbar Spine Without Contrast    Result Date: 6/5/2023  START OF REPORT: Technique: CT of the lumbar spine was performed without intravenous contrast with direct axial as well as sagittal and coronal reconstruction images. Comparison: None. Clinical history: Fall from bridge-Polytrauma, blunt; evaluate for fractures, polytrauma Dx: Other closed fracture of second lumbar vertebra,. Findings: Anatomy: Unremarkable. Mineralization: The bony mineralization is within normal limits. Congenital: None. Bone alignment: Unremarkable with no listhesis. Curvature: The lumbar lordosis is maintained. Intervertebral disc spaces: The intervertebral discs are preserved throughout. Spinal canal: Fractures: There is a burst fracture of the L2 vertebral body. There is mild to moderate retropulsion of the fracture fragment (series 7 image 50) causing spinal canal narrowing and compression upon the thecal sac (series 3 image 50). The posterior elements appear unremarkable. There is a comminuted displaced fracture of the right L2 transverse process. There is a displaced fracture of the right L3 transverse process. There are comminuted displaced fractures of the bilateral L5 transverse process. There is a comminuted displaced fracture of the left sacral ala described separately. Orthopedic Hardware: None. Vertebral Fusion: None. Notifications: The results were discussed prior to dictation with the patient's nurse (Rocio) at 2023-06-05 00:58:59 CDT. Impression: 1. There is a burst fracture of the L2 vertebral body. There is mild to moderate retropulsion of the fracture fragment (series 7 image 50) causing spinal canal narrowing and compression upon the thecal sac (series 3 image 50). There is a comminuted displaced fracture of the right L2 transverse  process. There is a displaced fracture of the right L3 transverse process. There are comminuted displaced fractures of the bilateral L5 transverse process. There is a comminuted displaced fracture of the left sacral ala described separately. 2. Details and findings as above.     CT Pelvis Without Contrast    Result Date: 6/4/2023  START OF REPORT: Technique: CT imaging of the pelvis was performed with and without intravenous contrast with direct axial as well as sagittal and coronal reconstruction images. Comparison: None. Clinical history: Please send prior plain film - pelvic trauma. Findings: The left psoas muscle appears bulky as compared to the right with adjacent fat stranding and high density fluid. The findings are suggestive of a psoas hematoma with adjacent retroperitoneal hemorrhage. There is contrast seen within the left ureter. The distal left ureter is not opacified and could not be traced. Correlate with clinical findings and further evaluation to rue out any concommitant ureteral injuries. Pelvis: There is a comminuted displaced fracture of the left ala of sacrum. There is displacement of the lateral fracture fragments anteriorly. Thecombination of the pubic rami and sacral ala fracture is consistent with a vertical shear mechanism ofMalgaigne fracture. There is a comminuted fracture of the left inferior pubic ramus with displacement of the fracture fragments posteriorly. There is an associated pubic diastasis. There is associated hematoma within the leftt pelvic side wall / obturator internus muscle. This is reduced as compared to the previous radiograph. Hip: Right: No fracture dislocation or subluxation is seen involving the visualized right hip bony structures. Left: There is a comminuted displaced fracture of the anterior column, quadrilateral surface and the retroacetabular surface of the left acetabulum. There is a transverse displaced fracture of the left ischium. Femur: Proximal Femur: There  is an intramedullary nail seen in position within the left proximal femur. There is no evidence of harware loosening or collections seen. There are no new fractures seen. There is an underlying old transvverse proximal diaphyseal fracture of the left femur with adjacent callus formation seen. Pelvic structures: Bladder: Non distended and cannot be definitely evaluated. The folleys bulb is seen in position. Impression: 1. There is contrast seen within the left ureter. The distal left ureter is not opacified and could not be traced. Correlate with clinical findings and further evaluation to rue out any concommitant ureteral injuries. 2. There is a comminuted displaced fracture of the anterior column, quadrilateral surface and the retroacetabular surface of the left acetabulum. There is a transverse displaced fracture of the left ischium. 3. There is a comminuted displaced fracture of the left ala of sacrum. There is displacement of the lateral fracture fragments anteriorly. Thecombination of the pubic rami and sacral ala fracture is consistent with a vertical shear mechanism ofMalgaigne fracture. There is a comminuted fracture of the left inferior pubic ramus with displacement of the fracture fragments posteriorly. There is an associated pubic diastasis. This is reduced as compared to the previous radiograph. 4. Details and other findings as discussed above.     X-Ray Pelvis Routine AP    Result Date: 6/4/2023  EXAMINATION: XR PELVIS ROUTINE AP CLINICAL HISTORY: r/o bleeding or hemorrhage; TECHNIQUE: One view FINDINGS: The left half of the sacrum is fractured with severe comminution and displacement of the bony fragments.  The right aspect of the sacrum appears to be intact and right sacroiliac joint is also without diastasis.  There is severe fracture of the left acetabulum and the inferior pubic ramus.  There is considerable diastasis of the symphysis pubis.  Partially visualized left femoral intramedullary miguel.   There are several fragments subjacent to the proximal shaft of the femur.  No definite fracture of the right hip joint identified.     Multiple fractures. Electronically signed by: Roberto Parks Date:    06/04/2023 Time:    17:30    SURG [place] Surgery Fluoro Usage    Result Date: 5/9/2023  See OP Notes for results. IMPRESSION: See OP Notes for results. This procedure was auto-finalized by: Virtual Radiologist       Assessment/Plan:     Active Diagnoses:    Diagnosis Date Noted POA    PRINCIPAL PROBLEM:  Closed pelvic ring fracture [S32.810A] 06/05/2023 Yes    Displaced fracture of posterior column (ilioischial) of left acetabulum, initial encounter for closed fracture [S32.442A] 06/05/2023 Yes      Problems Resolved During this Admission:         Patient is known to my service from a subtrochanteric femur fracture that was fixed proximally 3 months ago and dynamize in May.  Patient had a jumping incident to the left side of his body he is essentially has a APC 3 LC 3 hemipelvis dislocation.  His left side of his iliac wing is unstable vertically and horizontally displaced.  Patient has a type 2 sacral body fracture involves the foramen of all sacral nerves in the left side his L5 nerve root is likely obliterated due to the fracture pattern.  He also has a posterior column anterior column acetabulum fracture essentially places his anterior pelvis in the floating position.  This is a very serious injury to have an with high risk.  Patient will be going to surgery today for pelvis fixation.  He will have to be nonweightbearing for extended period of time to allow bone to fill in posteriorly.  He is likely to have neuro deficits bowel or bladder dysfunction including L5 nerve root dysfunction on the left side.    We will likely stage posterior column fixation on the left following pelvis stabilization.    Patient is mother reached.  I discussed the risks and benefits procedure as stated below.  She is currently agreeable  to surgery.  He will likely have multiple surgeries on his pelvis and acetabulum.    I explained that surgery and the nature of their condition are not without risks. These include, but are not limited to, bleeding, infection, neurovascular compromise, malunion, nonunion, hardware complications, wound complications, scarring, cosmetic defects, need for later and/or repeated surgeries, avascular necrosis, bone death due to initial trauma, pain, loss of ROM, loss of function, PTOA, deformity, stance/gait and/or functional abnormalities, thromboembolic complications, compartment syndrome, loss of limb, loss of life, anesthetic complications, and other imponderables. I explained that these can occur despite the adequacy of treatments rendered, and that their risks are heightened given the nature of their condition. They verbalized understanding. They would like to continue with surgery at this time. If appropriate family was involved with surgical discussion.             This note/OR report was created with the assistance of  voice recognition software or phone  dictation.  There may be transcription errors as a result of using this technology however minimal. Effort has been made to assure accuracy of transcription but any obvious errors or omissions should be clarified with the author of the document.       Tuan Zepeda, DO   Orthopedic Trauma Surgery  [facility] - 7 North ICU

## 2023-06-06 ENCOUNTER — ANESTHESIA (OUTPATIENT)
Dept: SURGERY | Facility: HOSPITAL | Age: 38
DRG: 956 | End: 2023-06-06
Payer: MEDICARE

## 2023-06-06 LAB
ABO + RH BLD: NORMAL
ALBUMIN SERPL-MCNC: 2.9 G/DL (ref 3.5–5)
ALBUMIN/GLOB SERPL: 1.6 RATIO (ref 1.1–2)
ALLENS TEST BLOOD GAS (OHS): ABNORMAL
ALLENS TEST BLOOD GAS (OHS): YES
ALP SERPL-CCNC: 78 UNIT/L (ref 40–150)
ALT SERPL-CCNC: 27 UNIT/L (ref 0–55)
APTT PPP: 30 SECONDS (ref 23.2–33.7)
AST SERPL-CCNC: 49 UNIT/L (ref 5–34)
BASE EXCESS BLD CALC-SCNC: 0.1 MMOL/L (ref -2–2)
BASE EXCESS BLD CALC-SCNC: 1.1 MMOL/L
BASOPHILS # BLD AUTO: 0.03 X10(3)/MCL
BASOPHILS # BLD AUTO: 0.03 X10(3)/MCL
BASOPHILS # BLD AUTO: 0.04 X10(3)/MCL
BASOPHILS NFR BLD AUTO: 0.3 %
BASOPHILS NFR BLD AUTO: 0.4 %
BASOPHILS NFR BLD AUTO: 0.4 %
BILIRUBIN DIRECT+TOT PNL SERPL-MCNC: 2 MG/DL
BLD PROD TYP BPU: NORMAL
BLOOD GAS SAMPLE TYPE (OHS): ABNORMAL
BLOOD GAS SAMPLE TYPE (OHS): ABNORMAL
BLOOD UNIT EXPIRATION DATE: NORMAL
BLOOD UNIT TYPE CODE: 6200
BLOOD UNIT TYPE CODE: 7300
BUN SERPL-MCNC: 14.6 MG/DL (ref 8.9–20.6)
CA-I BLD-SCNC: 1.03 MMOL/L (ref 1.12–1.23)
CA-I BLD-SCNC: 1.11 MMOL/L (ref 1.12–1.23)
CALCIUM SERPL-MCNC: 7.3 MG/DL (ref 8.4–10.2)
CHLORIDE SERPL-SCNC: 114 MMOL/L (ref 98–107)
CO2 BLDA-SCNC: 27.9 MMOL/L
CO2 BLDA-SCNC: 28.6 MMOL/L
CO2 SERPL-SCNC: 23 MMOL/L (ref 22–29)
COHGB MFR BLDA: 2.6 % (ref 0.5–1.5)
CREAT SERPL-MCNC: 0.95 MG/DL (ref 0.73–1.18)
CROSSMATCH INTERPRETATION: NORMAL
DISPENSE STATUS: NORMAL
DRAWN BY BLOOD GAS (OHS): ABNORMAL
DRAWN BY BLOOD GAS (OHS): ABNORMAL
EOSINOPHIL # BLD AUTO: 0.05 X10(3)/MCL (ref 0–0.9)
EOSINOPHIL # BLD AUTO: 0.06 X10(3)/MCL (ref 0–0.9)
EOSINOPHIL # BLD AUTO: 0.16 X10(3)/MCL (ref 0–0.9)
EOSINOPHIL NFR BLD AUTO: 0.6 %
EOSINOPHIL NFR BLD AUTO: 0.6 %
EOSINOPHIL NFR BLD AUTO: 1.9 %
ERYTHROCYTE [DISTWIDTH] IN BLOOD BY AUTOMATED COUNT: 15.9 % (ref 11.5–17)
ERYTHROCYTE [DISTWIDTH] IN BLOOD BY AUTOMATED COUNT: 15.9 % (ref 11.5–17)
ERYTHROCYTE [DISTWIDTH] IN BLOOD BY AUTOMATED COUNT: 16.4 % (ref 11.5–17)
FIO2 BLOOD GAS (OHS): 100 %
FIO2 BLOOD GAS (OHS): 60 %
GFR SERPLBLD CREATININE-BSD FMLA CKD-EPI: >60 MLS/MIN/1.73/M2
GLOBULIN SER-MCNC: 1.8 GM/DL (ref 2.4–3.5)
GLUCOSE SERPL-MCNC: 104 MG/DL (ref 74–100)
HCO3 BLDA-SCNC: 26.4 MMOL/L (ref 22–26)
HCO3 BLDA-SCNC: 27.1 MMOL/L
HCT VFR BLD AUTO: 26.1 % (ref 42–52)
HCT VFR BLD AUTO: 26.8 % (ref 42–52)
HCT VFR BLD AUTO: 26.8 % (ref 42–52)
HGB BLD-MCNC: 8.8 G/DL (ref 14–18)
HGB BLD-MCNC: 9 G/DL (ref 14–18)
HGB BLD-MCNC: 9.1 G/DL (ref 14–18)
IMM GRANULOCYTES # BLD AUTO: 0.03 X10(3)/MCL (ref 0–0.04)
IMM GRANULOCYTES # BLD AUTO: 0.04 X10(3)/MCL (ref 0–0.04)
IMM GRANULOCYTES # BLD AUTO: 0.05 X10(3)/MCL (ref 0–0.04)
IMM GRANULOCYTES NFR BLD AUTO: 0.3 %
IMM GRANULOCYTES NFR BLD AUTO: 0.5 %
IMM GRANULOCYTES NFR BLD AUTO: 0.6 %
INR BLD: 1.34 (ref 0–1.3)
LACTATE SERPL-SCNC: 1.9 MMOL/L (ref 0.5–2.2)
LYMPHOCYTES # BLD AUTO: 0.98 X10(3)/MCL (ref 0.6–4.6)
LYMPHOCYTES # BLD AUTO: 1.14 X10(3)/MCL (ref 0.6–4.6)
LYMPHOCYTES # BLD AUTO: 1.34 X10(3)/MCL (ref 0.6–4.6)
LYMPHOCYTES NFR BLD AUTO: 11.8 %
LYMPHOCYTES NFR BLD AUTO: 13.3 %
LYMPHOCYTES NFR BLD AUTO: 14.5 %
MAGNESIUM SERPL-MCNC: 1.8 MG/DL (ref 1.6–2.6)
MCH RBC QN AUTO: 28.8 PG (ref 27–31)
MCH RBC QN AUTO: 28.9 PG (ref 27–31)
MCH RBC QN AUTO: 29.3 PG (ref 27–31)
MCHC RBC AUTO-ENTMCNC: 33.6 G/DL (ref 33–36)
MCHC RBC AUTO-ENTMCNC: 33.7 G/DL (ref 33–36)
MCHC RBC AUTO-ENTMCNC: 34 G/DL (ref 33–36)
MCV RBC AUTO: 85.9 FL (ref 80–94)
MCV RBC AUTO: 85.9 FL (ref 80–94)
MCV RBC AUTO: 86.2 FL (ref 80–94)
MECH RR (OHS): 28 B/MIN
MECH RR (OHS): 28 B/MIN
MECH VT (OHS): 530 ML
MECH VT (OHS): 530 ML
METHGB MFR BLDA: 0.5 % (ref 0.4–1.5)
MODE (OHS): ABNORMAL
MODE (OHS): AC
MONOCYTES # BLD AUTO: 0.33 X10(3)/MCL (ref 0.1–1.3)
MONOCYTES # BLD AUTO: 0.45 X10(3)/MCL (ref 0.1–1.3)
MONOCYTES # BLD AUTO: 0.52 X10(3)/MCL (ref 0.1–1.3)
MONOCYTES NFR BLD AUTO: 4 %
MONOCYTES NFR BLD AUTO: 5.2 %
MONOCYTES NFR BLD AUTO: 5.6 %
NEUTROPHILS # BLD AUTO: 6.75 X10(3)/MCL (ref 2.1–9.2)
NEUTROPHILS # BLD AUTO: 6.88 X10(3)/MCL (ref 2.1–9.2)
NEUTROPHILS # BLD AUTO: 7.28 X10(3)/MCL (ref 2.1–9.2)
NEUTROPHILS NFR BLD AUTO: 78.6 %
NEUTROPHILS NFR BLD AUTO: 78.7 %
NEUTROPHILS NFR BLD AUTO: 82.7 %
NRBC BLD AUTO-RTO: 0 %
O2 HB BLOOD GAS (OHS): 94.5 % (ref 94–97)
OXYGEN DEVICE BLOOD GAS (OHS): ABNORMAL
OXYGEN DEVICE BLOOD GAS (OHS): ABNORMAL
OXYHGB MFR BLDA: 9.9 G/DL (ref 12–16)
PCO2 BLDA: 48 MMHG (ref 35–45)
PCO2 BLDA: 50 MMHG (ref 35–45)
PEEP (OHS): 12 CMH2O
PEEP (OHS): 12 CMH2O
PH BLDA: 7.33 [PH] (ref 7.35–7.45)
PH BLDA: 7.36 [PH] (ref 7.35–7.45)
PHOSPHATE SERPL-MCNC: 3.5 MG/DL (ref 2.3–4.7)
PLATELET # BLD AUTO: 110 X10(3)/MCL (ref 130–400)
PLATELET # BLD AUTO: 86 X10(3)/MCL (ref 130–400)
PLATELET # BLD AUTO: 91 X10(3)/MCL (ref 130–400)
PMV BLD AUTO: 10.4 FL (ref 7.4–10.4)
PMV BLD AUTO: 11 FL (ref 7.4–10.4)
PMV BLD AUTO: 11.1 FL (ref 7.4–10.4)
PO2 BLDA: 70 MMHG (ref 80–100)
PO2 BLDA: 82 MMHG (ref 80–100)
POTASSIUM BLOOD GAS (OHS): 4.4 MMOL/L (ref 3.5–5)
POTASSIUM BLOOD GAS (OHS): 4.5 MMOL/L (ref 3.5–5)
POTASSIUM SERPL-SCNC: 4.8 MMOL/L (ref 3.5–5.1)
PROT SERPL-MCNC: 4.7 GM/DL (ref 6.4–8.3)
PROTHROMBIN TIME: 16.5 SECONDS (ref 12.5–14.5)
RBC # BLD AUTO: 3.04 X10(6)/MCL (ref 4.7–6.1)
RBC # BLD AUTO: 3.11 X10(6)/MCL (ref 4.7–6.1)
RBC # BLD AUTO: 3.12 X10(6)/MCL (ref 4.7–6.1)
SAMPLE SITE BLOOD GAS (OHS): ABNORMAL
SAMPLE SITE BLOOD GAS (OHS): ABNORMAL
SAO2 % BLDA: 92.5 %
SAO2 % BLDA: 96 %
SODIUM BLOOD GAS (OHS): 137 MMOL/L (ref 137–145)
SODIUM BLOOD GAS (OHS): 139 MMOL/L (ref 137–145)
SODIUM SERPL-SCNC: 145 MMOL/L (ref 136–145)
UNIT NUMBER: NORMAL
WBC # SPEC AUTO: 8.31 X10(3)/MCL (ref 4.5–11.5)
WBC # SPEC AUTO: 8.58 X10(3)/MCL (ref 4.5–11.5)
WBC # SPEC AUTO: 9.27 X10(3)/MCL (ref 4.5–11.5)

## 2023-06-06 PROCEDURE — 36000706: Performed by: NEUROLOGICAL SURGERY

## 2023-06-06 PROCEDURE — D9220A PRA ANESTHESIA: Mod: CRNA,,, | Performed by: NURSE ANESTHETIST, CERTIFIED REGISTERED

## 2023-06-06 PROCEDURE — 25000003 PHARM REV CODE 250: Performed by: NURSE ANESTHETIST, CERTIFIED REGISTERED

## 2023-06-06 PROCEDURE — 25000003 PHARM REV CODE 250: Performed by: NEUROLOGICAL SURGERY

## 2023-06-06 PROCEDURE — P9016 RBC LEUKOCYTES REDUCED: HCPCS | Performed by: NEUROLOGICAL SURGERY

## 2023-06-06 PROCEDURE — P9017 PLASMA 1 DONOR FRZ W/IN 8 HR: HCPCS | Performed by: STUDENT IN AN ORGANIZED HEALTH CARE EDUCATION/TRAINING PROGRAM

## 2023-06-06 PROCEDURE — 85610 PROTHROMBIN TIME: CPT | Performed by: STUDENT IN AN ORGANIZED HEALTH CARE EDUCATION/TRAINING PROGRAM

## 2023-06-06 PROCEDURE — 85025 COMPLETE CBC W/AUTO DIFF WBC: CPT | Performed by: STUDENT IN AN ORGANIZED HEALTH CARE EDUCATION/TRAINING PROGRAM

## 2023-06-06 PROCEDURE — 99900035 HC TECH TIME PER 15 MIN (STAT)

## 2023-06-06 PROCEDURE — 63600175 PHARM REV CODE 636 W HCPCS: Performed by: STUDENT IN AN ORGANIZED HEALTH CARE EDUCATION/TRAINING PROGRAM

## 2023-06-06 PROCEDURE — 37799 UNLISTED PX VASCULAR SURGERY: CPT

## 2023-06-06 PROCEDURE — 20800000 HC ICU TRAUMA

## 2023-06-06 PROCEDURE — 93010 ELECTROCARDIOGRAM REPORT: CPT | Mod: ,,, | Performed by: INTERNAL MEDICINE

## 2023-06-06 PROCEDURE — 80053 COMPREHEN METABOLIC PANEL: CPT | Performed by: STUDENT IN AN ORGANIZED HEALTH CARE EDUCATION/TRAINING PROGRAM

## 2023-06-06 PROCEDURE — 85025 COMPLETE CBC W/AUTO DIFF WBC: CPT | Performed by: SURGERY

## 2023-06-06 PROCEDURE — 83735 ASSAY OF MAGNESIUM: CPT | Performed by: STUDENT IN AN ORGANIZED HEALTH CARE EDUCATION/TRAINING PROGRAM

## 2023-06-06 PROCEDURE — 25000003 PHARM REV CODE 250

## 2023-06-06 PROCEDURE — 94003 VENT MGMT INPAT SUBQ DAY: CPT

## 2023-06-06 PROCEDURE — 20000000 HC ICU ROOM

## 2023-06-06 PROCEDURE — 99900026 HC AIRWAY MAINTENANCE (STAT)

## 2023-06-06 PROCEDURE — 84100 ASSAY OF PHOSPHORUS: CPT | Performed by: STUDENT IN AN ORGANIZED HEALTH CARE EDUCATION/TRAINING PROGRAM

## 2023-06-06 PROCEDURE — 37000009 HC ANESTHESIA EA ADD 15 MINS: Performed by: NEUROLOGICAL SURGERY

## 2023-06-06 PROCEDURE — 27000221 HC OXYGEN, UP TO 24 HOURS

## 2023-06-06 PROCEDURE — 94761 N-INVAS EAR/PLS OXIMETRY MLT: CPT

## 2023-06-06 PROCEDURE — 93010 EKG 12-LEAD: ICD-10-PCS | Mod: ,,, | Performed by: INTERNAL MEDICINE

## 2023-06-06 PROCEDURE — P9035 PLATELET PHERES LEUKOREDUCED: HCPCS | Performed by: STUDENT IN AN ORGANIZED HEALTH CARE EDUCATION/TRAINING PROGRAM

## 2023-06-06 PROCEDURE — 85730 THROMBOPLASTIN TIME PARTIAL: CPT | Performed by: NEUROLOGICAL SURGERY

## 2023-06-06 PROCEDURE — 25000242 PHARM REV CODE 250 ALT 637 W/ HCPCS: Performed by: HOSPITALIST

## 2023-06-06 PROCEDURE — 36620 INSERTION CATHETER ARTERY: CPT | Mod: ,,, | Performed by: NURSE ANESTHETIST, CERTIFIED REGISTERED

## 2023-06-06 PROCEDURE — 25000242 PHARM REV CODE 250 ALT 637 W/ HCPCS: Performed by: NURSE ANESTHETIST, CERTIFIED REGISTERED

## 2023-06-06 PROCEDURE — P9035 PLATELET PHERES LEUKOREDUCED: HCPCS | Performed by: SURGERY

## 2023-06-06 PROCEDURE — D9220A PRA ANESTHESIA: ICD-10-PCS | Mod: CRNA,,, | Performed by: NURSE ANESTHETIST, CERTIFIED REGISTERED

## 2023-06-06 PROCEDURE — 63600175 PHARM REV CODE 636 W HCPCS: Performed by: PHYSICIAN ASSISTANT

## 2023-06-06 PROCEDURE — 83605 ASSAY OF LACTIC ACID: CPT | Performed by: STUDENT IN AN ORGANIZED HEALTH CARE EDUCATION/TRAINING PROGRAM

## 2023-06-06 PROCEDURE — 36620 ARTERIAL: ICD-10-PCS | Mod: ,,, | Performed by: NURSE ANESTHETIST, CERTIFIED REGISTERED

## 2023-06-06 PROCEDURE — 63600175 PHARM REV CODE 636 W HCPCS: Mod: JZ,JG | Performed by: SURGERY

## 2023-06-06 PROCEDURE — 94640 AIRWAY INHALATION TREATMENT: CPT

## 2023-06-06 PROCEDURE — 82803 BLOOD GASES ANY COMBINATION: CPT

## 2023-06-06 PROCEDURE — D9220A PRA ANESTHESIA: Mod: ANES,,, | Performed by: ANESTHESIOLOGY

## 2023-06-06 PROCEDURE — 25000003 PHARM REV CODE 250: Performed by: SURGERY

## 2023-06-06 PROCEDURE — 36600 WITHDRAWAL OF ARTERIAL BLOOD: CPT

## 2023-06-06 PROCEDURE — 37000008 HC ANESTHESIA 1ST 15 MINUTES: Performed by: NEUROLOGICAL SURGERY

## 2023-06-06 PROCEDURE — 63600175 PHARM REV CODE 636 W HCPCS: Performed by: NURSE ANESTHETIST, CERTIFIED REGISTERED

## 2023-06-06 PROCEDURE — 93005 ELECTROCARDIOGRAM TRACING: CPT

## 2023-06-06 PROCEDURE — 25000003 PHARM REV CODE 250: Performed by: STUDENT IN AN ORGANIZED HEALTH CARE EDUCATION/TRAINING PROGRAM

## 2023-06-06 PROCEDURE — P9045 ALBUMIN (HUMAN), 5%, 250 ML: HCPCS | Mod: JZ,JG | Performed by: SURGERY

## 2023-06-06 PROCEDURE — 36000707: Performed by: NEUROLOGICAL SURGERY

## 2023-06-06 PROCEDURE — 63600175 PHARM REV CODE 636 W HCPCS

## 2023-06-06 PROCEDURE — D9220A PRA ANESTHESIA: ICD-10-PCS | Mod: ANES,,, | Performed by: ANESTHESIOLOGY

## 2023-06-06 RX ORDER — ENOXAPARIN SODIUM 100 MG/ML
40 INJECTION SUBCUTANEOUS EVERY 12 HOURS
Status: COMPLETED | OUTPATIENT
Start: 2023-06-06 | End: 2023-06-07

## 2023-06-06 RX ORDER — MIDAZOLAM HYDROCHLORIDE 5 MG/ML
2 INJECTION INTRAMUSCULAR; INTRAVENOUS EVERY 4 HOURS PRN
Status: DISCONTINUED | OUTPATIENT
Start: 2023-06-06 | End: 2023-06-23

## 2023-06-06 RX ORDER — HYDROCODONE BITARTRATE AND ACETAMINOPHEN 500; 5 MG/1; MG/1
TABLET ORAL
Status: DISCONTINUED | OUTPATIENT
Start: 2023-06-06 | End: 2023-06-07

## 2023-06-06 RX ORDER — PROPOFOL 10 MG/ML
VIAL (ML) INTRAVENOUS CONTINUOUS PRN
Status: DISCONTINUED | OUTPATIENT
Start: 2023-06-06 | End: 2023-06-06

## 2023-06-06 RX ORDER — OXYCODONE HYDROCHLORIDE 10 MG/1
10 TABLET ORAL EVERY 6 HOURS PRN
Status: DISCONTINUED | OUTPATIENT
Start: 2023-06-06 | End: 2023-06-12

## 2023-06-06 RX ORDER — HYDRALAZINE HYDROCHLORIDE 20 MG/ML
10 INJECTION INTRAMUSCULAR; INTRAVENOUS EVERY 4 HOURS PRN
Status: DISCONTINUED | OUTPATIENT
Start: 2023-06-06 | End: 2023-06-26 | Stop reason: HOSPADM

## 2023-06-06 RX ORDER — METHOCARBAMOL 100 MG/ML
500 INJECTION, SOLUTION INTRAMUSCULAR; INTRAVENOUS EVERY 8 HOURS
Status: COMPLETED | OUTPATIENT
Start: 2023-06-06 | End: 2023-06-09

## 2023-06-06 RX ORDER — ALBUMIN HUMAN 50 G/1000ML
50 SOLUTION INTRAVENOUS ONCE
Status: COMPLETED | OUTPATIENT
Start: 2023-06-06 | End: 2023-06-06

## 2023-06-06 RX ORDER — ALBUTEROL SULFATE 90 UG/1
AEROSOL, METERED RESPIRATORY (INHALATION)
Status: DISCONTINUED | OUTPATIENT
Start: 2023-06-06 | End: 2023-06-06

## 2023-06-06 RX ORDER — CALCIUM CHLORIDE INJECTION 100 MG/ML
INJECTION, SOLUTION INTRAVENOUS
Status: DISCONTINUED | OUTPATIENT
Start: 2023-06-06 | End: 2023-06-06

## 2023-06-06 RX ORDER — OXYCODONE HYDROCHLORIDE 5 MG/1
5 TABLET ORAL EVERY 6 HOURS PRN
Status: DISCONTINUED | OUTPATIENT
Start: 2023-06-06 | End: 2023-06-12

## 2023-06-06 RX ORDER — ROCURONIUM BROMIDE 10 MG/ML
INJECTION, SOLUTION INTRAVENOUS
Status: DISCONTINUED | OUTPATIENT
Start: 2023-06-06 | End: 2023-06-06

## 2023-06-06 RX ORDER — LABETALOL HYDROCHLORIDE 5 MG/ML
10 INJECTION, SOLUTION INTRAVENOUS EVERY 6 HOURS PRN
Status: DISCONTINUED | OUTPATIENT
Start: 2023-06-06 | End: 2023-06-16

## 2023-06-06 RX ORDER — NOREPINEPHRINE BITARTRATE/D5W 8 MG/250ML
0-3 PLASTIC BAG, INJECTION (ML) INTRAVENOUS CONTINUOUS
Status: DISCONTINUED | OUTPATIENT
Start: 2023-06-06 | End: 2023-06-15

## 2023-06-06 RX ORDER — NOREPINEPHRINE BITARTRATE/D5W 8 MG/250ML
PLASTIC BAG, INJECTION (ML) INTRAVENOUS
Status: COMPLETED
Start: 2023-06-06 | End: 2023-06-06

## 2023-06-06 RX ORDER — GABAPENTIN 300 MG/1
300 CAPSULE ORAL 3 TIMES DAILY
Status: DISCONTINUED | OUTPATIENT
Start: 2023-06-06 | End: 2023-06-12

## 2023-06-06 RX ORDER — DEXMEDETOMIDINE HYDROCHLORIDE 4 UG/ML
0-1.4 INJECTION, SOLUTION INTRAVENOUS CONTINUOUS
Status: DISCONTINUED | OUTPATIENT
Start: 2023-06-06 | End: 2023-06-19

## 2023-06-06 RX ORDER — PROPOFOL 10 MG/ML
VIAL (ML) INTRAVENOUS
Status: DISCONTINUED | OUTPATIENT
Start: 2023-06-06 | End: 2023-06-06

## 2023-06-06 RX ADMIN — ENOXAPARIN SODIUM 40 MG: 40 INJECTION SUBCUTANEOUS at 11:06

## 2023-06-06 RX ADMIN — GABAPENTIN 300 MG: 300 CAPSULE ORAL at 02:06

## 2023-06-06 RX ADMIN — IPRATROPIUM BROMIDE AND ALBUTEROL SULFATE 3 ML: 2.5; .5 SOLUTION RESPIRATORY (INHALATION) at 08:06

## 2023-06-06 RX ADMIN — FAMOTIDINE 20 MG: 10 INJECTION, SOLUTION INTRAVENOUS at 08:06

## 2023-06-06 RX ADMIN — ALBUMIN (HUMAN) 50 G: 2.5 SOLUTION INTRAVENOUS at 10:06

## 2023-06-06 RX ADMIN — IPRATROPIUM BROMIDE AND ALBUTEROL SULFATE 3 ML: 2.5; .5 SOLUTION RESPIRATORY (INHALATION) at 02:06

## 2023-06-06 RX ADMIN — GABAPENTIN 300 MG: 300 CAPSULE ORAL at 08:06

## 2023-06-06 RX ADMIN — PROPOFOL 50 MCG/KG/MIN: 10 INJECTION, EMULSION INTRAVENOUS at 01:06

## 2023-06-06 RX ADMIN — OXYCODONE HYDROCHLORIDE 10 MG: 10 TABLET ORAL at 02:06

## 2023-06-06 RX ADMIN — CALCIUM CHLORIDE INJECTION 1 G: 100 INJECTION, SOLUTION INTRAVENOUS at 08:06

## 2023-06-06 RX ADMIN — Medication 1 TABLET: at 08:06

## 2023-06-06 RX ADMIN — Medication 200 MCG/HR: at 04:06

## 2023-06-06 RX ADMIN — PROPOFOL 30 MG: 10 INJECTION, EMULSION INTRAVENOUS at 07:06

## 2023-06-06 RX ADMIN — PROPOFOL 50 MG: 10 INJECTION, EMULSION INTRAVENOUS at 08:06

## 2023-06-06 RX ADMIN — CEFAZOLIN SODIUM 2 G: 2 SOLUTION INTRAVENOUS at 04:06

## 2023-06-06 RX ADMIN — DEXMEDETOMIDINE HYDROCHLORIDE 0.8 MCG/KG/HR: 400 INJECTION INTRAVENOUS at 11:06

## 2023-06-06 RX ADMIN — METHOCARBAMOL 500 MG: 100 INJECTION, SOLUTION INTRAMUSCULAR; INTRAVENOUS at 09:06

## 2023-06-06 RX ADMIN — REMIFENTANIL HYDROCHLORIDE 0.1 MCG/KG/MIN: 1 INJECTION, POWDER, LYOPHILIZED, FOR SOLUTION INTRAVENOUS at 08:06

## 2023-06-06 RX ADMIN — PROPOFOL 50 MCG/KG/MIN: 10 INJECTION, EMULSION INTRAVENOUS at 07:06

## 2023-06-06 RX ADMIN — HYDRALAZINE HYDROCHLORIDE 10 MG: 20 INJECTION INTRAMUSCULAR; INTRAVENOUS at 10:06

## 2023-06-06 RX ADMIN — NOREPINEPHRINE BITARTRATE 0.04 MCG/KG/MIN: 8 INJECTION, SOLUTION INTRAVENOUS at 10:06

## 2023-06-06 RX ADMIN — SODIUM CHLORIDE, POTASSIUM CHLORIDE, SODIUM LACTATE AND CALCIUM CHLORIDE: 600; 310; 30; 20 INJECTION, SOLUTION INTRAVENOUS at 05:06

## 2023-06-06 RX ADMIN — ALBUTEROL SULFATE 5 PUFF: 90 AEROSOL, METERED RESPIRATORY (INHALATION) at 08:06

## 2023-06-06 RX ADMIN — PROPOFOL 45 MCG/KG/MIN: 10 INJECTION, EMULSION INTRAVENOUS at 09:06

## 2023-06-06 RX ADMIN — Medication 0.04 MCG/KG/MIN: at 10:06

## 2023-06-06 RX ADMIN — ROCURONIUM BROMIDE 20 MG: 10 SOLUTION INTRAVENOUS at 08:06

## 2023-06-06 RX ADMIN — PROPOFOL 50 MCG/KG/MIN: 10 INJECTION, EMULSION INTRAVENOUS at 11:06

## 2023-06-06 RX ADMIN — SODIUM CHLORIDE, POTASSIUM CHLORIDE, SODIUM LACTATE AND CALCIUM CHLORIDE: 600; 310; 30; 20 INJECTION, SOLUTION INTRAVENOUS at 04:06

## 2023-06-06 RX ADMIN — SODIUM CHLORIDE, SODIUM GLUCONATE, SODIUM ACETATE, POTASSIUM CHLORIDE AND MAGNESIUM CHLORIDE: 526; 502; 368; 37; 30 INJECTION, SOLUTION INTRAVENOUS at 07:06

## 2023-06-06 RX ADMIN — PHENYLEPHRINE HYDROCHLORIDE 0.17 MCG/KG/MIN: 10 INJECTION INTRAVENOUS at 08:06

## 2023-06-06 RX ADMIN — ACETAMINOPHEN 650 MG: 650 SOLUTION ORAL at 05:06

## 2023-06-06 RX ADMIN — PROPOFOL 50 MCG/KG/MIN: 10 INJECTION, EMULSION INTRAVENOUS at 08:06

## 2023-06-06 RX ADMIN — IPRATROPIUM BROMIDE AND ALBUTEROL SULFATE 3 ML: 2.5; .5 SOLUTION RESPIRATORY (INHALATION) at 01:06

## 2023-06-06 RX ADMIN — METHOCARBAMOL 500 MG: 100 INJECTION, SOLUTION INTRAMUSCULAR; INTRAVENOUS at 02:06

## 2023-06-06 RX ADMIN — DEXMEDETOMIDINE HYDROCHLORIDE 0.8 MCG/KG/HR: 400 INJECTION INTRAVENOUS at 05:06

## 2023-06-06 RX ADMIN — DEXMEDETOMIDINE HYDROCHLORIDE 0.6 MCG/KG/HR: 400 INJECTION INTRAVENOUS at 09:06

## 2023-06-06 RX ADMIN — MUPIROCIN: 20 OINTMENT TOPICAL at 08:06

## 2023-06-06 RX ADMIN — PROPOFOL 50 MCG/KG/MIN: 10 INJECTION, EMULSION INTRAVENOUS at 04:06

## 2023-06-06 RX ADMIN — PROPOFOL 30 MCG/KG/MIN: 10 INJECTION, EMULSION INTRAVENOUS at 05:06

## 2023-06-06 RX ADMIN — Medication 150 MCG/HR: at 02:06

## 2023-06-06 RX ADMIN — SODIUM CHLORIDE, POTASSIUM CHLORIDE, SODIUM LACTATE AND CALCIUM CHLORIDE 1000 ML: 600; 310; 30; 20 INJECTION, SOLUTION INTRAVENOUS at 03:06

## 2023-06-06 RX ADMIN — ENOXAPARIN SODIUM 40 MG: 40 INJECTION SUBCUTANEOUS at 08:06

## 2023-06-06 RX ADMIN — ROCURONIUM BROMIDE 30 MG: 10 SOLUTION INTRAVENOUS at 08:06

## 2023-06-06 RX ADMIN — ACETAMINOPHEN 650 MG: 650 SOLUTION ORAL at 01:06

## 2023-06-06 RX ADMIN — DEXMEDETOMIDINE HYDROCHLORIDE 1.2 MCG/KG/HR: 400 INJECTION INTRAVENOUS at 11:06

## 2023-06-06 NOTE — Clinical Note
Pt turned prone.. difficulty ventilating. Spo2 in 80's pt head turned... easier to ventilate in that postion but still unable to get spo2 into the 90's .. pt repositioned supine.   OETT pulled back to 23 (was at 25).  Audible wheezing noted b/l.  Pt  OETT suctioned for moderate amount thick secretions.   Pt still wheezing with audible bs =bl.  Puffs of ventolin given.

## 2023-06-06 NOTE — TRANSFER OF CARE
"Anesthesia Transfer of Care Note    Patient: Gera Chavez    Procedure(s) Performed: Procedure(s) (LRB):  FUSION, SPINE, LUMBAR, PLIF, USING COMPUTER-ASSISTED NAVIGATION (N/A)    Patient location: ICU    Anesthesia Type: general    Transport from OR: Transported from OR intubated on 100% O2 by AMBU with assisted ventilation    Post pain: adequate analgesia    Post vital signs: stable    Level of consciousness: sedated    Nausea/Vomiting: no nausea/vomiting    Complications: respiratory complications          Last vitals:   Visit Vitals  BP (!) 121/46   Pulse (!) 125   Temp 36.9 °C (98.4 °F)   Resp (!) 22   Ht 5' 10.98" (1.803 m)   Wt 121 kg (266 lb 12.1 oz)   SpO2 (!) 92%   BMI 37.22 kg/m²     "

## 2023-06-06 NOTE — Clinical Note
Decision to cancel case.   Spo2 remains in 80's despite various attempts to raise it. Given the length and extent of the surgery will attempt at another time.

## 2023-06-06 NOTE — NURSING
Nurses Note -- 4 Eyes      6/6/2023   12:59 PM      Skin assessed during: Daily Assessment      [x] No Altered Skin Integrity Present    [x]Prevention Measures Documented      [] Yes- Altered Skin Integrity Present or Discovered   [] LDA Added if Not in Epic (Describe Wound)   [] New Altered Skin Integrity was Present on Admit and Documented in LDA   [] Wound Image Taken    Wound Care Consulted? No    Attending Nurse:  Luis Gillis RN     Second RN/Staff Member:  Sarah Beth Hickey RN

## 2023-06-06 NOTE — PROGRESS NOTES
Mr. Chavez was brought to the operating room today for a scheduled T11-L4 posterolateral fusion with L1-2 laminectomy.      Yesterday on 06/05/2023, the patient underwent anterior pelvic ORIF by Dr. Zepeda.  Postoperatively, he had his ET tube changed and also had a bronchoscopy with improved pulmonary status.  He received platelets, FFP, and 1 unit of packed red blood cells before going to surgery this morning.      When Mr. Chavez was positioned prone, the patient had O2 desaturations to the 80s.  This continued even when he was paralyzed, as the patient was not initially paralyzed for neuro monitoring purposes.    A combined decision was made by anesthesia and by Neurosurgery to turn the patient back supine, his O2 desaturations did not significantly improve.  Therefore the thoracolumbar fusion procedure was canceled and Mr. Chavez was brought back to the ICU.      I informed trauma surgeon Dr. Sauer, critical care specialist Dr. Hoyt, as well as orthopedic surgeon Dr. Zepeda.  In addition, I provided an update to Mr. Chavez' sister Yesi on the phone.      The patient has been rescheduled for a T11 to L4 posterior lateral fusion with L1-2 laminectomy on Friday, 6/9/2023 at 7:30 AM.     The prerequisite for proceeding with surgery include a hematocrit of at least 30, platelet count of at least 100,000, INR of 1.4 or less.  He also needs to be afebrile with an improved pulmonary status.

## 2023-06-06 NOTE — ANESTHESIA PROCEDURE NOTES
Arterial    Diagnosis: Thoraco-Lumbar  Burst fx and Pelvic Fx  Doctor requesting consult: Carlo    Patient location during procedure: done in OR  Procedure start time: 6/6/2023 7:46 AM  Timeout: 6/6/2023 7:05 AM  Procedure end time: 6/6/2023 7:49 AM    Staffing  Authorizing Provider: Michael Garcia MD  Performing Provider: Matthew Uribe CRNA    Anesthesiologist was present at the time of the procedure.    Preanesthetic Checklist  Completed: patient identified, IV checked, site marked, risks and benefits discussed, surgical consent, monitors and equipment checked, pre-op evaluation, timeout performed and anesthesia consent givenArterial  Skin Prep: chlorhexidine gluconate  Local Infiltration: none  Orientation: left  Location: radial    Catheter Size: 20 G  Catheter placement by Anatomical landmarks. Heme positive aspiration all ports. Insertion Attempts: 1  Assessment  Dressing: secured with tape and tegaderm  Patient: Tolerated well

## 2023-06-06 NOTE — PLAN OF CARE
Problem: Adult Inpatient Plan of Care  Goal: Readiness for Transition of Care  Outcome: Ongoing, Not Progressing     Problem: Inability to Wean (Mechanical Ventilation, Invasive)  Goal: Mechanical Ventilation Liberation  Outcome: Ongoing, Not Progressing     Problem: Restraint, Nonbehavioral (Nonviolent)  Goal: Absence of Harm or Injury  Outcome: Ongoing, Progressing
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
## 2023-06-06 NOTE — PROGRESS NOTES
[address]  Orthopedics  Progress Note    Patient Name: Gera Chavez  MRN: [id number]  Admission Date: 6/4/2023  Hospital Length of Stay: 2 days  Attending Provider: Salvador Sauer MD  Primary Care Provider: Primary Doctor No  Follow-up For: Procedure(s) (LRB):  FUSION, SPINE, LUMBAR, PLIF, USING COMPUTER-ASSISTED NAVIGATION (N/A)    Post-Operative Day: Day of Surgery  Subjective:     Principal Problem:Closed pelvic ring fracture    Principal Orthopedic Problem: Day of Surgery   POD 1 ORIF pelvic ring. Needs left posterior column acetabulum fixation which will be staged    Interval History: Patient remains in ICU this morning. Intubated and sedated. He did receive 2 units of blood as well as cell saver approx 250 cc yesterday intraop. Plans to go to OR today with Dr. Matos for lumbar burst fracture.     Review of patient's allergies indicates:   Allergen Reactions    Iodine     Trazodone        Current Facility-Administered Medications   Medication    0.9%  NaCl infusion (for blood administration)    0.9%  NaCl infusion (for blood administration)    0.9%  NaCl infusion (for blood administration)    0.9%  NaCl infusion (for blood administration)    acetaminophen oral solution 650 mg    albuterol-ipratropium 2.5 mg-0.5 mg/3 mL nebulizer solution 3 mL    calcium-vitamin D3 500 mg-5 mcg (200 unit) per tablet 1 tablet    famotidine (PF) injection 20 mg    fentaNYL 2500 mcg in 0.9% sodium chloride 250 mL infusion premix (titrating)    lactated ringers infusion    mupirocin 2 % ointment    propofol (DIPRIVAN) 10 mg/mL infusion     Objective:     Vital Signs (Most Recent):  Temp: 100.2 °F (37.9 °C) (06/06/23 0515)  Pulse: (!) 117 (06/06/23 0700)  Resp: (!) 22 (06/06/23 0700)  BP: (!) 91/44 (06/06/23 0700)  SpO2: 96 % (06/06/23 0700) Vital Signs (24h Range):  Temp:  [98.2 °F (36.8 °C)-100.4 °F (38 °C)] 100.2 °F (37.9 °C)  Pulse:  [] 117  Resp:  [0-32] 22  SpO2:  [81 %-100 %] 96 %  BP:  "()/(42-95) 91/44     Weight: 121 kg (266 lb 12.1 oz)  Height: 5' 10.98" (180.3 cm)  Body mass index is 37.22 kg/m².      Intake/Output Summary (Last 24 hours) at 6/6/2023 0737  Last data filed at 6/6/2023 0630  Gross per 24 hour   Intake 8973.11 ml   Output 5240 ml   Net 3733.11 ml       Physical Exam:   Musculoskeletal:     Left lower extremity: incisions to pelvis and left hip with dressings in place. Mild drainage to left hip incision, anterior pelvis dressing is clean and dry; compartments are soft and compressible; neuromotor exam limited by patient status; BCR distally; DP pulse 2+      Diagnostic Findings:   Significant Labs:   Recent Lab Results  (Last 5 results in the past 72 hours)        06/06/23  0155   06/06/23  0126   06/05/23  2213   06/05/23  1612   06/05/23  1519        Albumin/Globulin Ratio   1.6       1.4       Albumin   2.9       3.3       Alkaline Phosphatase   78       81       Allens Test Yes               ALT   27       31       Appearance, UA       Clear         aPTT   30.0  Comment: For Minimal Heparin Infusion, the goal aPTT 64-85 seconds corresponds to an anti-Xa of 0.3-0.5.    For Low Intensity and High Intensity Heparin, the goal aPTT  seconds corresponds to an anti-Xa of 0.3-0.7             AST   49       63       Bacteria, UA       None Seen         Base Excess, Blood gas 0.10               Baso #   0.04       0.05       Basophil %   0.4       0.4       BILIRUBIN TOTAL   2.0       1.7       Bilirubin, UA       Negative         Sample Type Arterial Blood               BUN   14.6       16.8       Calcium   7.3       7.8       Ionized Calcium 1.03               Chloride   114       113       CO Hgb 2.6               CO2   23       19       Color, UA       Yellow         Creatinine   0.95       0.97       Drawn by  RRT               eGFR   >60       >60       Eos #   0.06       0.03       Eosinophil %   0.6       0.2       FIO2, Blood gas 60               Globulin, Total   " 1.8       2.3       Glucose   104       96       Glucose, UA       Negative         HCO3, Blood gas 26.4               Hematocrit   26.8   29.2     32.0       Hemoglobin   9.0   9.9     11.2       Immature Grans (Abs)   0.03       0.04       Immature Granulocytes   0.3       0.3       INR   1.34             Ketones, UA       Negative         Lactate, Jhonathan   1.9   2.6     5.5  Comment: Critical Result called and verified by verbal readback to: Franco Smithtinger on 06/05/2023 at 15:54. Reported by [id number].       Leukocytes, UA       2+         Lymph #   1.34       1.40       LYMPH %   14.5       10.9       Magnesium   1.80             MCH   28.8   29.0     29.3       MCHC   33.6   33.9     35.0       MCV   85.9   85.6     83.8       [facility] Vt 530               Met Hgb 0.5               MODE SIMV               Mono #   0.52       0.77       Mono %   5.6       6.0       MPV   11.0   10.2     10.5       Neut #   7.28       10.55       Neut %   78.6       82.2       NITRITE UA       Negative         nRBC   0.0   0.2     0.0       O2 Hb, Blood Gas 94.5               Occult Blood UA       2+         Oxygen Device, Blood gas Ventilator               pCO2, Blood gas 50.0               PEEP 12.0               pH, Blood gas 7.330               pH, UA       5.5         Phosphorus   3.5             Platelets   91   102     96       pO2, Blood gas 70.0               Potassium   4.8       5.1       Potassium, Blood Gas 4.4               PROTEIN TOTAL   4.7       5.6       Protein, UA       Trace         Protime   16.5             PS               RBC   3.12   3.41     3.82       RBC, UA       14         RDW   16.4   16.2     15.9       [facility] RR 28               Sample site Left Radial Artery               sO2, Blood gas 92.5               Sodium   145       145       Sodium, Blood Gas 137               Specific Gravity,UA       1.013         Squam Epithel, UA       <5         TOC2, Blood gas 27.9               THb, Blood gas 9.9                Urine Culture, Routine       No Growth At 24 Hours  [P]         Urobilinogen, UA       0.2         WBC, UA       13         WBC   9.27   12.47     12.84                               [P] - Preliminary Result                Significant Imaging: I have reviewed and interpreted all pertinent imaging results/findings.     Assessment/Plan:     Active Diagnoses:    Diagnosis Date Noted POA    PRINCIPAL PROBLEM:  Closed pelvic ring fracture [S32.810A] 06/05/2023 Yes    Displaced fracture of posterior column (ilioischial) of left acetabulum, initial encounter for closed fracture [S32.442A] 06/05/2023 Yes      Problems Resolved During this Admission:   HGB 9.0 following transfusion. Continue to monitor. Transfusion as necessary for neurosurgery today.   NWB BLE at this time. Planned staged fixation of the left posterior acetabulum fracture later this week likely.   Will discuss physical therapy once surgery is completed and status improves.   DVT ppx once okay with primary team following neurosurgery procedure today.   Will continue to follow through his stay and update accordingly, please call with any questions.      The above findings, diagnostics, and treatment plan were discussed with Dr. Zepeda who is in agreement with the plan of care except as stated in additional documentation.      Kailey Mars PA-C  Orthopedic Trauma Surgery  [facility]

## 2023-06-06 NOTE — Clinical Note
Pt tansported back to ICU with ambu 100% o2.  On monitor.. vss.   Spo2 in high 80's for transport.

## 2023-06-06 NOTE — TERTIARY TRAUMA SURVEY NOTE
TERTIARY TRAUMA SURVEY (TTS)    List Injuries Identified to Date:   1.  L pelvic rim fx w/ open book pelvis  L pelvic wall hematoma  L2 burst fx  L apical PTX  L 4-11 rib fx  pulm contusion    List Operations and Procedures:     Past Surgical History:   Procedure Laterality Date    INTRAMEDULLARY RODDING OF FEMUR Left 02/05/2023    Procedure: INSERTION, INTRAMEDULLARY NOEL, FEMUR;  Surgeon: Tuan Zepeda DO;  Location: [facility] OR;  Service: Orthopedics;  Laterality: Left;    REMOVAL OF HARDWARE FROM LOWER EXTREMITY Left 5/9/2023    Procedure: REMOVAL, HARDWARE, LOWER EXTREMITY;  Surgeon: Tuan Zepeda DO;  Location: [facility] OR;  Service: Orthopedics;  Laterality: Left;    TRACH TUBE EXCHANGER  6/5/2023    UVULOPALATOPHARYNGOPLASTY         Incidental findings:   1. none    Past Medical History:     Past Medical History:   Diagnosis Date    Asthma     Depression     Diabetes mellitus     Encounter for blood transfusion     Generalized anxiety disorder     Hypertension     Schizophrenia, unspecified     Sleep apnea     Unspecified glaucoma        Tertiary Physical Exam:     Physical Exam  Constitutional:       General: He is not in acute distress.     Comments: sedated   HENT:      Head: Normocephalic.   Cardiovascular:      Rate and Rhythm: Normal rate.      Pulses: Normal pulses.   Pulmonary:      Comments: On mechanical ventilation  Abdominal:      General: Abdomen is flat.      Palpations: Abdomen is soft.   Musculoskeletal:         General: Tenderness and signs of injury present.   Neurological:      Comments: Exam limited due to sedation       Imaging Review:     Imaging Results              X-Ray Pelvis Routine AP (Final result)  Result time 06/04/23 17:30:46      Final result by Roberto Parks MD (06/04/23 17:30:46)                   Impression:      Multiple fractures.      Electronically signed by: Roberto Parks  Date:    06/04/2023  Time:    17:30               Narrative:    EXAMINATION:  XR PELVIS ROUTINE  AP    CLINICAL HISTORY:  r/o bleeding or hemorrhage;    TECHNIQUE:  One view    FINDINGS:  The left half of the sacrum is fractured with severe comminution and displacement of the bony fragments.  The right aspect of the sacrum appears to be intact and right sacroiliac joint is also without diastasis.  There is severe fracture of the left acetabulum and the inferior pubic ramus.  There is considerable diastasis of the symphysis pubis.  Partially visualized left femoral intramedullary miguel.  There are several fragments subjacent to the proximal shaft of the femur.  No definite fracture of the right hip joint identified.                                       X-Ray Chest 1 View (Final result)  Result time 06/04/23 16:18:46      Final result by Harjinder Strong MD (06/04/23 16:18:46)                   Impression:      1. Endotracheal tube tip near the origin of the right mainstem bronchus and can be retracted slightly.  Additional tubing overlying the thoracic inlet may be an enteric tube.  2. Right upper lung consolidation.  Patchy opacities in the left lower lung may be atelectasis or contusion.      Electronically signed by: Harjinder Strong  Date:    06/04/2023  Time:    16:18               Narrative:    EXAMINATION:  XR CHEST 1 VIEW    CLINICAL HISTORY:  r/o bleeding or hemorrhage;    TECHNIQUE:  Frontal view(s) of the chest.    COMPARISON:  Radiography 02/08/2023    FINDINGS:  Endotracheal tube tip lies near the origin of the right mainstem bronchus.  Left chest tube tip in the perihilar region.  Additional tube tip noted near the thoracic inlet.  This may be an enteric tube.  Lungs are mildly hypoinflated with right upper lung consolidation.  Patchy opacity in the lower left lung.  No definitive pneumothorax.  Cardiac silhouette within normal limits for technique.                                       Lab Review:   CBC:  Recent Labs   Lab Result Units 06/04/23  1556 06/05/23  0429 06/05/23  1519 06/05/23  2213  06/06/23  0126   WBC x10(3)/mcL 28.67* 13.34* 12.84* 12.47* 9.27   RBC x10(6)/mcL 4.66* 3.88* 3.82* 3.41* 3.12*   Hgb g/dL 13.3* 11.2* 11.2* 9.9* 9.0*   Hct % 40.0* 33.1* 32.0* 29.2* 26.8*   Platelet x10(3)/mcL 189 138 96* 102* 91*   MCV fL 85.8 85.3 83.8 85.6 85.9   MCH pg 28.5 28.9 29.3 29.0 28.8   MCHC g/dL 33.3 33.8 35.0 33.9 33.6       CMP:  Recent Labs   Lab Result Units 06/04/23  1556 06/05/23  0429 06/05/23  1519 06/06/23  0126   Calcium Level Total mg/dL 8.3* 7.9* 7.8* 7.3*   Albumin Level g/dL 3.7 3.1* 3.3* 2.9*   Sodium Level mmol/L 144 145 145 145   Potassium Level mmol/L 5.6* 5.3* 5.1 4.8   Carbon Dioxide mmol/L 20* 21* 19* 23   Blood Urea Nitrogen mg/dL 12.8 18.1 16.8 14.6   Creatinine mg/dL 1.07 1.09 0.97 0.95   Alkaline Phosphatase unit/L 134 100 81 78   Alanine Aminotransferase unit/L 54 45 31 27   Aspartate Aminotransferase unit/L 76* 73* 63* 49*   Bilirubin Total mg/dL 1.2 0.9 1.7* 2.0*       Troponin:  No results for input(s): TROPONINI in the last 2160 hours.    ETOH:  Recent Labs     06/04/23  1556   ETHANOL <10.0        Urine Drug Screen:  Recent Labs     06/04/23  1649   COCAINE Negative   OPIATE Positive*   FENTANYL Positive*   MDMA Negative      Plan:     - Obtain XR of R shoulder and forearm  - OR with neurosurgery today  - 2 units PRBCs ordered   - Post op labs  - L chest tube to wall suction      6/6/2023 6:21 AM     The above findings, diagnostics, and treatment plan were discussed with Dr. Jarret Suaer  who will follow with further assessments and recommendations. Please call with any questions, concerns, or clinical status changes.

## 2023-06-06 NOTE — PROGRESS NOTES
[facility] - [facility] ICU  Pulmonary Critical Care Note    Patient Name: Gera Chavez  MRN: [id number]  Admission Date: 6/4/2023  Hospital Length of Stay: 2 days  Code Status: Full Code  Attending Provider: Salvador Sauer MD  Primary Care Provider: Primary Doctor No     Subjective:     HPI:   Patient is a 37-year-old male with no known medical history, who was transferred from [facility] for level 1 trauma activation, after patient reportedly jumped off a bridge.  Sustained rib fractures x4 left-sided, pelvic ring fracture, acetabulum fracture, L2 burst fracture, and left pneumothorax with chest tube in place.  Jump witnessed by bystander who called 911.     Hospital Course/Significant events:  6/4 - intubated   6/5 - bronchoscopy     24 Hour Interval History:  No acute events overnight, remains intubated and sedated   CXR with opacifications this AM, bronched by surgery team    Past Medical History:   Diagnosis Date    Asthma     Depression     Diabetes mellitus     Encounter for blood transfusion     Generalized anxiety disorder     Hypertension     Schizophrenia, unspecified     Sleep apnea     Unspecified glaucoma        Past Surgical History:   Procedure Laterality Date    INTRAMEDULLARY RODDING OF FEMUR Left 02/05/2023    Procedure: INSERTION, INTRAMEDULLARY NOEL, FEMUR;  Surgeon: Tuan Zepeda DO;  Location: [facility] OR;  Service: Orthopedics;  Laterality: Left;    REMOVAL OF HARDWARE FROM LOWER EXTREMITY Left 5/9/2023    Procedure: REMOVAL, HARDWARE, LOWER EXTREMITY;  Surgeon: Tuan Zepeda DO;  Location: [facility] OR;  Service: Orthopedics;  Laterality: Left;    TRACH TUBE EXCHANGER  6/5/2023    UVULOPALATOPHARYNGOPLASTY         Social History     Socioeconomic History    Marital status: Single   Tobacco Use    Smoking status: Every Day     Packs/day: 0.50     Years: 10.00     Pack years: 5.00     Types: Cigarettes    Smokeless tobacco: Never   Substance and Sexual Activity    Alcohol  use: Not Currently    Drug use: Never    Sexual activity: Not Currently     Social Determinants of Health     Financial Resource Strain: Unknown    Difficulty of Paying Living Expenses: Patient refused   Food Insecurity: Unknown    Worried About Running Out of Food in the Last Year: Patient refused    Ran Out of Food in the Last Year: Patient refused   Transportation Needs: Unknown    Lack of Transportation (Medical): Patient refused    Lack of Transportation (Non-Medical): Patient refused   Physical Activity: Unknown    Days of Exercise per Week: Patient refused   Stress: Unknown    Feeling of Stress : Patient refused   Social Connections: Unknown    Frequency of Communication with Friends and Family: Patient refused    Frequency of Social Gatherings with Friends and Family: Patient refused    Attends Club or Organization Meetings: Patient refused    Marital Status: Never    Housing Stability: Unknown    Unable to Pay for Housing in the Last Year: Patient refused    Unstable Housing in the Last Year: Patient refused           Current Outpatient Medications   Medication Instructions    albuterol (PROVENTIL/VENTOLIN HFA) 90 mcg/actuation inhaler 2 puffs, Inhalation, Every 4 hours PRN    ALBUTEROL INHL 90 mcg, Inhalation, Every 4 hours PRN    amLODIPine (NORVASC) 5 mg, Oral, Daily    amoxicillin-clavulanate 875-125mg (AUGMENTIN) 875-125 mg per tablet 1 tablet, Oral, Every 12 hours (non-standard times)    atorvastatin (LIPITOR) 20 mg, Oral, Daily    azithromycin (Z-CELESTINA) 500 mg, Oral, Daily    clonazePAM (KLONOPIN) 1 mg, Oral, 2 times daily PRN    dextroamphetamine-amphetamine (ADDERALL XR) 25 MG 24 hr capsule 25 mg, Oral, 2 times daily    FLUoxetine 20 MG capsule fluoxetine 20 mg capsule    FLUoxetine 40 mg, Oral, Daily    insulin glargine 100 units/mL SubQ pen Lantus Solostar U-100 Insulin 100 unit/mL (3 mL) subcutaneous pen    ipratropium (ATROVENT) 42 mcg (0.06 %) nasal spray Each Nostril    lamoTRIgine  (LAMICTAL) 25 MG tablet lamotrigine 25 mg tablet    LANTUS SOLOSTAR U-100 INSULIN glargine 100 units/mL SubQ pen Subcutaneous    latanoprost 0.005 % ophthalmic solution latanoprost 0.005 % eye drops    lisinopriL (PRINIVIL,ZESTRIL) 40 mg, Oral    mirtazapine (REMERON) 7.5 MG Tab mirtazapine 7.5 mg tablet    potassium chloride (K-TAB) 20 mEq potassium chloride ER 20 mEq tablet,extended release   TAKE 1 TABLET BY MOUTH EVERY DAY    prochlorperazine (COMPAZINE) 10 MG tablet prochlorperazine maleate 10 mg tablet    QUEtiapine (SEROQUEL) 200 mg, Oral, Nightly    testosterone cypionate (DEPOTESTOTERONE CYPIONATE) 200 mg/mL injection SMARTSIG:Milliliter(s) IM       Current Inpatient Medications   albuterol-ipratropium  3 mL Nebulization Q6H    calcium-vitamin D3  1 tablet Oral BID    famotidine (PF)  20 mg Intravenous BID    mupirocin   Nasal BID       Current Intravenous Infusions   fentanyl 150 mcg/hr (06/06/23 0705)    lactated ringers 125 mL/hr at 06/06/23 0437    propofoL 50 mcg/kg/min (06/06/23 0705)         Review of Systems   Reason unable to perform ROS: intubated and sedated.        Objective:       Intake/Output Summary (Last 24 hours) at 6/6/2023 0755  Last data filed at 6/6/2023 0630  Gross per 24 hour   Intake 8973.11 ml   Output 5240 ml   Net 3733.11 ml           Vital Signs (Most Recent):  Temp: 100.2 °F (37.9 °C) (06/06/23 0515)  Pulse: (!) 117 (06/06/23 0700)  Resp: (!) 22 (06/06/23 0700)  BP: (!) 91/44 (06/06/23 0700)  SpO2: 96 % (06/06/23 0700)  Body mass index is 37.22 kg/m².  Weight: 121 kg (266 lb 12.1 oz) Vital Signs (24h Range):  Temp:  [98.2 °F (36.8 °C)-100.4 °F (38 °C)] 100.2 °F (37.9 °C)  Pulse:  [] 117  Resp:  [0-32] 22  SpO2:  [89 %-100 %] 96 %  BP: ()/(42-94) 91/44     Physical Exam  Constitutional:       Comments: Intubated sedated   HENT:      Head: Normocephalic.      Mouth/Throat:      Mouth: Mucous membranes are moist.      Comments: ETT and OGT in palce  Cardiovascular:       Rate and Rhythm: Regular rhythm. Tachycardia present.   Pulmonary:      Comments: Mechanically ventilated   Abdominal:      General: Abdomen is flat.      Palpations: Abdomen is soft.   Genitourinary:     Comments: Titus in place  Skin:     General: Skin is warm and dry.         Lines/Drains/Airways       Central Venous Catheter Line  Duration             Percutaneous Central Line Insertion/Assessment - Double Lumen  06/04/23 1530 Subclavian Left 1 day              Drain  Duration                  Chest Tube 06/04/23 1549 Left Midaxillary 28 Fr. 1 day         NG/OG Tube 06/04/23 1554 16 Fr. Center mouth 1 day         Urethral Catheter 06/05/23 1610 Silicone 16 Fr. <1 day              Airway  Duration                  Airway - Non-Surgical 06/04/23 1549 Endotracheal Tube 1 day              Peripheral Intravenous Line  Duration                  Peripheral IV - Single Lumen 06/04/23 1549 18 G Left Forearm 1 day         Peripheral IV - Single Lumen 06/04/23 1554 18 G Right Antecubital 1 day                    Significant Labs:    Lab Results   Component Value Date    WBC 9.27 06/06/2023    HGB 9.0 (L) 06/06/2023    HCT 26.8 (L) 06/06/2023    MCV 85.9 06/06/2023    PLT 91 (L) 06/06/2023         BMP  Lab Results   Component Value Date     06/06/2023    K 4.8 06/06/2023    CHLORIDE 114 (H) 06/06/2023    CO2 23 06/06/2023    BUN 14.6 06/06/2023    CREATININE 0.95 06/06/2023    CALCIUM 7.3 (L) 06/06/2023    AGAP 12.0 02/05/2023       ABG  No results for input(s): PH, PO2, PCO2, HCO3, BE in the last 168 hours.      Mechanical Ventilation Support:  Vent Mode: SIMV (06/06/23 0429)  Set Rate: 28 BPM (06/06/23 0429)  Vt Set: 530 mL (06/06/23 0429)  Pressure Support: 12 cmH20 (06/06/23 0429)  PEEP/CPAP: 12 cmH20 (06/06/23 0429)  Oxygen Concentration (%): 40 (06/06/23 0705)  Peak Airway Pressure: 22 cmH20 (06/06/23 0429)  Total Ve: 12.9 L/m (06/06/23 0429)  F/VT Ratio<105 (RSBI): (!) 61.12 (06/06/23 0429)    Significant  Imaging:  I have reviewed the pertinent imaging within the past 24 hours.        Assessment/Plan:     Assessment  Pelvic ring fx s/p ORIF on 6/5 POD 1  L rib fractures with pneumo/hemo thorax s/p chest tube placement  L2 compression fracture     Plan  Intubated and sedated, on propofol and fentanyl   Continue ICU level of care; pt admitted to trauma surgery, neurosurgery, orthopedic surgery on board   Planned for OR today with NSY for laminectomy and lumbar fusion  Pt under PEC  Cont routine chest tube care    DVT Prophylaxis: SCD  GI Prophylaxis: pepcid     32 minutes of critical care was time spent personally by me on the following activities: development of treatment plan with patient or surrogate and bedside caregivers, discussions with consultants, evaluation of patient's response to treatment, examination of patient, ordering and performing treatments and interventions, ordering and review of laboratory studies, ordering and review of radiographic studies, pulse oximetry, re-evaluation of patient's condition.  This critical care time did not overlap with that of any other provider or involve time for any procedures.     José Luis Diop MD  Pulmonary Critical Care Medicine  [facility] - [facility] ICU

## 2023-06-07 LAB
ALBUMIN SERPL-MCNC: 2.8 G/DL (ref 3.5–5)
ALBUMIN/GLOB SERPL: 1.3 RATIO (ref 1.1–2)
ALLENS TEST BLOOD GAS (OHS): NORMAL
ALP SERPL-CCNC: 74 UNIT/L (ref 40–150)
ALT SERPL-CCNC: 18 UNIT/L (ref 0–55)
AST SERPL-CCNC: 37 UNIT/L (ref 5–34)
BACTERIA UR CULT: NO GROWTH
BASE EXCESS BLD CALC-SCNC: 3 MMOL/L
BASOPHILS # BLD AUTO: 0.03 X10(3)/MCL
BASOPHILS # BLD AUTO: 0.04 X10(3)/MCL
BASOPHILS # BLD AUTO: 0.04 X10(3)/MCL
BASOPHILS # BLD AUTO: 0.05 X10(3)/MCL
BASOPHILS NFR BLD AUTO: 0.4 %
BASOPHILS NFR BLD AUTO: 0.5 %
BASOPHILS NFR BLD AUTO: 0.5 %
BASOPHILS NFR BLD AUTO: 0.6 %
BILIRUBIN DIRECT+TOT PNL SERPL-MCNC: 1.5 MG/DL
BLOOD GAS SAMPLE TYPE (OHS): NORMAL
BUN SERPL-MCNC: 8.1 MG/DL (ref 8.9–20.6)
CA-I BLD-SCNC: 1.12 MMOL/L (ref 1.12–1.23)
CALCIUM SERPL-MCNC: 7.9 MG/DL (ref 8.4–10.2)
CHLORIDE SERPL-SCNC: 113 MMOL/L (ref 98–107)
CO2 BLDA-SCNC: 28.3 MMOL/L
CO2 SERPL-SCNC: 25 MMOL/L (ref 22–29)
CREAT SERPL-MCNC: 0.74 MG/DL (ref 0.73–1.18)
DRAWN BY BLOOD GAS (OHS): NORMAL
EOSINOPHIL # BLD AUTO: 0.16 X10(3)/MCL (ref 0–0.9)
EOSINOPHIL # BLD AUTO: 0.16 X10(3)/MCL (ref 0–0.9)
EOSINOPHIL # BLD AUTO: 0.18 X10(3)/MCL (ref 0–0.9)
EOSINOPHIL # BLD AUTO: 0.18 X10(3)/MCL (ref 0–0.9)
EOSINOPHIL NFR BLD AUTO: 1.9 %
EOSINOPHIL NFR BLD AUTO: 2 %
EOSINOPHIL NFR BLD AUTO: 2.2 %
EOSINOPHIL NFR BLD AUTO: 2.3 %
ERYTHROCYTE [DISTWIDTH] IN BLOOD BY AUTOMATED COUNT: 15.9 % (ref 11.5–17)
ERYTHROCYTE [DISTWIDTH] IN BLOOD BY AUTOMATED COUNT: 15.9 % (ref 11.5–17)
ERYTHROCYTE [DISTWIDTH] IN BLOOD BY AUTOMATED COUNT: 16.1 % (ref 11.5–17)
ERYTHROCYTE [DISTWIDTH] IN BLOOD BY AUTOMATED COUNT: 16.1 % (ref 11.5–17)
FIO2 BLOOD GAS (OHS): 50 %
GFR SERPLBLD CREATININE-BSD FMLA CKD-EPI: >60 MLS/MIN/1.73/M2
GLOBULIN SER-MCNC: 2.2 GM/DL (ref 2.4–3.5)
GLUCOSE SERPL-MCNC: 114 MG/DL (ref 74–100)
HCO3 BLDA-SCNC: 27.1 MMOL/L
HCT VFR BLD AUTO: 25.3 % (ref 42–52)
HCT VFR BLD AUTO: 26.4 % (ref 42–52)
HGB BLD-MCNC: 8.6 G/DL (ref 14–18)
HGB BLD-MCNC: 9 G/DL (ref 14–18)
IMM GRANULOCYTES # BLD AUTO: 0.03 X10(3)/MCL (ref 0–0.04)
IMM GRANULOCYTES # BLD AUTO: 0.03 X10(3)/MCL (ref 0–0.04)
IMM GRANULOCYTES # BLD AUTO: 0.04 X10(3)/MCL (ref 0–0.04)
IMM GRANULOCYTES # BLD AUTO: 0.04 X10(3)/MCL (ref 0–0.04)
IMM GRANULOCYTES NFR BLD AUTO: 0.4 %
IMM GRANULOCYTES NFR BLD AUTO: 0.4 %
IMM GRANULOCYTES NFR BLD AUTO: 0.5 %
IMM GRANULOCYTES NFR BLD AUTO: 0.5 %
LYMPHOCYTES # BLD AUTO: 1 X10(3)/MCL (ref 0.6–4.6)
LYMPHOCYTES # BLD AUTO: 1.14 X10(3)/MCL (ref 0.6–4.6)
LYMPHOCYTES # BLD AUTO: 1.27 X10(3)/MCL (ref 0.6–4.6)
LYMPHOCYTES # BLD AUTO: 1.29 X10(3)/MCL (ref 0.6–4.6)
LYMPHOCYTES NFR BLD AUTO: 12.5 %
LYMPHOCYTES NFR BLD AUTO: 14.4 %
LYMPHOCYTES NFR BLD AUTO: 14.9 %
LYMPHOCYTES NFR BLD AUTO: 15.5 %
MAGNESIUM SERPL-MCNC: 2 MG/DL (ref 1.6–2.6)
MCH RBC QN AUTO: 29 PG (ref 27–31)
MCH RBC QN AUTO: 29.2 PG (ref 27–31)
MCHC RBC AUTO-ENTMCNC: 34 G/DL (ref 33–36)
MCHC RBC AUTO-ENTMCNC: 34.1 G/DL (ref 33–36)
MCV RBC AUTO: 85.2 FL (ref 80–94)
MCV RBC AUTO: 85.7 FL (ref 80–94)
MCV RBC AUTO: 85.8 FL (ref 80–94)
MCV RBC AUTO: 85.8 FL (ref 80–94)
MECH RR (OHS): 28 B/MIN
MECH VT (OHS): 530 ML
MODE (OHS): AC
MONOCYTES # BLD AUTO: 0.43 X10(3)/MCL (ref 0.1–1.3)
MONOCYTES # BLD AUTO: 0.45 X10(3)/MCL (ref 0.1–1.3)
MONOCYTES # BLD AUTO: 0.5 X10(3)/MCL (ref 0.1–1.3)
MONOCYTES # BLD AUTO: 0.58 X10(3)/MCL (ref 0.1–1.3)
MONOCYTES NFR BLD AUTO: 5.1 %
MONOCYTES NFR BLD AUTO: 5.7 %
MONOCYTES NFR BLD AUTO: 6.3 %
MONOCYTES NFR BLD AUTO: 7 %
NEUTROPHILS # BLD AUTO: 6.08 X10(3)/MCL (ref 2.1–9.2)
NEUTROPHILS # BLD AUTO: 6.17 X10(3)/MCL (ref 2.1–9.2)
NEUTROPHILS # BLD AUTO: 6.23 X10(3)/MCL (ref 2.1–9.2)
NEUTROPHILS # BLD AUTO: 6.56 X10(3)/MCL (ref 2.1–9.2)
NEUTROPHILS NFR BLD AUTO: 74.3 %
NEUTROPHILS NFR BLD AUTO: 77.1 %
NEUTROPHILS NFR BLD AUTO: 77.1 %
NEUTROPHILS NFR BLD AUTO: 77.9 %
NRBC BLD AUTO-RTO: 0 %
NRBC BLD AUTO-RTO: 0.3 %
PCO2 BLDA: 39 MMHG (ref 35–45)
PEEP (OHS): 10 CMH2O
PH BLDA: 7.45 [PH] (ref 7.35–7.45)
PHOSPHATE SERPL-MCNC: 1.8 MG/DL (ref 2.3–4.7)
PLATELET # BLD AUTO: 105 X10(3)/MCL (ref 130–400)
PLATELET # BLD AUTO: 116 X10(3)/MCL (ref 130–400)
PLATELET # BLD AUTO: 116 X10(3)/MCL (ref 130–400)
PLATELET # BLD AUTO: 117 X10(3)/MCL (ref 130–400)
PMV BLD AUTO: 10.3 FL (ref 7.4–10.4)
PMV BLD AUTO: 10.6 FL (ref 7.4–10.4)
PMV BLD AUTO: 10.7 FL (ref 7.4–10.4)
PMV BLD AUTO: 11.1 FL (ref 7.4–10.4)
PO2 BLDA: 82 MMHG (ref 80–100)
POTASSIUM BLOOD GAS (OHS): 3.8 MMOL/L (ref 3.5–5)
POTASSIUM SERPL-SCNC: 4.1 MMOL/L (ref 3.5–5.1)
PROT SERPL-MCNC: 5 GM/DL (ref 6.4–8.3)
RBC # BLD AUTO: 2.95 X10(6)/MCL (ref 4.7–6.1)
RBC # BLD AUTO: 2.95 X10(6)/MCL (ref 4.7–6.1)
RBC # BLD AUTO: 2.97 X10(6)/MCL (ref 4.7–6.1)
RBC # BLD AUTO: 3.08 X10(6)/MCL (ref 4.7–6.1)
SAMPLE SITE BLOOD GAS (OHS): NORMAL
SAO2 % BLDA: 97 %
SODIUM BLOOD GAS (OHS): 142 MMOL/L (ref 137–145)
SODIUM SERPL-SCNC: 145 MMOL/L (ref 136–145)
WBC # SPEC AUTO: 7.89 X10(3)/MCL (ref 4.5–11.5)
WBC # SPEC AUTO: 7.99 X10(3)/MCL (ref 4.5–11.5)
WBC # SPEC AUTO: 8.3 X10(3)/MCL (ref 4.5–11.5)
WBC # SPEC AUTO: 8.5 X10(3)/MCL (ref 4.5–11.5)

## 2023-06-07 PROCEDURE — 25000003 PHARM REV CODE 250: Performed by: STUDENT IN AN ORGANIZED HEALTH CARE EDUCATION/TRAINING PROGRAM

## 2023-06-07 PROCEDURE — 63600175 PHARM REV CODE 636 W HCPCS: Performed by: STUDENT IN AN ORGANIZED HEALTH CARE EDUCATION/TRAINING PROGRAM

## 2023-06-07 PROCEDURE — 99232 SBSQ HOSP IP/OBS MODERATE 35: CPT | Mod: ,,, | Performed by: NEUROLOGICAL SURGERY

## 2023-06-07 PROCEDURE — 99900035 HC TECH TIME PER 15 MIN (STAT)

## 2023-06-07 PROCEDURE — 99232 PR SUBSEQUENT HOSPITAL CARE,LEVL II: ICD-10-PCS | Mod: ,,, | Performed by: NEUROLOGICAL SURGERY

## 2023-06-07 PROCEDURE — 25000003 PHARM REV CODE 250: Performed by: NEUROLOGICAL SURGERY

## 2023-06-07 PROCEDURE — 94640 AIRWAY INHALATION TREATMENT: CPT

## 2023-06-07 PROCEDURE — 27000221 HC OXYGEN, UP TO 24 HOURS

## 2023-06-07 PROCEDURE — 25000242 PHARM REV CODE 250 ALT 637 W/ HCPCS: Performed by: HOSPITALIST

## 2023-06-07 PROCEDURE — 83735 ASSAY OF MAGNESIUM: CPT | Performed by: STUDENT IN AN ORGANIZED HEALTH CARE EDUCATION/TRAINING PROGRAM

## 2023-06-07 PROCEDURE — 99900026 HC AIRWAY MAINTENANCE (STAT)

## 2023-06-07 PROCEDURE — 63600175 PHARM REV CODE 636 W HCPCS

## 2023-06-07 PROCEDURE — 37799 UNLISTED PX VASCULAR SURGERY: CPT

## 2023-06-07 PROCEDURE — 63600175 PHARM REV CODE 636 W HCPCS: Performed by: SURGERY

## 2023-06-07 PROCEDURE — 25000003 PHARM REV CODE 250

## 2023-06-07 PROCEDURE — 85025 COMPLETE CBC W/AUTO DIFF WBC: CPT | Performed by: SURGERY

## 2023-06-07 PROCEDURE — 84100 ASSAY OF PHOSPHORUS: CPT | Performed by: STUDENT IN AN ORGANIZED HEALTH CARE EDUCATION/TRAINING PROGRAM

## 2023-06-07 PROCEDURE — 94761 N-INVAS EAR/PLS OXIMETRY MLT: CPT

## 2023-06-07 PROCEDURE — 82803 BLOOD GASES ANY COMBINATION: CPT

## 2023-06-07 PROCEDURE — 20000000 HC ICU ROOM

## 2023-06-07 PROCEDURE — 25000003 PHARM REV CODE 250: Performed by: SURGERY

## 2023-06-07 PROCEDURE — 80053 COMPREHEN METABOLIC PANEL: CPT | Performed by: STUDENT IN AN ORGANIZED HEALTH CARE EDUCATION/TRAINING PROGRAM

## 2023-06-07 PROCEDURE — 94003 VENT MGMT INPAT SUBQ DAY: CPT

## 2023-06-07 PROCEDURE — 20800000 HC ICU TRAUMA

## 2023-06-07 RX ORDER — FUROSEMIDE 10 MG/ML
40 INJECTION INTRAMUSCULAR; INTRAVENOUS ONCE
Status: COMPLETED | OUTPATIENT
Start: 2023-06-07 | End: 2023-06-07

## 2023-06-07 RX ORDER — FENTANYL CITRATE 50 UG/ML
50 INJECTION, SOLUTION INTRAMUSCULAR; INTRAVENOUS ONCE
Status: DISCONTINUED | OUTPATIENT
Start: 2023-06-07 | End: 2023-06-07

## 2023-06-07 RX ORDER — FENTANYL CITRATE 50 UG/ML
50 INJECTION, SOLUTION INTRAMUSCULAR; INTRAVENOUS
Status: DISCONTINUED | OUTPATIENT
Start: 2023-06-07 | End: 2023-06-19

## 2023-06-07 RX ADMIN — PIPERACILLIN AND TAZOBACTAM 4.5 G: 4; .5 INJECTION, POWDER, LYOPHILIZED, FOR SOLUTION INTRAVENOUS; PARENTERAL at 03:06

## 2023-06-07 RX ADMIN — DEXMEDETOMIDINE HYDROCHLORIDE 0.8 MCG/KG/HR: 400 INJECTION INTRAVENOUS at 08:06

## 2023-06-07 RX ADMIN — Medication 1 TABLET: at 08:06

## 2023-06-07 RX ADMIN — Medication 150 MCG/HR: at 08:06

## 2023-06-07 RX ADMIN — GABAPENTIN 300 MG: 300 CAPSULE ORAL at 08:06

## 2023-06-07 RX ADMIN — OXYCODONE HYDROCHLORIDE 10 MG: 10 TABLET ORAL at 08:06

## 2023-06-07 RX ADMIN — PROPOFOL 40 MCG/KG/MIN: 10 INJECTION, EMULSION INTRAVENOUS at 10:06

## 2023-06-07 RX ADMIN — IPRATROPIUM BROMIDE AND ALBUTEROL SULFATE 3 ML: 2.5; .5 SOLUTION RESPIRATORY (INHALATION) at 01:06

## 2023-06-07 RX ADMIN — METHOCARBAMOL 500 MG: 100 INJECTION, SOLUTION INTRAMUSCULAR; INTRAVENOUS at 09:06

## 2023-06-07 RX ADMIN — MUPIROCIN: 20 OINTMENT TOPICAL at 08:06

## 2023-06-07 RX ADMIN — FAMOTIDINE 20 MG: 10 INJECTION, SOLUTION INTRAVENOUS at 08:06

## 2023-06-07 RX ADMIN — DEXMEDETOMIDINE HYDROCHLORIDE 0.8 MCG/KG/HR: 400 INJECTION INTRAVENOUS at 06:06

## 2023-06-07 RX ADMIN — SODIUM CHLORIDE, POTASSIUM CHLORIDE, SODIUM LACTATE AND CALCIUM CHLORIDE: 600; 310; 30; 20 INJECTION, SOLUTION INTRAVENOUS at 03:06

## 2023-06-07 RX ADMIN — SODIUM CHLORIDE, POTASSIUM CHLORIDE, SODIUM LACTATE AND CALCIUM CHLORIDE: 600; 310; 30; 20 INJECTION, SOLUTION INTRAVENOUS at 02:06

## 2023-06-07 RX ADMIN — PIPERACILLIN AND TAZOBACTAM 4.5 G: 4; .5 INJECTION, POWDER, LYOPHILIZED, FOR SOLUTION INTRAVENOUS; PARENTERAL at 11:06

## 2023-06-07 RX ADMIN — ACETAMINOPHEN 650 MG: 650 SOLUTION ORAL at 05:06

## 2023-06-07 RX ADMIN — MIDAZOLAM HYDROCHLORIDE 2 MG: 5 INJECTION, SOLUTION INTRAMUSCULAR; INTRAVENOUS at 11:06

## 2023-06-07 RX ADMIN — SODIUM CHLORIDE, POTASSIUM CHLORIDE, SODIUM LACTATE AND CALCIUM CHLORIDE: 600; 310; 30; 20 INJECTION, SOLUTION INTRAVENOUS at 09:06

## 2023-06-07 RX ADMIN — ENOXAPARIN SODIUM 40 MG: 40 INJECTION SUBCUTANEOUS at 08:06

## 2023-06-07 RX ADMIN — PROPOFOL 15 MCG/KG/MIN: 10 INJECTION, EMULSION INTRAVENOUS at 03:06

## 2023-06-07 RX ADMIN — ACETAMINOPHEN 650 MG: 650 SOLUTION ORAL at 03:06

## 2023-06-07 RX ADMIN — DEXMEDETOMIDINE HYDROCHLORIDE 0.4 MCG/KG/HR: 400 INJECTION INTRAVENOUS at 03:06

## 2023-06-07 RX ADMIN — Medication 150 MCG/HR: at 05:06

## 2023-06-07 RX ADMIN — ACETAMINOPHEN 650 MG: 650 SOLUTION ORAL at 08:06

## 2023-06-07 RX ADMIN — PROPOFOL 30 MCG/KG/MIN: 10 INJECTION, EMULSION INTRAVENOUS at 05:06

## 2023-06-07 RX ADMIN — PROPOFOL 30 MCG/KG/MIN: 10 INJECTION, EMULSION INTRAVENOUS at 12:06

## 2023-06-07 RX ADMIN — FUROSEMIDE 40 MG: 10 INJECTION, SOLUTION INTRAMUSCULAR; INTRAVENOUS at 08:06

## 2023-06-07 RX ADMIN — GABAPENTIN 300 MG: 300 CAPSULE ORAL at 03:06

## 2023-06-07 RX ADMIN — DEXMEDETOMIDINE HYDROCHLORIDE 0.8 MCG/KG/HR: 400 INJECTION INTRAVENOUS at 02:06

## 2023-06-07 RX ADMIN — METHOCARBAMOL 500 MG: 100 INJECTION, SOLUTION INTRAMUSCULAR; INTRAVENOUS at 03:06

## 2023-06-07 RX ADMIN — ACETAMINOPHEN 650 MG: 650 SOLUTION ORAL at 12:06

## 2023-06-07 RX ADMIN — ACETAMINOPHEN 650 MG: 650 SOLUTION ORAL at 11:06

## 2023-06-07 RX ADMIN — PROPOFOL 40 MCG/KG/MIN: 10 INJECTION, EMULSION INTRAVENOUS at 08:06

## 2023-06-07 RX ADMIN — METHOCARBAMOL 500 MG: 100 INJECTION, SOLUTION INTRAMUSCULAR; INTRAVENOUS at 05:06

## 2023-06-07 RX ADMIN — PROPOFOL 40 MCG/KG/MIN: 10 INJECTION, EMULSION INTRAVENOUS at 07:06

## 2023-06-07 RX ADMIN — IPRATROPIUM BROMIDE AND ALBUTEROL SULFATE 3 ML: 2.5; .5 SOLUTION RESPIRATORY (INHALATION) at 08:06

## 2023-06-07 RX ADMIN — OXYCODONE HYDROCHLORIDE 10 MG: 10 TABLET ORAL at 03:06

## 2023-06-07 RX ADMIN — IPRATROPIUM BROMIDE AND ALBUTEROL SULFATE 3 ML: 2.5; .5 SOLUTION RESPIRATORY (INHALATION) at 07:06

## 2023-06-07 RX ADMIN — SODIUM CHLORIDE, POTASSIUM CHLORIDE, SODIUM LACTATE AND CALCIUM CHLORIDE: 600; 310; 30; 20 INJECTION, SOLUTION INTRAVENOUS at 11:06

## 2023-06-07 NOTE — ANESTHESIA POSTPROCEDURE EVALUATION
Anesthesia Post Evaluation    Patient: Gera Chavez    Procedure(s) Performed: Procedure(s) (LRB):  FUSION, SPINE, LUMBAR, PLIF, USING COMPUTER-ASSISTED NAVIGATION (N/A)    Final Anesthesia Type: general      Patient location during evaluation: PACU  Patient participation: Yes- Able to Participate  Level of consciousness: awake and alert and oriented  Post-procedure vital signs: reviewed and stable  Pain management: adequate  Airway patency: patent  BIENVENIDO mitigation strategies: Verification of full reversal of neuromuscular block  PONV status at discharge: No PONV  Anesthetic complications: no      Cardiovascular status: blood pressure returned to baseline and stable  Respiratory status: spontaneous ventilation and unassisted  Hydration status: euvolemic  Follow-up not needed.  Comments: [facility]          Vitals Value Taken Time   /77 06/07/23 1131   Temp 38 °C (100.4 °F) 06/07/23 1156   Pulse 97 06/07/23 1158   Resp 28 06/07/23 1158   SpO2 94 % 06/07/23 1157   Vitals shown include unvalidated device data.      No case tracking events are documented in the log.      Pain/Mp Score: Pain Rating Prior to Med Admin: 0 (6/7/2023 11:57 AM)  Pain Rating Post Med Admin: 0 (6/7/2023  9:30 AM)

## 2023-06-07 NOTE — PROGRESS NOTES
[facility] - [facility] ICU  Pulmonary Critical Care Note    Patient Name: Gera Chavez  MRN: [id number]  Admission Date: 6/4/2023  Hospital Length of Stay: 3 days  Code Status: Full Code  Attending Provider: Salvador Sauer MD  Primary Care Provider: Primary Doctor No     Subjective:     HPI:   Gera Chavez is a 37 y.o. male with no known PMH, who was transferred from [facility] for level 1 trauma activation, after patient reportedly jumped off a bridge.  Sustained rib fractures x4 left-sided, pelvic ring fracture, acetabulum fracture, L2 burst fracture, and left pneumothorax with chest tube in place.  Jump witnessed by bystander who called 911.     Hospital Course/Significant events:  6/4/2023 - Admitted to ICU   6/5/2023 - s/p bronchoscopy    24 Hour Interval History:  No acute events overnight. Neurological status remains unchanged. Current drips: propofol at 30, fentanyl at 150, precedex at 0.8.     Past Medical History:   Diagnosis Date    Asthma     Depression     Diabetes mellitus     Encounter for blood transfusion     Generalized anxiety disorder     Hypertension     Schizophrenia, unspecified     Sleep apnea     Unspecified glaucoma        Past Surgical History:   Procedure Laterality Date    INTRAMEDULLARY RODDING OF FEMUR Left 02/05/2023    Procedure: INSERTION, INTRAMEDULLARY NOEL, FEMUR;  Surgeon: Tuan Zepeda DO;  Location: [facility];  Service: Orthopedics;  Laterality: Left;    OPEN REDUCTION AND INTERNAL FIXATION (ORIF) OF INJURY OF HIP Left 6/5/2023    Procedure: ORIF,PELVIS;  Surgeon: Tuan Zepeda DO;  Location: [facility] OR;  Service: Orthopedics;  Laterality: Left;  supine deven traction LLE CELL SAVER, teo    REMOVAL OF HARDWARE FROM LOWER EXTREMITY Left 5/9/2023    Procedure: REMOVAL, HARDWARE, LOWER EXTREMITY;  Surgeon: Tuan Zepeda DO;  Location: [facility] OR;  Service: Orthopedics;  Laterality: Left;    TRACH TUBE EXCHANGER  6/5/2023    UVULOPALATOPHARYNGOPLASTY          Social History     Socioeconomic History    Marital status: Single   Tobacco Use    Smoking status: Every Day     Packs/day: 0.50     Years: 10.00     Pack years: 5.00     Types: Cigarettes    Smokeless tobacco: Never   Substance and Sexual Activity    Alcohol use: Not Currently    Drug use: Never    Sexual activity: Not Currently     Social Determinants of Health     Financial Resource Strain: Unknown    Difficulty of Paying Living Expenses: Patient refused   Food Insecurity: Unknown    Worried About Running Out of Food in the Last Year: Patient refused    Ran Out of Food in the Last Year: Patient refused   Transportation Needs: Unknown    Lack of Transportation (Medical): Patient refused    Lack of Transportation (Non-Medical): Patient refused   Physical Activity: Unknown    Days of Exercise per Week: Patient refused   Stress: Unknown    Feeling of Stress : Patient refused   Social Connections: Unknown    Frequency of Communication with Friends and Family: Patient refused    Frequency of Social Gatherings with Friends and Family: Patient refused    Attends Club or Organization Meetings: Patient refused    Marital Status: Never    Housing Stability: Unknown    Unable to Pay for Housing in the Last Year: Patient refused    Unstable Housing in the Last Year: Patient refused       Current Outpatient Medications   Medication Instructions    albuterol (PROVENTIL/VENTOLIN HFA) 90 mcg/actuation inhaler 2 puffs, Inhalation, Every 4 hours PRN    ALBUTEROL INHL 90 mcg, Inhalation, Every 4 hours PRN    amLODIPine (NORVASC) 5 mg, Oral, Daily    amoxicillin-clavulanate 875-125mg (AUGMENTIN) 875-125 mg per tablet 1 tablet, Oral, Every 12 hours (non-standard times)    atorvastatin (LIPITOR) 20 mg, Oral, Daily    azithromycin (Z-CELESTINA) 500 mg, Oral, Daily    clonazePAM (KLONOPIN) 1 mg, Oral, 2 times daily PRN    dextroamphetamine-amphetamine (ADDERALL XR) 25 MG 24 hr capsule 25 mg, Oral, 2 times daily    FLUoxetine 20  MG capsule fluoxetine 20 mg capsule    FLUoxetine 40 mg, Oral, Daily    insulin glargine 100 units/mL SubQ pen Lantus Solostar U-100 Insulin 100 unit/mL (3 mL) subcutaneous pen    ipratropium (ATROVENT) 42 mcg (0.06 %) nasal spray Each Nostril    lamoTRIgine (LAMICTAL) 25 MG tablet lamotrigine 25 mg tablet    LANTUS SOLOSTAR U-100 INSULIN glargine 100 units/mL SubQ pen Subcutaneous    latanoprost 0.005 % ophthalmic solution latanoprost 0.005 % eye drops    lisinopriL (PRINIVIL,ZESTRIL) 40 mg, Oral    mirtazapine (REMERON) 7.5 MG Tab mirtazapine 7.5 mg tablet    potassium chloride (K-TAB) 20 mEq potassium chloride ER 20 mEq tablet,extended release   TAKE 1 TABLET BY MOUTH EVERY DAY    prochlorperazine (COMPAZINE) 10 MG tablet prochlorperazine maleate 10 mg tablet    QUEtiapine (SEROQUEL) 200 mg, Oral, Nightly    testosterone cypionate (DEPOTESTOTERONE CYPIONATE) 200 mg/mL injection SMARTSIG:Milliliter(s) IM     Current Inpatient Medications   albuterol-ipratropium  3 mL Nebulization Q6H    calcium-vitamin D3  1 tablet Oral BID    enoxaparin  40 mg Subcutaneous Q12H    famotidine (PF)  20 mg Intravenous BID    gabapentin  300 mg Per OG tube TID    methocarbamoL  500 mg Intravenous Q8H    mupirocin   Nasal BID     Current Intravenous Infusions   dexmedeTOMIDine (Precedex) infusion (titrating) 0.8 mcg/kg/hr (06/06/23 2329)    fentanyl 150 mcg/hr (06/06/23 1408)    lactated ringers 125 mL/hr at 06/06/23 1700    NORepinephrine bitartrate-D5W Stopped (06/06/23 1135)    propofoL 30 mcg/kg/min (06/07/23 0033)     ROS     Objective:     Intake/Output Summary (Last 24 hours) at 6/7/2023 0123  Last data filed at 6/7/2023 0000  Gross per 24 hour   Intake 7925.34 ml   Output 5505 ml   Net 2420.34 ml     Vital Signs (Most Recent):  Temp: (!) 100.7 °F (38.2 °C) (06/07/23 0033)  Pulse: 100 (06/07/23 0000)  Resp: (!) 28 (06/07/23 0033)  BP: 126/63 (06/07/23 0000)  SpO2: 97 % (06/07/23 0000)  Body mass index is 37.22 kg/m².  Weight:  121 kg (266 lb 12.1 oz) Vital Signs (24h Range):  Temp:  [98 °F (36.7 °C)-100.7 °F (38.2 °C)] 100.7 °F (38.2 °C)  Pulse:  [] 100  Resp:  [6-34] 28  SpO2:  [90 %-100 %] 97 %  BP: ()/(43-93) 126/63  Arterial Line BP: ()/(35-75) 120/47     Physical Exam  General: Intubated and sedated  HEENT: NC/AT; PERRL; nasal and oral mucosa moist and clear; ET tube in place  Pulm: CTA bilaterally, normal work of breathing  CV: S1, S2 w/o murmurs or gallops; no edema noted  GI: Soft with normal bowel sounds in all quadrants, no masses on palpation  MSK:   Neuro: AAOx4; motor/sensory function intact      Lines/Drains/Airways       Central Venous Catheter Line  Duration             Percutaneous Central Line Insertion/Assessment - Double Lumen  06/04/23 1530 Subclavian Left 2 days              Drain  Duration                  Chest Tube 06/04/23 1549 Left Midaxillary 28 Fr. 2 days         NG/OG Tube 06/04/23 1554 16 Fr. Center mouth 2 days         Urethral Catheter 06/05/23 1610 Silicone 16 Fr. 1 day              Airway  Duration                  Airway - Non-Surgical 06/04/23 1549 Endotracheal Tube 2 days              Arterial Line  Duration             Arterial Line 06/06/23 0746 Left Radial <1 day              Peripheral Intravenous Line  Duration                  Peripheral IV - Single Lumen 06/04/23 1549 18 G Left Forearm 2 days         Peripheral IV - Single Lumen 06/04/23 1554 18 G Right Antecubital 2 days                  Significant Labs:  Lab Results   Component Value Date    WBC 8.50 06/06/2023    HGB 9.0 (L) 06/06/2023    HCT 26.4 (L) 06/06/2023    MCV 85.7 06/06/2023     (L) 06/06/2023       BMP  Lab Results   Component Value Date     06/06/2023    K 4.8 06/06/2023    CHLORIDE 114 (H) 06/06/2023    CO2 23 06/06/2023    BUN 14.6 06/06/2023    CREATININE 0.95 06/06/2023    CALCIUM 7.3 (L) 06/06/2023    AGAP 12.0 02/05/2023     AB  Recent Labs   Lab 06/06/23  1040   PH 7.360   PO2 82.0   HCO3  27.1     Mechanical Ventilation Support:  Vent Mode: A/C (06/06/23 2050)  Set Rate: 28 BPM (06/06/23 2050)  Vt Set: 530 mL (06/06/23 2050)  Pressure Support: 12 cmH20 (06/06/23 0429)  PEEP/CPAP: 10 cmH20 (josie adrian from Dr. Marshall to wean peep to 10.) (06/06/23 2240)  Oxygen Concentration (%): 60 (06/07/23 0000)  Peak Airway Pressure: 31 cmH20 (06/06/23 2050)  Total Ve: 14.6 L/m (06/06/23 2050)  F/VT Ratio<105 (RSBI): (!) 54.9 (06/06/23 2050)    Significant Imaging:  I have reviewed the pertinent imaging within the past 24 hours.    Assessment/Plan:     Assessment  Pelvic ring fx s/p ORIF on 6/5 POD 1  Left-sided rib fractures with pneumo/hemo thorax s/p chest tube placement  L2 compression fracture     Plan  -Continue ICU level of care per primary team  -Trauma, neurosurgery, and orthopedic surgery teams following  -Lumbar laminectomy on 6/6/2023 was cancelled d/t unimproved oxygenation in the OR; rescheduled to 6/9/2023  -Continue routine chest tube care    DVT Prophylaxis: SCD  GI Prophylaxis: Famotidine     32 minutes of critical care was time spent personally by me on the following activities: development of treatment plan with patient or surrogate and bedside caregivers, discussions with consultants, evaluation of patient's response to treatment, examination of patient, ordering and performing treatments and interventions, ordering and review of laboratory studies, ordering and review of radiographic studies, pulse oximetry, re-evaluation of patient's condition.  This critical care time did not overlap with that of any other provider or involve time for any procedures.     Kalee Zamudio DO, PGY-II  Pulmonary Critical Care Medicine  [facility] - [facility] ICU

## 2023-06-07 NOTE — NURSING
Nurses Note -- 4 Eyes      6/7/2023   3:56 AM      Skin assessed during: Daily Assessment      [x] No Altered Skin Integrity Present    [x]Prevention Measures Documented      [] Yes- Altered Skin Integrity Present or Discovered   [] LDA Added if Not in Epic (Describe Wound)   [] New Altered Skin Integrity was Present on Admit and Documented in LDA   [] Wound Image Taken    Wound Care Consulted? No    Attending Nurse:  Kellie Acosta RN     Second RN/Staff Member:  Macie Hawthorne RN
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
## 2023-06-07 NOTE — PROGRESS NOTES
Patient has a right distal radius fracture meeting surgical indications stabilization.  We will await his neurosurgery and for him to recover for us to address this this may be addressed next week.    alonso   The patient is a 6m Female complaining of fever.

## 2023-06-07 NOTE — PLAN OF CARE
Problem: Adult Inpatient Plan of Care  Goal: Plan of Care Review  Outcome: Ongoing, Progressing  Goal: Optimal Comfort and Wellbeing  Outcome: Ongoing, Progressing  Goal: Readiness for Transition of Care  Outcome: Ongoing, Not Progressing     Problem: Inability to Wean (Mechanical Ventilation, Invasive)  Goal: Mechanical Ventilation Liberation  Outcome: Ongoing, Progressing

## 2023-06-07 NOTE — PLAN OF CARE
Problem: Adult Inpatient Plan of Care  Goal: Plan of Care Review  Outcome: Ongoing, Progressing  Goal: Patient-Specific Goal (Individualized)  Outcome: Ongoing, Progressing  Goal: Absence of Hospital-Acquired Illness or Injury  Outcome: Ongoing, Progressing  Goal: Optimal Comfort and Wellbeing  Outcome: Ongoing, Progressing  Goal: Readiness for Transition of Care  Outcome: Ongoing, Progressing     Problem: Infection  Goal: Absence of Infection Signs and Symptoms  Outcome: Ongoing, Progressing     Problem: Communication Impairment (Mechanical Ventilation, Invasive)  Goal: Effective Communication  Outcome: Ongoing, Progressing     Problem: Device-Related Complication Risk (Mechanical Ventilation, Invasive)  Goal: Optimal Device Function  Outcome: Ongoing, Progressing     Problem: Inability to Wean (Mechanical Ventilation, Invasive)  Goal: Mechanical Ventilation Liberation  Outcome: Ongoing, Progressing     Problem: Nutrition Impairment (Mechanical Ventilation, Invasive)  Goal: Optimal Nutrition Delivery  Outcome: Ongoing, Progressing     Problem: Skin and Tissue Injury (Mechanical Ventilation, Invasive)  Goal: Absence of Device-Related Skin and Tissue Injury  Outcome: Ongoing, Progressing     Problem: Ventilator-Induced Lung Injury (Mechanical Ventilation, Invasive)  Goal: Absence of Ventilator-Induced Lung Injury  Outcome: Ongoing, Progressing     Problem: Communication Impairment (Artificial Airway)  Goal: Effective Communication  Outcome: Ongoing, Progressing     Problem: Device-Related Complication Risk (Artificial Airway)  Goal: Optimal Device Function  Outcome: Ongoing, Progressing     Problem: Skin and Tissue Injury (Artificial Airway)  Goal: Absence of Device-Related Skin or Tissue Injury  Outcome: Ongoing, Progressing     Problem: Noninvasive Ventilation Acute  Goal: Effective Unassisted Ventilation and Oxygenation  Outcome: Ongoing, Progressing     Problem: Skin Injury Risk Increased  Goal: Skin Health and  Integrity  Outcome: Ongoing, Progressing     Problem: Fall Injury Risk  Goal: Absence of Fall and Fall-Related Injury  Outcome: Ongoing, Progressing     Problem: Restraint, Nonbehavioral (Nonviolent)  Goal: Absence of Harm or Injury  Outcome: Ongoing, Progressing
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
## 2023-06-08 ENCOUNTER — ANESTHESIA EVENT (OUTPATIENT)
Dept: SURGERY | Facility: HOSPITAL | Age: 38
DRG: 956 | End: 2023-06-08
Payer: MEDICARE

## 2023-06-08 LAB
ABO + RH BLD: NORMAL
ALBUMIN SERPL-MCNC: 2.4 G/DL (ref 3.5–5)
ALBUMIN/GLOB SERPL: 0.8 RATIO (ref 1.1–2)
ALLENS TEST BLOOD GAS (OHS): ABNORMAL
ALP SERPL-CCNC: 84 UNIT/L (ref 40–150)
ALT SERPL-CCNC: 16 UNIT/L (ref 0–55)
APTT PPP: 30.8 SECONDS (ref 23.2–33.7)
AST SERPL-CCNC: 27 UNIT/L (ref 5–34)
BASE EXCESS BLD CALC-SCNC: 3.9 MMOL/L
BASOPHILS # BLD AUTO: 0.02 X10(3)/MCL
BASOPHILS NFR BLD AUTO: 0.2 %
BILIRUBIN DIRECT+TOT PNL SERPL-MCNC: 1.7 MG/DL
BLD PROD TYP BPU: NORMAL
BLOOD GAS SAMPLE TYPE (OHS): ABNORMAL
BLOOD UNIT EXPIRATION DATE: NORMAL
BLOOD UNIT TYPE CODE: 1700
BLOOD UNIT TYPE CODE: 7300
BUN SERPL-MCNC: 10.1 MG/DL (ref 8.9–20.6)
CA-I BLD-SCNC: 1.12 MMOL/L (ref 1.12–1.23)
CALCIUM SERPL-MCNC: 8.5 MG/DL (ref 8.4–10.2)
CHLORIDE SERPL-SCNC: 111 MMOL/L (ref 98–107)
CO2 BLDA-SCNC: 29.8 MMOL/L
CO2 SERPL-SCNC: 25 MMOL/L (ref 22–29)
CREAT SERPL-MCNC: 0.82 MG/DL (ref 0.73–1.18)
CROSSMATCH INTERPRETATION: NORMAL
CRP SERPL-MCNC: 336.6 MG/L
DISPENSE STATUS: NORMAL
DRAWN BY BLOOD GAS (OHS): ABNORMAL
EOSINOPHIL # BLD AUTO: 0.24 X10(3)/MCL (ref 0–0.9)
EOSINOPHIL NFR BLD AUTO: 2.9 %
ERYTHROCYTE [DISTWIDTH] IN BLOOD BY AUTOMATED COUNT: 16.1 % (ref 11.5–17)
FIO2 BLOOD GAS (OHS): 40 %
GFR SERPLBLD CREATININE-BSD FMLA CKD-EPI: >60 MLS/MIN/1.73/M2
GLOBULIN SER-MCNC: 3.2 GM/DL (ref 2.4–3.5)
GLUCOSE SERPL-MCNC: 107 MG/DL (ref 74–100)
GROUP & RH: NORMAL
HCO3 BLDA-SCNC: 28.5 MMOL/L
HCT VFR BLD AUTO: 24.6 % (ref 42–52)
HGB BLD-MCNC: 8.2 G/DL (ref 14–18)
IMM GRANULOCYTES # BLD AUTO: 0.04 X10(3)/MCL (ref 0–0.04)
IMM GRANULOCYTES NFR BLD AUTO: 0.5 %
INDIRECT COOMBS GEL: NORMAL
INR BLD: 1.31 (ref 0–1.3)
LYMPHOCYTES # BLD AUTO: 1.17 X10(3)/MCL (ref 0.6–4.6)
LYMPHOCYTES NFR BLD AUTO: 13.9 %
MAGNESIUM SERPL-MCNC: 2 MG/DL (ref 1.6–2.6)
MCH RBC QN AUTO: 28.4 PG (ref 27–31)
MCHC RBC AUTO-ENTMCNC: 33.3 G/DL (ref 33–36)
MCV RBC AUTO: 85.1 FL (ref 80–94)
MECH RR (OHS): 28 B/MIN
MECH VT (OHS): 510 ML
MODE (OHS): AC
MONOCYTES # BLD AUTO: 0.57 X10(3)/MCL (ref 0.1–1.3)
MONOCYTES NFR BLD AUTO: 6.8 %
NEUTROPHILS # BLD AUTO: 6.38 X10(3)/MCL (ref 2.1–9.2)
NEUTROPHILS NFR BLD AUTO: 75.7 %
NRBC BLD AUTO-RTO: 0 %
OXYGEN DEVICE BLOOD GAS (OHS): ABNORMAL
PCO2 BLDA: 42 MMHG (ref 35–45)
PEEP (OHS): 8 CMH2O
PH BLDA: 7.44 [PH] (ref 7.35–7.45)
PHOSPHATE SERPL-MCNC: 2.1 MG/DL (ref 2.3–4.7)
PLATELET # BLD AUTO: 131 X10(3)/MCL (ref 130–400)
PMV BLD AUTO: 10.9 FL (ref 7.4–10.4)
PO2 BLDA: 70 MMHG (ref 80–100)
POTASSIUM BLOOD GAS (OHS): 3.4 MMOL/L (ref 3.5–5)
POTASSIUM SERPL-SCNC: 3.5 MMOL/L (ref 3.5–5.1)
PREALB SERPL-MCNC: 8 MG/DL (ref 18–45)
PROT SERPL-MCNC: 5.6 GM/DL (ref 6.4–8.3)
PROTHROMBIN TIME: 16.1 SECONDS (ref 12.5–14.5)
RBC # BLD AUTO: 2.89 X10(6)/MCL (ref 4.7–6.1)
RBCS: NORMAL
SAMPLE SITE BLOOD GAS (OHS): ABNORMAL
SAO2 % BLDA: 94 %
SODIUM BLOOD GAS (OHS): 144 MMOL/L (ref 137–145)
SODIUM SERPL-SCNC: 144 MMOL/L (ref 136–145)
SPECIMEN OUTDATE: NORMAL
UNIT NUMBER: NORMAL
WBC # SPEC AUTO: 8.42 X10(3)/MCL (ref 4.5–11.5)

## 2023-06-08 PROCEDURE — 99900025 HC BRONCHOSCOPY-ASST (STAT)

## 2023-06-08 PROCEDURE — 20800000 HC ICU TRAUMA

## 2023-06-08 PROCEDURE — 27200966 HC CLOSED SUCTION SYSTEM

## 2023-06-08 PROCEDURE — 94003 VENT MGMT INPAT SUBQ DAY: CPT

## 2023-06-08 PROCEDURE — 83735 ASSAY OF MAGNESIUM: CPT | Performed by: STUDENT IN AN ORGANIZED HEALTH CARE EDUCATION/TRAINING PROGRAM

## 2023-06-08 PROCEDURE — 85730 THROMBOPLASTIN TIME PARTIAL: CPT | Performed by: NEUROLOGICAL SURGERY

## 2023-06-08 PROCEDURE — 84134 ASSAY OF PREALBUMIN: CPT | Performed by: STUDENT IN AN ORGANIZED HEALTH CARE EDUCATION/TRAINING PROGRAM

## 2023-06-08 PROCEDURE — 25000242 PHARM REV CODE 250 ALT 637 W/ HCPCS: Performed by: HOSPITALIST

## 2023-06-08 PROCEDURE — 25000003 PHARM REV CODE 250: Performed by: SURGERY

## 2023-06-08 PROCEDURE — 85025 COMPLETE CBC W/AUTO DIFF WBC: CPT | Performed by: STUDENT IN AN ORGANIZED HEALTH CARE EDUCATION/TRAINING PROGRAM

## 2023-06-08 PROCEDURE — 36430 TRANSFUSION BLD/BLD COMPNT: CPT

## 2023-06-08 PROCEDURE — 63600175 PHARM REV CODE 636 W HCPCS: Performed by: STUDENT IN AN ORGANIZED HEALTH CARE EDUCATION/TRAINING PROGRAM

## 2023-06-08 PROCEDURE — 25000003 PHARM REV CODE 250: Performed by: STUDENT IN AN ORGANIZED HEALTH CARE EDUCATION/TRAINING PROGRAM

## 2023-06-08 PROCEDURE — 82803 BLOOD GASES ANY COMBINATION: CPT

## 2023-06-08 PROCEDURE — 86923 COMPATIBILITY TEST ELECTRIC: CPT | Mod: 91 | Performed by: NEUROLOGICAL SURGERY

## 2023-06-08 PROCEDURE — 94640 AIRWAY INHALATION TREATMENT: CPT

## 2023-06-08 PROCEDURE — 99024 PR POST-OP FOLLOW-UP VISIT: ICD-10-PCS | Mod: ,,, | Performed by: NEUROLOGICAL SURGERY

## 2023-06-08 PROCEDURE — 80053 COMPREHEN METABOLIC PANEL: CPT | Performed by: STUDENT IN AN ORGANIZED HEALTH CARE EDUCATION/TRAINING PROGRAM

## 2023-06-08 PROCEDURE — 31500 INSERT EMERGENCY AIRWAY: CPT

## 2023-06-08 PROCEDURE — 85610 PROTHROMBIN TIME: CPT | Performed by: NEUROLOGICAL SURGERY

## 2023-06-08 PROCEDURE — 86900 BLOOD TYPING SEROLOGIC ABO: CPT | Performed by: SURGERY

## 2023-06-08 PROCEDURE — 20000000 HC ICU ROOM

## 2023-06-08 PROCEDURE — 99900026 HC AIRWAY MAINTENANCE (STAT)

## 2023-06-08 PROCEDURE — 63600175 PHARM REV CODE 636 W HCPCS: Performed by: SURGERY

## 2023-06-08 PROCEDURE — 63600175 PHARM REV CODE 636 W HCPCS

## 2023-06-08 PROCEDURE — 99024 POSTOP FOLLOW-UP VISIT: CPT | Mod: ,,, | Performed by: NEUROLOGICAL SURGERY

## 2023-06-08 PROCEDURE — 86140 C-REACTIVE PROTEIN: CPT | Performed by: STUDENT IN AN ORGANIZED HEALTH CARE EDUCATION/TRAINING PROGRAM

## 2023-06-08 PROCEDURE — 27202055 HC BRONCHOSCOPE, DISP

## 2023-06-08 PROCEDURE — 37799 UNLISTED PX VASCULAR SURGERY: CPT

## 2023-06-08 PROCEDURE — P9016 RBC LEUKOCYTES REDUCED: HCPCS | Performed by: STUDENT IN AN ORGANIZED HEALTH CARE EDUCATION/TRAINING PROGRAM

## 2023-06-08 PROCEDURE — 94761 N-INVAS EAR/PLS OXIMETRY MLT: CPT

## 2023-06-08 PROCEDURE — 25000003 PHARM REV CODE 250

## 2023-06-08 PROCEDURE — 99900031 HC PATIENT EDUCATION (STAT)

## 2023-06-08 PROCEDURE — 99900035 HC TECH TIME PER 15 MIN (STAT)

## 2023-06-08 PROCEDURE — 27000221 HC OXYGEN, UP TO 24 HOURS

## 2023-06-08 PROCEDURE — 25000003 PHARM REV CODE 250: Performed by: NEUROLOGICAL SURGERY

## 2023-06-08 PROCEDURE — 84100 ASSAY OF PHOSPHORUS: CPT | Performed by: STUDENT IN AN ORGANIZED HEALTH CARE EDUCATION/TRAINING PROGRAM

## 2023-06-08 PROCEDURE — 86923 COMPATIBILITY TEST ELECTRIC: CPT | Mod: 91 | Performed by: STUDENT IN AN ORGANIZED HEALTH CARE EDUCATION/TRAINING PROGRAM

## 2023-06-08 RX ORDER — MIDAZOLAM HYDROCHLORIDE 5 MG/ML
5 INJECTION INTRAMUSCULAR; INTRAVENOUS ONCE
Status: COMPLETED | OUTPATIENT
Start: 2023-06-08 | End: 2023-06-08

## 2023-06-08 RX ORDER — MIDAZOLAM HYDROCHLORIDE 1 MG/ML
5 INJECTION INTRAMUSCULAR; INTRAVENOUS ONCE
Status: DISCONTINUED | OUTPATIENT
Start: 2023-06-08 | End: 2023-06-08

## 2023-06-08 RX ORDER — ROCURONIUM BROMIDE 10 MG/ML
INJECTION, SOLUTION INTRAVENOUS CODE/TRAUMA/SEDATION MEDICATION
Status: COMPLETED | OUTPATIENT
Start: 2023-06-08 | End: 2023-06-08

## 2023-06-08 RX ORDER — HYDROCODONE BITARTRATE AND ACETAMINOPHEN 500; 5 MG/1; MG/1
TABLET ORAL
Status: DISCONTINUED | OUTPATIENT
Start: 2023-06-08 | End: 2023-06-14

## 2023-06-08 RX ORDER — PROPOFOL 10 MG/ML
VIAL (ML) INTRAVENOUS
Status: COMPLETED | OUTPATIENT
Start: 2023-06-08 | End: 2023-06-08

## 2023-06-08 RX ADMIN — Medication 150 MCG/HR: at 12:06

## 2023-06-08 RX ADMIN — PROPOFOL 35 MCG/KG/MIN: 10 INJECTION, EMULSION INTRAVENOUS at 11:06

## 2023-06-08 RX ADMIN — METHOCARBAMOL 500 MG: 100 INJECTION, SOLUTION INTRAMUSCULAR; INTRAVENOUS at 09:06

## 2023-06-08 RX ADMIN — ROCURONIUM BROMIDE 100 MG: 10 INJECTION, SOLUTION INTRAVENOUS at 08:06

## 2023-06-08 RX ADMIN — DEXMEDETOMIDINE HYDROCHLORIDE 0.8 MCG/KG/HR: 400 INJECTION INTRAVENOUS at 02:06

## 2023-06-08 RX ADMIN — DEXMEDETOMIDINE HYDROCHLORIDE 0.8 MCG/KG/HR: 400 INJECTION INTRAVENOUS at 06:06

## 2023-06-08 RX ADMIN — METHOCARBAMOL 500 MG: 100 INJECTION, SOLUTION INTRAMUSCULAR; INTRAVENOUS at 03:06

## 2023-06-08 RX ADMIN — FAMOTIDINE 20 MG: 10 INJECTION, SOLUTION INTRAVENOUS at 08:06

## 2023-06-08 RX ADMIN — POTASSIUM PHOSPHATE, MONOBASIC AND POTASSIUM PHOSPHATE, DIBASIC 30 MMOL: 224; 236 INJECTION, SOLUTION, CONCENTRATE INTRAVENOUS at 09:06

## 2023-06-08 RX ADMIN — Medication 1 TABLET: at 09:06

## 2023-06-08 RX ADMIN — METHOCARBAMOL 500 MG: 100 INJECTION, SOLUTION INTRAMUSCULAR; INTRAVENOUS at 05:06

## 2023-06-08 RX ADMIN — IPRATROPIUM BROMIDE AND ALBUTEROL SULFATE 3 ML: 2.5; .5 SOLUTION RESPIRATORY (INHALATION) at 02:06

## 2023-06-08 RX ADMIN — GABAPENTIN 300 MG: 300 CAPSULE ORAL at 04:06

## 2023-06-08 RX ADMIN — PROPOFOL 40 MCG/KG/MIN: 10 INJECTION, EMULSION INTRAVENOUS at 12:06

## 2023-06-08 RX ADMIN — DEXMEDETOMIDINE HYDROCHLORIDE 0.8 MCG/KG/HR: 400 INJECTION INTRAVENOUS at 09:06

## 2023-06-08 RX ADMIN — PROPOFOL 40 MCG/KG/MIN: 10 INJECTION, EMULSION INTRAVENOUS at 02:06

## 2023-06-08 RX ADMIN — SODIUM CHLORIDE, POTASSIUM CHLORIDE, SODIUM LACTATE AND CALCIUM CHLORIDE: 600; 310; 30; 20 INJECTION, SOLUTION INTRAVENOUS at 09:06

## 2023-06-08 RX ADMIN — IPRATROPIUM BROMIDE AND ALBUTEROL SULFATE 3 ML: 2.5; .5 SOLUTION RESPIRATORY (INHALATION) at 07:06

## 2023-06-08 RX ADMIN — DEXMEDETOMIDINE HYDROCHLORIDE 0.8 MCG/KG/HR: 400 INJECTION INTRAVENOUS at 12:06

## 2023-06-08 RX ADMIN — PROPOFOL 30 MCG/KG/MIN: 10 INJECTION, EMULSION INTRAVENOUS at 04:06

## 2023-06-08 RX ADMIN — IPRATROPIUM BROMIDE AND ALBUTEROL SULFATE 3 ML: 2.5; .5 SOLUTION RESPIRATORY (INHALATION) at 08:06

## 2023-06-08 RX ADMIN — PROPOFOL 30 MCG/KG/MIN: 10 INJECTION, EMULSION INTRAVENOUS at 09:06

## 2023-06-08 RX ADMIN — PIPERACILLIN AND TAZOBACTAM 4.5 G: 4; .5 INJECTION, POWDER, LYOPHILIZED, FOR SOLUTION INTRAVENOUS; PARENTERAL at 09:06

## 2023-06-08 RX ADMIN — FAMOTIDINE 20 MG: 10 INJECTION, SOLUTION INTRAVENOUS at 09:06

## 2023-06-08 RX ADMIN — MIDAZOLAM 5 MG: 5 INJECTION INTRAMUSCULAR; INTRAVENOUS at 08:06

## 2023-06-08 RX ADMIN — GABAPENTIN 300 MG: 300 CAPSULE ORAL at 08:06

## 2023-06-08 RX ADMIN — PROPOFOL 30 MCG/KG/MIN: 10 INJECTION, EMULSION INTRAVENOUS at 08:06

## 2023-06-08 RX ADMIN — Medication 1 TABLET: at 08:06

## 2023-06-08 RX ADMIN — DEXMEDETOMIDINE HYDROCHLORIDE 1 MCG/KG/HR: 400 INJECTION INTRAVENOUS at 09:06

## 2023-06-08 RX ADMIN — PROPOFOL 50 MG: 10 INJECTION, EMULSION INTRAVENOUS at 08:06

## 2023-06-08 RX ADMIN — MUPIROCIN: 20 OINTMENT TOPICAL at 09:06

## 2023-06-08 RX ADMIN — PIPERACILLIN AND TAZOBACTAM 4.5 G: 4; .5 INJECTION, POWDER, LYOPHILIZED, FOR SOLUTION INTRAVENOUS; PARENTERAL at 04:06

## 2023-06-08 RX ADMIN — PROPOFOL 40 MCG/KG/MIN: 10 INJECTION, EMULSION INTRAVENOUS at 05:06

## 2023-06-08 RX ADMIN — MUPIROCIN: 20 OINTMENT TOPICAL at 08:06

## 2023-06-08 RX ADMIN — GABAPENTIN 300 MG: 300 CAPSULE ORAL at 09:06

## 2023-06-08 NOTE — PROCEDURES
Endotracheal Intubation Procedure  Patient Name: Gera Chavez  MRN: [id number]  YOB: 1985  Admit Date: 6/4/2023  HD#4  Date of Procedure: 06/08/2023    Performed by: Dr. Salvador Sauer MD    Procedure: Endotracheal Intubation  Indications: airway protection, compromise, respiratory distress and respiratory failure  Intubation method: direct  Patient status: paralyzed (RSI)  Preoxygenation: BVM  Sedatives: propofol, versed  Paralytic: rocuronium   Time: 0855  Equipment: glidescope laryngoscope blade and 8 mm cuffed endotracheal tube. Vocal chords seen and color change on CO2 monitor.  Cricoid Pressure: yes.  Laryngoscope size: Chago #4  Tube size: 8 mm  Tube type: cuffed  Number of attempts: 1  Post-procedure assessment: auscultation, chest rise, ETCO2 monitor and CO2 detector  Breath sounds: equal and clear  Cuff inflated: yes  ETT to teeth: 24 cm  Tube secured with: adhesive tape, bite block and ETT alexandre  EBL: minimal  Chest x-ray interpreted by me.  Chest x-ray findings: endotracheal tube in appropriate position  Complications: Patient tolerated the procedure well with no immediate complications.  Condition: stable        6/8/2023 8:55 AM     The above findings, diagnostics, and treatment plan were discussed with Dr. Jarret Sauer  who will follow with further assessments and recommendations. Please call with any questions, concerns, or clinical status changes.

## 2023-06-08 NOTE — PROGRESS NOTES
Inpatient Nutrition Assessment    Admit Date: 6/4/2023   Total duration of encounter: 4 days     Nutrition Recommendation/Prescription     Start tube feeding when appropriate.  Tube feeding recommendation:   Peptamen Intense VHP goal rate 60 ml/hr to provide  1200 kcal/d (71% est needs, 105% with meds)  111 g protein/d (71% est needs)  1008 ml free water/d (60% est needs)  (calculations based on estimated 20 hr/d run time)     Communication of Recommendations: reviewed with nurse    Nutrition Assessment     Malnutrition Assessment/Nutrition-Focused Physical Exam    Malnutrition Context: other (see comments) (does not meet criteria)     Energy Intake (Malnutrition): other (see comments) (unable to obtain)  Weight Loss (Malnutrition): other (see comments) (unable to obtain)  Subcutaneous Fat (Malnutrition): other (see comments) (does not meet criteria)           Muscle Mass (Malnutrition): other (see comments) (does not meet criteria)                          Fluid Accumulation (Malnutrition): other (see comments) (does not meet criteria)        A minimum of two characteristics is recommended for diagnosis of either severe or non-severe malnutrition.    Chart Review    Reason Seen: continuous nutrition monitoring    Malnutrition Screening Tool Results   Have you recently lost weight without trying?: No  Have you been eating poorly because of a decreased appetite?: No   MST Score: 0     Diagnosis:  L pelvic rim fx w/ open book pelvis  L pelvic wall hematoma  L2 burst fx  L apical PTX  L 4-11 rib fx  pulm contusion  (Fall from bridge)    Relevant Medical History: none noted    Nutrition-Related Medications: LR @ 125ml/hr, diprivan, Ca +Vit D  Calorie Containing IV Medications: Diprivan @ 22 ml/hr (provides 580 kcal/d)    Nutrition-Related Labs:  6/5 K 5.3, Cl 115, .9, PAB 24.7  6/8 Glu 107, Phos 2.1    Diet/PN Order: Diet NPO Except for: Medication  Oral Supplement Order: none  Tube Feeding Order:  "none  Appetite/Oral Intake: not applicable/not applicable  Factors Affecting Nutritional Intake: on mechanical ventilation  Food/Druze/Cultural Preferences: unable to obtain  Food Allergies: none reported    Skin Integrity: incision  Wound(s):   incision noted    Comments    23: Discussed with RN. Will provide tube feeding recommendations for when appropriate to start tube feeding. Receiving kcal from meds.      23: Pt self extubated this AM, now reintubated. Discussed starting TF with RN. Receiving kcal from meds.     Anthropometrics    Height: 5' 10.98" (180.3 cm)    Last Weight: 121 kg (266 lb 12.1 oz) (23 1555)    BMI (Calculated): 37.2  BMI Classification: obese grade II (BMI 35-39.9)        Ideal Body Weight (IBW), Male: 171.88 lb     % Ideal Body Weight, Male (lb): 155.09 %                          Usual Weight Provided By: unable to obtain usual weight    Wt Readings from Last 5 Encounters:   23 121 kg (266 lb 12.1 oz)   23 90.7 kg (199 lb 15.3 oz)   23 90.7 kg (200 lb)   23 90.7 kg (200 lb)   23 100 kg (220 lb 7.4 oz)     Weight Change(s) Since Admission:  Admit Weight: 121 kg (266 lb 12.1 oz) (23 1555)    Estimated Needs    Weight Used For Calorie Calculations: 121 kg (266 lb 12.1 oz)  Energy Calorie Requirements (kcal): 1331-1694kcal (11-14kcal/kg)  Energy Need Method: Kcal/kg  Weight Used For Protein Calculations: 78.2 kg (172 lb 5.7 oz) (IBW)  Protein Requirements: 157gm  Fluid Requirements (mL): 1694ml (1ml/kcal)  Temp (24hrs), Av.9 °F (37.7 °C), Min:99 °F (37.2 °C), Max:100.5 °F (38.1 °C)         Enteral Nutrition    Patient not receiving enteral nutrition at this time.    Parenteral Nutrition    Patient not receiving parenteral nutrition support at this time.    Evaluation of Received Nutrient Intake    Calories: not meeting estimated needs  Protein: not meeting estimated needs    Patient Education    Not applicable.    Nutrition Diagnosis "     PES: Inadequate oral intake related to acute illness as evidenced by intubation since admit. (continues)    Interventions/Goals     Intervention(s): modified composition of enteral nutrition, modified rate of enteral nutrition, and collaboration with other providers  Goal: Meet greater than 75% of nutritional needs by follow-up. (goal progressing)    Monitoring & Evaluation     Dietitian will monitor energy intake.  Nutrition Risk/Follow-Up: high (follow-up in 1-4 days)   Please consult if re-assessment needed sooner.

## 2023-06-08 NOTE — PROGRESS NOTES
[facility] - [facility] ICU  Pulmonary Critical Care Note    Patient Name: Gera Chavez  MRN: [id number]  Admission Date: 6/4/2023  Hospital Length of Stay: 4 days  Code Status: Full Code  Attending Provider: Salvador Sauer MD  Primary Care Provider: Primary Doctor No     Subjective:     HPI:   Gera Chavez is a 37 y.o. male with no known PMH, who was transferred from [facility] for level 1 trauma activation, after patient reportedly jumped off a bridge.  Sustained rib fractures x4 left-sided, pelvic ring fracture, acetabulum fracture, L2 burst fracture, and left pneumothorax with chest tube in place.  Jump witnessed by bystander who called 911.     Hospital Course/Significant events:  6/4/2023 - Admitted to ICU   6/5/2023 - s/p bronchoscopy    24 Hour Interval History:  No acute events overnight. Current drips: propofol at 40, fentanyl at 150, precedex at 0.8.     Past Medical History:   Diagnosis Date    Asthma     Depression     Diabetes mellitus     Encounter for blood transfusion     Generalized anxiety disorder     Hypertension     Schizophrenia, unspecified     Sleep apnea     Unspecified glaucoma        Past Surgical History:   Procedure Laterality Date    INTRAMEDULLARY RODDING OF FEMUR Left 02/05/2023    Procedure: INSERTION, INTRAMEDULLARY NOEL, FEMUR;  Surgeon: Tuan Zepeda DO;  Location: [facility];  Service: Orthopedics;  Laterality: Left;    OPEN REDUCTION AND INTERNAL FIXATION (ORIF) OF INJURY OF HIP Left 6/5/2023    Procedure: ORIF,PELVIS;  Surgeon: Tuan Zepeda DO;  Location: [facility];  Service: Orthopedics;  Laterality: Left;  supine deven traction LLE CELL SAVER, teo    REMOVAL OF HARDWARE FROM LOWER EXTREMITY Left 5/9/2023    Procedure: REMOVAL, HARDWARE, LOWER EXTREMITY;  Surgeon: Tuan Zepeda DO;  Location: [facility] OR;  Service: Orthopedics;  Laterality: Left;    TRACH TUBE EXCHANGER  6/5/2023    UVULOPALATOPHARYNGOPLASTY         Social History     Socioeconomic  History    Marital status: Single   Tobacco Use    Smoking status: Every Day     Packs/day: 0.50     Years: 10.00     Pack years: 5.00     Types: Cigarettes    Smokeless tobacco: Never   Substance and Sexual Activity    Alcohol use: Not Currently    Drug use: Never    Sexual activity: Not Currently     Social Determinants of Health     Financial Resource Strain: Unknown    Difficulty of Paying Living Expenses: Patient refused   Food Insecurity: Unknown    Worried About Running Out of Food in the Last Year: Patient refused    Ran Out of Food in the Last Year: Patient refused   Transportation Needs: Unknown    Lack of Transportation (Medical): Patient refused    Lack of Transportation (Non-Medical): Patient refused   Physical Activity: Unknown    Days of Exercise per Week: Patient refused   Stress: Unknown    Feeling of Stress : Patient refused   Social Connections: Unknown    Frequency of Communication with Friends and Family: Patient refused    Frequency of Social Gatherings with Friends and Family: Patient refused    Attends Club or Organization Meetings: Patient refused    Marital Status: Never    Housing Stability: Unknown    Unable to Pay for Housing in the Last Year: Patient refused    Unstable Housing in the Last Year: Patient refused       Current Outpatient Medications   Medication Instructions    albuterol (PROVENTIL/VENTOLIN HFA) 90 mcg/actuation inhaler 2 puffs, Inhalation, Every 4 hours PRN    ALBUTEROL INHL 90 mcg, Inhalation, Every 4 hours PRN    amLODIPine (NORVASC) 5 mg, Oral, Daily    amoxicillin-clavulanate 875-125mg (AUGMENTIN) 875-125 mg per tablet 1 tablet, Oral, Every 12 hours (non-standard times)    atorvastatin (LIPITOR) 20 mg, Oral, Daily    azithromycin (Z-CELESTINA) 500 mg, Oral, Daily    clonazePAM (KLONOPIN) 1 mg, Oral, 2 times daily PRN    dextroamphetamine-amphetamine (ADDERALL XR) 25 MG 24 hr capsule 25 mg, Oral, 2 times daily    FLUoxetine 20 MG capsule fluoxetine 20 mg capsule     FLUoxetine 40 mg, Oral, Daily    insulin glargine 100 units/mL SubQ pen Lantus Solostar U-100 Insulin 100 unit/mL (3 mL) subcutaneous pen    ipratropium (ATROVENT) 42 mcg (0.06 %) nasal spray Each Nostril    lamoTRIgine (LAMICTAL) 25 MG tablet lamotrigine 25 mg tablet    LANTUS SOLOSTAR U-100 INSULIN glargine 100 units/mL SubQ pen Subcutaneous    latanoprost 0.005 % ophthalmic solution latanoprost 0.005 % eye drops    lisinopriL (PRINIVIL,ZESTRIL) 40 mg, Oral    mirtazapine (REMERON) 7.5 MG Tab mirtazapine 7.5 mg tablet    potassium chloride (K-TAB) 20 mEq potassium chloride ER 20 mEq tablet,extended release   TAKE 1 TABLET BY MOUTH EVERY DAY    prochlorperazine (COMPAZINE) 10 MG tablet prochlorperazine maleate 10 mg tablet    QUEtiapine (SEROQUEL) 200 mg, Oral, Nightly    testosterone cypionate (DEPOTESTOTERONE CYPIONATE) 200 mg/mL injection SMARTSIG:Milliliter(s) IM     Current Inpatient Medications   albuterol-ipratropium  3 mL Nebulization Q6H    calcium-vitamin D3  1 tablet Oral BID    famotidine (PF)  20 mg Intravenous BID    gabapentin  300 mg Per OG tube TID    methocarbamoL  500 mg Intravenous Q8H    mupirocin   Nasal BID    piperacillin-tazobactam (Zosyn) IV (PEDS and ADULTS) (extended infusion is not appropriate)  4.5 g Intravenous Q8H     Current Intravenous Infusions   dexmedeTOMIDine (Precedex) infusion (titrating) 0.8 mcg/kg/hr (06/08/23 0043)    fentanyl 175 mcg/hr (06/07/23 2100)    lactated ringers 125 mL/hr at 06/07/23 2104    NORepinephrine bitartrate-D5W Stopped (06/06/23 1135)    propofoL 40 mcg/kg/min (06/07/23 2253)     Review of Systems   Reason unable to perform ROS: Sedated and intubated.      Objective:     Intake/Output Summary (Last 24 hours) at 6/8/2023 0121  Last data filed at 6/8/2023 0000  Gross per 24 hour   Intake 4676 ml   Output 6025 ml   Net -1349 ml       Vital Signs (Most Recent):  Temp: 99.6 °F (37.6 °C) (06/08/23 0000)  Pulse: 89 (06/08/23 0032)  Resp: (!) 28 (06/08/23  0032)  BP: (!) 110/58 (06/08/23 0000)  SpO2: 98 % (06/08/23 0032)  Body mass index is 37.22 kg/m².  Weight: 121 kg (266 lb 12.1 oz) Vital Signs (24h Range):  Temp:  [99 °F (37.2 °C)-100.5 °F (38.1 °C)] 99.6 °F (37.6 °C)  Pulse:  [] 89  Resp:  [0-28] 28  SpO2:  [92 %-100 %] 98 %  BP: (110-148)/(55-80) 110/58  Arterial Line BP: (108-145)/(39-64) 122/52     Physical Exam  General: Intubated and sedated  HEENT: NC/AT; PERRL; nasal and oral mucosa moist and clear; ET tube in place  Pulm: CTA bilaterally, mechanically ventilated  CV: S1, S2 w/o murmurs or gallops; no edema noted  GI: Soft with normal bowel sounds in all quadrants, no masses on palpation  Neuro: Limited d/t sedation    Lines/Drains/Airways       Central Venous Catheter Line  Duration             Percutaneous Central Line Insertion/Assessment - Double Lumen  06/04/23 1530 Subclavian Left 3 days              Drain  Duration                  Chest Tube 06/04/23 1549 Left Midaxillary 28 Fr. 3 days         NG/OG Tube 06/04/23 1554 16 Fr. Center mouth 3 days         Urethral Catheter 06/05/23 1610 Silicone 16 Fr. 2 days              Airway  Duration                  Airway - Non-Surgical 06/04/23 1549 Endotracheal Tube 3 days              Arterial Line  Duration             Arterial Line 06/06/23 0746 Left Radial 1 day              Peripheral Intravenous Line  Duration                  Peripheral IV - Single Lumen 06/04/23 1549 18 G Left Forearm 3 days         Peripheral IV - Single Lumen 06/04/23 1554 18 G Right Antecubital 3 days                  Significant Labs:  Lab Results   Component Value Date    WBC 8.30 06/07/2023    HGB 8.6 (L) 06/07/2023    HCT 25.3 (L) 06/07/2023    MCV 85.2 06/07/2023     (L) 06/07/2023       BMP  Lab Results   Component Value Date     06/07/2023    K 4.1 06/07/2023    CHLORIDE 113 (H) 06/07/2023    CO2 25 06/07/2023    BUN 8.1 (L) 06/07/2023    CREATININE 0.74 06/07/2023    CALCIUM 7.9 (L) 06/07/2023    AGAP  12.0 02/05/2023     ABG  Recent Labs   Lab 06/07/23 0635   PH 7.450   PO2 82.0   HCO3 27.1       Mechanical Ventilation Support:  Vent Mode: VOLUME A/C (06/08/23 0032)  Set Rate: 28 BPM (06/08/23 0032)  Vt Set: 530 mL (06/08/23 0032)  Pressure Support: 12 cmH20 (06/06/23 0429)  PEEP/CPAP: 8 cmH20 (06/08/23 0032)  Oxygen Concentration (%): 50 (06/08/23 0032)  Peak Airway Pressure: 28 cmH20 (06/08/23 0032)  Total Ve: 14 L/m (06/08/23 0032)  F/VT Ratio<105 (RSBI): (!) 57.14 (06/08/23 0032)    Significant Imaging:  I have reviewed the pertinent imaging within the past 24 hours.    Assessment/Plan:     Assessment  Pelvic ring fx s/p ORIF on 6/5 POD 1  Left-sided rib fractures with pneumo/hemo thorax s/p chest tube placement  L2 compression fracture   Right distal radius fracture    Plan  -Continue ICU level of care per primary team  -Trauma, neurosurgery, and orthopedic surgery teams following  -Lumbar laminectomy scheduled on 6/9/2023  -Continue routine chest tube care    DVT Prophylaxis: SCD  GI Prophylaxis: Famotidine     32 minutes of critical care was time spent personally by me on the following activities: development of treatment plan with patient or surrogate and bedside caregivers, discussions with consultants, evaluation of patient's response to treatment, examination of patient, ordering and performing treatments and interventions, ordering and review of laboratory studies, ordering and review of radiographic studies, pulse oximetry, re-evaluation of patient's condition.  This critical care time did not overlap with that of any other provider or involve time for any procedures.     Kalee Zamudio DO, PGY-II  Pulmonary Critical Care Medicine  [facility] - [facility] ICU

## 2023-06-08 NOTE — PLAN OF CARE
Problem: Adult Inpatient Plan of Care  Goal: Plan of Care Review  Outcome: Ongoing, Progressing  Goal: Patient-Specific Goal (Individualized)  Outcome: Ongoing, Progressing  Goal: Absence of Hospital-Acquired Illness or Injury  Outcome: Ongoing, Progressing  Goal: Optimal Comfort and Wellbeing  Outcome: Ongoing, Progressing  Goal: Readiness for Transition of Care  Outcome: Ongoing, Progressing     Problem: Infection  Goal: Absence of Infection Signs and Symptoms  Outcome: Ongoing, Progressing     Problem: Communication Impairment (Mechanical Ventilation, Invasive)  Goal: Effective Communication  Outcome: Ongoing, Progressing     Problem: Device-Related Complication Risk (Mechanical Ventilation, Invasive)  Goal: Optimal Device Function  Outcome: Ongoing, Progressing     Problem: Inability to Wean (Mechanical Ventilation, Invasive)  Goal: Mechanical Ventilation Liberation  Outcome: Ongoing, Progressing     Problem: Nutrition Impairment (Mechanical Ventilation, Invasive)  Goal: Optimal Nutrition Delivery  Outcome: Ongoing, Progressing     Problem: Skin and Tissue Injury (Mechanical Ventilation, Invasive)  Goal: Absence of Device-Related Skin and Tissue Injury  Outcome: Ongoing, Progressing     Problem: Ventilator-Induced Lung Injury (Mechanical Ventilation, Invasive)  Goal: Absence of Ventilator-Induced Lung Injury  Outcome: Ongoing, Progressing     Problem: Communication Impairment (Artificial Airway)  Goal: Effective Communication  Outcome: Ongoing, Progressing     Problem: Device-Related Complication Risk (Artificial Airway)  Goal: Optimal Device Function  Outcome: Ongoing, Progressing     Problem: Skin and Tissue Injury (Artificial Airway)  Goal: Absence of Device-Related Skin or Tissue Injury  Outcome: Ongoing, Progressing     Problem: Noninvasive Ventilation Acute  Goal: Effective Unassisted Ventilation and Oxygenation  Outcome: Ongoing, Progressing     Problem: Skin Injury Risk Increased  Goal: Skin Health and  Integrity  Outcome: Ongoing, Progressing     Problem: Fall Injury Risk  Goal: Absence of Fall and Fall-Related Injury  Outcome: Ongoing, Progressing     Problem: Restraint, Nonbehavioral (Nonviolent)  Goal: Absence of Harm or Injury  Outcome: Ongoing, Progressing      no

## 2023-06-08 NOTE — PROCEDURES
Bronchoscopy Procedure    Patient Name: Gera Chavez  MRN: [id number]  YOB: 1985  Admit Date: 6/4/2023  HD#4  Date of Procedure: 06/08/2023    Procedure: Bronchoscopy, Diagnostic  Bronchoscopy, Therapeutic  Consent: emergent  Performed by: Zuly Abdi PA-C  Assisted by: Dr. Salvador Sauer MD  Indication: airway surveillance, secretion clearance, pulmonary contusions  Monitoring: EKG, blood pressure, pulse oximetry   Anesthesia: Moderate Sedation    Description of the Procedure:   The correct patient and procedure was identified by time out by all members of the team. The bronchocope was lubricated and introduced into the main airway via the ETT in the standard fashion. The endotracheal tube was near the right mainstem; the ETT was pulled back to 24cm at the lip. An anatomical survey was done of the main airways and the subsegmental bronchus. Several passes were done down all bronchi. Secretions notable in the L airway which we irrigated. A final pass through was done and the bronchoscope was removed.     Complications: None; patient tolerated the procedure well.    Estimated Blood Loss (EBL): Minimal           Specimens: None       Condition: Stable        Disposition: ICU - intubated and hemodynamically stable.    Plan: Obtain CXR       6/8/2023 10:11 AM     The above findings, diagnostics, and treatment plan were discussed with Dr. Jarret Sauer  who will follow with further assessments and recommendations. Please call with any questions, concerns, or clinical status changes.

## 2023-06-08 NOTE — NURSING
Nurses Note -- 4 Eyes      6/8/2023   5:26 AM      Skin assessed during: Daily Assessment      [x] No Altered Skin Integrity Present    [x]Prevention Measures Documented      [] Yes- Altered Skin Integrity Present or Discovered   [] LDA Added if Not in Epic (Describe Wound)   [] New Altered Skin Integrity was Present on Admit and Documented in LDA   [] Wound Image Taken    Wound Care Consulted? No    Attending Nurse:  Kellie Acosta RN     Second RN/Staff Member:  Macie Hawthorne RN
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
## 2023-06-09 ENCOUNTER — ANESTHESIA (OUTPATIENT)
Dept: SURGERY | Facility: HOSPITAL | Age: 38
DRG: 956 | End: 2023-06-09
Payer: MEDICARE

## 2023-06-09 LAB
ABO + RH BLD: NORMAL
ABO + RH BLD: NORMAL
ALBUMIN SERPL-MCNC: 2.2 G/DL (ref 3.5–5)
ALBUMIN/GLOB SERPL: 0.6 RATIO (ref 1.1–2)
ALLENS TEST BLOOD GAS (OHS): ABNORMAL
ALP SERPL-CCNC: 69 UNIT/L (ref 40–150)
ALT SERPL-CCNC: 14 UNIT/L (ref 0–55)
ANION GAP SERPL CALC-SCNC: 11 MEQ/L
AST SERPL-CCNC: 19 UNIT/L (ref 5–34)
BACTERIA BLD CULT: NORMAL
BACTERIA BLD CULT: NORMAL
BASE EXCESS BLD CALC-SCNC: 3.7 MMOL/L
BASOPHILS # BLD AUTO: 0.03 X10(3)/MCL
BASOPHILS # BLD AUTO: 0.03 X10(3)/MCL
BASOPHILS NFR BLD AUTO: 0.4 %
BASOPHILS NFR BLD AUTO: 0.4 %
BILIRUBIN DIRECT+TOT PNL SERPL-MCNC: 2.3 MG/DL
BLD PROD TYP BPU: NORMAL
BLD PROD TYP BPU: NORMAL
BLOOD GAS SAMPLE TYPE (OHS): ABNORMAL
BLOOD UNIT EXPIRATION DATE: NORMAL
BLOOD UNIT EXPIRATION DATE: NORMAL
BLOOD UNIT TYPE CODE: 7300
BLOOD UNIT TYPE CODE: 7300
BUN SERPL-MCNC: 10.5 MG/DL (ref 8.9–20.6)
BUN SERPL-MCNC: 12 MG/DL (ref 8.9–20.6)
CA-I BLD-SCNC: 1.12 MMOL/L (ref 1.12–1.23)
CALCIUM SERPL-MCNC: 7.6 MG/DL (ref 8.4–10.2)
CALCIUM SERPL-MCNC: 8.4 MG/DL (ref 8.4–10.2)
CHLORIDE SERPL-SCNC: 109 MMOL/L (ref 98–107)
CHLORIDE SERPL-SCNC: 109 MMOL/L (ref 98–107)
CO2 BLDA-SCNC: 28.9 MMOL/L
CO2 SERPL-SCNC: 23 MMOL/L (ref 22–29)
CO2 SERPL-SCNC: 24 MMOL/L (ref 22–29)
CREAT SERPL-MCNC: 0.8 MG/DL (ref 0.73–1.18)
CREAT SERPL-MCNC: 0.83 MG/DL (ref 0.73–1.18)
CREAT/UREA NIT SERPL: 14
CROSSMATCH INTERPRETATION: NORMAL
CROSSMATCH INTERPRETATION: NORMAL
DISPENSE STATUS: NORMAL
DISPENSE STATUS: NORMAL
DRAWN BY BLOOD GAS (OHS): ABNORMAL
EOSINOPHIL # BLD AUTO: 0.05 X10(3)/MCL (ref 0–0.9)
EOSINOPHIL # BLD AUTO: 0.3 X10(3)/MCL (ref 0–0.9)
EOSINOPHIL NFR BLD AUTO: 0.6 %
EOSINOPHIL NFR BLD AUTO: 3.9 %
ERYTHROCYTE [DISTWIDTH] IN BLOOD BY AUTOMATED COUNT: 16.3 % (ref 11.5–17)
ERYTHROCYTE [DISTWIDTH] IN BLOOD BY AUTOMATED COUNT: 16.3 % (ref 11.5–17)
FIO2 BLOOD GAS (OHS): 50 %
GFR SERPLBLD CREATININE-BSD FMLA CKD-EPI: >60 MLS/MIN/1.73/M2
GFR SERPLBLD CREATININE-BSD FMLA CKD-EPI: >60 MLS/MIN/1.73/M2
GLOBULIN SER-MCNC: 3.5 GM/DL (ref 2.4–3.5)
GLUCOSE SERPL-MCNC: 110 MG/DL (ref 74–100)
GLUCOSE SERPL-MCNC: 116 MG/DL (ref 74–100)
HCO3 BLDA-SCNC: 27.7 MMOL/L
HCT VFR BLD AUTO: 27.1 % (ref 42–52)
HCT VFR BLD AUTO: 29.4 % (ref 42–52)
HGB BLD-MCNC: 9.2 G/DL (ref 14–18)
HGB BLD-MCNC: 9.9 G/DL (ref 14–18)
IMM GRANULOCYTES # BLD AUTO: 0.06 X10(3)/MCL (ref 0–0.04)
IMM GRANULOCYTES # BLD AUTO: 0.09 X10(3)/MCL (ref 0–0.04)
IMM GRANULOCYTES NFR BLD AUTO: 0.8 %
IMM GRANULOCYTES NFR BLD AUTO: 1.1 %
LYMPHOCYTES # BLD AUTO: 0.69 X10(3)/MCL (ref 0.6–4.6)
LYMPHOCYTES # BLD AUTO: 1.09 X10(3)/MCL (ref 0.6–4.6)
LYMPHOCYTES NFR BLD AUTO: 14.3 %
LYMPHOCYTES NFR BLD AUTO: 8.3 %
MAGNESIUM SERPL-MCNC: 1.9 MG/DL (ref 1.6–2.6)
MCH RBC QN AUTO: 28.5 PG (ref 27–31)
MCH RBC QN AUTO: 29.2 PG (ref 27–31)
MCHC RBC AUTO-ENTMCNC: 33.7 G/DL (ref 33–36)
MCHC RBC AUTO-ENTMCNC: 33.9 G/DL (ref 33–36)
MCV RBC AUTO: 84.7 FL (ref 80–94)
MCV RBC AUTO: 86 FL (ref 80–94)
MECH RR (OHS): 28 B/MIN
MECH VT (OHS): 510 ML
MODE (OHS): AC
MONOCYTES # BLD AUTO: 0.67 X10(3)/MCL (ref 0.1–1.3)
MONOCYTES # BLD AUTO: 0.69 X10(3)/MCL (ref 0.1–1.3)
MONOCYTES NFR BLD AUTO: 8 %
MONOCYTES NFR BLD AUTO: 9.1 %
NEUTROPHILS # BLD AUTO: 5.45 X10(3)/MCL (ref 2.1–9.2)
NEUTROPHILS # BLD AUTO: 6.83 X10(3)/MCL (ref 2.1–9.2)
NEUTROPHILS NFR BLD AUTO: 71.5 %
NEUTROPHILS NFR BLD AUTO: 81.6 %
NRBC BLD AUTO-RTO: 0 %
NRBC BLD AUTO-RTO: 0 %
OXYGEN DEVICE BLOOD GAS (OHS): ABNORMAL
PCO2 BLDA: 39 MMHG (ref 35–45)
PEEP (OHS): 10 CMH2O
PH BLDA: 7.46 [PH] (ref 7.35–7.45)
PHOSPHATE SERPL-MCNC: 3.9 MG/DL (ref 2.3–4.7)
PLATELET # BLD AUTO: 144 X10(3)/MCL (ref 130–400)
PLATELET # BLD AUTO: 169 X10(3)/MCL (ref 130–400)
PMV BLD AUTO: 10.3 FL (ref 7.4–10.4)
PMV BLD AUTO: 10.3 FL (ref 7.4–10.4)
PO2 BLDA: 96 MMHG (ref 80–100)
POTASSIUM BLOOD GAS (OHS): 3.4 MMOL/L (ref 3.5–5)
POTASSIUM SERPL-SCNC: 3.5 MMOL/L (ref 3.5–5.1)
POTASSIUM SERPL-SCNC: 4.4 MMOL/L (ref 3.5–5.1)
PROT SERPL-MCNC: 5.7 GM/DL (ref 6.4–8.3)
RBC # BLD AUTO: 3.15 X10(6)/MCL (ref 4.7–6.1)
RBC # BLD AUTO: 3.47 X10(6)/MCL (ref 4.7–6.1)
SAMPLE SITE BLOOD GAS (OHS): ABNORMAL
SAO2 % BLDA: 98 %
SODIUM BLOOD GAS (OHS): 142 MMOL/L (ref 137–145)
SODIUM SERPL-SCNC: 143 MMOL/L (ref 136–145)
SODIUM SERPL-SCNC: 144 MMOL/L (ref 136–145)
UNIT NUMBER: NORMAL
UNIT NUMBER: NORMAL
WBC # SPEC AUTO: 7.62 X10(3)/MCL (ref 4.5–11.5)
WBC # SPEC AUTO: 8.36 X10(3)/MCL (ref 4.5–11.5)

## 2023-06-09 PROCEDURE — 20930 PR ALLOGRAFT FOR SPINE SURGERY ONLY MORSELIZED: ICD-10-PCS | Mod: ,,, | Performed by: NEUROLOGICAL SURGERY

## 2023-06-09 PROCEDURE — 27800903 OPTIME MED/SURG SUP & DEVICES OTHER IMPLANTS: Performed by: NEUROLOGICAL SURGERY

## 2023-06-09 PROCEDURE — 63600175 PHARM REV CODE 636 W HCPCS: Performed by: NEUROLOGICAL SURGERY

## 2023-06-09 PROCEDURE — 25000003 PHARM REV CODE 250: Performed by: NURSE ANESTHETIST, CERTIFIED REGISTERED

## 2023-06-09 PROCEDURE — 99900026 HC AIRWAY MAINTENANCE (STAT)

## 2023-06-09 PROCEDURE — 63600175 PHARM REV CODE 636 W HCPCS

## 2023-06-09 PROCEDURE — 25000003 PHARM REV CODE 250

## 2023-06-09 PROCEDURE — 25000003 PHARM REV CODE 250: Performed by: NEUROLOGICAL SURGERY

## 2023-06-09 PROCEDURE — 80053 COMPREHEN METABOLIC PANEL: CPT | Performed by: STUDENT IN AN ORGANIZED HEALTH CARE EDUCATION/TRAINING PROGRAM

## 2023-06-09 PROCEDURE — 20930 SP BONE ALGRFT MORSEL ADD-ON: CPT | Mod: ,,, | Performed by: NEUROLOGICAL SURGERY

## 2023-06-09 PROCEDURE — 27000221 HC OXYGEN, UP TO 24 HOURS

## 2023-06-09 PROCEDURE — 20000000 HC ICU ROOM

## 2023-06-09 PROCEDURE — 94640 AIRWAY INHALATION TREATMENT: CPT

## 2023-06-09 PROCEDURE — 63600175 PHARM REV CODE 636 W HCPCS: Performed by: NURSE ANESTHETIST, CERTIFIED REGISTERED

## 2023-06-09 PROCEDURE — 22610 ARTHRD PST TQ 1NTRSPC THRC: CPT | Mod: 51,,, | Performed by: NEUROLOGICAL SURGERY

## 2023-06-09 PROCEDURE — 22325 TREAT SPINE FRACTURE: CPT | Mod: ,,, | Performed by: NEUROLOGICAL SURGERY

## 2023-06-09 PROCEDURE — 94003 VENT MGMT INPAT SUBQ DAY: CPT

## 2023-06-09 PROCEDURE — D9220A PRA ANESTHESIA: ICD-10-PCS | Mod: CRNA,,, | Performed by: NURSE ANESTHETIST, CERTIFIED REGISTERED

## 2023-06-09 PROCEDURE — D9220A PRA ANESTHESIA: Mod: CRNA,,, | Performed by: NURSE ANESTHETIST, CERTIFIED REGISTERED

## 2023-06-09 PROCEDURE — 37000008 HC ANESTHESIA 1ST 15 MINUTES: Performed by: NEUROLOGICAL SURGERY

## 2023-06-09 PROCEDURE — 99900035 HC TECH TIME PER 15 MIN (STAT)

## 2023-06-09 PROCEDURE — 25000003 PHARM REV CODE 250: Performed by: SURGERY

## 2023-06-09 PROCEDURE — 63600175 PHARM REV CODE 636 W HCPCS: Performed by: SURGERY

## 2023-06-09 PROCEDURE — 25000242 PHARM REV CODE 250 ALT 637 W/ HCPCS: Performed by: NURSE ANESTHETIST, CERTIFIED REGISTERED

## 2023-06-09 PROCEDURE — 85025 COMPLETE CBC W/AUTO DIFF WBC: CPT | Performed by: NEUROLOGICAL SURGERY

## 2023-06-09 PROCEDURE — P9016 RBC LEUKOCYTES REDUCED: HCPCS | Performed by: STUDENT IN AN ORGANIZED HEALTH CARE EDUCATION/TRAINING PROGRAM

## 2023-06-09 PROCEDURE — 63600175 PHARM REV CODE 636 W HCPCS: Performed by: STUDENT IN AN ORGANIZED HEALTH CARE EDUCATION/TRAINING PROGRAM

## 2023-06-09 PROCEDURE — 99900031 HC PATIENT EDUCATION (STAT)

## 2023-06-09 PROCEDURE — 20800000 HC ICU TRAUMA

## 2023-06-09 PROCEDURE — 20936 SP BONE AGRFT LOCAL ADD-ON: CPT | Mod: ,,, | Performed by: NEUROLOGICAL SURGERY

## 2023-06-09 PROCEDURE — 36620 INSERTION CATHETER ARTERY: CPT | Mod: 59,,, | Performed by: ANESTHESIOLOGY

## 2023-06-09 PROCEDURE — 22614 ARTHRD PST TQ 1NTRSPC EA ADD: CPT | Mod: ,,, | Performed by: NEUROLOGICAL SURGERY

## 2023-06-09 PROCEDURE — 22842 INSERT SPINE FIXATION DEVICE: CPT | Mod: ,,, | Performed by: NEUROLOGICAL SURGERY

## 2023-06-09 PROCEDURE — 25000242 PHARM REV CODE 250 ALT 637 W/ HCPCS: Performed by: HOSPITALIST

## 2023-06-09 PROCEDURE — 22610 PR ARTHRODESIS, POST/POSTLAT, SNGL INTERSPACE, THORACIC: ICD-10-PCS | Mod: 51,,, | Performed by: NEUROLOGICAL SURGERY

## 2023-06-09 PROCEDURE — 25000003 PHARM REV CODE 250: Performed by: STUDENT IN AN ORGANIZED HEALTH CARE EDUCATION/TRAINING PROGRAM

## 2023-06-09 PROCEDURE — 36620 ARTERIAL: ICD-10-PCS | Mod: 59,,, | Performed by: ANESTHESIOLOGY

## 2023-06-09 PROCEDURE — 22842 PR POSTERIOR SEGMENTAL INSTRUMENTATION 3-6 VRT SEG: ICD-10-PCS | Mod: ,,, | Performed by: NEUROLOGICAL SURGERY

## 2023-06-09 PROCEDURE — 37799 UNLISTED PX VASCULAR SURGERY: CPT

## 2023-06-09 PROCEDURE — 84100 ASSAY OF PHOSPHORUS: CPT | Performed by: STUDENT IN AN ORGANIZED HEALTH CARE EDUCATION/TRAINING PROGRAM

## 2023-06-09 PROCEDURE — 22614 PR ARTHRODESIS, POST/POSTLAT, SNGL INTERSPACE, EA ADDTL: ICD-10-PCS | Mod: ,,, | Performed by: NEUROLOGICAL SURGERY

## 2023-06-09 PROCEDURE — D9220A PRA ANESTHESIA: ICD-10-PCS | Mod: ANES,,, | Performed by: ANESTHESIOLOGY

## 2023-06-09 PROCEDURE — 63707 REPAIR SPINAL FLUID LEAKAGE: CPT | Mod: 59,,, | Performed by: NEUROLOGICAL SURGERY

## 2023-06-09 PROCEDURE — 36000713 HC OR TIME LEV V EA ADD 15 MIN: Performed by: NEUROLOGICAL SURGERY

## 2023-06-09 PROCEDURE — 63707 PR REPR,DURAL/CSF LEAK,NOT REQ LAMINECTOMY: ICD-10-PCS | Mod: 59,,, | Performed by: NEUROLOGICAL SURGERY

## 2023-06-09 PROCEDURE — 37000009 HC ANESTHESIA EA ADD 15 MINS: Performed by: NEUROLOGICAL SURGERY

## 2023-06-09 PROCEDURE — 61783 PR STEREOTACTIC COMP ASSIST PROC,SPINAL: ICD-10-PCS | Mod: ,,, | Performed by: NEUROLOGICAL SURGERY

## 2023-06-09 PROCEDURE — 82803 BLOOD GASES ANY COMBINATION: CPT

## 2023-06-09 PROCEDURE — 85025 COMPLETE CBC W/AUTO DIFF WBC: CPT | Performed by: STUDENT IN AN ORGANIZED HEALTH CARE EDUCATION/TRAINING PROGRAM

## 2023-06-09 PROCEDURE — 20936 PR AUTOGRAFT SPINE SURGERY LOCAL FROM SAME INCISION: ICD-10-PCS | Mod: ,,, | Performed by: NEUROLOGICAL SURGERY

## 2023-06-09 PROCEDURE — 83735 ASSAY OF MAGNESIUM: CPT | Performed by: STUDENT IN AN ORGANIZED HEALTH CARE EDUCATION/TRAINING PROGRAM

## 2023-06-09 PROCEDURE — C1713 ANCHOR/SCREW BN/BN,TIS/BN: HCPCS | Performed by: NEUROLOGICAL SURGERY

## 2023-06-09 PROCEDURE — 94761 N-INVAS EAR/PLS OXIMETRY MLT: CPT

## 2023-06-09 PROCEDURE — 36000712 HC OR TIME LEV V 1ST 15 MIN: Performed by: NEUROLOGICAL SURGERY

## 2023-06-09 PROCEDURE — 27201423 OPTIME MED/SURG SUP & DEVICES STERILE SUPPLY: Performed by: NEUROLOGICAL SURGERY

## 2023-06-09 PROCEDURE — 22325 PR OPEN POST RX LUMB VERT FX,1 LVL: ICD-10-PCS | Mod: ,,, | Performed by: NEUROLOGICAL SURGERY

## 2023-06-09 PROCEDURE — D9220A PRA ANESTHESIA: Mod: ANES,,, | Performed by: ANESTHESIOLOGY

## 2023-06-09 PROCEDURE — 61783 SCAN PROC SPINAL: CPT | Mod: ,,, | Performed by: NEUROLOGICAL SURGERY

## 2023-06-09 DEVICE — SCREW HORIZON SOLERA 6.5X35: Type: IMPLANTABLE DEVICE | Site: SPINE LUMBAR | Status: FUNCTIONAL

## 2023-06-09 DEVICE — IMPLANTABLE DEVICE: Type: IMPLANTABLE DEVICE | Site: SPINE THORACIC | Status: FUNCTIONAL

## 2023-06-09 DEVICE — SET SCREW HORIZON SOLERA 5.5-6: Type: IMPLANTABLE DEVICE | Site: SPINE LUMBAR | Status: FUNCTIONAL

## 2023-06-09 DEVICE — COLLAGEN DURAL REGENERATION MEMBRANE 1IN X 1IN (2.5CM X 2.5CM)
Type: IMPLANTABLE DEVICE | Site: SPINE LUMBAR | Status: FUNCTIONAL
Brand: DURAMATRIX-ONLAY PLUS

## 2023-06-09 DEVICE — GRAFT CHIPS FD 4-10MM 15CC: Type: IMPLANTABLE DEVICE | Site: SPINE LUMBAR | Status: FUNCTIONAL

## 2023-06-09 RX ORDER — KETAMINE HYDROCHLORIDE 100 MG/ML
INJECTION, SOLUTION INTRAMUSCULAR; INTRAVENOUS
Status: DISCONTINUED | OUTPATIENT
Start: 2023-06-09 | End: 2023-06-09

## 2023-06-09 RX ORDER — ACETAMINOPHEN 10 MG/ML
INJECTION, SOLUTION INTRAVENOUS
Status: DISCONTINUED | OUTPATIENT
Start: 2023-06-09 | End: 2023-06-09

## 2023-06-09 RX ORDER — FENTANYL CITRATE 50 UG/ML
INJECTION, SOLUTION INTRAMUSCULAR; INTRAVENOUS
Status: DISCONTINUED | OUTPATIENT
Start: 2023-06-09 | End: 2023-06-09

## 2023-06-09 RX ORDER — HYDROMORPHONE HYDROCHLORIDE 2 MG/ML
INJECTION, SOLUTION INTRAMUSCULAR; INTRAVENOUS; SUBCUTANEOUS
Status: DISCONTINUED | OUTPATIENT
Start: 2023-06-09 | End: 2023-06-09

## 2023-06-09 RX ORDER — ALBUTEROL SULFATE 90 UG/1
AEROSOL, METERED RESPIRATORY (INHALATION)
Status: DISCONTINUED | OUTPATIENT
Start: 2023-06-09 | End: 2023-06-09

## 2023-06-09 RX ORDER — ONDANSETRON HYDROCHLORIDE 2 MG/ML
INJECTION, SOLUTION INTRAMUSCULAR; INTRAVENOUS
Status: DISCONTINUED | OUTPATIENT
Start: 2023-06-09 | End: 2023-06-09

## 2023-06-09 RX ORDER — PROPOFOL 10 MG/ML
VIAL (ML) INTRAVENOUS
Status: DISCONTINUED | OUTPATIENT
Start: 2023-06-09 | End: 2023-06-09

## 2023-06-09 RX ORDER — DEXAMETHASONE SODIUM PHOSPHATE 4 MG/ML
INJECTION, SOLUTION INTRA-ARTICULAR; INTRALESIONAL; INTRAMUSCULAR; INTRAVENOUS; SOFT TISSUE
Status: DISCONTINUED | OUTPATIENT
Start: 2023-06-09 | End: 2023-06-09

## 2023-06-09 RX ORDER — PROPOFOL 10 MG/ML
VIAL (ML) INTRAVENOUS CONTINUOUS PRN
Status: DISCONTINUED | OUTPATIENT
Start: 2023-06-09 | End: 2023-06-09

## 2023-06-09 RX ORDER — GLYCOPYRROLATE 0.2 MG/ML
INJECTION INTRAMUSCULAR; INTRAVENOUS
Status: DISCONTINUED | OUTPATIENT
Start: 2023-06-09 | End: 2023-06-09

## 2023-06-09 RX ORDER — MIDAZOLAM HYDROCHLORIDE 1 MG/ML
INJECTION INTRAMUSCULAR; INTRAVENOUS
Status: DISCONTINUED | OUTPATIENT
Start: 2023-06-09 | End: 2023-06-09

## 2023-06-09 RX ADMIN — IPRATROPIUM BROMIDE AND ALBUTEROL SULFATE 3 ML: 2.5; .5 SOLUTION RESPIRATORY (INHALATION) at 07:06

## 2023-06-09 RX ADMIN — SODIUM CHLORIDE, POTASSIUM CHLORIDE, SODIUM LACTATE AND CALCIUM CHLORIDE: 600; 310; 30; 20 INJECTION, SOLUTION INTRAVENOUS at 10:06

## 2023-06-09 RX ADMIN — KETAMINE HYDROCHLORIDE 10 MG: 100 INJECTION, SOLUTION, CONCENTRATE INTRAMUSCULAR; INTRAVENOUS at 01:06

## 2023-06-09 RX ADMIN — MIDAZOLAM HYDROCHLORIDE 5 MG: 1 INJECTION, SOLUTION INTRAMUSCULAR; INTRAVENOUS at 04:06

## 2023-06-09 RX ADMIN — HYDROMORPHONE HYDROCHLORIDE 0.4 MG: 2 INJECTION, SOLUTION INTRAMUSCULAR; INTRAVENOUS; SUBCUTANEOUS at 01:06

## 2023-06-09 RX ADMIN — FAMOTIDINE 20 MG: 10 INJECTION, SOLUTION INTRAVENOUS at 08:06

## 2023-06-09 RX ADMIN — SODIUM CHLORIDE, SODIUM GLUCONATE, SODIUM ACETATE, POTASSIUM CHLORIDE AND MAGNESIUM CHLORIDE: 526; 502; 368; 37; 30 INJECTION, SOLUTION INTRAVENOUS at 07:06

## 2023-06-09 RX ADMIN — GABAPENTIN 300 MG: 300 CAPSULE ORAL at 08:06

## 2023-06-09 RX ADMIN — PROPOFOL 40 MCG/KG/MIN: 10 INJECTION, EMULSION INTRAVENOUS at 07:06

## 2023-06-09 RX ADMIN — PHENYLEPHRINE HYDROCHLORIDE 0.5 MCG/KG/MIN: 10 INJECTION INTRAVENOUS at 08:06

## 2023-06-09 RX ADMIN — Medication 150 MCG/HR: at 12:06

## 2023-06-09 RX ADMIN — HYDROMORPHONE HYDROCHLORIDE 0.2 MG: 2 INJECTION, SOLUTION INTRAMUSCULAR; INTRAVENOUS; SUBCUTANEOUS at 12:06

## 2023-06-09 RX ADMIN — KETAMINE HYDROCHLORIDE 20 MG: 100 INJECTION, SOLUTION, CONCENTRATE INTRAMUSCULAR; INTRAVENOUS at 09:06

## 2023-06-09 RX ADMIN — DEXMEDETOMIDINE HYDROCHLORIDE 1 MCG/KG/HR: 400 INJECTION INTRAVENOUS at 02:06

## 2023-06-09 RX ADMIN — MUPIROCIN: 20 OINTMENT TOPICAL at 08:06

## 2023-06-09 RX ADMIN — KETAMINE HYDROCHLORIDE 10 MG: 100 INJECTION, SOLUTION, CONCENTRATE INTRAMUSCULAR; INTRAVENOUS at 02:06

## 2023-06-09 RX ADMIN — FENTANYL CITRATE 100 MCG: 50 INJECTION, SOLUTION INTRAMUSCULAR; INTRAVENOUS at 07:06

## 2023-06-09 RX ADMIN — MIDAZOLAM HYDROCHLORIDE 7 MG: 1 INJECTION, SOLUTION INTRAMUSCULAR; INTRAVENOUS at 07:06

## 2023-06-09 RX ADMIN — DEXMEDETOMIDINE HYDROCHLORIDE 1 MCG/KG/HR: 400 INJECTION INTRAVENOUS at 07:06

## 2023-06-09 RX ADMIN — PIPERACILLIN AND TAZOBACTAM 4.5 G: 4; .5 INJECTION, POWDER, LYOPHILIZED, FOR SOLUTION INTRAVENOUS; PARENTERAL at 03:06

## 2023-06-09 RX ADMIN — DEXMEDETOMIDINE HYDROCHLORIDE 1 MCG/KG/HR: 400 INJECTION INTRAVENOUS at 05:06

## 2023-06-09 RX ADMIN — Medication 1 TABLET: at 08:06

## 2023-06-09 RX ADMIN — SODIUM CHLORIDE, SODIUM GLUCONATE, SODIUM ACETATE, POTASSIUM CHLORIDE AND MAGNESIUM CHLORIDE: 526; 502; 368; 37; 30 INJECTION, SOLUTION INTRAVENOUS at 02:06

## 2023-06-09 RX ADMIN — KETAMINE HYDROCHLORIDE 10 MG: 100 INJECTION, SOLUTION, CONCENTRATE INTRAMUSCULAR; INTRAVENOUS at 11:06

## 2023-06-09 RX ADMIN — GLYCOPYRROLATE 0.2 MG: 0.2 INJECTION INTRAMUSCULAR; INTRAVENOUS at 09:06

## 2023-06-09 RX ADMIN — METHOCARBAMOL 500 MG: 100 INJECTION, SOLUTION INTRAMUSCULAR; INTRAVENOUS at 06:06

## 2023-06-09 RX ADMIN — KETAMINE HYDROCHLORIDE 20 MG: 100 INJECTION, SOLUTION, CONCENTRATE INTRAMUSCULAR; INTRAVENOUS at 05:06

## 2023-06-09 RX ADMIN — PROPOFOL 40 MCG/KG/MIN: 10 INJECTION, EMULSION INTRAVENOUS at 03:06

## 2023-06-09 RX ADMIN — PROPOFOL 200 MG: 10 INJECTION, EMULSION INTRAVENOUS at 07:06

## 2023-06-09 RX ADMIN — HYDROMORPHONE HYDROCHLORIDE 0.4 MG: 2 INJECTION, SOLUTION INTRAMUSCULAR; INTRAVENOUS; SUBCUTANEOUS at 10:06

## 2023-06-09 RX ADMIN — DEXAMETHASONE SODIUM PHOSPHATE 4 MG: 4 INJECTION, SOLUTION INTRA-ARTICULAR; INTRALESIONAL; INTRAMUSCULAR; INTRAVENOUS; SOFT TISSUE at 08:06

## 2023-06-09 RX ADMIN — DEXMEDETOMIDINE HYDROCHLORIDE 1.2 MCG/KG/HR: 400 INJECTION INTRAVENOUS at 09:06

## 2023-06-09 RX ADMIN — PIPERACILLIN AND TAZOBACTAM 4.5 G: 4; .5 INJECTION, POWDER, LYOPHILIZED, FOR SOLUTION INTRAVENOUS; PARENTERAL at 12:06

## 2023-06-09 RX ADMIN — HYDROMORPHONE HYDROCHLORIDE 0.2 MG: 2 INJECTION, SOLUTION INTRAMUSCULAR; INTRAVENOUS; SUBCUTANEOUS at 10:06

## 2023-06-09 RX ADMIN — Medication 150 MCG/HR: at 10:06

## 2023-06-09 RX ADMIN — PROPOFOL 50 MG: 10 INJECTION, EMULSION INTRAVENOUS at 11:06

## 2023-06-09 RX ADMIN — ALBUTEROL SULFATE 5 PUFF: 90 AEROSOL, METERED RESPIRATORY (INHALATION) at 08:06

## 2023-06-09 RX ADMIN — ACETAMINOPHEN 1000 MG: 10 INJECTION, SOLUTION INTRAVENOUS at 11:06

## 2023-06-09 RX ADMIN — ONDANSETRON 4 MG: 2 INJECTION INTRAMUSCULAR; INTRAVENOUS at 03:06

## 2023-06-09 RX ADMIN — PROPOFOL 40 MCG/KG/MIN: 10 INJECTION, EMULSION INTRAVENOUS at 10:06

## 2023-06-09 RX ADMIN — HYDROMORPHONE HYDROCHLORIDE 0.4 MG: 2 INJECTION, SOLUTION INTRAMUSCULAR; INTRAVENOUS; SUBCUTANEOUS at 11:06

## 2023-06-09 RX ADMIN — KETAMINE HYDROCHLORIDE 10 MG: 100 INJECTION, SOLUTION, CONCENTRATE INTRAMUSCULAR; INTRAVENOUS at 10:06

## 2023-06-09 RX ADMIN — PROPOFOL 100 MCG/KG/MIN: 10 INJECTION, EMULSION INTRAVENOUS at 07:06

## 2023-06-09 RX ADMIN — PROPOFOL 40 MCG/KG/MIN: 10 INJECTION, EMULSION INTRAVENOUS at 08:06

## 2023-06-09 RX ADMIN — DEXMEDETOMIDINE HYDROCHLORIDE 1.2 MCG/KG/HR: 400 INJECTION INTRAVENOUS at 11:06

## 2023-06-09 RX ADMIN — PIPERACILLIN AND TAZOBACTAM 4.5 G: 4; .5 INJECTION, POWDER, LYOPHILIZED, FOR SOLUTION INTRAVENOUS; PARENTERAL at 07:06

## 2023-06-09 RX ADMIN — SODIUM CHLORIDE, POTASSIUM CHLORIDE, SODIUM LACTATE AND CALCIUM CHLORIDE: 600; 310; 30; 20 INJECTION, SOLUTION INTRAVENOUS at 04:06

## 2023-06-09 NOTE — TRANSFER OF CARE
"Anesthesia Transfer of Care Note    Patient: Gera Chavez    Procedure(s) Performed: Procedure(s) (LRB):  FUSION, SPINE, LUMBAR, PLIF, USING COMPUTER-ASSISTED NAVIGATION (N/A)    Patient location: ICU    Anesthesia Type: general    Transport from OR: Transported from OR intubated on 100% O2 by AMBU with assisted ventilation    Post pain: adequate analgesia    Post assessment: no apparent anesthetic complications and tolerated procedure well    Post vital signs: stable    Level of consciousness: sedated    Nausea/Vomiting: no nausea/vomiting    Complications: none    Transfer of care protocol was followed      Last vitals:   Visit Vitals  /75 (BP Location: Right arm, Patient Position: Lying)   Pulse 94   Temp 37.9 °C (100.2 °F) (Oral)   Resp (!) 28   Ht 5' 10.98" (1.803 m)   Wt 121 kg (266 lb 12.1 oz)   SpO2 99%   BMI 37.22 kg/m²     "

## 2023-06-09 NOTE — NURSING
Nurses Note -- 4 Eyes      6/9/2023   5:43 AM      Skin assessed during: Daily Assessment      [x] No Altered Skin Integrity Present    [x]Prevention Measures Documented      [] Yes- Altered Skin Integrity Present or Discovered   [] LDA Added if Not in Epic (Describe Wound)   [] New Altered Skin Integrity was Present on Admit and Documented in LDA   [] Wound Image Taken    Wound Care Consulted? No    Attending Nurse:  Telly Mays RN     Second RN/Staff Member:  Beatriz LYONS RN

## 2023-06-09 NOTE — ANESTHESIA POSTPROCEDURE EVALUATION
Anesthesia Post Evaluation    Patient: Gera Chavez    Procedure(s) Performed: Procedure(s) (LRB):  FUSION, SPINE, LUMBAR, PLIF, USING COMPUTER-ASSISTED NAVIGATION (N/A)    Final Anesthesia Type: general      Patient location during evaluation: ICU  Patient participation: No - Unable to Participate, Intubation  Level of consciousness: sedated  Post-procedure vital signs: reviewed and stable  Pain management: adequate  Airway patency: patent  BIENVENIDO mitigation strategies: Multimodal analgesia  PONV status at discharge: No PONV  Anesthetic complications: no      Cardiovascular status: blood pressure returned to baseline and hemodynamically stable  Respiratory status: unassisted  Hydration status: euvolemic  Follow-up not needed.          Vitals Value Taken Time   /59 06/09/23 1816   Temp 37.1 °C (98.8 °F) 06/09/23 1745   Pulse 84 06/09/23 1826   Resp 25 06/09/23 1826   SpO2 98 % 06/09/23 1826   Vitals shown include unvalidated device data.      No case tracking events are documented in the log.      Pain/Mp Score: Pain Rating Prior to Med Admin: 5 (6/9/2023 12:06 AM)  Pain Rating Post Med Admin: 0 (6/9/2023 12:36 AM)
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
## 2023-06-09 NOTE — PROGRESS NOTES
[facility] - [facility] ICU  Pulmonary Critical Care Note    Patient Name: Gera Chavez  MRN: [id number]  Admission Date: 6/4/2023  Hospital Length of Stay: 5 days  Code Status: Full Code  Attending Provider: Salvador Sauer MD  Primary Care Provider: Primary Doctor No     Subjective:     HPI:   Gera Chavez is a 37 y.o. male with no known PMH, who was transferred from [facility] for level 1 trauma activation, after patient reportedly jumped off a bridge.  Sustained rib fractures x4 left-sided, pelvic ring fracture, acetabulum fracture, L2 burst fracture, and left pneumothorax with chest tube in place.  Jump witnessed by bystander who called 911.     Hospital Course/Significant events:  6/4/2023 - Admitted to ICU   6/5/2023 - s/p bronchoscopy    24 Hour Interval History:  No acute events overnight. Current drips: propofol at 35, fentanyl at 150, precedex at 0.8. Patient received 2 units PRBCs in preparation for laminectomy this morning.    Past Medical History:   Diagnosis Date    Asthma     Depression     Diabetes mellitus     Encounter for blood transfusion     Generalized anxiety disorder     Hypertension     Schizophrenia, unspecified     Sleep apnea     Unspecified glaucoma        Past Surgical History:   Procedure Laterality Date    INTRAMEDULLARY RODDING OF FEMUR Left 02/05/2023    Procedure: INSERTION, INTRAMEDULLARY NOEL, FEMUR;  Surgeon: Tuan Zepeda DO;  [facility];  Service: Orthopedics;  Laterality: Left;    OPEN REDUCTION AND INTERNAL FIXATION (ORIF) OF INJURY OF HIP Left 6/5/2023    Procedure: ORIF,PELVIS;  Surgeon: Tuan Zepeda DO;  Location: [facility] OR;  Service: Orthopedics;  Laterality: Left;  supine deven traction LLE CELL SAVER, teo    REMOVAL OF HARDWARE FROM LOWER EXTREMITY Left 5/9/2023    Procedure: REMOVAL, HARDWARE, LOWER EXTREMITY;  Surgeon: Tuan Zepeda DO;  Location: [facility] OR;  Service: Orthopedics;  Laterality: Left;    TRACH TUBE EXCHANGER   6/5/2023    UVULOPALATOPHARYNGOPLASTY         Social History     Socioeconomic History    Marital status: Single   Tobacco Use    Smoking status: Every Day     Packs/day: 0.50     Years: 10.00     Pack years: 5.00     Types: Cigarettes    Smokeless tobacco: Never   Substance and Sexual Activity    Alcohol use: Not Currently    Drug use: Never    Sexual activity: Not Currently     Social Determinants of Health     Financial Resource Strain: Unknown    Difficulty of Paying Living Expenses: Patient refused   Food Insecurity: Unknown    Worried About Running Out of Food in the Last Year: Patient refused    Ran Out of Food in the Last Year: Patient refused   Transportation Needs: Unknown    Lack of Transportation (Medical): Patient refused    Lack of Transportation (Non-Medical): Patient refused   Physical Activity: Unknown    Days of Exercise per Week: Patient refused   Stress: Unknown    Feeling of Stress : Patient refused   Social Connections: Unknown    Frequency of Communication with Friends and Family: Patient refused    Frequency of Social Gatherings with Friends and Family: Patient refused    Attends Club or Organization Meetings: Patient refused    Marital Status: Never    Housing Stability: Unknown    Unable to Pay for Housing in the Last Year: Patient refused    Unstable Housing in the Last Year: Patient refused       Current Outpatient Medications   Medication Instructions    albuterol (PROVENTIL/VENTOLIN HFA) 90 mcg/actuation inhaler 2 puffs, Inhalation, Every 4 hours PRN    ALBUTEROL INHL 90 mcg, Inhalation, Every 4 hours PRN    amLODIPine (NORVASC) 5 mg, Oral, Daily    amoxicillin-clavulanate 875-125mg (AUGMENTIN) 875-125 mg per tablet 1 tablet, Oral, Every 12 hours (non-standard times)    atorvastatin (LIPITOR) 20 mg, Oral, Daily    azithromycin (Z-CELESTINA) 500 mg, Oral, Daily    clonazePAM (KLONOPIN) 1 mg, Oral, 2 times daily PRN    dextroamphetamine-amphetamine (ADDERALL XR) 25 MG 24 hr capsule 25  mg, Oral, 2 times daily    FLUoxetine 20 MG capsule fluoxetine 20 mg capsule    FLUoxetine 40 mg, Oral, Daily    insulin glargine 100 units/mL SubQ pen Lantus Solostar U-100 Insulin 100 unit/mL (3 mL) subcutaneous pen    ipratropium (ATROVENT) 42 mcg (0.06 %) nasal spray Each Nostril    lamoTRIgine (LAMICTAL) 25 MG tablet lamotrigine 25 mg tablet    LANTUS SOLOSTAR U-100 INSULIN glargine 100 units/mL SubQ pen Subcutaneous    latanoprost 0.005 % ophthalmic solution latanoprost 0.005 % eye drops    lisinopriL (PRINIVIL,ZESTRIL) 40 mg, Oral    mirtazapine (REMERON) 7.5 MG Tab mirtazapine 7.5 mg tablet    potassium chloride (K-TAB) 20 mEq potassium chloride ER 20 mEq tablet,extended release   TAKE 1 TABLET BY MOUTH EVERY DAY    prochlorperazine (COMPAZINE) 10 MG tablet prochlorperazine maleate 10 mg tablet    QUEtiapine (SEROQUEL) 200 mg, Oral, Nightly    testosterone cypionate (DEPOTESTOTERONE CYPIONATE) 200 mg/mL injection SMARTSIG:Milliliter(s) IM     Current Inpatient Medications   albuterol-ipratropium  3 mL Nebulization Q6H    calcium-vitamin D3  1 tablet Oral BID    famotidine (PF)  20 mg Intravenous BID    gabapentin  300 mg Per OG tube TID    methocarbamoL  500 mg Intravenous Q8H    mupirocin   Nasal BID    piperacillin-tazobactam (Zosyn) IV (PEDS and ADULTS) (extended infusion is not appropriate)  4.5 g Intravenous Q8H     Current Intravenous Infusions   dexmedeTOMIDine (Precedex) infusion (titrating) 0.8 mcg/kg/hr (06/08/23 2103)    fentanyl 150 mcg/hr (06/09/23 0006)    lactated ringers 125 mL/hr at 06/08/23 0947    NORepinephrine bitartrate-D5W Stopped (06/06/23 1135)    propofoL 35 mcg/kg/min (06/08/23 2347)     Review of Systems   Reason unable to perform ROS: Sedated and intubated.      Objective:     Intake/Output Summary (Last 24 hours) at 6/9/2023 0025  Last data filed at 6/9/2023 0006  Gross per 24 hour   Intake 4154.34 ml   Output 3980 ml   Net 174.34 ml       Vital Signs (Most Recent):  Temp: 99.6  °F (37.6 °C) (06/09/23 0000)  Pulse: 91 (06/09/23 0000)  Resp: (!) 28 (06/09/23 0000)  BP: 121/69 (06/09/23 0000)  SpO2: 100 % (06/09/23 0000)  Body mass index is 37.22 kg/m².  Weight: 121 kg (266 lb 12.1 oz) Vital Signs (24h Range):  Temp:  [99.3 °F (37.4 °C)-100.1 °F (37.8 °C)] 99.6 °F (37.6 °C)  Pulse:  [] 91  Resp:  [15-35] 28  SpO2:  [89 %-100 %] 100 %  BP: ()/(47-94) 121/69  Arterial Line BP: (100-198)/() 119/48     Physical Exam  General: Intubated and sedated  HEENT: NC/AT; PERRL; nasal and oral mucosa moist and clear; ET tube in place  Pulm: CTA bilaterally, mechanically ventilated  CV: S1, S2 w/o murmurs or gallops; no edema noted  GI: Soft with normal bowel sounds in all quadrants, no masses on palpation  Neuro: Limited d/t sedation    Lines/Drains/Airways       Central Venous Catheter Line  Duration             Percutaneous Central Line Insertion/Assessment - Double Lumen  06/04/23 1530 Subclavian Left 4 days              Drain  Duration                  Chest Tube 06/04/23 1549 Left Midaxillary 28 Fr. 4 days         Urethral Catheter 06/05/23 1610 Silicone 16 Fr. 3 days         NG/OG Tube 06/08/23 0915 Homer sump Right mouth <1 day              Airway  Duration                  Airway - Non-Surgical 06/08/23 0855 Endotracheal Tube <1 day              Arterial Line  Duration             Arterial Line 06/06/23 0746 Left Radial 2 days              Peripheral Intravenous Line  Duration                  Peripheral IV - Single Lumen 06/04/23 1549 18 G Left Forearm 4 days                  Significant Labs:  Lab Results   Component Value Date    WBC 8.42 06/08/2023    HGB 8.2 (L) 06/08/2023    HCT 24.6 (L) 06/08/2023    MCV 85.1 06/08/2023     06/08/2023       BMP  Lab Results   Component Value Date     06/08/2023    K 3.5 06/08/2023    CHLORIDE 111 (H) 06/08/2023    CO2 25 06/08/2023    BUN 10.1 06/08/2023    CREATININE 0.82 06/08/2023    CALCIUM 8.5 06/08/2023    AGAP 12.0  02/05/2023     ABG  Recent Labs   Lab 06/08/23  0650   PH 7.440   PO2 70.0*   HCO3 28.5       Mechanical Ventilation Support:  Vent Mode: VOLUME A/C (06/08/23 1946)  Set Rate: 28 BPM (06/08/23 1946)  Vt Set: 510 mL (06/08/23 1946)  Pressure Support: 12 cmH20 (06/06/23 0429)  PEEP/CPAP: 10 cmH20 (06/08/23 1946)  Oxygen Concentration (%): 60 (06/08/23 2000)  Peak Airway Pressure: 35 cmH20 (06/08/23 1946)  Total Ve: 13.2 L/m (06/08/23 1946)  F/VT Ratio<105 (RSBI): (!) 57.61 (06/08/23 1946)    Significant Imaging:  I have reviewed the pertinent imaging within the past 24 hours.    Assessment/Plan:     Assessment  Pelvic ring fx s/p ORIF on 6/5 POD 1  Left-sided rib fractures with pneumo/hemo thorax s/p chest tube placement  L2 compression fracture   Right distal radius fracture    Plan  -Continue ICU level of care per primary team  -Trauma, neurosurgery, and orthopedic surgery teams following  -Lumbar laminectomy scheduled on 6/9/2023  -Continue routine chest tube care    DVT Prophylaxis: SCD  GI Prophylaxis: Famotidine     32 minutes of critical care was time spent personally by me on the following activities: development of treatment plan with patient or surrogate and bedside caregivers, discussions with consultants, evaluation of patient's response to treatment, examination of patient, ordering and performing treatments and interventions, ordering and review of laboratory studies, ordering and review of radiographic studies, pulse oximetry, re-evaluation of patient's condition.  This critical care time did not overlap with that of any other provider or involve time for any procedures.     Kalee Zamudio DO, PGY-II  Pulmonary Critical Care Medicine  [facility] - [facility] ICU

## 2023-06-09 NOTE — NURSING
Patient transported to surgery by surgical team. Vitals stable and patient in no acute distress. Mittens and soft wrist restraints removed prior to departure. Will assess further need for restraints when pt returns.   Pt under care of surgical team.Will resume charting upon pt return to ICU.    Andie Jean RN

## 2023-06-09 NOTE — PLAN OF CARE
Problem: Adult Inpatient Plan of Care  Goal: Plan of Care Review  Outcome: Ongoing, Progressing  Goal: Patient-Specific Goal (Individualized)  Outcome: Ongoing, Progressing  Goal: Absence of Hospital-Acquired Illness or Injury  Outcome: Ongoing, Progressing     Problem: Infection  Goal: Absence of Infection Signs and Symptoms  Outcome: Ongoing, Progressing     Problem: Communication Impairment (Mechanical Ventilation, Invasive)  Goal: Effective Communication  Outcome: Ongoing, Progressing     Problem: Device-Related Complication Risk (Mechanical Ventilation, Invasive)  Goal: Optimal Device Function  Outcome: Ongoing, Progressing     Problem: Inability to Wean (Mechanical Ventilation, Invasive)  Goal: Mechanical Ventilation Liberation  Outcome: Ongoing, Progressing     Problem: Nutrition Impairment (Mechanical Ventilation, Invasive)  Goal: Optimal Nutrition Delivery  Outcome: Ongoing, Progressing     Problem: Skin and Tissue Injury (Mechanical Ventilation, Invasive)  Goal: Absence of Device-Related Skin and Tissue Injury  Outcome: Ongoing, Progressing     Problem: Ventilator-Induced Lung Injury (Mechanical Ventilation, Invasive)  Goal: Absence of Ventilator-Induced Lung Injury  Outcome: Ongoing, Progressing     Problem: Communication Impairment (Artificial Airway)  Goal: Effective Communication  Outcome: Ongoing, Progressing     Problem: Device-Related Complication Risk (Artificial Airway)  Goal: Optimal Device Function  Outcome: Ongoing, Progressing     Problem: Skin and Tissue Injury (Artificial Airway)  Goal: Absence of Device-Related Skin or Tissue Injury  Outcome: Ongoing, Progressing     Problem: Noninvasive Ventilation Acute  Goal: Effective Unassisted Ventilation and Oxygenation  Outcome: Ongoing, Progressing     Problem: Skin Injury Risk Increased  Goal: Skin Health and Integrity  Outcome: Ongoing, Progressing

## 2023-06-09 NOTE — PROGRESS NOTES
[facility] ICU PROGRESS NOTE    # 5  Admission Summary:   In brief, Gera Chavez is a 37 y.o. male admitted on 6/4/2023 following fall from bridge at 25 ft.  He reportedly jumped off a bridge and landed on dry land.  He arrived intubated with a left-sided chest tube.  He was found to have a pelvic ring fracture including open book fracture, L2 burst fracture, left pneumothorax, multiple left-sided rib fractures.    Interval history:    Self extubated yesterday requiring re-intubation  Receiving 2 units of packed red blood cells in preparation for laminectomy with neurosurgery this morning  Not following commands, moves all spontaneously, withdraws to pain    Consults:   Neurosurgery  Orthopedic surgery Injuries:  L pelvic rim fx w/ open book pelvis  L pelvic wall hematoma  L2 burst fx  L apical PTX  L 4-11 rib fx  pulmonary contusion  R distal radius fracture Operations/Procedures:  Left chest tube placement at outside facility 6/4  Pelvic ring fx ORIF 6/5     Past medical history:  1. Asthma   2. Depression  3. Diabetes   4. Generalized anxiety disorder   5. Hypertension  6. Schizophrenia   7. Sleep apnea    Medications: [x] Medications reviewed/updated   Home Meds:    Current Outpatient Medications   Medication Instructions    albuterol (PROVENTIL/VENTOLIN HFA) 90 mcg/actuation inhaler 2 puffs, Inhalation, Every 4 hours PRN    ALBUTEROL INHL 90 mcg, Inhalation, Every 4 hours PRN    amLODIPine (NORVASC) 5 mg, Oral, Daily    amoxicillin-clavulanate 875-125mg (AUGMENTIN) 875-125 mg per tablet 1 tablet, Oral, Every 12 hours (non-standard times)    atorvastatin (LIPITOR) 20 mg, Oral, Daily    azithromycin (Z-CELESTINA) 500 mg, Oral, Daily    clonazePAM (KLONOPIN) 1 mg, Oral, 2 times daily PRN    dextroamphetamine-amphetamine (ADDERALL XR) 25 MG 24 hr capsule 25 mg, Oral, 2 times daily    FLUoxetine 20 MG capsule fluoxetine 20 mg capsule    FLUoxetine 40 mg, Oral, Daily    insulin glargine 100 units/mL SubQ pen Lantus  "Solostar U-100 Insulin 100 unit/mL (3 mL) subcutaneous pen    ipratropium (ATROVENT) 42 mcg (0.06 %) nasal spray Each Nostril    lamoTRIgine (LAMICTAL) 25 MG tablet lamotrigine 25 mg tablet    LANTUS SOLOSTAR U-100 INSULIN glargine 100 units/mL SubQ pen Subcutaneous    latanoprost 0.005 % ophthalmic solution latanoprost 0.005 % eye drops    lisinopriL (PRINIVIL,ZESTRIL) 40 mg, Oral    mirtazapine (REMERON) 7.5 MG Tab mirtazapine 7.5 mg tablet    potassium chloride (K-TAB) 20 mEq potassium chloride ER 20 mEq tablet,extended release   TAKE 1 TABLET BY MOUTH EVERY DAY    prochlorperazine (COMPAZINE) 10 MG tablet prochlorperazine maleate 10 mg tablet    QUEtiapine (SEROQUEL) 200 mg, Oral, Nightly    testosterone cypionate (DEPOTESTOTERONE CYPIONATE) 200 mg/mL injection SMARTSIG:Milliliter(s) IM      Scheduled Meds:    albuterol-ipratropium  3 mL Nebulization Q6H    calcium-vitamin D3  1 tablet Oral BID    famotidine (PF)  20 mg Intravenous BID    gabapentin  300 mg Per OG tube TID    methocarbamoL  500 mg Intravenous Q8H    mupirocin   Nasal BID    piperacillin-tazobactam (Zosyn) IV (PEDS and ADULTS) (extended infusion is not appropriate)  4.5 g Intravenous Q8H     Continuous Infusions:    dexmedeTOMIDine (Precedex) infusion (titrating) 1 mcg/kg/hr (06/09/23 0230)    fentanyl 150 mcg/hr (06/09/23 0006)    lactated ringers 125 mL/hr at 06/09/23 0418    NORepinephrine bitartrate-D5W Stopped (06/06/23 1135)    propofoL 40 mcg/kg/min (06/09/23 0306)     PRN Meds: sodium chloride, acetaminophen, fentaNYL, hydrALAZINE, labetaloL, midazolam, oxyCODONE, oxyCODONE     Vitals:  VITAL SIGNS: 24 HR MIN & MAX LAST   Temp  Min: 98.5 °F (36.9 °C)  Max: 100 °F (37.8 °C)  99.6 °F (37.6 °C)   BP  Min: 105/50  Max: 152/94  124/65    Pulse  Min: 84  Max: 115  85    Resp  Min: 15  Max: 35  (!) 28    SpO2  Min: 89 %  Max: 100 %  99 %      HT: 5' 10.98" (180.3 cm)  WT: 121 kg (266 lb 12.1 oz)  BMI: 37.2   Ideal Body Weight (IBW), Male: 171.88 " lb  % Ideal Body Weight, Male (lb): 155.09 %        General  Exam: NAD, sedated and intubated     Neuro/Psych  GCS: 7T  Exam: limited exam secondary to sedation, moves all extremities spontaneously off sedation, withdraws to pain  ICP monitor: No  ICP treatment: ICP Treatment: N/A  C-Collar: Yes    Plan:  Q1 hour neurochecks  Scheduled for OR  with neurosurgery for T11 to L4 posterior lateral fusion with L1-2 laminectomy.  [place] TLSO brace  Wean sedation  Pain control     HEENT  Exam: NC    Plan:   none     Pulmonary  Vitals: Resp  Av  Min: 15  Max: 35  SpO2  Av.7 %  Min: 89 %  Max: 100 %    Ventilator/Oxygen Settings:   Vent Mode: VOLUME A/C  Vt Set: 510 mL  Set Rate: 28 BPM  Pressure Support: 12 cmH20  I:E Ratio Measured: 1:1.6     PaO2/FiO2 ratio (if ventilated): 70/40  RSBI RR/TV (if ventilated): 28/530  ABG:   Recent Labs   Lab 23  0650   PH 7.440   PO2 70.0*   HCO3 28.5        CXR:    X-Ray Chest 1 View    Result Date: 2023  As above.  No adverse interval change. Electronically signed by: Nikhil Auguste Date:    2023 Time:    06:49    X-Ray Chest 1 View    Result Date: 2023  1. Endotracheal tube tip near the origin of the right mainstem bronchus and can be retracted slightly.  Additional tubing overlying the thoracic inlet may be an enteric tube. 2. Right upper lung consolidation.  Patchy opacities in the left lower lung may be atelectasis or contusion. Electronically signed by: Harjinder Strong Date:    2023 Time:    16:18        Rib fractures: yes  Chest Tube: Left - without air leak    Exam: mechanically ventilated  Incentive Spirometry/RT Treatments: RT    Plan:     chest x-ray and ABGs PRN  Left chest tube to suction     Cardiovascular  Vitals: Pulse  Av  Min: 84  Max: 115  BP  Min: 105/50  Max: 152/94  Vasoactive Agents: None  Exam: tachy    Echo Findings:   Left Ventricle  The left ventricle is  with normal systolic function. The estimated ejection fraction is  65%. There is normal diastolic function.     Right Ventricle  Normal cavity size.     Left Atrium  The left atrial volume index is normal.     Right Atrium  The right atrium is normal in size.     Aortic Valve  The aortic valve appears structurally normal.     Mitral Valve  The mean pressure gradient across the mitral valve is 3 mmHg at a heart rate of. The mitral valve appears structurally normal. The estimated mitral valve area by pressure half time is 2.78 cm2.     Tricuspid Valve  The tricuspid valve appears structurally normal.     Pulmonic Valve  Pulmonic valve is structurally normal.     Plan:   Continuous cardiac monitoring while in the ICU     Renal  Recent Labs     06/07/23 0407 06/08/23  0153   BUN 8.1* 10.1   CREATININE 0.74 0.82         No results for input(s): LACTIC in the last 72 hours.      Intake/Output - Last 3 Shifts         06/07 0700 06/08 0659 06/08 0700 06/09 0659    I.V. (mL/kg) 4840 (40) 902.5 (7.5)    Blood  654.2    NG/GT 30 300    IV Piggyback  599.8    Total Intake(mL/kg) 4870 (40.2) 2456.5 (20.3)    Urine (mL/kg/hr) 4675 (1.6) 2675 (0.9)    Drains 925 200    Chest Tube 235 225    Total Output 5835 3100    Net -965 -643.5                   Intake/Output Summary (Last 24 hours) at 6/9/2023 0531  Last data filed at 6/9/2023 0400  Gross per 24 hour   Intake 4808.51 ml   Output 3750 ml   Net 1058.51 ml           Titus: Yes     Plan:   I's and O's  Lactic normalized  CTM BUN/Cr/UOP     FEN/GI  Recent Labs     06/07/23 0407 06/08/23  0153    144   K 4.1 3.5   CO2 25 25   CALCIUM 7.9* 8.5   MG 2.00 2.00   PHOS 1.8* 2.1*   ALBUMIN 2.8* 2.4*   BILITOT 1.5 1.7*   AST 37* 27   ALKPHOS 74 84   ALT 18 16         Diet: NPO    Last BM:  Prior to arrival    Abdominal Exam:  Soft, nondistended  Abdominal Dressing: None    Plan:   Replace electrolytes based on daily labs     Heme/Onc  Recent Labs     06/07/23 0407 06/07/23  0744 06/07/23  1140 06/08/23  0153 06/08/23  0627   HGB 8.6* 8.6* 8.6*  8.2*  --    HCT 25.3* 25.3* 25.3* 24.6*  --    * 116* 116* 131  --    PTT  --   --   --   --  30.8   INR  --   --   --   --  1.31*         Transfusions (over past 24h):  2 units of PRBC    Plan:   H&H stable   Transfuse greater than 7 hemoglobin or symptomatic  2 units PRBCs on call to the OR     ID  Temp  Av.6 °F (37.6 °C)  Min: 98.5 °F (36.9 °C)  Max: 100 °F (37.8 °C)      Recent Labs     23  0407 23  0744 23  1140 23  0153   WBC 7.99 7.89 8.30 8.42         Cultures:  None new  Antibiotics:   Ancef     Plan:   WBC WNL  CTM fever curve and WBC     Endocrine  Recent Labs     23  0407 23  0153   GLUCOSE 114* 107*        No results for input(s): POCTGLUCOSE in the last 72 hours.     Insulin treatment: no medications    Plan:   BG <180     Musculoskeletal/Wounds  Weight bearing status:   RUE: NWB  LUE: WBAT  RLE: NWB  LLE: NWB    [x] Tertiary exam performed    Extremity/wound exam:     Plan:   Surgical planning for R distal radius fracture      Precautions  Fall, Spinal, and Standard     Prophylaxis  Seizure: Not indicated.  DVT: lovenox 40 BID, last dose  at 2100 in anticipation of Neurosurgery   GI: H2B     Lines/drains/airway [x] LDA reviewed/updated   PIV  OGT  ETT  L chest tube  Titus  PIV  CVC  A-line    Plan:  Maintain peripheral access  Maintain ETT/OGT  Left chest tube to suction     Restraints  Face to face evaluation of need for restraints on rounds today:   Currently restrained? no     Disposition  Continue ongoing ICU level care.        2023 8:52 AM     The above findings, diagnostics, and treatment plan were discussed with Dr. Jarret Sauer  who will follow with further assessments and recommendations. Please call with any questions, concerns, or clinical status changes.

## 2023-06-09 NOTE — ANESTHESIA PROCEDURE NOTES
Arterial    Diagnosis: L2 compression fracture    Patient location during procedure: done in OR  Procedure start time: 6/9/2023 4:55 PM  Timeout: 6/9/2023 4:50 PM  Procedure end time: 6/9/2023 4:52 PM    Staffing  Authorizing Provider: Michael Garcia MD  Performing Provider: Kamaljit Cazares CRNA    Anesthesiologist was present at the time of the procedure.    Preanesthetic Checklist  Completed: patient identified, IV checked, site marked, risks and benefits discussed, surgical consent, monitors and equipment checked, pre-op evaluation, timeout performed and anesthesia consent givenArterial  Skin Prep: chlorhexidine gluconate  Local Infiltration: none  Orientation: right  Location: radial    Catheter Size: 20 G  Catheter placement by Anatomical landmarks. Heme positive aspiration all ports. Insertion Attempts: 1  Assessment  Dressing: secured with tape and tegaderm  Patient: Tolerated well

## 2023-06-09 NOTE — OP NOTE
Neurosurgery Operative Note  [facility]      Patient Name: Gera Chavez  MRN: [id number]  CSN:  [id number]  : 1985  Age:37 yrs  Sex:male  Admit Date: 2023    Surgery date: 2023  Surgeon(s) and Role:     * Angelika Matos MD - Primary  Assistant(s): None    Preop/ Preprocedure Diagnosis: L2 burst fracture and L1-2 stenosis    Postop/ Postprocedure Diagnosis: L2 burst fracture and L1-2 stenosis, durotomy due to lumbar fracture    Surgery:     1) Posterior lateral fusion with instrumentation from T11 to L4 with Medtronic Solera pedicle screws:         A. T11- Right- 6.5 x 35 mm pedicle screw, Left- 6.5 x 45 mm pedicle screw      B. T12- Right- 5.5 x 35 mm pedicle screw, Left- 6.5 x 45 mm pedicle screw      C. L1-  6.5 x 45 mm pedicle screws bilaterally      D. L2- 6.5 x 35 mm pedicle screws bilaterally      E. L3- 6.5 x 45 mm pedicle screws bilaterally      F. L4- 6.5 x 45 mm pedicle screws bilaterally        F. Placement of bilateral rods measuring 170 mm from T11-L4        2) Decompressive laminectomy at L1-2    3)  Repair of durotomy under L2 fracture       4) Placement of local autograft, allograft, and Dilan Strips bilaterally     5) Use of Medtronic Stealth navigation and intraoperative O Arm for placement of pedicle screws and to verify final placement of spinal instrumentation     6) Use of intraoperative C-arm      7) Use of intraoperative neuromonitoring with impedance testing of all pedicle screws, with confirmed impedance of greater 25 in each screw      Findings: Intraoperative decompression at L1-2.  The durotomy at L2 was sutured with Dura Matrix and Adherus placed on top of the dura. There was no active CSF leak with a Valsalva to 40 mm Hg for 10 seconds.  The final C arm and O arm images demonstrated that the instrumentation was in good position.      Fluids: See anesthesia record  Estimated Blood Loss:  500 cc  Anesthesia Type: General  Blood Products:  none  Specimens:  * No specimens in log *    Implants/Grafts:   Implant Name Type Inv. Item Serial No.  Lot No. LRB No. Used Action   5.5 x 35 screw    MEDTRONIC  N/A 1 Implanted   SCREW HORIZON SOLERA 6.5X35 - [id number]  SCREW HORIZON SOLERA 6.5X35  [employer] [place]  N/A 3 Implanted   6.5 X 45 SCREW    [employer]IC  N/A 8 Implanted   DURAMATRIX ONLAY PLUS 1X1 - [id number]  DURAMATRIX ONLAY PLUS 1X1  [facility] INSTRU[facility] HOSP SERV [id number] N/A 1 Implanted   MEDTRONIC MICKEY DBM MATRIX STRIPS SIZE 8MM X 1CM X 10CM  CAT # [id number]    MEDTRONIC [id number] N/A 1 Implanted   LIFENET CANCELLOUS (4-10MM) CHIPS  SIZE 15CC  CAT #[id number]   [id number] [employer]  N/A 1 Implanted   LIFENET CANCELLOUS(4-10 MM) CHIPS  CAT # [id number]   [id number] [employer]  N/A 1 Implanted   SET SCREW HORIZON SOLERA 5.5-6 - [id number]  SET SCREW HORIZON SOLERA 5.5-6  [employer] [place]  N/A 12 Implanted   MEDTRONIC  NOEL SIZE 170MM  CAT # [id number]    [employer]  N/A 2 Implanted     Drain(s), Tube(s), Packing: Hemovac drain without accordion suction    Complications: No immediate    Preoperative Indications:     Mr. Chavez is a 37 y.o. male who has a past medical history significant for hypertension, diabetes, asthma, schizophrenia, depression, and anxiety.  The patient is PID #5 s/p attempt to jump off a bridge with a fall on 6/4/2023.  He has been intubated and sedated on propofol and fentanyl gtts  The patient had a GCS 10T and was able to move all his extremities, although his exam was severely limited by extensive pelvic fractures.       A CT lumbar spine without contrast demonstrated a L2 burst fracture with retropulsion. There was 50% loss of height without any kyphosis.  Right L2 transverse process fracture.  Right L3 transverse process fracture.  Bilateral L5 transverse process fractures.  There was a comminuted displaced fracture involving the left sacral ala.     The patient had an unstable L2 burst fracture with  associated stenosis from retropulsion. He was a candidate for surgery, specifically a T11 to L4 posterior lateral fusion with L1-2 laminectomy.  I reviewed the risks, benefits, and alternatives of this surgery with the patient's sister Yesi. She agreed to these risks and would like to proceed.  All questions were answered to her satisfaction.  A telephone consent was completed by Mr. Chavez' sister Yesi.     The patient went to the operating room 3 days prior on 6/9/2023, but had oxygen desaturations when he was turned prone.  The patient was brought back to the ICU and medically optimize for the surgery today.       Operative Procedure:    The intubated patient was brought to the operating room.  The patient was then turned prone onto a Julio C frame with his arms and legs appropriately padded.  Neuromonitoring with SSEP, EMG, and MEP was performed throughout the procedure with no changes from baseline.      It was anticipated that a midline incision be made on the patient's lower back.  A C-arm image was performed that correctly identified the T11 to L4 levels with specific identification of the L2 burst fracture before proceeding.  The patient's back was prepped and draped in a normal sterile fashion.  A timeout was performed that correctly identified the patient, preoperative antibiotics, and procedure.     A solution of 2% lidocaine with epinephrine was infused into the anticipated incisional site.  The initial skin incision was made with an 10 scalpel blade. Bovie cautery was used for hemostasis and for carrying out this incision to the spinous processes.  The paraspinal muscles had significant traumatic contusions. The Bovie was used for dissection from the midline to the lateral aspects of the bilateral facets. Cerebellar and Zelpi retractors were placed for visualization.  A C-arm image was performed to identify the pedicles from T11 to L4.     A clamp with the [employer] system was placed on  the T11 spinous process.  O arm images were obtained. 2 CT scans were needed for this long thoracolumbar construct.      With intraoperative guidance from the Smashrunalth system and O arm images, bilateral pedicle screws at T11, T12, L1, L2, L3, and L4 were placed.  At each level, the entry point for the screw was determined with a navigated drill. Then, a series of steps involving the navigated, power-generated okfyy-caq-gozhqz tap, pedicle feeler without a navigated ball probe, pedicle feeler with a navigated ball probe, with final placement of a pedicle screw.  This was performed bilaterally from T11 to L4 with intraoperative navigation. Posterior lateral fusion with instrumentation was achieved with the Medtronic Solera system.          A. T11- Right- 6.5 x 35 mm pedicle screw, Left- 6.5 x 45 mm pedicle screw      B. T12- Right- 5.5 x 35 mm pedicle screw, Left- 6.5 x 45 mm pedicle screw      C. L1-  6.5 x 45 mm pedicle screws bilaterally      D. L2- 6.5 x 35 mm pedicle screws bilaterally      E. L3- 6.5 x 45 mm pedicle screws bilaterally      F. L4- 6.5 x 45 mm pedicle screws bilaterally        F. Placement of bilateral rods measuring 170 mm from T11-L4        Use of intraoperative neuromonitoring with impedance testing was completed for the bilateral T11 to L4 pedicle screws, with confirmed impedance greater than 25 in each screw. Images with the O arm confirmed that this instrumentation was in good position.       Laminectomies at the L1-2 level was then performed.  A rongeur was used to remove the spinous processes of L1 and L2 . Using a technique with a curved curette and Kerrison punch, this laminectomy at L1-2 was completed.  CSF was leaking under the bone at the level of L2, which was associated with fracture.    This durotomy at L2 was sutured with Gortex with visualization using a microscope in the operative field.  A piece of Dura Matrix and then Adherus was placed on top of the dura. There was  no active CSF leak with a Valsalva to 40 mm Hg for 10 seconds.     A 170 mm miguel was placed between all the T11 to L4 pedicle screws on the right as well as on the left. The set screw caps were secured. The surgical wound was copiously irrigated. Decortication was performed with the drill.  Medtronic Treynor Strips were placed bilaterally, spanning from T11 to L4 laterally.  Morselized local autograft and allograft were placed laterally along the pedicle screws.        C-arm imaging with AP and lateral views demonstrated that the instrumentation was in proper position. Closure was then in order.  The fascia was infused with a solution of 2% lidocaine with epinephrine.  A medium size Hemovac drain was placed under the fascia and sutured into place with a 2-0 Vicryl.  This Hemovac drain was not compressed after being attached.  The fascia was closed with interrupted 0 Vicryl sutures. The subcutaneous layer was closed with interrupted, buried 2-0 Vicryl sutures.  The skin was everted and closed with a running 3-0 nylon.  Xeroform gauze, 4x4s, and Medipore tape were placed on top of the skin as the dressing.      The patient was brought back into the supine position and left intubated. He was transported to the ICU for postoperative care.      Angelika Matos MD  Neurosurgery

## 2023-06-10 LAB
ALBUMIN SERPL-MCNC: 2 G/DL (ref 3.5–5)
ALBUMIN/GLOB SERPL: 0.8 RATIO (ref 1.1–2)
ALLENS TEST BLOOD GAS (OHS): ABNORMAL
ALP SERPL-CCNC: 63 UNIT/L (ref 40–150)
ALT SERPL-CCNC: 18 UNIT/L (ref 0–55)
AST SERPL-CCNC: 45 UNIT/L (ref 5–34)
BASE EXCESS BLD CALC-SCNC: 3.5 MMOL/L (ref -2–2)
BASOPHILS # BLD AUTO: 0.03 X10(3)/MCL
BASOPHILS NFR BLD AUTO: 0.4 %
BILIRUBIN DIRECT+TOT PNL SERPL-MCNC: 2.2 MG/DL
BLOOD GAS SAMPLE TYPE (OHS): ABNORMAL
BUN SERPL-MCNC: 11 MG/DL (ref 8.9–20.6)
CA-I BLD-SCNC: 1.06 MMOL/L (ref 1.12–1.23)
CALCIUM SERPL-MCNC: 7.2 MG/DL (ref 8.4–10.2)
CHLORIDE SERPL-SCNC: 110 MMOL/L (ref 98–107)
CO2 BLDA-SCNC: 29 MMOL/L
CO2 SERPL-SCNC: 24 MMOL/L (ref 22–29)
COHGB MFR BLDA: 2.3 % (ref 0.5–1.5)
CREAT SERPL-MCNC: 0.76 MG/DL (ref 0.73–1.18)
DRAWN BY BLOOD GAS (OHS): ABNORMAL
EOSINOPHIL # BLD AUTO: 0.14 X10(3)/MCL (ref 0–0.9)
EOSINOPHIL NFR BLD AUTO: 2 %
ERYTHROCYTE [DISTWIDTH] IN BLOOD BY AUTOMATED COUNT: 15.9 % (ref 11.5–17)
FIO2 BLOOD GAS (OHS): 80 %
GFR SERPLBLD CREATININE-BSD FMLA CKD-EPI: >60 MLS/MIN/1.73/M2
GLOBULIN SER-MCNC: 2.5 GM/DL (ref 2.4–3.5)
GLUCOSE SERPL-MCNC: 112 MG/DL (ref 74–100)
HCO3 BLDA-SCNC: 27.8 MMOL/L (ref 22–26)
HCT VFR BLD AUTO: 25 % (ref 42–52)
HGB BLD-MCNC: 8.2 G/DL (ref 14–18)
IMM GRANULOCYTES # BLD AUTO: 0.07 X10(3)/MCL (ref 0–0.04)
IMM GRANULOCYTES NFR BLD AUTO: 1 %
LYMPHOCYTES # BLD AUTO: 1.03 X10(3)/MCL (ref 0.6–4.6)
LYMPHOCYTES NFR BLD AUTO: 14.7 %
MAGNESIUM SERPL-MCNC: 2.1 MG/DL (ref 1.6–2.6)
MCH RBC QN AUTO: 28.3 PG (ref 27–31)
MCHC RBC AUTO-ENTMCNC: 32.8 G/DL (ref 33–36)
MCV RBC AUTO: 86.2 FL (ref 80–94)
MECH RR (OHS): 28 B/MIN
MECH VT (OHS): 510 ML
METHGB MFR BLDA: 1.3 % (ref 0.4–1.5)
MODE (OHS): AC
MONOCYTES # BLD AUTO: 0.66 X10(3)/MCL (ref 0.1–1.3)
MONOCYTES NFR BLD AUTO: 9.4 %
NEUTROPHILS # BLD AUTO: 5.09 X10(3)/MCL (ref 2.1–9.2)
NEUTROPHILS NFR BLD AUTO: 72.5 %
NRBC BLD AUTO-RTO: 0 %
O2 HB BLOOD GAS (OHS): 95.6 % (ref 94–97)
OXYGEN DEVICE BLOOD GAS (OHS): ABNORMAL
OXYHGB MFR BLDA: 8.5 G/DL (ref 12–16)
PCO2 BLDA: 40 MMHG (ref 35–45)
PEEP (OHS): 10 CMH2O
PH BLDA: 7.45 [PH] (ref 7.35–7.45)
PHOSPHATE SERPL-MCNC: 4.1 MG/DL (ref 2.3–4.7)
PLATELET # BLD AUTO: 160 X10(3)/MCL (ref 130–400)
PMV BLD AUTO: 10.7 FL (ref 7.4–10.4)
PO2 BLDA: 95 MMHG (ref 80–100)
POTASSIUM BLOOD GAS (OHS): 3.6 MMOL/L (ref 3.5–5)
POTASSIUM SERPL-SCNC: 4 MMOL/L (ref 3.5–5.1)
PROT SERPL-MCNC: 4.5 GM/DL (ref 6.4–8.3)
RBC # BLD AUTO: 2.9 X10(6)/MCL (ref 4.7–6.1)
SAMPLE SITE BLOOD GAS (OHS): ABNORMAL
SAO2 % BLDA: 97.7 %
SODIUM BLOOD GAS (OHS): 141 MMOL/L (ref 137–145)
SODIUM SERPL-SCNC: 145 MMOL/L (ref 136–145)
WBC # SPEC AUTO: 7.02 X10(3)/MCL (ref 4.5–11.5)

## 2023-06-10 PROCEDURE — 25000003 PHARM REV CODE 250: Performed by: NEUROLOGICAL SURGERY

## 2023-06-10 PROCEDURE — 25000003 PHARM REV CODE 250: Performed by: STUDENT IN AN ORGANIZED HEALTH CARE EDUCATION/TRAINING PROGRAM

## 2023-06-10 PROCEDURE — 94640 AIRWAY INHALATION TREATMENT: CPT

## 2023-06-10 PROCEDURE — 99900026 HC AIRWAY MAINTENANCE (STAT)

## 2023-06-10 PROCEDURE — 27000221 HC OXYGEN, UP TO 24 HOURS

## 2023-06-10 PROCEDURE — 99900035 HC TECH TIME PER 15 MIN (STAT)

## 2023-06-10 PROCEDURE — 20800000 HC ICU TRAUMA

## 2023-06-10 PROCEDURE — 94761 N-INVAS EAR/PLS OXIMETRY MLT: CPT

## 2023-06-10 PROCEDURE — 63600175 PHARM REV CODE 636 W HCPCS: Performed by: SURGERY

## 2023-06-10 PROCEDURE — 25000003 PHARM REV CODE 250

## 2023-06-10 PROCEDURE — 99024 POSTOP FOLLOW-UP VISIT: CPT | Mod: ,,, | Performed by: NEUROLOGICAL SURGERY

## 2023-06-10 PROCEDURE — 83735 ASSAY OF MAGNESIUM: CPT | Performed by: STUDENT IN AN ORGANIZED HEALTH CARE EDUCATION/TRAINING PROGRAM

## 2023-06-10 PROCEDURE — 94003 VENT MGMT INPAT SUBQ DAY: CPT

## 2023-06-10 PROCEDURE — 25000242 PHARM REV CODE 250 ALT 637 W/ HCPCS: Performed by: HOSPITALIST

## 2023-06-10 PROCEDURE — 20000000 HC ICU ROOM

## 2023-06-10 PROCEDURE — 80053 COMPREHEN METABOLIC PANEL: CPT | Performed by: STUDENT IN AN ORGANIZED HEALTH CARE EDUCATION/TRAINING PROGRAM

## 2023-06-10 PROCEDURE — 85025 COMPLETE CBC W/AUTO DIFF WBC: CPT | Performed by: STUDENT IN AN ORGANIZED HEALTH CARE EDUCATION/TRAINING PROGRAM

## 2023-06-10 PROCEDURE — 37799 UNLISTED PX VASCULAR SURGERY: CPT

## 2023-06-10 PROCEDURE — 63600175 PHARM REV CODE 636 W HCPCS: Performed by: STUDENT IN AN ORGANIZED HEALTH CARE EDUCATION/TRAINING PROGRAM

## 2023-06-10 PROCEDURE — 84100 ASSAY OF PHOSPHORUS: CPT | Performed by: STUDENT IN AN ORGANIZED HEALTH CARE EDUCATION/TRAINING PROGRAM

## 2023-06-10 PROCEDURE — 25000003 PHARM REV CODE 250: Performed by: SURGERY

## 2023-06-10 PROCEDURE — 99024 PR POST-OP FOLLOW-UP VISIT: ICD-10-PCS | Mod: ,,, | Performed by: NEUROLOGICAL SURGERY

## 2023-06-10 PROCEDURE — 82803 BLOOD GASES ANY COMBINATION: CPT

## 2023-06-10 PROCEDURE — 25000003 PHARM REV CODE 250: Performed by: INTERNAL MEDICINE

## 2023-06-10 RX ORDER — LAMOTRIGINE 25 MG/1
25 TABLET ORAL DAILY
Status: DISCONTINUED | OUTPATIENT
Start: 2023-06-11 | End: 2023-06-12

## 2023-06-10 RX ORDER — QUETIAPINE FUMARATE 25 MG/1
50 TABLET, FILM COATED ORAL 2 TIMES DAILY
Status: DISCONTINUED | OUTPATIENT
Start: 2023-06-10 | End: 2023-06-12

## 2023-06-10 RX ORDER — VASOPRESSIN 20 [USP'U]/ML
INJECTION, SOLUTION INTRAMUSCULAR; SUBCUTANEOUS
Status: DISPENSED
Start: 2023-06-10 | End: 2023-06-11

## 2023-06-10 RX ORDER — FLUOXETINE HYDROCHLORIDE 20 MG/1
40 CAPSULE ORAL DAILY
Status: DISCONTINUED | OUTPATIENT
Start: 2023-06-11 | End: 2023-06-12

## 2023-06-10 RX ADMIN — IPRATROPIUM BROMIDE AND ALBUTEROL SULFATE 3 ML: 2.5; .5 SOLUTION RESPIRATORY (INHALATION) at 12:06

## 2023-06-10 RX ADMIN — DEXMEDETOMIDINE HYDROCHLORIDE 1.4 MCG/KG/HR: 400 INJECTION INTRAVENOUS at 11:06

## 2023-06-10 RX ADMIN — SODIUM CHLORIDE, POTASSIUM CHLORIDE, SODIUM LACTATE AND CALCIUM CHLORIDE: 600; 310; 30; 20 INJECTION, SOLUTION INTRAVENOUS at 02:06

## 2023-06-10 RX ADMIN — Medication 200 MCG/HR: at 11:06

## 2023-06-10 RX ADMIN — GABAPENTIN 300 MG: 300 CAPSULE ORAL at 08:06

## 2023-06-10 RX ADMIN — QUETIAPINE FUMARATE 50 MG: 25 TABLET ORAL at 08:06

## 2023-06-10 RX ADMIN — DEXMEDETOMIDINE HYDROCHLORIDE 1.4 MCG/KG/HR: 400 INJECTION INTRAVENOUS at 05:06

## 2023-06-10 RX ADMIN — DEXMEDETOMIDINE HYDROCHLORIDE 1.4 MCG/KG/HR: 400 INJECTION INTRAVENOUS at 04:06

## 2023-06-10 RX ADMIN — GABAPENTIN 300 MG: 300 CAPSULE ORAL at 02:06

## 2023-06-10 RX ADMIN — PROPOFOL 50 MCG/KG/MIN: 10 INJECTION, EMULSION INTRAVENOUS at 11:06

## 2023-06-10 RX ADMIN — DEXMEDETOMIDINE HYDROCHLORIDE 1.4 MCG/KG/HR: 400 INJECTION INTRAVENOUS at 06:06

## 2023-06-10 RX ADMIN — PROPOFOL 50 MCG/KG/MIN: 10 INJECTION, EMULSION INTRAVENOUS at 04:06

## 2023-06-10 RX ADMIN — IPRATROPIUM BROMIDE AND ALBUTEROL SULFATE 3 ML: 2.5; .5 SOLUTION RESPIRATORY (INHALATION) at 01:06

## 2023-06-10 RX ADMIN — PROPOFOL 40 MCG/KG/MIN: 10 INJECTION, EMULSION INTRAVENOUS at 05:06

## 2023-06-10 RX ADMIN — PROPOFOL 50 MCG/KG/MIN: 10 INJECTION, EMULSION INTRAVENOUS at 07:06

## 2023-06-10 RX ADMIN — DEXMEDETOMIDINE HYDROCHLORIDE 1.4 MCG/KG/HR: 400 INJECTION INTRAVENOUS at 09:06

## 2023-06-10 RX ADMIN — DEXMEDETOMIDINE HYDROCHLORIDE 1.4 MCG/KG/HR: 400 INJECTION INTRAVENOUS at 02:06

## 2023-06-10 RX ADMIN — IPRATROPIUM BROMIDE AND ALBUTEROL SULFATE 3 ML: 2.5; .5 SOLUTION RESPIRATORY (INHALATION) at 07:06

## 2023-06-10 RX ADMIN — FAMOTIDINE 20 MG: 10 INJECTION, SOLUTION INTRAVENOUS at 08:06

## 2023-06-10 RX ADMIN — DEXMEDETOMIDINE HYDROCHLORIDE 1.4 MCG/KG/HR: 400 INJECTION INTRAVENOUS at 08:06

## 2023-06-10 RX ADMIN — PROPOFOL 45 MCG/KG/MIN: 10 INJECTION, EMULSION INTRAVENOUS at 02:06

## 2023-06-10 RX ADMIN — IPRATROPIUM BROMIDE AND ALBUTEROL SULFATE 3 ML: 2.5; .5 SOLUTION RESPIRATORY (INHALATION) at 08:06

## 2023-06-10 RX ADMIN — PIPERACILLIN AND TAZOBACTAM 4.5 G: 4; .5 INJECTION, POWDER, LYOPHILIZED, FOR SOLUTION INTRAVENOUS; PARENTERAL at 08:06

## 2023-06-10 RX ADMIN — PIPERACILLIN AND TAZOBACTAM 4.5 G: 4; .5 INJECTION, POWDER, LYOPHILIZED, FOR SOLUTION INTRAVENOUS; PARENTERAL at 11:06

## 2023-06-10 RX ADMIN — Medication 1 TABLET: at 08:06

## 2023-06-10 RX ADMIN — SODIUM CHLORIDE 1000 ML: 9 INJECTION, SOLUTION INTRAVENOUS at 09:06

## 2023-06-10 RX ADMIN — Medication 200 MCG/HR: at 10:06

## 2023-06-10 RX ADMIN — PROPOFOL 50 MCG/KG/MIN: 10 INJECTION, EMULSION INTRAVENOUS at 08:06

## 2023-06-10 RX ADMIN — ACETAMINOPHEN 650 MG: 650 SOLUTION ORAL at 04:06

## 2023-06-10 RX ADMIN — PROPOFOL 50 MCG/KG/MIN: 10 INJECTION, EMULSION INTRAVENOUS at 10:06

## 2023-06-10 RX ADMIN — ACETAMINOPHEN 650 MG: 650 SOLUTION ORAL at 05:06

## 2023-06-10 RX ADMIN — PIPERACILLIN AND TAZOBACTAM 4.5 G: 4; .5 INJECTION, POWDER, LYOPHILIZED, FOR SOLUTION INTRAVENOUS; PARENTERAL at 04:06

## 2023-06-10 RX ADMIN — PIPERACILLIN AND TAZOBACTAM 4.5 G: 4; .5 INJECTION, POWDER, LYOPHILIZED, FOR SOLUTION INTRAVENOUS; PARENTERAL at 12:06

## 2023-06-10 RX ADMIN — PROPOFOL 50 MCG/KG/MIN: 10 INJECTION, EMULSION INTRAVENOUS at 02:06

## 2023-06-10 NOTE — PLAN OF CARE
Problem: Adult Inpatient Plan of Care  Goal: Plan of Care Review  Outcome: Ongoing, Progressing  Goal: Patient-Specific Goal (Individualized)  Outcome: Ongoing, Progressing  Goal: Absence of Hospital-Acquired Illness or Injury  Outcome: Ongoing, Progressing     Problem: Infection  Goal: Absence of Infection Signs and Symptoms  Outcome: Ongoing, Progressing     Problem: Communication Impairment (Mechanical Ventilation, Invasive)  Goal: Effective Communication  Outcome: Ongoing, Progressing     Problem: Device-Related Complication Risk (Mechanical Ventilation, Invasive)  Goal: Optimal Device Function  Outcome: Ongoing, Progressing     Problem: Inability to Wean (Mechanical Ventilation, Invasive)  Goal: Mechanical Ventilation Liberation  Outcome: Ongoing, Progressing     Problem: Nutrition Impairment (Mechanical Ventilation, Invasive)  Goal: Optimal Nutrition Delivery  Outcome: Ongoing, Progressing     Problem: Skin and Tissue Injury (Mechanical Ventilation, Invasive)  Goal: Absence of Device-Related Skin and Tissue Injury  Outcome: Ongoing, Progressing     Problem: Ventilator-Induced Lung Injury (Mechanical Ventilation, Invasive)  Goal: Absence of Ventilator-Induced Lung Injury  Outcome: Ongoing, Progressing     Problem: Communication Impairment (Artificial Airway)  Goal: Effective Communication  Outcome: Ongoing, Progressing     Problem: Device-Related Complication Risk (Artificial Airway)  Goal: Optimal Device Function  Outcome: Ongoing, Progressing     Problem: Skin and Tissue Injury (Artificial Airway)  Goal: Absence of Device-Related Skin or Tissue Injury  Outcome: Ongoing, Progressing     Problem: Noninvasive Ventilation Acute  Goal: Effective Unassisted Ventilation and Oxygenation  Outcome: Ongoing, Progressing

## 2023-06-10 NOTE — PROGRESS NOTES
POD#1 T11-L4 fusion for L2 burst fx  He is lying flat in bed, currently intubated and sedated  Fentanyl and diprivan  No acute events overnight  H/H 8.2/25 today    AFVSS  Intubated, mechanically ventilated  Exam limited d/t sedation  He does open his eyes to noxious stimuli  Intermittently following commands in bilateral UE. He does withdraw to deep painful stimuli  Wiggles toes in bilateral LE  Sensation grossly intact to all ext.  Incision c/d/I  Drain output 20cc since surgery    Plan: Continue drain to gravity  Continue frequent neuro checks  Continue current dressing for now  Continue gabapentin TID and Os-shreya BID  On zosyn  SCDs for DVT prophylaxis

## 2023-06-10 NOTE — PROGRESS NOTES
[facility] ICU PROGRESS NOTE    HD# 6  Admission Summary:   In brief, Gera Chavez is a 37 y.o. male admitted on 6/4/2023 following fall from bridge at 25 ft.  He reportedly jumped off a bridge and landed on dry land.  He arrived intubated with a left-sided chest tube.  He was found to have a pelvic ring fracture including open book fracture, L2 burst fracture, left pneumothorax, multiple left-sided rib fractures.    Interval history:    Remains intubated  No acute events reported overnight  OR yesterday with NSGY for T11 to L4 posterior lateral fusion with L1-2 laminectomy.    Consults:   Neurosurgery  Orthopedic surgery Injuries:  L pelvic rim fx w/ open book pelvis  L pelvic wall hematoma  L2 burst fx  L apical PTX  L 4-11 rib fx  pulmonary contusion  R distal radius fracture Operations/Procedures:  Left chest tube placement at outside facility 6/4  Pelvic ring fx ORIF 6/5     Past medical history:  1. Asthma   2. Depression  3. Diabetes   4. Generalized anxiety disorder   5. Hypertension  6. Schizophrenia   7. Sleep apnea    Medications: [x] Medications reviewed/updated   Home Meds:    Current Outpatient Medications   Medication Instructions    albuterol (PROVENTIL/VENTOLIN HFA) 90 mcg/actuation inhaler 2 puffs, Inhalation, Every 4 hours PRN    ALBUTEROL INHL 90 mcg, Inhalation, Every 4 hours PRN    amLODIPine (NORVASC) 5 mg, Oral, Daily    amoxicillin-clavulanate 875-125mg (AUGMENTIN) 875-125 mg per tablet 1 tablet, Oral, Every 12 hours (non-standard times)    atorvastatin (LIPITOR) 20 mg, Oral, Daily    azithromycin (Z-CELESTINA) 500 mg, Oral, Daily    clonazePAM (KLONOPIN) 1 mg, Oral, 2 times daily PRN    dextroamphetamine-amphetamine (ADDERALL XR) 25 MG 24 hr capsule 25 mg, Oral, 2 times daily    FLUoxetine 20 MG capsule fluoxetine 20 mg capsule    FLUoxetine 40 mg, Oral, Daily    insulin glargine 100 units/mL SubQ pen Lantus Solostar U-100 Insulin 100 unit/mL (3 mL) subcutaneous pen    ipratropium (ATROVENT) 42  "mcg (0.06 %) nasal spray Each Nostril    lamoTRIgine (LAMICTAL) 25 MG tablet lamotrigine 25 mg tablet    LANTUS SOLOSTAR U-100 INSULIN glargine 100 units/mL SubQ pen Subcutaneous    latanoprost 0.005 % ophthalmic solution latanoprost 0.005 % eye drops    lisinopriL (PRINIVIL,ZESTRIL) 40 mg, Oral    mirtazapine (REMERON) 7.5 MG Tab mirtazapine 7.5 mg tablet    potassium chloride (K-TAB) 20 mEq potassium chloride ER 20 mEq tablet,extended release   TAKE 1 TABLET BY MOUTH EVERY DAY    prochlorperazine (COMPAZINE) 10 MG tablet prochlorperazine maleate 10 mg tablet    QUEtiapine (SEROQUEL) 200 mg, Oral, Nightly    testosterone cypionate (DEPOTESTOTERONE CYPIONATE) 200 mg/mL injection SMARTSIG:Milliliter(s) IM      Scheduled Meds:    albuterol-ipratropium  3 mL Nebulization Q6H    calcium-vitamin D3  1 tablet Oral BID    famotidine (PF)  20 mg Intravenous BID    gabapentin  300 mg Per OG tube TID    mupirocin   Nasal BID    piperacillin-tazobactam (Zosyn) IV (PEDS and ADULTS) (extended infusion is not appropriate)  4.5 g Intravenous Q8H     Continuous Infusions:    dexmedeTOMIDine (Precedex) infusion (titrating) 1.4 mcg/kg/hr (06/10/23 0508)    fentanyl 150 mcg/hr (06/09/23 2257)    lactated ringers 125 mL/hr at 06/09/23 2254    NORepinephrine bitartrate-D5W Stopped (06/06/23 1135)    propofoL 40 mcg/kg/min (06/10/23 0550)     PRN Meds: sodium chloride, acetaminophen, fentaNYL, hydrALAZINE, labetaloL, midazolam, oxyCODONE, oxyCODONE     Vitals:  VITAL SIGNS: 24 HR MIN & MAX LAST   Temp  Min: 98.8 °F (37.1 °C)  Max: 100.8 °F (38.2 °C)  (!) 100.8 °F (38.2 °C)   BP  Min: 102/63  Max: 122/49  (!) 118/54    Pulse  Min: 82  Max: 94  90    Resp  Min: 0  Max: 30  (!) 30    SpO2  Min: 90 %  Max: 100 %  96 %      HT: 5' 10.98" (180.3 cm)  WT: 121 kg (266 lb 12.1 oz)  BMI: 37.2   Ideal Body Weight (IBW), Male: 171.88 lb  % Ideal Body Weight, Male (lb): 155.09 %        General  Exam: NAD, sedated and intubated     Neuro/Psych  GCS: " 7T  Exam: limited exam secondary to sedation, reportedly moves all extremities spontaneously off sedation, withdraws to pain  ICP monitor: No  ICP treatment: ICP Treatment: N/A  C-Collar: Yes    Plan:  Q1 hour neurochecks  Scheduled for OR  with [place] TLSO brace  Wean sedation  Pain control  F/u NSGY recs     HEENT  Exam: NC    Plan:   none     Pulmonary  Vitals: Resp  Av.4  Min: 0  Max: 30  SpO2  Av.3 %  Min: 90 %  Max: 100 %    Ventilator/Oxygen Settings:   Vent Mode: A/C  Vt Set: 510 mL  Set Rate: 28 BPM  Pressure Support: 12 cmH20  I:E Ratio Measured: 1:1.6     PaO2/FiO2 ratio (if ventilated): 70/40  RSBI RR/TV (if ventilated): 28/530  ABG:   Recent Labs   Lab 06/10/23  0508   PH 7.450   PO2 95.0   HCO3 27.8*        CXR:    X-Ray Chest 1 View    Result Date: 2023  As above.  No adverse interval change. Electronically signed by: Nikhil Auguste Date:    2023 Time:    06:49    X-Ray Chest 1 View    Result Date: 2023  1. Endotracheal tube tip near the origin of the right mainstem bronchus and can be retracted slightly.  Additional tubing overlying the thoracic inlet may be an enteric tube. 2. Right upper lung consolidation.  Patchy opacities in the left lower lung may be atelectasis or contusion. Electronically signed by: Harjinder Strong Date:    2023 Time:    16:18        Rib fractures: yes  Chest Tube: Left - without air leak    Exam: mechanically ventilated  Incentive Spirometry/RT Treatments: RT    Plan:     Waterseal L CT  Wean vent as tolerated     Cardiovascular  Vitals: Pulse  Av.3  Min: 82  Max: 94  BP  Min: 102/63  Max: 122/49  Vasoactive Agents: None  Exam: tachy    Echo Findings:   Left Ventricle  The left ventricle is  with normal systolic function. The estimated ejection fraction is 65%. There is normal diastolic function.     Right Ventricle  Normal cavity size.     Left Atrium  The left atrial volume index is normal.     Right Atrium  The right atrium is normal in  size.     Aortic Valve  The aortic valve appears structurally normal.     Mitral Valve  The mean pressure gradient across the mitral valve is 3 mmHg at a heart rate of. The mitral valve appears structurally normal. The estimated mitral valve area by pressure half time is 2.78 cm2.     Tricuspid Valve  The tricuspid valve appears structurally normal.     Pulmonic Valve  Pulmonic valve is structurally normal.     Plan:   Continuous cardiac monitoring while in the ICU     Renal  Recent Labs     06/08/23  0153 06/09/23  0513 06/09/23  1756 06/10/23  0143   BUN 10.1 10.5 12.0 11.0   CREATININE 0.82 0.80 0.83 0.76         No results for input(s): LACTIC in the last 72 hours.      Intake/Output - Last 3 Shifts         06/08 0700 06/09 0659 06/09 0700  06/10 0659    I.V. (mL/kg) 4468.9 (36.9) 2350.1 (19.4)    Blood 654.2     NG/     IV Piggyback 599.8 4471.7    Total Intake(mL/kg) 6022.9 (49.8) 6821.8 (56.4)    Urine (mL/kg/hr) 3055 (1.1) 3165 (1.1)    Drains 250 220    Blood  350    Chest Tube 265 160    Total Output 3570 3895    Net +2452.9 +2926.8                   Intake/Output Summary (Last 24 hours) at 6/10/2023 0618  Last data filed at 6/10/2023 0550  Gross per 24 hour   Intake 6821.77 ml   Output 3895 ml   Net 2926.77 ml           Titus: Yes     Plan:   I's and O's  Lactic normalized  CTM BUN/Cr/UOP     FEN/GI  Recent Labs     06/08/23  0153 06/09/23  0513 06/09/23  1756 06/10/23  0143    144 143 145   K 3.5 3.5 4.4 4.0   CO2 25 24 23 24   CALCIUM 8.5 8.4 7.6* 7.2*   MG 2.00 1.90  --  2.10   PHOS 2.1* 3.9  --  4.1   ALBUMIN 2.4* 2.2*  --  2.0*   BILITOT 1.7* 2.3*  --  2.2*   AST 27 19  --  45*   ALKPHOS 84 69  --  63   ALT 16 14  --  18         Diet: NPO    Last BM:  Prior to arrival    Abdominal Exam:  Soft, nondistended  Abdominal Dressing: None    Plan:   Replace electrolytes based on daily labs     Heme/Onc  Recent Labs     06/08/23  0153 06/08/23  0627 06/09/23  0513 06/09/23  1756 06/10/23  0143    HGB 8.2*  --  9.9* 9.2* 8.2*   HCT 24.6*  --  29.4* 27.1* 25.0*     --  144 169 160   PTT  --  30.8  --   --   --    INR  --  1.31*  --   --   --          Transfusions (over past 24h):  2 units of PRBC    Plan:   H&H stable   Transfuse greater than 7 hemoglobin or symptomatic       ID  Temp  Av.7 °F (37.6 °C)  Min: 98.8 °F (37.1 °C)  Max: 100.8 °F (38.2 °C)      Recent Labs     23  0153 23  0513 23  1756 06/10/23  0143   WBC 8.42 7.62 8.36 7.02         Cultures:  None new  Antibiotics:   Ancef     Plan:   WBC WNL  On Zosyn x 4 days  Febrile x 1 this morning to 100.8, CTM     Endocrine  Recent Labs     23  0153 23  0513 23  1756 06/10/23  0143   GLUCOSE 107* 110* 116* 112*        No results for input(s): POCTGLUCOSE in the last 72 hours.     Insulin treatment: no medications    Plan:   BG <180     Musculoskeletal/Wounds  Weight bearing status:   RUE: NWB  LUE: WBAT  RLE: NWB  LLE: NWB    [x] Tertiary exam performed    Extremity/wound exam:     Plan:   Surgical planning for R distal radius fracture      Precautions  Fall, Spinal, and Standard     Prophylaxis  Seizure: Not indicated.  DVT: lovenox 40 BID, last dose  at 2100 in anticipation of Neurosurgery, will discuss when this can be resumed  GI: H2B     Lines/drains/airway [x] LDA reviewed/updated   PIV  OGT  ETT  L chest tube  Titus  PIV  CVC  A-line    Plan:  Maintain peripheral access  Maintain ETT/OGT  Left chest tube to waterseal     Restraints  Face to face evaluation of need for restraints on rounds today:   Currently restrained? no     Disposition  Continue ongoing ICU level care.      Elva Haley MD  [employer] General Surgery PGY4

## 2023-06-10 NOTE — PROGRESS NOTES
[facility] - [facility] ICU  Pulmonary Critical Care Note    Patient Name: Gera Chavez  MRN: [id number]  Admission Date: 6/4/2023  Hospital Length of Stay: 6 days  Code Status: Full Code  Attending Provider: Salvador Sauer MD  Primary Care Provider: Primary Doctor No     Subjective:     HPI:   Gera Chavez is a 37 y.o. male with no known PMH, who was transferred from [facility] for level 1 trauma activation, after patient reportedly jumped off a bridge.  Sustained rib fractures x4 left-sided, pelvic ring fracture, acetabulum fracture, L2 burst fracture, and left pneumothorax with chest tube in place.  Jump witnessed by bystander who called 911.     Hospital Course/Significant events:  6/4/2023 - Admitted to ICU   6/5/2023 - s/p bronchoscopy    24 Hour Interval History:  No acute events overnight. Current drips: propofol at 45, fentanyl at 150, precedex at 1.4. Labs this AM: H/H 8.2/25.0 I/O: 2.8 L urine output past 24 hours, net positive 8 L.    Past Medical History:   Diagnosis Date    Asthma     Depression     Diabetes mellitus     Encounter for blood transfusion     Generalized anxiety disorder     Hypertension     Schizophrenia, unspecified     Sleep apnea     Unspecified glaucoma        Past Surgical History:   Procedure Laterality Date    INTRAMEDULLARY RODDING OF FEMUR Left 02/05/2023    Procedure: INSERTION, INTRAMEDULLARY NOEL, FEMUR;  Surgeon: Tuan Zepeda DO;  Location: [facility];  Service: Orthopedics;  Laterality: Left;    OPEN REDUCTION AND INTERNAL FIXATION (ORIF) OF INJURY OF HIP Left 6/5/2023    Procedure: ORIF,PELVIS;  Surgeon: Tuan Zepeda DO;  Location: [facility] OR;  Service: Orthopedics;  Laterality: Left;  supine deven traction LLE CELL SAVER, teo    REMOVAL OF HARDWARE FROM LOWER EXTREMITY Left 5/9/2023    Procedure: REMOVAL, HARDWARE, LOWER EXTREMITY;  Surgeon: Tuan Zepeda DO;  Location: [facility] OR;  Service: Orthopedics;  Laterality: Left;    TRACH TUBE  EXCHANGER  6/5/2023    UVULOPALATOPHARYNGOPLASTY         Social History     Socioeconomic History    Marital status: Single   Tobacco Use    Smoking status: Every Day     Packs/day: 0.50     Years: 10.00     Pack years: 5.00     Types: Cigarettes    Smokeless tobacco: Never   Substance and Sexual Activity    Alcohol use: Not Currently    Drug use: Never    Sexual activity: Not Currently     Social Determinants of Health     Financial Resource Strain: Unknown    Difficulty of Paying Living Expenses: Patient refused   Food Insecurity: Unknown    Worried About Running Out of Food in the Last Year: Patient refused    Ran Out of Food in the Last Year: Patient refused   Transportation Needs: Unknown    Lack of Transportation (Medical): Patient refused    Lack of Transportation (Non-Medical): Patient refused   Physical Activity: Unknown    Days of Exercise per Week: Patient refused   Stress: Unknown    Feeling of Stress : Patient refused   Social Connections: Unknown    Frequency of Communication with Friends and Family: Patient refused    Frequency of Social Gatherings with Friends and Family: Patient refused    Attends Club or Organization Meetings: Patient refused    Marital Status: Never    Housing Stability: Unknown    Unable to Pay for Housing in the Last Year: Patient refused    Unstable Housing in the Last Year: Patient refused       Current Outpatient Medications   Medication Instructions    albuterol (PROVENTIL/VENTOLIN HFA) 90 mcg/actuation inhaler 2 puffs, Inhalation, Every 4 hours PRN    ALBUTEROL INHL 90 mcg, Inhalation, Every 4 hours PRN    amLODIPine (NORVASC) 5 mg, Oral, Daily    amoxicillin-clavulanate 875-125mg (AUGMENTIN) 875-125 mg per tablet 1 tablet, Oral, Every 12 hours (non-standard times)    atorvastatin (LIPITOR) 20 mg, Oral, Daily    azithromycin (Z-CELESTINA) 500 mg, Oral, Daily    clonazePAM (KLONOPIN) 1 mg, Oral, 2 times daily PRN    dextroamphetamine-amphetamine (ADDERALL XR) 25 MG 24 hr  capsule 25 mg, Oral, 2 times daily    FLUoxetine 20 MG capsule fluoxetine 20 mg capsule    FLUoxetine 40 mg, Oral, Daily    insulin glargine 100 units/mL SubQ pen Lantus Solostar U-100 Insulin 100 unit/mL (3 mL) subcutaneous pen    ipratropium (ATROVENT) 42 mcg (0.06 %) nasal spray Each Nostril    lamoTRIgine (LAMICTAL) 25 MG tablet lamotrigine 25 mg tablet    LANTUS SOLOSTAR U-100 INSULIN glargine 100 units/mL SubQ pen Subcutaneous    latanoprost 0.005 % ophthalmic solution latanoprost 0.005 % eye drops    lisinopriL (PRINIVIL,ZESTRIL) 40 mg, Oral    mirtazapine (REMERON) 7.5 MG Tab mirtazapine 7.5 mg tablet    potassium chloride (K-TAB) 20 mEq potassium chloride ER 20 mEq tablet,extended release   TAKE 1 TABLET BY MOUTH EVERY DAY    prochlorperazine (COMPAZINE) 10 MG tablet prochlorperazine maleate 10 mg tablet    QUEtiapine (SEROQUEL) 200 mg, Oral, Nightly    testosterone cypionate (DEPOTESTOTERONE CYPIONATE) 200 mg/mL injection SMARTSIG:Milliliter(s) IM     Current Inpatient Medications   albuterol-ipratropium  3 mL Nebulization Q6H    calcium-vitamin D3  1 tablet Oral BID    famotidine (PF)  20 mg Intravenous BID    gabapentin  300 mg Per OG tube TID    mupirocin   Nasal BID    piperacillin-tazobactam (Zosyn) IV (PEDS and ADULTS) (extended infusion is not appropriate)  4.5 g Intravenous Q8H     Current Intravenous Infusions   dexmedeTOMIDine (Precedex) infusion (titrating) 1.4 mcg/kg/hr (06/10/23 0250)    fentanyl 150 mcg/hr (06/09/23 2257)    lactated ringers 125 mL/hr at 06/09/23 2254    NORepinephrine bitartrate-D5W Stopped (06/06/23 1135)    propofoL 45 mcg/kg/min (06/10/23 0250)     Review of Systems   Reason unable to perform ROS: Sedated and intubated.      Objective:     Intake/Output Summary (Last 24 hours) at 6/10/2023 0315  Last data filed at 6/10/2023 0250  Gross per 24 hour   Intake 8348.07 ml   Output 3950 ml   Net 4398.07 ml       Vital Signs (Most Recent):  Temp: 98.8 °F (37.1 °C) (06/09/23  2000)  Pulse: 90 (06/10/23 0300)  Resp: (!) 28 (06/10/23 0300)  BP: (!) 120/48 (06/10/23 0300)  SpO2: (!) 93 % (06/10/23 0300)  Body mass index is 37.22 kg/m².  Weight: 121 kg (266 lb 12.1 oz) Vital Signs (24h Range):  Temp:  [98.8 °F (37.1 °C)-100.2 °F (37.9 °C)] 98.8 °F (37.1 °C)  Pulse:  [82-94] 90  Resp:  [0-29] 28  SpO2:  [90 %-100 %] 93 %  BP: (102-124)/(43-75) 120/48  Arterial Line BP: (105-139)/(58-66) 129/60     Physical Exam  General: Intubated and sedated  HEENT: NC/AT; PERRL; nasal and oral mucosa moist and clear; ET tube in place  Pulm: CTA bilaterally, mechanically ventilated  CV: S1, S2 w/o murmurs or gallops; no edema noted  GI: Soft with normal bowel sounds in all quadrants, no masses on palpation  Neuro: Limited d/t sedation    Lines/Drains/Airways       Central Venous Catheter Line  Duration             Percutaneous Central Line Insertion/Assessment - Double Lumen  06/04/23 1530 Subclavian Left 5 days              Drain  Duration                  Chest Tube 06/04/23 1549 Left Midaxillary 28 Fr. 5 days         Urethral Catheter 06/05/23 1610 Silicone 16 Fr. 4 days         NG/OG Tube 06/08/23 0915 [place] sump Right mouth 1 day         Closed/Suction Drain 06/09/23 1537 Midline Back Accordion 10 Fr. <1 day              Arterial Line  Duration             Arterial Line 06/09/23 1655 Right Radial <1 day              Peripheral Intravenous Line  Duration                  Peripheral IV - Single Lumen 06/04/23 1549 18 G Left Forearm 5 days         Peripheral IV - Single Lumen 06/09/23 0829 18 G Left;Posterior Hand <1 day                  Significant Labs:  Lab Results   Component Value Date    WBC 7.02 06/10/2023    HGB 8.2 (L) 06/10/2023    HCT 25.0 (L) 06/10/2023    MCV 86.2 06/10/2023     06/10/2023       BMP  Lab Results   Component Value Date     06/09/2023    K 4.4 06/09/2023    CHLORIDE 109 (H) 06/09/2023    CO2 23 06/09/2023    BUN 12.0 06/09/2023    CREATININE 0.83 06/09/2023     CALCIUM 7.6 (L) 06/09/2023    AGAP 11.0 06/09/2023     ABG  Recent Labs   Lab 06/09/23  0702   PH 7.460*   PO2 96.0   HCO3 27.7       Mechanical Ventilation Support:  Vent Mode: A/C (06/10/23 0026)  Set Rate: 28 BPM (06/10/23 0026)  Vt Set: 510 mL (06/10/23 0026)  Pressure Support: 12 cmH20 (06/10/23 0026)  PEEP/CPAP: 10 cmH20 (06/10/23 0026)  Oxygen Concentration (%): 80 (06/09/23 1929)  Peak Airway Pressure: 3 cmH20 (06/10/23 0026)  Total Ve: 12.6 L/m (06/10/23 0026)  F/VT Ratio<105 (RSBI): (!) 65.42 (06/10/23 0026)    Significant Imaging:  I have reviewed the pertinent imaging within the past 24 hours.    Assessment/Plan:     Assessment  Pelvic ring fx s/p ORIF on 6/5 POD 1  Left-sided rib fractures with pneumo/hemo thorax s/p chest tube placement  L2 compression fracture   Right distal radius fracture    Plan  -Continue ICU level of care per primary team  -Trauma, neurosurgery, and orthopedic surgery teams following  -Continue routine chest tube care    DVT Prophylaxis: SCD  GI Prophylaxis: Famotidine     32 minutes of critical care was time spent personally by me on the following activities: development of treatment plan with patient or surrogate and bedside caregivers, discussions with consultants, evaluation of patient's response to treatment, examination of patient, ordering and performing treatments and interventions, ordering and review of laboratory studies, ordering and review of radiographic studies, pulse oximetry, re-evaluation of patient's condition.  This critical care time did not overlap with that of any other provider or involve time for any procedures.     Kalee Zamudio DO, PGY-II  Pulmonary Critical Care Medicine  [facility] - [facility] ICU

## 2023-06-10 NOTE — NURSING
Nurses Note -- 4 Eyes      6/10/2023   6:17 AM      Skin assessed during: Daily Assessment      [x] No Altered Skin Integrity Present    [x]Prevention Measures Documented      [] Yes- Altered Skin Integrity Present or Discovered   [] LDA Added if Not in Epic (Describe Wound)   [] New Altered Skin Integrity was Present on Admit and Documented in LDA   [] Wound Image Taken    Wound Care Consulted? No    Attending Nurse:  Telly Mays RN     Second RN/Staff Member:  Beatriz LYONS RN

## 2023-06-10 NOTE — NURSING
Nurses Note -- 4 Eyes      6/10/2023   4:48 PM      Skin assessed during: Daily Assessment      [] No Altered Skin Integrity Present    [x]Prevention Measures Documented      [x] Yes- Altered Skin Integrity Present or Discovered   [] LDA Added if Not in Epic (Describe Wound)   [] New Altered Skin Integrity was Present on Admit and Documented in LDA   [] Wound Image Taken    Wound Care Consulted? No    Attending Nurse:  Altagracia Moreno RN     Second RN/Staff Member:  LUPE Rivers

## 2023-06-11 LAB
ALBUMIN SERPL-MCNC: 1.8 G/DL (ref 3.5–5)
ALBUMIN/GLOB SERPL: 0.7 RATIO (ref 1.1–2)
ALLENS TEST BLOOD GAS (OHS): ABNORMAL
ALP SERPL-CCNC: 62 UNIT/L (ref 40–150)
ALT SERPL-CCNC: 22 UNIT/L (ref 0–55)
AST SERPL-CCNC: 53 UNIT/L (ref 5–34)
BASE EXCESS BLD CALC-SCNC: 1.9 MMOL/L (ref -2–2)
BASOPHILS # BLD AUTO: 0.02 X10(3)/MCL
BASOPHILS # BLD AUTO: 0.04 X10(3)/MCL
BASOPHILS NFR BLD AUTO: 0.2 %
BASOPHILS NFR BLD AUTO: 0.5 %
BILIRUBIN DIRECT+TOT PNL SERPL-MCNC: 3.9 MG/DL
BLOOD GAS SAMPLE TYPE (OHS): ABNORMAL
BUN SERPL-MCNC: 9.1 MG/DL (ref 8.9–20.6)
CA-I BLD-SCNC: 1.1 MMOL/L (ref 1.12–1.23)
CALCIUM SERPL-MCNC: 7.4 MG/DL (ref 8.4–10.2)
CHLORIDE SERPL-SCNC: 111 MMOL/L (ref 98–107)
CO2 BLDA-SCNC: 27.9 MMOL/L
CO2 SERPL-SCNC: 23 MMOL/L (ref 22–29)
COHGB MFR BLDA: 2.4 % (ref 0.5–1.5)
CREAT SERPL-MCNC: 0.73 MG/DL (ref 0.73–1.18)
DRAWN BY BLOOD GAS (OHS): ABNORMAL
EOSINOPHIL # BLD AUTO: 0.21 X10(3)/MCL (ref 0–0.9)
EOSINOPHIL # BLD AUTO: 0.23 X10(3)/MCL (ref 0–0.9)
EOSINOPHIL NFR BLD AUTO: 2.6 %
EOSINOPHIL NFR BLD AUTO: 2.8 %
ERYTHROCYTE [DISTWIDTH] IN BLOOD BY AUTOMATED COUNT: 15.9 % (ref 11.5–17)
ERYTHROCYTE [DISTWIDTH] IN BLOOD BY AUTOMATED COUNT: 16.1 % (ref 11.5–17)
GFR SERPLBLD CREATININE-BSD FMLA CKD-EPI: >60 MLS/MIN/1.73/M2
GLOBULIN SER-MCNC: 2.6 GM/DL (ref 2.4–3.5)
GLUCOSE SERPL-MCNC: 117 MG/DL (ref 74–100)
GROUP & RH: NORMAL
HCO3 BLDA-SCNC: 26.6 MMOL/L (ref 22–26)
HCT VFR BLD AUTO: 21.7 % (ref 42–52)
HCT VFR BLD AUTO: 22.9 % (ref 42–52)
HGB BLD-MCNC: 7.2 G/DL (ref 14–18)
HGB BLD-MCNC: 7.5 G/DL (ref 14–18)
IMM GRANULOCYTES # BLD AUTO: 0.09 X10(3)/MCL (ref 0–0.04)
IMM GRANULOCYTES # BLD AUTO: 0.13 X10(3)/MCL (ref 0–0.04)
IMM GRANULOCYTES NFR BLD AUTO: 1.1 %
IMM GRANULOCYTES NFR BLD AUTO: 1.6 %
INDIRECT COOMBS GEL: NORMAL
LYMPHOCYTES # BLD AUTO: 0.89 X10(3)/MCL (ref 0.6–4.6)
LYMPHOCYTES # BLD AUTO: 1.04 X10(3)/MCL (ref 0.6–4.6)
LYMPHOCYTES NFR BLD AUTO: 10.7 %
LYMPHOCYTES NFR BLD AUTO: 13 %
MAGNESIUM SERPL-MCNC: 2.1 MG/DL (ref 1.6–2.6)
MCH RBC QN AUTO: 28.4 PG (ref 27–31)
MCH RBC QN AUTO: 29 PG (ref 27–31)
MCHC RBC AUTO-ENTMCNC: 32.8 G/DL (ref 33–36)
MCHC RBC AUTO-ENTMCNC: 33.2 G/DL (ref 33–36)
MCV RBC AUTO: 86.7 FL (ref 80–94)
MCV RBC AUTO: 87.5 FL (ref 80–94)
METHGB MFR BLDA: 0.5 % (ref 0.4–1.5)
MONOCYTES # BLD AUTO: 0.7 X10(3)/MCL (ref 0.1–1.3)
MONOCYTES # BLD AUTO: 0.74 X10(3)/MCL (ref 0.1–1.3)
MONOCYTES NFR BLD AUTO: 8.4 %
MONOCYTES NFR BLD AUTO: 9.3 %
NEUTROPHILS # BLD AUTO: 5.85 X10(3)/MCL (ref 2.1–9.2)
NEUTROPHILS # BLD AUTO: 6.35 X10(3)/MCL (ref 2.1–9.2)
NEUTROPHILS NFR BLD AUTO: 73.5 %
NEUTROPHILS NFR BLD AUTO: 76.3 %
NRBC BLD AUTO-RTO: 0 %
NRBC BLD AUTO-RTO: 0 %
O2 HB BLOOD GAS (OHS): 97.8 % (ref 94–97)
OXYHGB MFR BLDA: 8.1 G/DL (ref 12–16)
PCO2 BLDA: 41 MMHG (ref 35–45)
PH BLDA: 7.42 [PH] (ref 7.35–7.45)
PHOSPHATE SERPL-MCNC: 3.2 MG/DL (ref 2.3–4.7)
PLATELET # BLD AUTO: 183 X10(3)/MCL (ref 130–400)
PLATELET # BLD AUTO: 184 X10(3)/MCL (ref 130–400)
PMV BLD AUTO: 10.7 FL (ref 7.4–10.4)
PMV BLD AUTO: 9.9 FL (ref 7.4–10.4)
PO2 BLDA: 148 MMHG (ref 80–100)
POTASSIUM BLOOD GAS (OHS): 3.5 MMOL/L (ref 3.5–5)
POTASSIUM SERPL-SCNC: 3.7 MMOL/L (ref 3.5–5.1)
PROT SERPL-MCNC: 4.4 GM/DL (ref 6.4–8.3)
RBC # BLD AUTO: 2.48 X10(6)/MCL (ref 4.7–6.1)
RBC # BLD AUTO: 2.64 X10(6)/MCL (ref 4.7–6.1)
SAMPLE SITE BLOOD GAS (OHS): ABNORMAL
SAO2 % BLDA: 99.3 %
SODIUM BLOOD GAS (OHS): 139 MMOL/L (ref 137–145)
SODIUM SERPL-SCNC: 144 MMOL/L (ref 136–145)
SPECIMEN OUTDATE: NORMAL
WBC # SPEC AUTO: 7.97 X10(3)/MCL (ref 4.5–11.5)
WBC # SPEC AUTO: 8.32 X10(3)/MCL (ref 4.5–11.5)

## 2023-06-11 PROCEDURE — 63600175 PHARM REV CODE 636 W HCPCS: Performed by: STUDENT IN AN ORGANIZED HEALTH CARE EDUCATION/TRAINING PROGRAM

## 2023-06-11 PROCEDURE — 85025 COMPLETE CBC W/AUTO DIFF WBC: CPT | Performed by: SURGERY

## 2023-06-11 PROCEDURE — 99024 POSTOP FOLLOW-UP VISIT: CPT | Mod: ,,, | Performed by: NEUROLOGICAL SURGERY

## 2023-06-11 PROCEDURE — 84100 ASSAY OF PHOSPHORUS: CPT | Performed by: STUDENT IN AN ORGANIZED HEALTH CARE EDUCATION/TRAINING PROGRAM

## 2023-06-11 PROCEDURE — 85025 COMPLETE CBC W/AUTO DIFF WBC: CPT | Performed by: STUDENT IN AN ORGANIZED HEALTH CARE EDUCATION/TRAINING PROGRAM

## 2023-06-11 PROCEDURE — 99900026 HC AIRWAY MAINTENANCE (STAT)

## 2023-06-11 PROCEDURE — 25000003 PHARM REV CODE 250: Performed by: INTERNAL MEDICINE

## 2023-06-11 PROCEDURE — 20000000 HC ICU ROOM

## 2023-06-11 PROCEDURE — 94640 AIRWAY INHALATION TREATMENT: CPT

## 2023-06-11 PROCEDURE — 25000242 PHARM REV CODE 250 ALT 637 W/ HCPCS: Performed by: HOSPITALIST

## 2023-06-11 PROCEDURE — 63600175 PHARM REV CODE 636 W HCPCS

## 2023-06-11 PROCEDURE — 20800000 HC ICU TRAUMA

## 2023-06-11 PROCEDURE — 86923 COMPATIBILITY TEST ELECTRIC: CPT

## 2023-06-11 PROCEDURE — 25000003 PHARM REV CODE 250: Performed by: STUDENT IN AN ORGANIZED HEALTH CARE EDUCATION/TRAINING PROGRAM

## 2023-06-11 PROCEDURE — 80053 COMPREHEN METABOLIC PANEL: CPT | Performed by: STUDENT IN AN ORGANIZED HEALTH CARE EDUCATION/TRAINING PROGRAM

## 2023-06-11 PROCEDURE — 36430 TRANSFUSION BLD/BLD COMPNT: CPT

## 2023-06-11 PROCEDURE — 25000003 PHARM REV CODE 250: Performed by: SURGERY

## 2023-06-11 PROCEDURE — 99900035 HC TECH TIME PER 15 MIN (STAT)

## 2023-06-11 PROCEDURE — 36600 WITHDRAWAL OF ARTERIAL BLOOD: CPT

## 2023-06-11 PROCEDURE — P9016 RBC LEUKOCYTES REDUCED: HCPCS | Performed by: NEUROLOGICAL SURGERY

## 2023-06-11 PROCEDURE — 94761 N-INVAS EAR/PLS OXIMETRY MLT: CPT

## 2023-06-11 PROCEDURE — 99024 PR POST-OP FOLLOW-UP VISIT: ICD-10-PCS | Mod: ,,, | Performed by: NEUROLOGICAL SURGERY

## 2023-06-11 PROCEDURE — 25000003 PHARM REV CODE 250: Performed by: NEUROLOGICAL SURGERY

## 2023-06-11 PROCEDURE — 83735 ASSAY OF MAGNESIUM: CPT | Performed by: STUDENT IN AN ORGANIZED HEALTH CARE EDUCATION/TRAINING PROGRAM

## 2023-06-11 PROCEDURE — 86900 BLOOD TYPING SEROLOGIC ABO: CPT | Performed by: SURGERY

## 2023-06-11 PROCEDURE — 27000221 HC OXYGEN, UP TO 24 HOURS

## 2023-06-11 PROCEDURE — 25000003 PHARM REV CODE 250

## 2023-06-11 PROCEDURE — 63600175 PHARM REV CODE 636 W HCPCS: Performed by: SURGERY

## 2023-06-11 PROCEDURE — 82803 BLOOD GASES ANY COMBINATION: CPT

## 2023-06-11 RX ORDER — HYDROCODONE BITARTRATE AND ACETAMINOPHEN 500; 5 MG/1; MG/1
TABLET ORAL
Status: DISCONTINUED | OUTPATIENT
Start: 2023-06-11 | End: 2023-06-14

## 2023-06-11 RX ADMIN — DEXMEDETOMIDINE HYDROCHLORIDE 1.4 MCG/KG/HR: 400 INJECTION INTRAVENOUS at 04:06

## 2023-06-11 RX ADMIN — PROPOFOL 50 MCG/KG/MIN: 10 INJECTION, EMULSION INTRAVENOUS at 02:06

## 2023-06-11 RX ADMIN — MIDAZOLAM HYDROCHLORIDE 2 MG: 5 INJECTION, SOLUTION INTRAMUSCULAR; INTRAVENOUS at 11:06

## 2023-06-11 RX ADMIN — DEXMEDETOMIDINE HYDROCHLORIDE 1.4 MCG/KG/HR: 400 INJECTION INTRAVENOUS at 08:06

## 2023-06-11 RX ADMIN — IPRATROPIUM BROMIDE AND ALBUTEROL SULFATE 3 ML: 2.5; .5 SOLUTION RESPIRATORY (INHALATION) at 08:06

## 2023-06-11 RX ADMIN — PROPOFOL 50 MCG/KG/MIN: 10 INJECTION, EMULSION INTRAVENOUS at 11:06

## 2023-06-11 RX ADMIN — LAMOTRIGINE 25 MG: 25 TABLET ORAL at 08:06

## 2023-06-11 RX ADMIN — FAMOTIDINE 20 MG: 10 INJECTION, SOLUTION INTRAVENOUS at 08:06

## 2023-06-11 RX ADMIN — Medication 200 MCG/HR: at 08:06

## 2023-06-11 RX ADMIN — DEXMEDETOMIDINE HYDROCHLORIDE 1 MCG/KG/HR: 400 INJECTION INTRAVENOUS at 10:06

## 2023-06-11 RX ADMIN — IPRATROPIUM BROMIDE AND ALBUTEROL SULFATE 3 ML: 2.5; .5 SOLUTION RESPIRATORY (INHALATION) at 01:06

## 2023-06-11 RX ADMIN — DEXMEDETOMIDINE HYDROCHLORIDE 1.4 MCG/KG/HR: 400 INJECTION INTRAVENOUS at 06:06

## 2023-06-11 RX ADMIN — HYDRALAZINE HYDROCHLORIDE 10 MG: 20 INJECTION INTRAMUSCULAR; INTRAVENOUS at 09:06

## 2023-06-11 RX ADMIN — PROPOFOL 50 MCG/KG/MIN: 10 INJECTION, EMULSION INTRAVENOUS at 06:06

## 2023-06-11 RX ADMIN — PIPERACILLIN AND TAZOBACTAM 4.5 G: 4; .5 INJECTION, POWDER, LYOPHILIZED, FOR SOLUTION INTRAVENOUS; PARENTERAL at 08:06

## 2023-06-11 RX ADMIN — GABAPENTIN 300 MG: 300 CAPSULE ORAL at 08:06

## 2023-06-11 RX ADMIN — GABAPENTIN 300 MG: 300 CAPSULE ORAL at 02:06

## 2023-06-11 RX ADMIN — PROPOFOL 50 MCG/KG/MIN: 10 INJECTION, EMULSION INTRAVENOUS at 12:06

## 2023-06-11 RX ADMIN — PROPOFOL 50 MCG/KG/MIN: 10 INJECTION, EMULSION INTRAVENOUS at 08:06

## 2023-06-11 RX ADMIN — PROPOFOL 50 MCG/KG/MIN: 10 INJECTION, EMULSION INTRAVENOUS at 04:06

## 2023-06-11 RX ADMIN — PROPOFOL 50 MCG/KG/MIN: 10 INJECTION, EMULSION INTRAVENOUS at 10:06

## 2023-06-11 RX ADMIN — IPRATROPIUM BROMIDE AND ALBUTEROL SULFATE 3 ML: 2.5; .5 SOLUTION RESPIRATORY (INHALATION) at 07:06

## 2023-06-11 RX ADMIN — FLUOXETINE 40 MG: 20 CAPSULE ORAL at 08:06

## 2023-06-11 RX ADMIN — IPRATROPIUM BROMIDE AND ALBUTEROL SULFATE 3 ML: 2.5; .5 SOLUTION RESPIRATORY (INHALATION) at 12:06

## 2023-06-11 RX ADMIN — Medication 1 TABLET: at 08:06

## 2023-06-11 RX ADMIN — SODIUM CHLORIDE, POTASSIUM CHLORIDE, SODIUM LACTATE AND CALCIUM CHLORIDE: 600; 310; 30; 20 INJECTION, SOLUTION INTRAVENOUS at 07:06

## 2023-06-11 RX ADMIN — DEXMEDETOMIDINE HYDROCHLORIDE 1.4 MCG/KG/HR: 400 INJECTION INTRAVENOUS at 01:06

## 2023-06-11 RX ADMIN — Medication 200 MCG/HR: at 07:06

## 2023-06-11 RX ADMIN — QUETIAPINE FUMARATE 50 MG: 25 TABLET ORAL at 08:06

## 2023-06-11 RX ADMIN — SODIUM CHLORIDE, POTASSIUM CHLORIDE, SODIUM LACTATE AND CALCIUM CHLORIDE: 600; 310; 30; 20 INJECTION, SOLUTION INTRAVENOUS at 05:06

## 2023-06-11 RX ADMIN — DEXMEDETOMIDINE HYDROCHLORIDE 1.4 MCG/KG/HR: 400 INJECTION INTRAVENOUS at 07:06

## 2023-06-11 RX ADMIN — MIDAZOLAM HYDROCHLORIDE 2 MG: 5 INJECTION, SOLUTION INTRAMUSCULAR; INTRAVENOUS at 03:06

## 2023-06-11 RX ADMIN — MIDAZOLAM HYDROCHLORIDE 2 MG: 5 INJECTION, SOLUTION INTRAMUSCULAR; INTRAVENOUS at 08:06

## 2023-06-11 RX ADMIN — DEXMEDETOMIDINE HYDROCHLORIDE 1.4 MCG/KG/HR: 400 INJECTION INTRAVENOUS at 11:06

## 2023-06-11 NOTE — PROGRESS NOTES
[facility] - [facility] ICU  Pulmonary Critical Care Note    Patient Name: Gera Chavez  MRN: [id number]  Admission Date: 6/4/2023  Hospital Length of Stay: 7 days  Code Status: Full Code  Attending Provider: Salvador Sauer MD  Primary Care Provider: Primary Doctor No     Subjective:     HPI:   Gera Chavez is a 37 y.o. male with no known PMH, who was transferred from [facility] for level 1 trauma activation, after patient reportedly jumped off a bridge.  Sustained rib fractures x4 left-sided, pelvic ring fracture, acetabulum fracture, L2 burst fracture, and left pneumothorax with chest tube in place.  Jump witnessed by bystander who called 911.     Hospital Course/Significant events:  6/4/2023 - Admitted to ICU   6/5/2023 - s/p bronchoscopy    24 Hour Interval History:  No acute events overnight. Current drips: propofol at 50, fentanyl at 200, precedex at 1.4. Labs this AM: H/H 7.5/22.9 I/O: 2.8 L urine output past 24 hours, net positive 8 L.    Past Medical History:   Diagnosis Date    Asthma     Depression     Diabetes mellitus     Encounter for blood transfusion     Generalized anxiety disorder     Hypertension     Schizophrenia, unspecified     Sleep apnea     Unspecified glaucoma        Past Surgical History:   Procedure Laterality Date    INTRAMEDULLARY RODDING OF FEMUR Left 02/05/2023    Procedure: INSERTION, INTRAMEDULLARY NOEL, FEMUR;  Surgeon: Tuan Zepeda DO;  Location: [facility];  Service: Orthopedics;  Laterality: Left;    OPEN REDUCTION AND INTERNAL FIXATION (ORIF) OF INJURY OF HIP Left 6/5/2023    Procedure: ORIF,PELVIS;  Surgeon: Tuan Zepeda DO;  Location: [facility] OR;  Service: Orthopedics;  Laterality: Left;  supine deven traction LLE CELL SAVER, teo    REMOVAL OF HARDWARE FROM LOWER EXTREMITY Left 5/9/2023    Procedure: REMOVAL, HARDWARE, LOWER EXTREMITY;  Surgeon: Tuan Zepeda DO;  Location: [facility] OR;  Service: Orthopedics;  Laterality: Left;    TRACH TUBE  EXCHANGER  6/5/2023    UVULOPALATOPHARYNGOPLASTY         Social History     Socioeconomic History    Marital status: Single   Tobacco Use    Smoking status: Every Day     Packs/day: 0.50     Years: 10.00     Pack years: 5.00     Types: Cigarettes    Smokeless tobacco: Never   Substance and Sexual Activity    Alcohol use: Not Currently    Drug use: Never    Sexual activity: Not Currently     Social Determinants of Health     Financial Resource Strain: Unknown    Difficulty of Paying Living Expenses: Patient refused   Food Insecurity: Unknown    Worried About Running Out of Food in the Last Year: Patient refused    Ran Out of Food in the Last Year: Patient refused   Transportation Needs: Unknown    Lack of Transportation (Medical): Patient refused    Lack of Transportation (Non-Medical): Patient refused   Physical Activity: Unknown    Days of Exercise per Week: Patient refused   Stress: Unknown    Feeling of Stress : Patient refused   Social Connections: Unknown    Frequency of Communication with Friends and Family: Patient refused    Frequency of Social Gatherings with Friends and Family: Patient refused    Attends Club or Organization Meetings: Patient refused    Marital Status: Never    Housing Stability: Unknown    Unable to Pay for Housing in the Last Year: Patient refused    Unstable Housing in the Last Year: Patient refused       Current Outpatient Medications   Medication Instructions    albuterol (PROVENTIL/VENTOLIN HFA) 90 mcg/actuation inhaler 2 puffs, Inhalation, Every 4 hours PRN    ALBUTEROL INHL 90 mcg, Inhalation, Every 4 hours PRN    amLODIPine (NORVASC) 5 mg, Oral, Daily    amoxicillin-clavulanate 875-125mg (AUGMENTIN) 875-125 mg per tablet 1 tablet, Oral, Every 12 hours (non-standard times)    atorvastatin (LIPITOR) 20 mg, Oral, Daily    azithromycin (Z-CELESTINA) 500 mg, Oral, Daily    clonazePAM (KLONOPIN) 1 mg, Oral, 2 times daily PRN    dextroamphetamine-amphetamine (ADDERALL XR) 25 MG 24 hr  capsule 25 mg, Oral, 2 times daily    FLUoxetine 20 MG capsule fluoxetine 20 mg capsule    FLUoxetine 40 mg, Oral, Daily    insulin glargine 100 units/mL SubQ pen Lantus Solostar U-100 Insulin 100 unit/mL (3 mL) subcutaneous pen    ipratropium (ATROVENT) 42 mcg (0.06 %) nasal spray Each Nostril    lamoTRIgine (LAMICTAL) 25 MG tablet lamotrigine 25 mg tablet    LANTUS SOLOSTAR U-100 INSULIN glargine 100 units/mL SubQ pen Subcutaneous    latanoprost 0.005 % ophthalmic solution latanoprost 0.005 % eye drops    lisinopriL (PRINIVIL,ZESTRIL) 40 mg, Oral    mirtazapine (REMERON) 7.5 MG Tab mirtazapine 7.5 mg tablet    potassium chloride (K-TAB) 20 mEq potassium chloride ER 20 mEq tablet,extended release   TAKE 1 TABLET BY MOUTH EVERY DAY    prochlorperazine (COMPAZINE) 10 MG tablet prochlorperazine maleate 10 mg tablet    QUEtiapine (SEROQUEL) 200 mg, Oral, Nightly    testosterone cypionate (DEPOTESTOTERONE CYPIONATE) 200 mg/mL injection SMARTSIG:Milliliter(s) IM     Current Inpatient Medications   albuterol-ipratropium  3 mL Nebulization Q6H    calcium-vitamin D3  1 tablet Oral BID    famotidine (PF)  20 mg Intravenous BID    FLUoxetine  40 mg Per OG tube Daily    gabapentin  300 mg Per OG tube TID    lamoTRIgine  25 mg Per OG tube Daily    piperacillin-tazobactam (Zosyn) IV (PEDS and ADULTS) (extended infusion is not appropriate)  4.5 g Intravenous Q8H    QUEtiapine  50 mg Per OG tube BID     Current Intravenous Infusions   dexmedeTOMIDine (Precedex) infusion (titrating) 1.4 mcg/kg/hr (06/11/23 0845)    fentanyl 200 mcg/hr (06/11/23 0821)    lactated ringers 125 mL/hr at 06/11/23 0548    NORepinephrine bitartrate-D5W Stopped (06/06/23 1135)    propofoL 50 mcg/kg/min (06/11/23 1005)     Review of Systems   Reason unable to perform ROS: Sedated and intubated.      Objective:     Intake/Output Summary (Last 24 hours) at 6/11/2023 1036  Last data filed at 6/11/2023 1000  Gross per 24 hour   Intake 4332.1 ml   Output 3000 ml    Net 1332.1 ml       Vital Signs (Most Recent):  Temp: 100.1 °F (37.8 °C) (06/11/23 0800)  Pulse: 78 (06/11/23 1000)  Resp: (!) 28 (06/11/23 1000)  BP: (!) 141/69 (06/11/23 1000)  SpO2: 100 % (06/11/23 1000)  Body mass index is 37.22 kg/m².  Weight: 121 kg (266 lb 12.1 oz) Vital Signs (24h Range):  Temp:  [99 °F (37.2 °C)-100.4 °F (38 °C)] 100.1 °F (37.8 °C)  Pulse:  [78-90] 78  Resp:  [13-28] 28  SpO2:  [93 %-100 %] 100 %  BP: (122-141)/(52-73) 141/69     Physical Exam  General: Intubated and sedated  HEENT: NC/AT; PERRL; nasal and oral mucosa moist and clear; ET tube in place  Pulm: Rhonchi in upper lobes bilaterally, diminished right lower lobe; mechanically ventilated  CV: S1, S2 w/o murmurs or gallops; no edema noted  GI: Soft with normal bowel sounds in all quadrants, no masses on palpation  Neuro: Limited d/t sedation    Lines/Drains/Airways       Central Venous Catheter Line  Duration             Percutaneous Central Line Insertion/Assessment - Double Lumen  06/04/23 1530 Subclavian Left 6 days              Drain  Duration                  Chest Tube 06/04/23 1549 Left Midaxillary 28 Fr. 6 days         Urethral Catheter 06/05/23 1610 Silicone 16 Fr. 5 days         NG/OG Tube 06/08/23 0915 [place] sump Right mouth 3 days         Closed/Suction Drain 06/09/23 1537 Midline Back Accordion 10 Fr. 1 day              Airway  Duration                  Airway - Non-Surgical 06/08/23 0855 Endotracheal Tube 3 days              Arterial Line  Duration             Arterial Line 06/09/23 1655 Right Radial 1 day              Peripheral Intravenous Line  Duration                  Peripheral IV - Single Lumen 06/09/23 0829 18 G Left;Posterior Hand 2 days                  Significant Labs:  Lab Results   Component Value Date    WBC 7.97 06/11/2023    HGB 7.5 (L) 06/11/2023    HCT 22.9 (L) 06/11/2023    MCV 86.7 06/11/2023     06/11/2023       BMP  Lab Results   Component Value Date     06/11/2023    K 3.7  06/11/2023    CHLORIDE 111 (H) 06/11/2023    CO2 23 06/11/2023    BUN 9.1 06/11/2023    CREATININE 0.73 06/11/2023    CALCIUM 7.4 (L) 06/11/2023    AGAP 11.0 06/09/2023     ABG  Recent Labs   Lab 06/11/23 0601   PH 7.420   PO2 148.0*   HCO3 26.6*       Mechanical Ventilation Support:  Vent Mode: A/C (06/11/23 0815)  Set Rate: 28 BPM (06/11/23 0815)  Vt Set: 510 mL (06/11/23 0815)  Pressure Support: 12 cmH20 (06/11/23 0527)  PEEP/CPAP: 10 cmH20 (06/11/23 0815)  Oxygen Concentration (%): 60 (06/11/23 0815)  Peak Airway Pressure: 30 cmH20 (06/11/23 0815)  Total Ve: 13.8 L/m (06/11/23 0815)  F/VT Ratio<105 (RSBI): (!) 57.61 (06/11/23 0815)    Significant Imaging:  I have reviewed the pertinent imaging within the past 24 hours.    Assessment/Plan:     Assessment  Pelvic ring fx s/p ORIF on 6/5/2023  Left-sided rib fractures with pneumo/hemo thorax s/p chest tube placement  L2 burst fracture and L1-2 stenosis s/p laminectomy on 6/9/2023  Right distal radius fracture    Plan  -Continue ICU level of care per primary team  -Trauma, neurosurgery, and orthopedic surgery teams following  -Continue chest tube management   -CXR 6/11/2023 revealed improved bilateral pleural effusions    DVT Prophylaxis: SCD  GI Prophylaxis: Famotidine     32 minutes of critical care was time spent personally by me on the following activities: development of treatment plan with patient or surrogate and bedside caregivers, discussions with consultants, evaluation of patient's response to treatment, examination of patient, ordering and performing treatments and interventions, ordering and review of laboratory studies, ordering and review of radiographic studies, pulse oximetry, re-evaluation of patient's condition.  This critical care time did not overlap with that of any other provider or involve time for any procedures.     Kalee Zamudio DO, PGY-II  Pulmonary Critical Care Medicine  [facility] - [facility] ICU

## 2023-06-11 NOTE — PROGRESS NOTES
Hospital ICU Progress Note  [facility] Neurosurgery      Admit Date: 6/4/2023  Post-operative Day: 2 Days Post-Op  Hospital Day: 8    SUBJECTIVE:     POD #2 s/p T11-L4 posterior fusion with L1-2 laminectomy and dural repair for L2 burst fx    Interval History:  The patient continues to be intubated and sedated on Propofol, Precedex, and fentanyl gtts.  He is on flat strict bedrest.  Mr. Chavez' thoracolumbar dressing continues to remain dry.    Scheduled Meds:   albuterol-ipratropium  3 mL Nebulization Q6H    calcium-vitamin D3  1 tablet Oral BID    famotidine (PF)  20 mg Intravenous BID    FLUoxetine  40 mg Per OG tube Daily    gabapentin  300 mg Per OG tube TID    lamoTRIgine  25 mg Per OG tube Daily    piperacillin-tazobactam (Zosyn) IV (PEDS and ADULTS) (extended infusion is not appropriate)  4.5 g Intravenous Q8H    QUEtiapine  50 mg Per OG tube BID     Continuous Infusions:   dexmedeTOMIDine (Precedex) infusion (titrating) 1.4 mcg/kg/hr (06/11/23 0845)    fentanyl 200 mcg/hr (06/11/23 0821)    lactated ringers 125 mL/hr at 06/11/23 0548    NORepinephrine bitartrate-D5W Stopped (06/06/23 1135)    propofoL 50 mcg/kg/min (06/11/23 1005)     PRN Meds:sodium chloride, acetaminophen, fentaNYL, hydrALAZINE, labetaloL, midazolam, oxyCODONE, oxyCODONE    Review of patient's allergies indicates:   Allergen Reactions    Iodine     Trazodone        Past Medical History:   Diagnosis Date    Asthma     Depression     Diabetes mellitus     Encounter for blood transfusion     Generalized anxiety disorder     Hypertension     Schizophrenia, unspecified     Sleep apnea     Unspecified glaucoma      Past Surgical History:   Procedure Laterality Date    INTRAMEDULLARY RODDING OF FEMUR Left 02/05/2023    Procedure: INSERTION, INTRAMEDULLARY NOEL, FEMUR;  Surgeon: Tuan Zepeda DO;  Location: [facility];  Service: Orthopedics;  Laterality: Left;    OPEN REDUCTION AND INTERNAL FIXATION (ORIF) OF INJURY OF HIP Left  6/5/2023    Procedure: ORIF,PELVIS;  Surgeon: Tuan Zepeda DO;  Location: [facility] OR;  Service: Orthopedics;  Laterality: Left;  supine deven traction LLE CELL SAVER, teo    REMOVAL OF HARDWARE FROM LOWER EXTREMITY Left 5/9/2023    Procedure: REMOVAL, HARDWARE, LOWER EXTREMITY;  Surgeon: Tuan Zepeda DO;  Location: [facility] OR;  Service: Orthopedics;  Laterality: Left;    TRACH TUBE EXCHANGER  6/5/2023    UVULOPALATOPHARYNGOPLASTY         OBJECTIVE:     Vital Signs (Most Recent)  Temp: 100.1 °F (37.8 °C) (06/11/23 0800)  Pulse: 78 (06/11/23 1000)  Resp: (!) 28 (06/11/23 1000)  BP: (!) 141/69 (06/11/23 1000)  SpO2: 100 % (06/11/23 1000)    Vital Signs Range (Last 24H):  Temp:  [99 °F (37.2 °C)-100.4 °F (38 °C)]   Pulse:  [78-91]   Resp:  [13-28]   BP: (122-141)/(52-73)   SpO2:  [93 %-100 %]       Hemovac drain without compression: 35 cc in last 8 hours, 110 cc in last 24 hrs, serosanguineous      Physical Exam:  General Intubated, sedated  GCS- 6T  E-1, V-1T, M-4    Eyes closed to pain  Intubated, non-verbal  Withdraws all extremities to central pain     Cranial Nerves PERRL, 3 to 2 mm, brisk bilaterally   Wound Dressing- clean, dry, and intact       Laboratory Results:  CBC without Differential:  Lab Results   Component Value Date    WBC 7.97 06/11/2023    HGB 7.5 (L) 06/11/2023    HCT 22.9 (L) 06/11/2023     06/11/2023    MCV 86.7 06/11/2023     Differential:   No results found for: NEUTROABS, BASOSABS, MONOSABS    Basic Metabolic Panel:  Lab Results   Component Value Date     06/11/2023    K 3.7 06/11/2023    BUN 9.1 06/11/2023    MG 2.10 06/11/2023    PHOS 3.2 06/11/2023     Coagulation Studies:  Lab Results   Component Value Date    INR 1.31 (H) 06/08/2023    INR 1.34 (H) 06/06/2023    INR 1.03 06/04/2023    PROTIME 16.1 (H) 06/08/2023    PROTIME 16.5 (H) 06/06/2023    PROTIME 13.4 06/04/2023     Lab Results   Component Value Date    PTT 30.8 06/08/2023     Urinalysis:  Lab Results   Component  Value Date    PHURINE 6.0 06/04/2023    LEUKOCYTESUR 2+ (A) 06/05/2023    UROBILINOGEN 0.2 06/05/2023        ASSESSMENT/PLAN:     Mr. Chavez is a 37 y.o. male who has a past medical history significant for hypertension, diabetes, asthma, schizophrenia, depression, and anxiety.  The patient is PID #7 s/p attempt to jump off a bridge with a fall on 6/4/2023.  He POD #2 s/p T11-L4 posterior fusion with L1-2 laminectomy and dural repair for L2 burst fx.  Mr. Chavez is intubated and sedated on propofol, Precedex, and fentanyl gtts.  He has a GCS 6T and withdraws all his extremities well.       Plan:     1.  The patient continues to be monitored in the ICU with Q2 hr neuro checks.    2.  Continue strict, flat bedrest due to repaired CSF leak.  As long as the patient's wound remains dry, he will be able to mobilize on Wednesday, 6/14/2023.      3.  Continue Hemovac drain with no accordion suction.      4.  He is on Zosyn.    5.   I discussed with orthopedic surgeon Dr. Zepeda that the 2nd part of the patient's staged pelvic fracture repair in a lateral position can take place on Wednesday, 06/14/2023.    6.  Neurosurgery will continue to follow the patient.  Please do not hesitate to contact neurosurgery for any additional questions or concerns.      Angelika Matos MD  Neurosurgery

## 2023-06-11 NOTE — PLAN OF CARE
Problem: Adult Inpatient Plan of Care  Goal: Plan of Care Review  Outcome: Ongoing, Progressing  Goal: Patient-Specific Goal (Individualized)  Outcome: Ongoing, Progressing  Goal: Absence of Hospital-Acquired Illness or Injury  Outcome: Ongoing, Progressing     Problem: Infection  Goal: Absence of Infection Signs and Symptoms  Outcome: Ongoing, Progressing     Problem: Communication Impairment (Mechanical Ventilation, Invasive)  Goal: Effective Communication  Outcome: Ongoing, Progressing     Problem: Device-Related Complication Risk (Mechanical Ventilation, Invasive)  Goal: Optimal Device Function  Outcome: Ongoing, Progressing     Problem: Inability to Wean (Mechanical Ventilation, Invasive)  Goal: Mechanical Ventilation Liberation  Outcome: Ongoing, Progressing     Problem: Nutrition Impairment (Mechanical Ventilation, Invasive)  Goal: Optimal Nutrition Delivery  Outcome: Ongoing, Progressing     Problem: Skin and Tissue Injury (Mechanical Ventilation, Invasive)  Goal: Absence of Device-Related Skin and Tissue Injury  Outcome: Ongoing, Progressing     Problem: Ventilator-Induced Lung Injury (Mechanical Ventilation, Invasive)  Goal: Absence of Ventilator-Induced Lung Injury  Outcome: Ongoing, Progressing     Problem: Communication Impairment (Artificial Airway)  Goal: Effective Communication  Outcome: Ongoing, Progressing

## 2023-06-11 NOTE — PROGRESS NOTES
Patient has a left posterior column acetabular fracture and a distal radius fracture still in need of orthopedic stabilization.  I spoke to neurosurgery attending recommends strict supine position until Wednesday.  We will then prioritize the patient and try to get his remaining fractures stabilized so he can continue his rehab appropriately.    Trauma team meche Zepeda

## 2023-06-11 NOTE — NURSING
Nurses Note -- 4 Eyes      6/11/2023   5:32 AM      Skin assessed during: Daily Assessment      [x] No Altered Skin Integrity Present    [x]Prevention Measures Documented      [] Yes- Altered Skin Integrity Present or Discovered   [] LDA Added if Not in Epic (Describe Wound)   [] New Altered Skin Integrity was Present on Admit and Documented in LDA   [] Wound Image Taken    Wound Care Consulted? No    Attending Nurse:  Telly Mays RN     Second RN/Staff Member:  Jazmyn DONOVAN RN

## 2023-06-11 NOTE — PROGRESS NOTES
TRAUMA ICU PROGRESS NOTE    HD# 7  Admission Summary:   In brief, Gera Chavez is a 37 y.o. male admitted on 6/4/2023 following fall from bridge at 25 ft.  He reportedly jumped off a bridge and landed on dry land.  He arrived intubated with a left-sided chest tube.  He was found to have a pelvic ring fracture including open book fracture, L2 burst fracture, left pneumothorax, multiple left-sided rib fractures.    Interval history:    Remains intubated  No acute events reported overnight  POD2 T11 to L4 posterior lateral fusion with L1-2 laminectomy.    Consults:   Neurosurgery  Orthopedic surgery Injuries:  L pelvic rim fx w/ open book pelvis  L pelvic wall hematoma  L2 burst fx  L apical PTX  L 4-11 rib fx  pulmonary contusion  R distal radius fracture Operations/Procedures:  Left chest tube placement at outside facility 6/4  Pelvic ring fx ORIF 6/5     Past medical history:  1. Asthma   2. Depression  3. Diabetes   4. Generalized anxiety disorder   5. Hypertension  6. Schizophrenia   7. Sleep apnea    Medications: [x] Medications reviewed/updated   Home Meds:    Current Outpatient Medications   Medication Instructions    albuterol (PROVENTIL/VENTOLIN HFA) 90 mcg/actuation inhaler 2 puffs, Inhalation, Every 4 hours PRN    ALBUTEROL INHL 90 mcg, Inhalation, Every 4 hours PRN    amLODIPine (NORVASC) 5 mg, Oral, Daily    amoxicillin-clavulanate 875-125mg (AUGMENTIN) 875-125 mg per tablet 1 tablet, Oral, Every 12 hours (non-standard times)    atorvastatin (LIPITOR) 20 mg, Oral, Daily    azithromycin (Z-CELESTINA) 500 mg, Oral, Daily    clonazePAM (KLONOPIN) 1 mg, Oral, 2 times daily PRN    dextroamphetamine-amphetamine (ADDERALL XR) 25 MG 24 hr capsule 25 mg, Oral, 2 times daily    FLUoxetine 20 MG capsule fluoxetine 20 mg capsule    FLUoxetine 40 mg, Oral, Daily    insulin glargine 100 units/mL SubQ pen Lantus Solostar U-100 Insulin 100 unit/mL (3 mL) subcutaneous pen    ipratropium (ATROVENT) 42 mcg (0.06 %) nasal  "spray Each Nostril    lamoTRIgine (LAMICTAL) 25 MG tablet lamotrigine 25 mg tablet    LANTUS SOLOSTAR U-100 INSULIN glargine 100 units/mL SubQ pen Subcutaneous    latanoprost 0.005 % ophthalmic solution latanoprost 0.005 % eye drops    lisinopriL (PRINIVIL,ZESTRIL) 40 mg, Oral    mirtazapine (REMERON) 7.5 MG Tab mirtazapine 7.5 mg tablet    potassium chloride (K-TAB) 20 mEq potassium chloride ER 20 mEq tablet,extended release   TAKE 1 TABLET BY MOUTH EVERY DAY    prochlorperazine (COMPAZINE) 10 MG tablet prochlorperazine maleate 10 mg tablet    QUEtiapine (SEROQUEL) 200 mg, Oral, Nightly    testosterone cypionate (DEPOTESTOTERONE CYPIONATE) 200 mg/mL injection SMARTSIG:Milliliter(s) IM      Scheduled Meds:    albuterol-ipratropium  3 mL Nebulization Q6H    calcium-vitamin D3  1 tablet Oral BID    famotidine (PF)  20 mg Intravenous BID    FLUoxetine  40 mg Per OG tube Daily    gabapentin  300 mg Per OG tube TID    lamoTRIgine  25 mg Per OG tube Daily    piperacillin-tazobactam (Zosyn) IV (PEDS and ADULTS) (extended infusion is not appropriate)  4.5 g Intravenous Q8H    QUEtiapine  50 mg Per OG tube BID    vasopressin         Continuous Infusions:    dexmedeTOMIDine (Precedex) infusion (titrating) 1.4 mcg/kg/hr (06/11/23 0408)    fentanyl 200 mcg/hr (06/10/23 2246)    lactated ringers 125 mL/hr at 06/11/23 0548    NORepinephrine bitartrate-D5W Stopped (06/06/23 1135)    propofoL 50 mcg/kg/min (06/11/23 0408)     PRN Meds: sodium chloride, acetaminophen, fentaNYL, hydrALAZINE, labetaloL, midazolam, oxyCODONE, oxyCODONE     Vitals:  VITAL SIGNS: 24 HR MIN & MAX LAST   Temp  Min: 99 °F (37.2 °C)  Max: 101.4 °F (38.6 °C)  99 °F (37.2 °C)   BP  Min: 96/39  Max: 140/68  131/67    Pulse  Min: 83  Max: 94  85    Resp  Min: 13  Max: 30  (!) 28    SpO2  Min: 90 %  Max: 100 %  100 %      HT: 5' 10.98" (180.3 cm)  WT: 121 kg (266 lb 12.1 oz)  BMI: 37.2   Ideal Body Weight (IBW), Male: 171.88 lb  % Ideal Body Weight, Male (lb): " 155.09 %        General  Exam: NAD, sedated and intubated     Neuro/Psych  GCS: 3T on sedation  Exam: limited exam secondary to sedation, reportedly follows commands off sedation  ICP monitor: No  ICP treatment: ICP Treatment: N/A  C-Collar: Yes    Plan:  Q2 hour neurochecks  Flat bedrest until   Wean sedation  Pain control  F/u NSGY recs     HEENT  Exam: NC    Plan:   none     Pulmonary  Vitals: Resp  Av.4  Min: 13  Max: 30  SpO2  Av.8 %  Min: 90 %  Max: 100 %    Ventilator/Oxygen Settings:   Vent Mode: A/C  Vt Set: 510 mL  Set Rate: 28 BPM  Pressure Support: 12 cmH20  I:E Ratio Measured: 1:1.6     PaO2/FiO2 ratio (if ventilated): 70/40  RSBI RR/TV (if ventilated): 28/530  ABG:   Recent Labs   Lab 06/10/23  0508   PH 7.450   PO2 95.0   HCO3 27.8*        CXR:    CXR shows some improvement           Rib fractures: yes  Chest Tube: Left - without air leak    Exam: mechanically ventilated  Incentive Spirometry/RT Treatments: RT    Plan:     Continue L CT to waterseal  Wean vent as tolerated  Monitor daily CXR     Cardiovascular  Vitals: Pulse  Av.3  Min: 83  Max: 94  BP  Min: 96/39  Max: 140/68  Vasoactive Agents: None  Exam: tachy    Echo Findings:   Left Ventricle  The left ventricle is  with normal systolic function. The estimated ejection fraction is 65%. There is normal diastolic function.     Right Ventricle  Normal cavity size.     Left Atrium  The left atrial volume index is normal.     Right Atrium  The right atrium is normal in size.     Aortic Valve  The aortic valve appears structurally normal.     Mitral Valve  The mean pressure gradient across the mitral valve is 3 mmHg at a heart rate of. The mitral valve appears structurally normal. The estimated mitral valve area by pressure half time is 2.78 cm2.     Tricuspid Valve  The tricuspid valve appears structurally normal.     Pulmonic Valve  Pulmonic valve is structurally normal.     Plan:   Continuous cardiac monitoring while in the ICU      Renal  Recent Labs     23  0513 06/09/23  1756 06/10/23  0143 06/11/23  0138   BUN 10.5 12.0 11.0 9.1   CREATININE 0.80 0.83 0.76 0.73         No results for input(s): LACTIC in the last 72 hours.      Intake/Output - Last 3 Shifts          0700  06/10 0659 06/10 07 0659    I.V. (mL/kg) 2350.1 (19.4) 4232.1 (35)    IV Piggyback 4471.7 1100    Total Intake(mL/kg) 6821.8 (56.4) 5332.1 (44.1)    Urine (mL/kg/hr) 3165 (1.1) 2305 (0.8)    Drains 220 460    Blood 350     Chest Tube 160 110    Total Output 3895 2875    Net +2926.8 +2457.1                   Intake/Output Summary (Last 24 hours) at 2023 0551  Last data filed at 2023 0500  Gross per 24 hour   Intake 5332.1 ml   Output 2875 ml   Net 2457.1 ml           Titus: Yes     Plan:   I's and O's  Lactic normalized  CTM BUN/Cr/UOP     FEN/GI  Recent Labs     06/09/23  0513 06/09/23  1756 06/10/23  0143 06/11/23  0138    143 145 144   K 3.5 4.4 4.0 3.7   CO2 24 23 24 23   CALCIUM 8.4 7.6* 7.2* 7.4*   MG 1.90  --  2.10 2.10   PHOS 3.9  --  4.1 3.2   ALBUMIN 2.2*  --  2.0* 1.8*   BILITOT 2.3*  --  2.2* 3.9*   AST 19  --  45* 53*   ALKPHOS 69  --  63 62   ALT 14  --  18 22         Diet: NPO    Last BM:  Prior to arrival    Abdominal Exam:  Soft, nondistended  Abdominal Dressing: None    Plan:   Replace electrolytes based on daily labs     Heme/Onc  Recent Labs     23  0627 06/09/23  0513 06/09/23  1756 06/10/23  0143 06/11/23  0138   HGB  --  9.9* 9.2* 8.2* 7.5*   HCT  --  29.4* 27.1* 25.0* 22.9*   PLT  --  144 169 160 183   PTT 30.8  --   --   --   --    INR 1.31*  --   --   --   --          Transfusions (over past 24h):  2 units of PRBC    Plan:   H&H stable   Transfuse greater than 7 hemoglobin or symptomatic       ID  Temp  Av °F (37.8 °C)  Min: 99 °F (37.2 °C)  Max: 101.4 °F (38.6 °C)      Recent Labs     23  0513 23  1756 06/10/23  0143 23  0138   WBC 7.62 8.36 7.02 7.97         Cultures:  None  new  Antibiotics:   Ancef 6/5    Plan:   WBC WNL  On Zosyn x 4 days  Febrile x 1 this morning to 100.8, CTM     Endocrine  Recent Labs     06/09/23  0513 06/09/23  1756 06/10/23  0143 06/11/23  0138   GLUCOSE 110* 116* 112* 117*        No results for input(s): POCTGLUCOSE in the last 72 hours.     Insulin treatment: no medications    Plan:   BG <180     Musculoskeletal/Wounds  Weight bearing status:   RUE: NWB  LUE: WBAT  RLE: NWB  LLE: NWB    [x] Tertiary exam performed    Extremity/wound exam:     Plan:   Surgical planning for R distal radius fracture and pelvic fracture     Precautions  Fall, Spinal, and Standard     Prophylaxis  Seizure: Not indicated.  DVT: lovenox 40 BID, last dose 6/7 at 2100 in anticipation of Neurosurgery, will discuss when this can be resumed  GI: H2B     Lines/drains/airway [x] LDA reviewed/updated   PIV  OGT  ETT  L chest tube  Titus  PIV  CVC  A-line    Plan:  Maintain peripheral access  Maintain ETT/OGT  Left chest tube to waterseal     Restraints  Face to face evaluation of need for restraints on rounds today:   Currently restrained? no     Disposition  Continue ongoing ICU level care.      Elva Haley MD  [employer] General Surgery PGY4

## 2023-06-11 NOTE — PROGRESS NOTES
[facility] ICU  Pulmonary Critical Care Note    Patient Name: Gera Chavez  MRN: [id number]  Admission Date: 6/4/2023  Hospital Length of Stay: 7 days  Code Status: Full Code  Attending Provider: Salvador Sauer MD  Primary Care Provider: Primary Doctor No     Subjective:     HPI:   Gera Chavez is a 37 y.o. male with no known PMH, who was transferred from [facility] for level 1 trauma activation, after patient reportedly jumped off a bridge.  Sustained rib fractures x4 left-sided, pelvic ring fracture, acetabulum fracture, L2 burst fracture, and left pneumothorax with chest tube in place.  Jump witnessed by bystander who called 911.     Hospital Course/Significant events:  6/4/2023 - Admitted to ICU   6/5/2023 - s/p bronchoscopy    24 Hour Interval History:  Patient remains intubated mechanically ventilated.  Seroquel was started yesterday as were the remainder of his psychiatric meds.  oxygenation has improved significantly now on 50% FiO2.    Past Medical History:   Diagnosis Date    Asthma     Depression     Diabetes mellitus     Encounter for blood transfusion     Generalized anxiety disorder     Hypertension     Schizophrenia, unspecified     Sleep apnea     Unspecified glaucoma        Past Surgical History:   Procedure Laterality Date    INTRAMEDULLARY RODDING OF FEMUR Left 02/05/2023    Procedure: INSERTION, INTRAMEDULLARY NOEL, FEMUR;  Surgeon: Tuan Zepeda DO;  Location: [facility];  Service: Orthopedics;  Laterality: Left;    OPEN REDUCTION AND INTERNAL FIXATION (ORIF) OF INJURY OF HIP Left 6/5/2023    Procedure: ORIF,PELVIS;  Surgeon: Tuan Zepeda DO;  Location: [facility] OR;  Service: Orthopedics;  Laterality: Left;  supine deven traction LLE CELL SAVER, teo    REMOVAL OF HARDWARE FROM LOWER EXTREMITY Left 5/9/2023    Procedure: REMOVAL, HARDWARE, LOWER EXTREMITY;  Surgeon: Tuan Zepeda DO;  Location: [facility] OR;  Service: Orthopedics;  Laterality: Left;    TRACH  TUBE EXCHANGER  6/5/2023    UVULOPALATOPHARYNGOPLASTY               Current Inpatient Medications   albuterol-ipratropium  3 mL Nebulization Q6H    calcium-vitamin D3  1 tablet Oral BID    famotidine (PF)  20 mg Intravenous BID    FLUoxetine  40 mg Per OG tube Daily    gabapentin  300 mg Per OG tube TID    lamoTRIgine  25 mg Per OG tube Daily    piperacillin-tazobactam (Zosyn) IV (PEDS and ADULTS) (extended infusion is not appropriate)  4.5 g Intravenous Q8H    QUEtiapine  50 mg Per OG tube BID     Current Intravenous Infusions   dexmedeTOMIDine (Precedex) infusion (titrating) 1.4 mcg/kg/hr (06/11/23 0845)    fentanyl 200 mcg/hr (06/11/23 0821)    lactated ringers 125 mL/hr at 06/11/23 0548    NORepinephrine bitartrate-D5W Stopped (06/06/23 1135)    propofoL 50 mcg/kg/min (06/11/23 0618)         Objective:     Intake/Output Summary (Last 24 hours) at 6/11/2023 0946  Last data filed at 6/11/2023 0800  Gross per 24 hour   Intake 5332.1 ml   Output 2905 ml   Net 2427.1 ml       Vital Signs (Most Recent):  Temp: 100.1 °F (37.8 °C) (06/11/23 0800)  Pulse: 87 (06/11/23 0900)  Resp: (!) 28 (06/11/23 0900)  BP: (!) 129/57 (06/11/23 0900)  SpO2: 100 % (06/11/23 0900)  Body mass index is 37.22 kg/m².  Weight: 121 kg (266 lb 12.1 oz) Vital Signs (24h Range):  Temp:  [99 °F (37.2 °C)-100.4 °F (38 °C)] 100.1 °F (37.8 °C)  Pulse:  [83-91] 87  Resp:  [13-28] 28  SpO2:  [93 %-100 %] 100 %  BP: (122-140)/(52-73) 129/57     Physical Exam  General: Intubated and sedated  HEENT: NC/AT; PERRL; nasal and oral mucosa moist and clear; ET tube in place  Pulm: CTA bilaterally, mechanically ventilated  CV: S1, S2 w/o murmurs or gallops; no edema noted  GI: Soft with normal bowel sounds in all quadrants, no masses on palpation  Neuro: Limited d/t sedation    Lines/Drains/Airways       Central Venous Catheter Line  Duration             Percutaneous Central Line Insertion/Assessment - Double Lumen  06/04/23 1530 Subclavian Left 6 days               Drain  Duration                  Chest Tube 06/04/23 1549 Left Midaxillary 28 Fr. 6 days         Urethral Catheter 06/05/23 1610 Silicone 16 Fr. 5 days         NG/OG Tube 06/08/23 0915 [place] sump Right mouth 3 days         Closed/Suction Drain 06/09/23 1537 Midline Back Accordion 10 Fr. 1 day              Airway  Duration                  Airway - Non-Surgical 06/08/23 0855 Endotracheal Tube 3 days              Arterial Line  Duration             Arterial Line 06/09/23 1655 Right Radial 1 day              Peripheral Intravenous Line  Duration                  Peripheral IV - Single Lumen 06/09/23 0829 18 G Left;Posterior Hand 2 days                  Significant Labs:  Lab Results   Component Value Date    WBC 7.97 06/11/2023    HGB 7.5 (L) 06/11/2023    HCT 22.9 (L) 06/11/2023    MCV 86.7 06/11/2023     06/11/2023       BMP  Lab Results   Component Value Date     06/11/2023    K 3.7 06/11/2023    CHLORIDE 111 (H) 06/11/2023    CO2 23 06/11/2023    BUN 9.1 06/11/2023    CREATININE 0.73 06/11/2023    CALCIUM 7.4 (L) 06/11/2023    AGAP 11.0 06/09/2023     ABG  Recent Labs   Lab 06/11/23 0601   PH 7.420   PO2 148.0*   HCO3 26.6*       Mechanical Ventilation Support:  Vent Mode: A/C (06/11/23 0815)  Set Rate: 28 BPM (06/11/23 0815)  Vt Set: 510 mL (06/11/23 0815)  Pressure Support: 12 cmH20 (06/11/23 0527)  PEEP/CPAP: 10 cmH20 (06/11/23 0815)  Oxygen Concentration (%): 60 (06/11/23 0815)  Peak Airway Pressure: 30 cmH20 (06/11/23 0815)  Total Ve: 13.8 L/m (06/11/23 0815)  F/VT Ratio<105 (RSBI): (!) 57.61 (06/11/23 0815)    Significant Imaging:  I have reviewed the pertinent imaging within the past 24 hours.    Assessment/Plan:     Assessment  Pelvic ring fx s/p ORIF on 6/5 POD 1  Left-sided rib fractures with pneumo/hemo thorax s/p chest tube placement  L2 compression fracture   Right distal radius fracture  Acute hypoxic respiratory failure    Plan  Respiratory status is improving significantly.   Continue weaning FiO2 as tolerated.  Continue to monitor for air leak from chest tube, none appreciated on my visit today.    DVT Prophylaxis: SCD  GI Prophylaxis: Famotidine     32 minutes of critical care was time spent personally by me on the following activities: development of treatment plan with patient or surrogate and bedside caregivers, discussions with consultants, evaluation of patient's response to treatment, examination of patient, ordering and performing treatments and interventions, ordering and review of laboratory studies, ordering and review of radiographic studies, pulse oximetry, re-evaluation of patient's condition.  This critical care time did not overlap with that of any other provider or involve time for any procedures.     Israel Conway MD  Pulmonary Critical Care Medicine  [facility] ICU

## 2023-06-11 NOTE — NURSING
Nurses Note -- 4 Eyes      6/11/2023   4:38 PM      Skin assessed during: Daily Assessment      [] No Altered Skin Integrity Present    [x]Prevention Measures Documented      [x] Yes- Altered Skin Integrity Present or Discovered   [] LDA Added if Not in Epic (Describe Wound)   [] New Altered Skin Integrity was Present on Admit and Documented in LDA   [] Wound Image Taken    Wound Care Consulted? No    Attending Nurse:  Altagracia Moreno RN     Second RN/Staff Member:  Martha RG RN

## 2023-06-12 ENCOUNTER — ANESTHESIA (OUTPATIENT)
Dept: SURGERY | Facility: HOSPITAL | Age: 38
DRG: 956 | End: 2023-06-12
Payer: MEDICARE

## 2023-06-12 ENCOUNTER — ANESTHESIA EVENT (OUTPATIENT)
Dept: SURGERY | Facility: HOSPITAL | Age: 38
DRG: 956 | End: 2023-06-12
Payer: MEDICARE

## 2023-06-12 LAB
ABO + RH BLD: NORMAL
ABO + RH BLD: NORMAL
ALBUMIN SERPL-MCNC: 1.8 G/DL (ref 3.5–5)
ALBUMIN/GLOB SERPL: 0.7 RATIO (ref 1.1–2)
ALLENS TEST BLOOD GAS (OHS): ABNORMAL
ALP SERPL-CCNC: 67 UNIT/L (ref 40–150)
ALT SERPL-CCNC: 20 UNIT/L (ref 0–55)
ANION GAP SERPL CALC-SCNC: 10 MEQ/L
ANION GAP SERPL CALC-SCNC: 11 MEQ/L
AST SERPL-CCNC: 42 UNIT/L (ref 5–34)
BASE EXCESS BLD CALC-SCNC: 2.5 MMOL/L
BASOPHILS # BLD AUTO: 0.01 X10(3)/MCL
BASOPHILS # BLD AUTO: 0.01 X10(3)/MCL
BASOPHILS # BLD AUTO: 0.02 X10(3)/MCL
BASOPHILS NFR BLD AUTO: 0.1 %
BASOPHILS NFR BLD AUTO: 0.1 %
BASOPHILS NFR BLD AUTO: 0.3 %
BILIRUBIN DIRECT+TOT PNL SERPL-MCNC: 5.8 MG/DL
BLD PROD TYP BPU: NORMAL
BLD PROD TYP BPU: NORMAL
BLOOD GAS SAMPLE TYPE (OHS): ABNORMAL
BLOOD UNIT EXPIRATION DATE: NORMAL
BLOOD UNIT EXPIRATION DATE: NORMAL
BLOOD UNIT TYPE CODE: 7300
BLOOD UNIT TYPE CODE: 7300
BUN SERPL-MCNC: 13.1 MG/DL (ref 8.9–20.6)
BUN SERPL-MCNC: 15.1 MG/DL (ref 8.9–20.6)
BUN SERPL-MCNC: 15.2 MG/DL (ref 8.9–20.6)
CA-I BLD-SCNC: 1.15 MMOL/L (ref 1.12–1.23)
CALCIUM SERPL-MCNC: 7.8 MG/DL (ref 8.4–10.2)
CALCIUM SERPL-MCNC: 8 MG/DL (ref 8.4–10.2)
CALCIUM SERPL-MCNC: 8.3 MG/DL (ref 8.4–10.2)
CHLORIDE SERPL-SCNC: 110 MMOL/L (ref 98–107)
CO2 BLDA-SCNC: 27.4 MMOL/L
CO2 SERPL-SCNC: 23 MMOL/L (ref 22–29)
CO2 SERPL-SCNC: 24 MMOL/L (ref 22–29)
CO2 SERPL-SCNC: 24 MMOL/L (ref 22–29)
CREAT SERPL-MCNC: 0.77 MG/DL (ref 0.73–1.18)
CREAT SERPL-MCNC: 0.84 MG/DL (ref 0.73–1.18)
CREAT SERPL-MCNC: 0.84 MG/DL (ref 0.73–1.18)
CREAT/UREA NIT SERPL: 18
CREAT/UREA NIT SERPL: 18
CROSSMATCH INTERPRETATION: NORMAL
CROSSMATCH INTERPRETATION: NORMAL
CRP SERPL-MCNC: 375.9 MG/L
DISPENSE STATUS: NORMAL
DISPENSE STATUS: NORMAL
DRAWN BY BLOOD GAS (OHS): ABNORMAL
EOSINOPHIL # BLD AUTO: 0.12 X10(3)/MCL (ref 0–0.9)
EOSINOPHIL # BLD AUTO: 0.12 X10(3)/MCL (ref 0–0.9)
EOSINOPHIL # BLD AUTO: 0.2 X10(3)/MCL (ref 0–0.9)
EOSINOPHIL NFR BLD AUTO: 1.6 %
EOSINOPHIL NFR BLD AUTO: 1.7 %
EOSINOPHIL NFR BLD AUTO: 2.6 %
ERYTHROCYTE [DISTWIDTH] IN BLOOD BY AUTOMATED COUNT: 15.8 % (ref 11.5–17)
ERYTHROCYTE [DISTWIDTH] IN BLOOD BY AUTOMATED COUNT: 16 % (ref 11.5–17)
ERYTHROCYTE [DISTWIDTH] IN BLOOD BY AUTOMATED COUNT: 16.1 % (ref 11.5–17)
FIO2 BLOOD GAS (OHS): 40 %
GFR SERPLBLD CREATININE-BSD FMLA CKD-EPI: >60 MLS/MIN/1.73/M2
GLOBULIN SER-MCNC: 2.7 GM/DL (ref 2.4–3.5)
GLUCOSE SERPL-MCNC: 104 MG/DL (ref 74–100)
GLUCOSE SERPL-MCNC: 107 MG/DL (ref 74–100)
GLUCOSE SERPL-MCNC: 111 MG/DL (ref 74–100)
HCO3 BLDA-SCNC: 26.3 MMOL/L
HCT VFR BLD AUTO: 24 % (ref 42–52)
HCT VFR BLD AUTO: 24.5 % (ref 42–52)
HCT VFR BLD AUTO: 25.5 % (ref 42–52)
HGB BLD-MCNC: 8 G/DL (ref 14–18)
HGB BLD-MCNC: 8.1 G/DL (ref 14–18)
HGB BLD-MCNC: 8.3 G/DL (ref 14–18)
IMM GRANULOCYTES # BLD AUTO: 0.09 X10(3)/MCL (ref 0–0.04)
IMM GRANULOCYTES # BLD AUTO: 0.1 X10(3)/MCL (ref 0–0.04)
IMM GRANULOCYTES # BLD AUTO: 0.12 X10(3)/MCL (ref 0–0.04)
IMM GRANULOCYTES NFR BLD AUTO: 1.3 %
IMM GRANULOCYTES NFR BLD AUTO: 1.3 %
IMM GRANULOCYTES NFR BLD AUTO: 1.6 %
LYMPHOCYTES # BLD AUTO: 0.82 X10(3)/MCL (ref 0.6–4.6)
LYMPHOCYTES # BLD AUTO: 0.9 X10(3)/MCL (ref 0.6–4.6)
LYMPHOCYTES # BLD AUTO: 0.93 X10(3)/MCL (ref 0.6–4.6)
LYMPHOCYTES NFR BLD AUTO: 10.5 %
LYMPHOCYTES NFR BLD AUTO: 12.2 %
LYMPHOCYTES NFR BLD AUTO: 13.2 %
MAGNESIUM SERPL-MCNC: 2.1 MG/DL (ref 1.6–2.6)
MCH RBC QN AUTO: 28.8 PG (ref 27–31)
MCH RBC QN AUTO: 28.8 PG (ref 27–31)
MCH RBC QN AUTO: 29.3 PG (ref 27–31)
MCHC RBC AUTO-ENTMCNC: 32.5 G/DL (ref 33–36)
MCHC RBC AUTO-ENTMCNC: 32.7 G/DL (ref 33–36)
MCHC RBC AUTO-ENTMCNC: 33.8 G/DL (ref 33–36)
MCV RBC AUTO: 87 FL (ref 80–94)
MCV RBC AUTO: 88.1 FL (ref 80–94)
MCV RBC AUTO: 88.5 FL (ref 80–94)
MECH RR (OHS): 28 B/MIN
MECH VT (OHS): 510 ML
MODE (OHS): AC
MONOCYTES # BLD AUTO: 0.49 X10(3)/MCL (ref 0.1–1.3)
MONOCYTES # BLD AUTO: 0.54 X10(3)/MCL (ref 0.1–1.3)
MONOCYTES # BLD AUTO: 0.55 X10(3)/MCL (ref 0.1–1.3)
MONOCYTES NFR BLD AUTO: 6.9 %
MONOCYTES NFR BLD AUTO: 7 %
MONOCYTES NFR BLD AUTO: 7.3 %
NEUTROPHILS # BLD AUTO: 5.42 X10(3)/MCL (ref 2.1–9.2)
NEUTROPHILS # BLD AUTO: 5.7 X10(3)/MCL (ref 2.1–9.2)
NEUTROPHILS # BLD AUTO: 6.14 X10(3)/MCL (ref 2.1–9.2)
NEUTROPHILS NFR BLD AUTO: 76.8 %
NEUTROPHILS NFR BLD AUTO: 77.2 %
NEUTROPHILS NFR BLD AUTO: 78.3 %
NRBC BLD AUTO-RTO: 0 %
NRBC BLD AUTO-RTO: 0.3 %
NRBC BLD AUTO-RTO: 0.3 %
OXYGEN DEVICE BLOOD GAS (OHS): ABNORMAL
PCO2 BLDA: 37 MMHG (ref 35–45)
PEEP (OHS): 10 CMH2O
PH BLDA: 7.46 [PH] (ref 7.35–7.45)
PHOSPHATE SERPL-MCNC: 4.1 MG/DL (ref 2.3–4.7)
PLATELET # BLD AUTO: 221 X10(3)/MCL (ref 130–400)
PLATELET # BLD AUTO: 262 X10(3)/MCL (ref 130–400)
PLATELET # BLD AUTO: 277 X10(3)/MCL (ref 130–400)
PMV BLD AUTO: 10.6 FL (ref 7.4–10.4)
PMV BLD AUTO: 10.9 FL (ref 7.4–10.4)
PMV BLD AUTO: 10.9 FL (ref 7.4–10.4)
PO2 BLDA: 77 MMHG (ref 80–100)
POTASSIUM BLOOD GAS (OHS): 3.5 MMOL/L (ref 3.5–5)
POTASSIUM SERPL-SCNC: 3.6 MMOL/L (ref 3.5–5.1)
POTASSIUM SERPL-SCNC: 3.8 MMOL/L (ref 3.5–5.1)
POTASSIUM SERPL-SCNC: 3.9 MMOL/L (ref 3.5–5.1)
PREALB SERPL-MCNC: 9.8 MG/DL (ref 18–45)
PROT SERPL-MCNC: 4.5 GM/DL (ref 6.4–8.3)
RBC # BLD AUTO: 2.76 X10(6)/MCL (ref 4.7–6.1)
RBC # BLD AUTO: 2.78 X10(6)/MCL (ref 4.7–6.1)
RBC # BLD AUTO: 2.88 X10(6)/MCL (ref 4.7–6.1)
RBCS: NORMAL
SAMPLE SITE BLOOD GAS (OHS): ABNORMAL
SAO2 % BLDA: 96 %
SODIUM BLOOD GAS (OHS): 142 MMOL/L (ref 137–145)
SODIUM SERPL-SCNC: 142 MMOL/L (ref 136–145)
SODIUM SERPL-SCNC: 144 MMOL/L (ref 136–145)
SODIUM SERPL-SCNC: 144 MMOL/L (ref 136–145)
UNIT NUMBER: NORMAL
UNIT NUMBER: NORMAL
WBC # SPEC AUTO: 7.06 X10(3)/MCL (ref 4.5–11.5)
WBC # SPEC AUTO: 7.39 X10(3)/MCL (ref 4.5–11.5)
WBC # SPEC AUTO: 7.83 X10(3)/MCL (ref 4.5–11.5)

## 2023-06-12 PROCEDURE — 63600175 PHARM REV CODE 636 W HCPCS: Performed by: NURSE ANESTHETIST, CERTIFIED REGISTERED

## 2023-06-12 PROCEDURE — C1713 ANCHOR/SCREW BN/BN,TIS/BN: HCPCS | Performed by: ORTHOPAEDIC SURGERY

## 2023-06-12 PROCEDURE — 99900035 HC TECH TIME PER 15 MIN (STAT)

## 2023-06-12 PROCEDURE — 80053 COMPREHEN METABOLIC PANEL: CPT | Performed by: STUDENT IN AN ORGANIZED HEALTH CARE EDUCATION/TRAINING PROGRAM

## 2023-06-12 PROCEDURE — 36000711: Performed by: ORTHOPAEDIC SURGERY

## 2023-06-12 PROCEDURE — 25000003 PHARM REV CODE 250: Performed by: STUDENT IN AN ORGANIZED HEALTH CARE EDUCATION/TRAINING PROGRAM

## 2023-06-12 PROCEDURE — 85025 COMPLETE CBC W/AUTO DIFF WBC: CPT | Performed by: STUDENT IN AN ORGANIZED HEALTH CARE EDUCATION/TRAINING PROGRAM

## 2023-06-12 PROCEDURE — 63600175 PHARM REV CODE 636 W HCPCS: Performed by: SURGERY

## 2023-06-12 PROCEDURE — 25000003 PHARM REV CODE 250: Performed by: INTERNAL MEDICINE

## 2023-06-12 PROCEDURE — 37000009 HC ANESTHESIA EA ADD 15 MINS: Performed by: ORTHOPAEDIC SURGERY

## 2023-06-12 PROCEDURE — 25609 OPTX DST RD XART FX/EP SEP3+: CPT | Mod: AS,79,RT, | Performed by: NURSE PRACTITIONER

## 2023-06-12 PROCEDURE — 20800000 HC ICU TRAUMA

## 2023-06-12 PROCEDURE — 85025 COMPLETE CBC W/AUTO DIFF WBC: CPT

## 2023-06-12 PROCEDURE — 37799 UNLISTED PX VASCULAR SURGERY: CPT

## 2023-06-12 PROCEDURE — 83735 ASSAY OF MAGNESIUM: CPT | Performed by: STUDENT IN AN ORGANIZED HEALTH CARE EDUCATION/TRAINING PROGRAM

## 2023-06-12 PROCEDURE — 99024 POSTOP FOLLOW-UP VISIT: CPT | Mod: ,,, | Performed by: NEUROLOGICAL SURGERY

## 2023-06-12 PROCEDURE — 99900026 HC AIRWAY MAINTENANCE (STAT)

## 2023-06-12 PROCEDURE — 86140 C-REACTIVE PROTEIN: CPT | Performed by: STUDENT IN AN ORGANIZED HEALTH CARE EDUCATION/TRAINING PROGRAM

## 2023-06-12 PROCEDURE — 25000003 PHARM REV CODE 250: Performed by: SURGERY

## 2023-06-12 PROCEDURE — 94761 N-INVAS EAR/PLS OXIMETRY MLT: CPT

## 2023-06-12 PROCEDURE — 25609 PR OPEN RX DISTAL RADIUS FX, INTRA-ARTICULAR, 3+ FRAG: ICD-10-PCS | Mod: 79,RT,, | Performed by: ORTHOPAEDIC SURGERY

## 2023-06-12 PROCEDURE — D9220A PRA ANESTHESIA: ICD-10-PCS | Mod: CRNA,,, | Performed by: NURSE ANESTHETIST, CERTIFIED REGISTERED

## 2023-06-12 PROCEDURE — 99024 PR POST-OP FOLLOW-UP VISIT: ICD-10-PCS | Mod: ,,, | Performed by: NEUROLOGICAL SURGERY

## 2023-06-12 PROCEDURE — 36000710: Performed by: ORTHOPAEDIC SURGERY

## 2023-06-12 PROCEDURE — 85025 COMPLETE CBC W/AUTO DIFF WBC: CPT | Performed by: SURGERY

## 2023-06-12 PROCEDURE — 63600175 PHARM REV CODE 636 W HCPCS

## 2023-06-12 PROCEDURE — D9220A PRA ANESTHESIA: Mod: ANES,,, | Performed by: ANESTHESIOLOGY

## 2023-06-12 PROCEDURE — 84134 ASSAY OF PREALBUMIN: CPT | Performed by: STUDENT IN AN ORGANIZED HEALTH CARE EDUCATION/TRAINING PROGRAM

## 2023-06-12 PROCEDURE — 94003 VENT MGMT INPAT SUBQ DAY: CPT

## 2023-06-12 PROCEDURE — 27201423 OPTIME MED/SURG SUP & DEVICES STERILE SUPPLY: Performed by: ORTHOPAEDIC SURGERY

## 2023-06-12 PROCEDURE — 63600175 PHARM REV CODE 636 W HCPCS: Performed by: NURSE PRACTITIONER

## 2023-06-12 PROCEDURE — D9220A PRA ANESTHESIA: ICD-10-PCS | Mod: ANES,,, | Performed by: ANESTHESIOLOGY

## 2023-06-12 PROCEDURE — 27000221 HC OXYGEN, UP TO 24 HOURS

## 2023-06-12 PROCEDURE — 94640 AIRWAY INHALATION TREATMENT: CPT

## 2023-06-12 PROCEDURE — 25000003 PHARM REV CODE 250: Performed by: NEUROLOGICAL SURGERY

## 2023-06-12 PROCEDURE — 82803 BLOOD GASES ANY COMBINATION: CPT

## 2023-06-12 PROCEDURE — 25609 OPTX DST RD XART FX/EP SEP3+: CPT | Mod: 79,RT,, | Performed by: ORTHOPAEDIC SURGERY

## 2023-06-12 PROCEDURE — 25000003 PHARM REV CODE 250: Performed by: NURSE ANESTHETIST, CERTIFIED REGISTERED

## 2023-06-12 PROCEDURE — 25609 PR OPEN RX DISTAL RADIUS FX, INTRA-ARTICULAR, 3+ FRAG: ICD-10-PCS | Mod: AS,79,RT, | Performed by: NURSE PRACTITIONER

## 2023-06-12 PROCEDURE — 25000003 PHARM REV CODE 250

## 2023-06-12 PROCEDURE — 20000000 HC ICU ROOM

## 2023-06-12 PROCEDURE — 37000008 HC ANESTHESIA 1ST 15 MINUTES: Performed by: ORTHOPAEDIC SURGERY

## 2023-06-12 PROCEDURE — 84100 ASSAY OF PHOSPHORUS: CPT | Performed by: STUDENT IN AN ORGANIZED HEALTH CARE EDUCATION/TRAINING PROGRAM

## 2023-06-12 PROCEDURE — 25000242 PHARM REV CODE 250 ALT 637 W/ HCPCS: Performed by: HOSPITALIST

## 2023-06-12 PROCEDURE — 27200966 HC CLOSED SUCTION SYSTEM

## 2023-06-12 PROCEDURE — D9220A PRA ANESTHESIA: Mod: CRNA,,, | Performed by: NURSE ANESTHETIST, CERTIFIED REGISTERED

## 2023-06-12 DEVICE — SCREW BONE COMPR 12X3MM: Type: IMPLANTABLE DEVICE | Site: RADIUS | Status: FUNCTIONAL

## 2023-06-12 DEVICE — PLATE GEMINUS DORSAL SPANNING: Type: IMPLANTABLE DEVICE | Site: RADIUS | Status: FUNCTIONAL

## 2023-06-12 DEVICE — SCREW BONE COMPR 10X3MM: Type: IMPLANTABLE DEVICE | Site: RADIUS | Status: FUNCTIONAL

## 2023-06-12 RX ORDER — FLUOXETINE HYDROCHLORIDE 20 MG/1
40 CAPSULE ORAL DAILY
Status: DISCONTINUED | OUTPATIENT
Start: 2023-06-13 | End: 2023-06-18

## 2023-06-12 RX ORDER — CEFAZOLIN SODIUM 1 G/3ML
INJECTION, POWDER, FOR SOLUTION INTRAMUSCULAR; INTRAVENOUS
Status: DISCONTINUED | OUTPATIENT
Start: 2023-06-12 | End: 2023-06-12

## 2023-06-12 RX ORDER — FERROUS SULFATE, DRIED 160(50) MG
1 TABLET, EXTENDED RELEASE ORAL 2 TIMES DAILY
Status: DISCONTINUED | OUTPATIENT
Start: 2023-06-12 | End: 2023-06-21

## 2023-06-12 RX ORDER — CEFAZOLIN SODIUM 2 G/50ML
2 SOLUTION INTRAVENOUS
Status: COMPLETED | OUTPATIENT
Start: 2023-06-12 | End: 2023-06-13

## 2023-06-12 RX ORDER — ROCURONIUM BROMIDE 10 MG/ML
INJECTION, SOLUTION INTRAVENOUS
Status: DISCONTINUED | OUTPATIENT
Start: 2023-06-12 | End: 2023-06-12

## 2023-06-12 RX ORDER — FENTANYL CITRATE 50 UG/ML
INJECTION, SOLUTION INTRAMUSCULAR; INTRAVENOUS
Status: DISCONTINUED | OUTPATIENT
Start: 2023-06-12 | End: 2023-06-12

## 2023-06-12 RX ORDER — QUETIAPINE FUMARATE 25 MG/1
50 TABLET, FILM COATED ORAL 2 TIMES DAILY
Status: DISCONTINUED | OUTPATIENT
Start: 2023-06-12 | End: 2023-06-18

## 2023-06-12 RX ORDER — LAMOTRIGINE 25 MG/1
25 TABLET ORAL DAILY
Status: DISCONTINUED | OUTPATIENT
Start: 2023-06-13 | End: 2023-06-18

## 2023-06-12 RX ORDER — HYDROMORPHONE HYDROCHLORIDE 2 MG/ML
INJECTION, SOLUTION INTRAMUSCULAR; INTRAVENOUS; SUBCUTANEOUS
Status: DISCONTINUED | OUTPATIENT
Start: 2023-06-12 | End: 2023-06-12

## 2023-06-12 RX ORDER — GABAPENTIN 300 MG/1
300 CAPSULE ORAL 3 TIMES DAILY
Status: DISCONTINUED | OUTPATIENT
Start: 2023-06-12 | End: 2023-06-21

## 2023-06-12 RX ORDER — ACETAMINOPHEN 650 MG/20.3ML
650 LIQUID ORAL EVERY 4 HOURS PRN
Status: DISCONTINUED | OUTPATIENT
Start: 2023-06-12 | End: 2023-06-21

## 2023-06-12 RX ORDER — MIDAZOLAM HYDROCHLORIDE 1 MG/ML
INJECTION INTRAMUSCULAR; INTRAVENOUS
Status: DISCONTINUED | OUTPATIENT
Start: 2023-06-12 | End: 2023-06-12

## 2023-06-12 RX ORDER — OXYCODONE HYDROCHLORIDE 10 MG/1
10 TABLET ORAL EVERY 6 HOURS PRN
Status: DISCONTINUED | OUTPATIENT
Start: 2023-06-12 | End: 2023-06-21

## 2023-06-12 RX ORDER — OXYCODONE HYDROCHLORIDE 5 MG/1
5 TABLET ORAL EVERY 6 HOURS PRN
Status: DISCONTINUED | OUTPATIENT
Start: 2023-06-12 | End: 2023-06-21

## 2023-06-12 RX ORDER — PROPOFOL 10 MG/ML
VIAL (ML) INTRAVENOUS
Status: DISCONTINUED | OUTPATIENT
Start: 2023-06-12 | End: 2023-06-12

## 2023-06-12 RX ADMIN — FAMOTIDINE 20 MG: 10 INJECTION, SOLUTION INTRAVENOUS at 08:06

## 2023-06-12 RX ADMIN — HYDROMORPHONE HYDROCHLORIDE 1 MG: 2 INJECTION, SOLUTION INTRAMUSCULAR; INTRAVENOUS; SUBCUTANEOUS at 11:06

## 2023-06-12 RX ADMIN — PROPOFOL 50 MCG/KG/MIN: 10 INJECTION, EMULSION INTRAVENOUS at 02:06

## 2023-06-12 RX ADMIN — SODIUM CHLORIDE, POTASSIUM CHLORIDE, SODIUM LACTATE AND CALCIUM CHLORIDE: 600; 310; 30; 20 INJECTION, SOLUTION INTRAVENOUS at 01:06

## 2023-06-12 RX ADMIN — IPRATROPIUM BROMIDE AND ALBUTEROL SULFATE 3 ML: 2.5; .5 SOLUTION RESPIRATORY (INHALATION) at 08:06

## 2023-06-12 RX ADMIN — MIDAZOLAM HYDROCHLORIDE 2 MG: 5 INJECTION, SOLUTION INTRAMUSCULAR; INTRAVENOUS at 08:06

## 2023-06-12 RX ADMIN — DEXMEDETOMIDINE HYDROCHLORIDE 1.4 MCG/KG/HR: 400 INJECTION INTRAVENOUS at 05:06

## 2023-06-12 RX ADMIN — Medication 250 MCG/HR: at 11:06

## 2023-06-12 RX ADMIN — DEXMEDETOMIDINE HYDROCHLORIDE 1.4 MCG/KG/HR: 400 INJECTION INTRAVENOUS at 03:06

## 2023-06-12 RX ADMIN — DEXMEDETOMIDINE HYDROCHLORIDE 1.4 MCG/KG/HR: 400 INJECTION INTRAVENOUS at 01:06

## 2023-06-12 RX ADMIN — Medication 1 TABLET: at 08:06

## 2023-06-12 RX ADMIN — SODIUM CHLORIDE, POTASSIUM CHLORIDE, SODIUM LACTATE AND CALCIUM CHLORIDE 1000 ML: 600; 310; 30; 20 INJECTION, SOLUTION INTRAVENOUS at 11:06

## 2023-06-12 RX ADMIN — ACETAMINOPHEN 650 MG: 650 SOLUTION ORAL at 08:06

## 2023-06-12 RX ADMIN — LAMOTRIGINE 25 MG: 25 TABLET ORAL at 08:06

## 2023-06-12 RX ADMIN — PROPOFOL 50 MCG/KG/MIN: 10 INJECTION, EMULSION INTRAVENOUS at 05:06

## 2023-06-12 RX ADMIN — IPRATROPIUM BROMIDE AND ALBUTEROL SULFATE 3 ML: 2.5; .5 SOLUTION RESPIRATORY (INHALATION) at 01:06

## 2023-06-12 RX ADMIN — IPRATROPIUM BROMIDE AND ALBUTEROL SULFATE 3 ML: 2.5; .5 SOLUTION RESPIRATORY (INHALATION) at 11:06

## 2023-06-12 RX ADMIN — Medication 250 MCG/HR: at 02:06

## 2023-06-12 RX ADMIN — FENTANYL CITRATE 100 MCG: 50 INJECTION, SOLUTION INTRAMUSCULAR; INTRAVENOUS at 09:06

## 2023-06-12 RX ADMIN — PROPOFOL 50 MCG/KG/MIN: 10 INJECTION, EMULSION INTRAVENOUS at 08:06

## 2023-06-12 RX ADMIN — GABAPENTIN 300 MG: 300 CAPSULE ORAL at 08:06

## 2023-06-12 RX ADMIN — ROCURONIUM BROMIDE 25 MG: 10 SOLUTION INTRAVENOUS at 10:06

## 2023-06-12 RX ADMIN — FLUOXETINE 40 MG: 20 CAPSULE ORAL at 08:06

## 2023-06-12 RX ADMIN — DEXMEDETOMIDINE HYDROCHLORIDE 1.4 MCG/KG/HR: 400 INJECTION INTRAVENOUS at 08:06

## 2023-06-12 RX ADMIN — ROCURONIUM BROMIDE 50 MG: 10 SOLUTION INTRAVENOUS at 09:06

## 2023-06-12 RX ADMIN — GABAPENTIN 300 MG: 300 CAPSULE ORAL at 02:06

## 2023-06-12 RX ADMIN — QUETIAPINE FUMARATE 50 MG: 25 TABLET ORAL at 08:06

## 2023-06-12 RX ADMIN — DEXMEDETOMIDINE HYDROCHLORIDE 1.4 MCG/KG/HR: 400 INJECTION INTRAVENOUS at 10:06

## 2023-06-12 RX ADMIN — PROPOFOL 50 MCG/KG/MIN: 10 INJECTION, EMULSION INTRAVENOUS at 10:06

## 2023-06-12 RX ADMIN — MIDAZOLAM HYDROCHLORIDE 2 MG: 5 INJECTION, SOLUTION INTRAMUSCULAR; INTRAVENOUS at 10:06

## 2023-06-12 RX ADMIN — MIDAZOLAM HYDROCHLORIDE 2 MG: 1 INJECTION, SOLUTION INTRAMUSCULAR; INTRAVENOUS at 09:06

## 2023-06-12 RX ADMIN — CEFAZOLIN SODIUM 2 G: 2 SOLUTION INTRAVENOUS at 05:06

## 2023-06-12 RX ADMIN — PROPOFOL 50 MCG/KG/MIN: 10 INJECTION, EMULSION INTRAVENOUS at 11:06

## 2023-06-12 RX ADMIN — Medication 250 MCG/HR: at 03:06

## 2023-06-12 RX ADMIN — DEXMEDETOMIDINE HYDROCHLORIDE 1.4 MCG/KG/HR: 400 INJECTION INTRAVENOUS at 09:06

## 2023-06-12 RX ADMIN — CEFAZOLIN 2 G: 330 INJECTION, POWDER, FOR SOLUTION INTRAMUSCULAR; INTRAVENOUS at 10:06

## 2023-06-12 RX ADMIN — SODIUM CHLORIDE, SODIUM GLUCONATE, SODIUM ACETATE, POTASSIUM CHLORIDE AND MAGNESIUM CHLORIDE: 526; 502; 368; 37; 30 INJECTION, SOLUTION INTRAVENOUS at 09:06

## 2023-06-12 RX ADMIN — DEXMEDETOMIDINE HYDROCHLORIDE 1.4 MCG/KG/HR: 400 INJECTION INTRAVENOUS at 06:06

## 2023-06-12 RX ADMIN — IPRATROPIUM BROMIDE AND ALBUTEROL SULFATE 3 ML: 2.5; .5 SOLUTION RESPIRATORY (INHALATION) at 12:06

## 2023-06-12 RX ADMIN — PROPOFOL 50 MCG/KG/MIN: 10 INJECTION, EMULSION INTRAVENOUS at 03:06

## 2023-06-12 RX ADMIN — HYDROMORPHONE HYDROCHLORIDE 1 MG: 2 INJECTION, SOLUTION INTRAMUSCULAR; INTRAVENOUS; SUBCUTANEOUS at 10:06

## 2023-06-12 RX ADMIN — MIDAZOLAM HYDROCHLORIDE 2 MG: 5 INJECTION, SOLUTION INTRAMUSCULAR; INTRAVENOUS at 03:06

## 2023-06-12 RX ADMIN — PROPOFOL 50 MCG/KG/MIN: 10 INJECTION, EMULSION INTRAVENOUS at 01:06

## 2023-06-12 RX ADMIN — PROPOFOL 50 MG: 10 INJECTION, EMULSION INTRAVENOUS at 09:06

## 2023-06-12 NOTE — PROGRESS NOTES
[facility] ICU PROGRESS NOTE    HD# 8  Admission Summary:   In brief, Gera Chavez is a 37 y.o. male admitted on 6/4/2023 following fall from bridge at 25 ft.  He reportedly jumped off a bridge and landed on dry land.  He arrived intubated with a left-sided chest tube.  He was found to have a pelvic ring fracture including open book fracture, L2 burst fracture, left pneumothorax, multiple left-sided rib fractures.    Interval history:    NAEON  AFVSS  OR 6/12 with orthopedic surgery     Consults:   Neurosurgery  Orthopedic surgery Injuries:  L pelvic rim fx w/ open book pelvis  L pelvic wall hematoma  L2 burst fx  L apical PTX  L 4-11 rib fx  pulmonary contusion  R distal radius fracture Operations/Procedures:  Left chest tube placement at outside facility 6/4  Pelvic ring fx ORIF 6/5     Past medical history:  1. Asthma   2. Depression  3. Diabetes   4. Generalized anxiety disorder   5. Hypertension  6. Schizophrenia   7. Sleep apnea    Medications: [x] Medications reviewed/updated   Home Meds:    Current Outpatient Medications   Medication Instructions    albuterol (PROVENTIL/VENTOLIN HFA) 90 mcg/actuation inhaler 2 puffs, Inhalation, Every 4 hours PRN    ALBUTEROL INHL 90 mcg, Inhalation, Every 4 hours PRN    amLODIPine (NORVASC) 5 mg, Oral, Daily    amoxicillin-clavulanate 875-125mg (AUGMENTIN) 875-125 mg per tablet 1 tablet, Oral, Every 12 hours (non-standard times)    atorvastatin (LIPITOR) 20 mg, Oral, Daily    azithromycin (Z-CELESTINA) 500 mg, Oral, Daily    clonazePAM (KLONOPIN) 1 mg, Oral, 2 times daily PRN    dextroamphetamine-amphetamine (ADDERALL XR) 25 MG 24 hr capsule 25 mg, Oral, 2 times daily    FLUoxetine 20 MG capsule fluoxetine 20 mg capsule    FLUoxetine 40 mg, Oral, Daily    insulin glargine 100 units/mL SubQ pen Lantus Solostar U-100 Insulin 100 unit/mL (3 mL) subcutaneous pen    ipratropium (ATROVENT) 42 mcg (0.06 %) nasal spray Each Nostril    lamoTRIgine (LAMICTAL) 25 MG tablet lamotrigine 25  "mg tablet    LANTUS SOLOSTAR U-100 INSULIN glargine 100 units/mL SubQ pen Subcutaneous    latanoprost 0.005 % ophthalmic solution latanoprost 0.005 % eye drops    lisinopriL (PRINIVIL,ZESTRIL) 40 mg, Oral    mirtazapine (REMERON) 7.5 MG Tab mirtazapine 7.5 mg tablet    potassium chloride (K-TAB) 20 mEq potassium chloride ER 20 mEq tablet,extended release   TAKE 1 TABLET BY MOUTH EVERY DAY    prochlorperazine (COMPAZINE) 10 MG tablet prochlorperazine maleate 10 mg tablet    QUEtiapine (SEROQUEL) 200 mg, Oral, Nightly    testosterone cypionate (DEPOTESTOTERONE CYPIONATE) 200 mg/mL injection SMARTSIG:Milliliter(s) IM      Scheduled Meds:    albuterol-ipratropium  3 mL Nebulization Q6H    calcium-vitamin D3  1 tablet Oral BID    famotidine (PF)  20 mg Intravenous BID    FLUoxetine  40 mg Per OG tube Daily    gabapentin  300 mg Per OG tube TID    lamoTRIgine  25 mg Per OG tube Daily    QUEtiapine  50 mg Per OG tube BID     Continuous Infusions:    dexmedeTOMIDine (Precedex) infusion (titrating) 1.4 mcg/kg/hr (06/12/23 0338)    fentanyl 250 mcg/hr (06/12/23 0337)    lactated ringers 75 mL/hr at 06/11/23 1912    NORepinephrine bitartrate-D5W Stopped (06/06/23 1135)    propofoL 50 mcg/kg/min (06/12/23 0337)     PRN Meds: sodium chloride, sodium chloride, acetaminophen, fentaNYL, hydrALAZINE, labetaloL, midazolam, oxyCODONE, oxyCODONE     Vitals:  VITAL SIGNS: 24 HR MIN & MAX LAST   Temp  Min: 98.9 °F (37.2 °C)  Max: 100.1 °F (37.8 °C)  100 °F (37.8 °C)   BP  Min: 107/43  Max: 176/90  (!) 147/70    Pulse  Min: 78  Max: 108  91    Resp  Min: 12  Max: 35  (!) 29    SpO2  Min: 93 %  Max: 100 %  97 %      HT: 5' 10.98" (180.3 cm)  WT: 121 kg (266 lb 12.1 oz)  BMI: 37.2   Ideal Body Weight (IBW), Male: 171.88 lb  % Ideal Body Weight, Male (lb): 155.09 %        General  Exam: NAD, sedated and intubated     Neuro/Psych  GCS: 3T on sedation  Exam: limited exam secondary to sedation, reportedly follows commands off sedation  ICP " monitor: No  ICP treatment: ICP Treatment: N/A  C-Collar: Yes    Plan:  Q2 hour neurochecks  Flat bedrest until   Wean sedation  Pain control  HV per NSGY     HEENT  Exam: NC    Plan:   none     Pulmonary  Vitals: Resp  Av  Min: 12  Max: 35  SpO2  Av.3 %  Min: 93 %  Max: 100 %    Ventilator/Oxygen Settings:   Vent Mode: A/C  Vt Set: 510 mL  Set Rate: 28 BPM  Pressure Support: 12 cmH20  I:E Ratio Measured: 1:1.6     PaO2/FiO2 ratio (if ventilated): 70/40  RSBI RR/TV (if ventilated): 28/530  ABG:   Recent Labs   Lab 23  0601   PH 7.420   PO2 148.0*   HCO3 26.6*        CXR:    CXR shows some improvement           Rib fractures: yes  Chest Tube: Left - without air leak    Exam: mechanically ventilated  Incentive Spirometry/RT Treatments: RT    Plan:     Continue L CT to waterseal  Wean to extubate post op  Monitor daily CXR/ABG     Cardiovascular  Vitals: Pulse  Av.8  Min: 78  Max: 108  BP  Min: 107/43  Max: 176/90  Vasoactive Agents: None  Exam: tachy    Echo Findings:   Left Ventricle  The left ventricle is  with normal systolic function. The estimated ejection fraction is 65%. There is normal diastolic function.     Right Ventricle  Normal cavity size.     Left Atrium  The left atrial volume index is normal.     Right Atrium  The right atrium is normal in size.     Aortic Valve  The aortic valve appears structurally normal.     Mitral Valve  The mean pressure gradient across the mitral valve is 3 mmHg at a heart rate of. The mitral valve appears structurally normal. The estimated mitral valve area by pressure half time is 2.78 cm2.     Tricuspid Valve  The tricuspid valve appears structurally normal.     Pulmonic Valve  Pulmonic valve is structurally normal.     Plan:   Continuous cardiac monitoring while in the ICU     Renal  Recent Labs     23  1756 06/10/23  0143 23  0138 23  0150   BUN 12.0 11.0 9.1 13.1   CREATININE 0.83 0.76 0.73 0.77         No results for input(s):  LACTIC in the last 72 hours.      Intake/Output - Last 3 Shifts         06/10 0700   0659  0700   0659    I.V. (mL/kg) 4232.1 (35) 3439.3 (28.4)    Blood  307.5    IV Piggyback 1100 100    Total Intake(mL/kg) 5332.1 (44.1) 3846.8 (31.8)    Urine (mL/kg/hr) 2305 (0.8) 2305 (0.8)    Drains 460 440    Chest Tube 110 150    Total Output 2875 2895    Net +2457.1 +951.8                   Intake/Output Summary (Last 24 hours) at 2023 0531  Last data filed at 2023 0500  Gross per 24 hour   Intake 3846.78 ml   Output 2895 ml   Net 951.78 ml           Titus: Yes     Plan:   I's and O's  Lactic normalized  CTM BUN/Cr/UOP     FEN/GI  Recent Labs     23  1756 06/10/23  0143 23  0138 23  0150    145 144 142   K 4.4 4.0 3.7 3.6   CO2 23 24 23 24   CALCIUM 7.6* 7.2* 7.4* 7.8*   MG  --  2.10 2.10 2.10   PHOS  --  4.1 3.2 4.1   ALBUMIN  --  2.0* 1.8* 1.8*   BILITOT  --  2.2* 3.9* 5.8*   AST  --  45* 53* 42*   ALKPHOS  --  63 62 67   ALT  --  18 22 20         Diet: NPO    Last BM:  Prior to arrival    Abdominal Exam:  Soft, nondistended  Abdominal Dressing: None    Plan:   Replace electrolytes based on daily labs     Heme/Onc  Recent Labs     06/10/23  0143 06/11/23  0138 23  1533 23  0150   HGB 8.2* 7.5* 7.2* 8.1*   HCT 25.0* 22.9* 21.7* 24.0*    183 184 221         Transfusions (over past 24h):  1 units of PRBC    Plan:   H&H stable   Transfuse greater than 7 hemoglobin or symptomatic  1 unit of PRBC given overnight, will obtain post op labs     ID  Temp  Av.7 °F (37.6 °C)  Min: 98.9 °F (37.2 °C)  Max: 100.1 °F (37.8 °C)      Recent Labs     06/10/23  0143 23  0138 23  1533 23  0150   WBC 7.02 7.97 8.32 7.83         Cultures:  None new  Antibiotics:   Ancef 6/5    Plan:   Continue to trend fever curve, WBC WNL  On Zosyn x 4 days     Endocrine  Recent Labs     23  1756 06/10/23  0143 23  0138 23  0150   GLUCOSE 116* 112* 117*  111*        No results for input(s): POCTGLUCOSE in the last 72 hours.     Insulin treatment: no medications    Plan:   BG <180     Musculoskeletal/Wounds  Weight bearing status:   RUE: NWB  LUE: WBAT  RLE: NWB  LLE: NWB    [x] Tertiary exam performed    Extremity/wound exam:     Plan:   Surgical planning for R distal radius fracture and pelvic fracture     Precautions  Fall, Spinal, and Standard  Strict bedrest until 6/14     Prophylaxis  Seizure: Not indicated.  DVT: lovenox 40 BID, last dose 6/7 at 2100 in anticipation of Neurosurgery, will discuss when this can be resumed  GI: H2B     Lines/drains/airway [x] LDA reviewed/updated   PIV  OGT  ETT  L chest tube  Titus  PIV  CVC  A-line    Plan:  Maintain peripheral access  Maintain ETT/OGT  Left chest tube to waterseal     Restraints  Face to face evaluation of need for restraints on rounds today:   Currently restrained? no     Disposition  Continue ongoing ICU level care.        6/12/2023 9:56 AM     The above findings, diagnostics, and treatment plan were discussed with Dr. Luis Bermudez who will follow with further assessments and recommendations. Please call with any questions, concerns, or clinical status changes.

## 2023-06-12 NOTE — PROGRESS NOTES
[facility] - [facility] ICU  Pulmonary Critical Care Note    Patient Name: Gera Chavez  MRN: [id number]  Admission Date: 6/4/2023  Hospital Length of Stay: 8 days  Code Status: Full Code  Attending Provider: Luis Bermudez Jr., MD  Primary Care Provider: Primary Doctor No     Subjective:     HPI:   Gera Chavez is a 37 y.o. male with no known PMH, who was transferred from [facility] for level 1 trauma activation, after patient reportedly jumped off a bridge.  Sustained rib fractures x4 left-sided, pelvic ring fracture, acetabulum fracture, L2 burst fracture, and left pneumothorax with chest tube in place.  Jump witnessed by bystander who called 911.     Hospital Course/Significant events:  6/4/2023 - Admitted to ICU   6/5/2023 - s/p bronchoscopy    24 Hour Interval History:  AF, VSS. No acute events overnight. Significant AM labs: Hg 8.1 from 7.2 received 1 unit RBC, Bilirubin 5.8 from 3.9. Scheduled for OR today with ortho for radius fracture. 2.5L of urine output over last 24 hrs. Cherst tubes with serosanguinous output    Past Medical History:   Diagnosis Date    Asthma     Depression     Diabetes mellitus     Encounter for blood transfusion     Generalized anxiety disorder     Hypertension     Schizophrenia, unspecified     Sleep apnea     Unspecified glaucoma        Past Surgical History:   Procedure Laterality Date    INTRAMEDULLARY RODDING OF FEMUR Left 02/05/2023    Procedure: INSERTION, INTRAMEDULLARY NOEL, FEMUR;  Surgeon: Tuan Zepeda DO;  Location: [facility];  Service: Orthopedics;  Laterality: Left;    OPEN REDUCTION AND INTERNAL FIXATION (ORIF) OF INJURY OF HIP Left 6/5/2023    Procedure: ORIF,PELVIS;  Surgeon: Tuan Zepeda DO;  Location: [facility];  Service: Orthopedics;  Laterality: Left;  supine deven traction LLE CELL SAVER, teo    REMOVAL OF HARDWARE FROM LOWER EXTREMITY Left 5/9/2023    Procedure: REMOVAL, HARDWARE, LOWER EXTREMITY;  Surgeon: Tuan Zepeda DO;   Location: [facility];  Service: Orthopedics;  Laterality: Left;    TRACH TUBE EXCHANGER  6/5/2023    UVULOPALATOPHARYNGOPLASTY         Social History     Socioeconomic History    Marital status: Single   Tobacco Use    Smoking status: Every Day     Packs/day: 0.50     Years: 10.00     Pack years: 5.00     Types: Cigarettes    Smokeless tobacco: Never   Substance and Sexual Activity    Alcohol use: Not Currently    Drug use: Never    Sexual activity: Not Currently     Social Determinants of Health     Financial Resource Strain: Unknown    Difficulty of Paying Living Expenses: Patient refused   Food Insecurity: Unknown    Worried About Running Out of Food in the Last Year: Patient refused    Ran Out of Food in the Last Year: Patient refused   Transportation Needs: Unknown    Lack of Transportation (Medical): Patient refused    Lack of Transportation (Non-Medical): Patient refused   Physical Activity: Unknown    Days of Exercise per Week: Patient refused   Stress: Unknown    Feeling of Stress : Patient refused   Social Connections: Unknown    Frequency of Communication with Friends and Family: Patient refused    Frequency of Social Gatherings with Friends and Family: Patient refused    Attends Club or Organization Meetings: Patient refused    Marital Status: Never    Housing Stability: Unknown    Unable to Pay for Housing in the Last Year: Patient refused    Unstable Housing in the Last Year: Patient refused           Current Outpatient Medications   Medication Instructions    albuterol (PROVENTIL/VENTOLIN HFA) 90 mcg/actuation inhaler 2 puffs, Inhalation, Every 4 hours PRN    ALBUTEROL INHL 90 mcg, Inhalation, Every 4 hours PRN    amLODIPine (NORVASC) 5 mg, Oral, Daily    amoxicillin-clavulanate 875-125mg (AUGMENTIN) 875-125 mg per tablet 1 tablet, Oral, Every 12 hours (non-standard times)    atorvastatin (LIPITOR) 20 mg, Oral, Daily    azithromycin (Z-CELESTINA) 500 mg, Oral, Daily    clonazePAM (KLONOPIN) 1 mg, Oral,  2 times daily PRN    dextroamphetamine-amphetamine (ADDERALL XR) 25 MG 24 hr capsule 25 mg, Oral, 2 times daily    FLUoxetine 20 MG capsule fluoxetine 20 mg capsule    FLUoxetine 40 mg, Oral, Daily    insulin glargine 100 units/mL SubQ pen Lantus Solostar U-100 Insulin 100 unit/mL (3 mL) subcutaneous pen    ipratropium (ATROVENT) 42 mcg (0.06 %) nasal spray Each Nostril    lamoTRIgine (LAMICTAL) 25 MG tablet lamotrigine 25 mg tablet    LANTUS SOLOSTAR U-100 INSULIN glargine 100 units/mL SubQ pen Subcutaneous    latanoprost 0.005 % ophthalmic solution latanoprost 0.005 % eye drops    lisinopriL (PRINIVIL,ZESTRIL) 40 mg, Oral    mirtazapine (REMERON) 7.5 MG Tab mirtazapine 7.5 mg tablet    potassium chloride (K-TAB) 20 mEq potassium chloride ER 20 mEq tablet,extended release   TAKE 1 TABLET BY MOUTH EVERY DAY    prochlorperazine (COMPAZINE) 10 MG tablet prochlorperazine maleate 10 mg tablet    QUEtiapine (SEROQUEL) 200 mg, Oral, Nightly    testosterone cypionate (DEPOTESTOTERONE CYPIONATE) 200 mg/mL injection SMARTSIG:Milliliter(s) IM       Current Inpatient Medications   albuterol-ipratropium  3 mL Nebulization Q6H    calcium-vitamin D3  1 tablet Oral BID    famotidine (PF)  20 mg Intravenous BID    FLUoxetine  40 mg Per OG tube Daily    gabapentin  300 mg Per OG tube TID    lamoTRIgine  25 mg Per OG tube Daily    QUEtiapine  50 mg Per OG tube BID       Current Intravenous Infusions   dexmedeTOMIDine (Precedex) infusion (titrating) 1.4 mcg/kg/hr (06/12/23 0619)    fentanyl 250 mcg/hr (06/12/23 0337)    lactated ringers 75 mL/hr at 06/11/23 1912    NORepinephrine bitartrate-D5W Stopped (06/06/23 1135)    propofoL 50 mcg/kg/min (06/12/23 0807)       Objective:       Intake/Output Summary (Last 24 hours) at 6/12/2023 0829  Last data filed at 6/12/2023 0619  Gross per 24 hour   Intake 3846.78 ml   Output 2845 ml   Net 1001.78 ml         Vital Signs (Most Recent):  Temp: 100 °F (37.8 °C) (06/12/23 0400)  Pulse: 92  (06/12/23 0802)  Resp: (!) 28 (06/12/23 0802)  BP: (!) 143/72 (06/12/23 0600)  SpO2: 97 % (06/12/23 0802)  Body mass index is 37.22 kg/m².  Weight: 121 kg (266 lb 12.1 oz) Vital Signs (24h Range):  Temp:  [98.9 °F (37.2 °C)-100 °F (37.8 °C)] 100 °F (37.8 °C)  Pulse:  [] 92  Resp:  [12-35] 28  SpO2:  [93 %-100 %] 97 %  BP: (107-176)/(43-90) 143/72  Arterial Line BP: (123-133)/(58-66) 123/58     Physical Exam  General: Intubated and sedated  HEENT: NC/AT; PERRL; nasal and oral mucosa moist and clear; ET tube in place  Pulm: Rhonchi in upper lobes bilaterally, diminished right lower lobe; mechanically ventilated  CV: S1, S2 w/o murmurs or gallops; no edema noted  GI: Soft with normal bowel sounds in all quadrants, no masses on palpation  Neuro: Limited d/t sedation      Lines/Drains/Airways       Central Venous Catheter Line  Duration             Percutaneous Central Line Insertion/Assessment - Double Lumen  06/04/23 1530 Subclavian Left 7 days              Drain  Duration                  Chest Tube 06/04/23 1549 Left Midaxillary 28 Fr. 7 days         Urethral Catheter 06/05/23 1610 Silicone 16 Fr. 6 days         NG/OG Tube 06/08/23 0915 Forrest sump Right mouth 3 days         Closed/Suction Drain 06/09/23 1537 Midline Back Accordion 10 Fr. 2 days              Airway  Duration                  Airway - Non-Surgical 06/08/23 0855 Endotracheal Tube 3 days              Arterial Line  Duration             Arterial Line 06/09/23 1655 Right Radial 2 days              Peripheral Intravenous Line  Duration                  Peripheral IV - Single Lumen 06/09/23 0829 18 G Left;Posterior Hand 3 days                    Significant Labs:    Lab Results   Component Value Date    WBC 7.83 06/12/2023    HGB 8.1 (L) 06/12/2023    HCT 24.0 (L) 06/12/2023    MCV 87.0 06/12/2023     06/12/2023           BMP  Lab Results   Component Value Date     06/12/2023    K 3.6 06/12/2023    CHLORIDE 110 (H) 06/12/2023    CO2 24  06/12/2023    BUN 13.1 06/12/2023    CREATININE 0.77 06/12/2023    CALCIUM 7.8 (L) 06/12/2023    AGAP 11.0 06/09/2023         ABG  Recent Labs   Lab 06/12/23 0542   PH 7.460*   PO2 77.0*   HCO3 26.3   POCBASEDEF 2.50       Mechanical Ventilation Support:  Vent Mode: A/C (06/12/23 0448)  Set Rate: 28 BPM (06/12/23 0448)  Vt Set: 510 mL (06/12/23 0448)  Pressure Support: 12 cmH20 (06/11/23 0527)  PEEP/CPAP: 10 cmH20 (06/12/23 0448)  Oxygen Concentration (%): 40 (06/12/23 0715)  Peak Airway Pressure: 30 cmH20 (06/12/23 0448)  Total Ve: 13.4 L/m (06/12/23 0448)  F/VT Ratio<105 (RSBI): (!) 54.74 (06/12/23 0448)      Significant Imaging:  I have reviewed the pertinent imaging within the past 24 hours.        Assessment/Plan:     Assessment  Pelvic ring fx s/p ORIF on 6/5/2023  Left-sided rib fractures with pneumo/hemo thorax s/p chest tube placement  L2 burst fracture and L1-2 stenosis s/p laminectomy on 6/9/2023  Right distal radius fracture      Plan  -Continue ICU level of care per primary team  -Trauma, neurosurgery, and orthopedic surgery teams following  -Continue chest tube management   -CXR 6/11/2023 revealed improved bilateral pleural effusions  -OR today with orthopedic surgery      DVT Prophylaxis: SCD  GI Prophylaxis: Famotidine     32 minutes of critical care was time spent personally by me on the following activities: development of treatment plan with patient or surrogate and bedside caregivers, discussions with consultants, evaluation of patient's response to treatment, examination of patient, ordering and performing treatments and interventions, ordering and review of laboratory studies, ordering and review of radiographic studies, pulse oximetry, re-evaluation of patient's condition.  This critical care time did not overlap with that of any other provider or involve time for any procedures.     Chucho Erickson MD  Pulmonary Critical Care Medicine, PGY1  [facility] - [facility] ICU

## 2023-06-12 NOTE — NURSING
Nurses Note -- 4 Eyes      6/12/2023   4:20 PM      Skin assessed during: Daily Assessment      [] No Altered Skin Integrity Present    [x]Prevention Measures Documented      [x] Yes- Altered Skin Integrity Present or Discovered   [] LDA Added if Not in Epic (Describe Wound)   [] New Altered Skin Integrity was Present on Admit and Documented in LDA   [] Wound Image Taken    Wound Care Consulted? No    Attending Nurse:  Altagracia Moreno RN     Second RN/Staff Member:  Dashawn GLORIA RN

## 2023-06-12 NOTE — PROGRESS NOTES
Patient Name: Gera Chavez  MRN: [id number]  Admission Date: 6/4/2023  Hospital Length of Stay: 8 days  Attending Provider: Luis Bermudez Jr., MD  Primary Care Provider: Primary Doctor No  Follow-up For: Procedure(s) (LRB):  ORIF, FRACTURE, RADIUS, DISTAL (Right)    Post-Operative Day: Day of Surgery  Subjective:       Principal Orthopedic Problem: Day of Surgery       Interval History:  No acute issues reported overnight per nursing staff.  Patient has right distal radius as well as left posterior column acetabulum fracture of the need internal fixation.  He is on strict spine precautions needs to remain supine.  We can fix his distal radius on his bed without moving him and will plan to take him today.  He remains intubated, he opens his eyes to stimulus.    Review of patient's allergies indicates:   Allergen Reactions    Iodine     Trazodone        Current Facility-Administered Medications   Medication    0.9%  NaCl infusion (for blood administration)    0.9%  NaCl infusion (for blood administration)    acetaminophen oral solution 650 mg    albuterol-ipratropium 2.5 mg-0.5 mg/3 mL nebulizer solution 3 mL    calcium-vitamin D3 500 mg-5 mcg (200 unit) per tablet 1 tablet    dexmedetomidine (PRECEDEX) 400mcg/100mL 0.9% NaCL infusion    famotidine (PF) injection 20 mg    fentaNYL 2500 mcg in 0.9% sodium chloride 250 mL infusion premix (titrating)    fentaNYL injection 50 mcg    FLUoxetine capsule 40 mg    gabapentin capsule 300 mg    hydrALAZINE injection 10 mg    labetaloL injection 10 mg    lactated ringers infusion    lamoTRIgine tablet 25 mg    midazolam (VERSED) 5 mg/mL injection 2 mg    NORepinephrine 8 mg in dextrose 5% 250 mL infusion    oxyCODONE immediate release tablet 5 mg    oxyCODONE immediate release tablet Tab 10 mg    propofol (DIPRIVAN) 10 mg/mL infusion    QUEtiapine tablet 50 mg     Objective:     Vital Signs (Most Recent):  Temp: (!) 100.5 °F (38.1 °C) (06/12/23 0841)  Pulse: 92 (06/12/23  "0802)  Resp: (!) 28 (06/12/23 0802)  BP: (!) 143/72 (06/12/23 0600)  SpO2: 97 % (06/12/23 0802) Vital Signs (24h Range):  Temp:  [98.9 °F (37.2 °C)-100.5 °F (38.1 °C)] 100.5 °F (38.1 °C)  Pulse:  [] 92  Resp:  [12-35] 28  SpO2:  [93 %-100 %] 97 %  BP: (107-176)/(43-90) 143/72  Arterial Line BP: (123-133)/(58-66) 123/58     Weight: 121 kg (266 lb 12.1 oz)  Height: 5' 10.98" (180.3 cm)  Body mass index is 37.22 kg/m².      Intake/Output Summary (Last 24 hours) at 6/12/2023 0853  Last data filed at 6/12/2023 0800  Gross per 24 hour   Intake 3846.78 ml   Output 3195 ml   Net 651.78 ml       Physical Exam:   Ortho/SPM Exam    General the patient is alert and oriented x3 no acute distress nontoxic-appearing appropriate affect.    Constitutional: Vital signs are examined and stable.  Resp: No signs of labored breathing    Musculoskeletal Exam:  Right upper extremity:  Radial art line is in place.  He is in restraints currently.  He has some mild swelling to the wrist.  Brisk capillary refill to the digits.  Normal cascade to the hand.  Forearm compartments soft and compressible.  Unable to participate in active use of the right upper extremity.    Diagnostic Findings:   Significant Labs: BMP:   Recent Labs   Lab 06/12/23  0150      K 3.6   CO2 24   BUN 13.1   CREATININE 0.77   CALCIUM 7.8*   MG 2.10     CBC:   Recent Labs   Lab 06/11/23  0138 06/11/23  1533 06/12/23  0150   WBC 7.97 8.32 7.83   HGB 7.5* 7.2* 8.1*   HCT 22.9* 21.7* 24.0*    184 221     CMP:   Recent Labs   Lab 06/11/23  0138 06/12/23  0150    142   K 3.7 3.6   CO2 23 24   BUN 9.1 13.1   CREATININE 0.73 0.77   CALCIUM 7.4* 7.8*   ALBUMIN 1.8* 1.8*   BILITOT 3.9* 5.8*   ALKPHOS 62 67   AST 53* 42*   ALT 22 20     All pertinent labs within the past 24 hours have been reviewed.        Significant Imaging: I have reviewed and interpreted all pertinent imaging results/findings.     Assessment/Plan:     Active Diagnoses:    Diagnosis Date " Noted POA    PRINCIPAL PROBLEM:  Closed pelvic ring fracture [S32.810A] 06/05/2023 Yes    Displaced fracture of posterior column (ilioischial) of left acetabulum, initial encounter for closed fracture [S32.442A] 06/05/2023 Yes      Problems Resolved During this Admission:     The risks, benefits and alternatives treatment were discussed at length with the patient's mother today including but not limited to pain, bleeding, scarring, infection, damage to neurovascular structures, malunion/nonunion, hardware failure/irritation, need for future procedures and complications leading to amputation and even death.  He has instability in the wrist due to dorsal comminution with intra-articular extension.  He will benefit from operative stabilization which will plan to perform today while he remained supine on his bed in order to continue his spine precautions.  We will plan to take him to the operating room on Wednesday for operative stabilization of his left posterior column acetabulum fracture once he is allowed to be turned in the lateral position.  I discussed the plan of care with his mother today who understands and agrees.  The fixation that we are proposing we will need to be removed in 10-12 weeks.  All questions and concerns were addressed and she understands and agrees with our plan.    This note/OR report was created with the assistance of  voice recognition software or phone  dictation.  There may be transcription errors as a result of using this technology however minimal. Effort has been made to assure accuracy of transcription but any obvious errors or omissions should be clarified with the author of the document.           Compa Bernal MD  Orthopedic Trauma Surgery  [facility] - [facility] ICU

## 2023-06-12 NOTE — ANESTHESIA PREPROCEDURE EVALUATION
"                                                                                                             06/12/2023  Gera Chavez is a 37 y.o., male  With multiple traumatic injuries after fall from bridge to ground.  To OR today for ORIF of distal right radius.  He remains intubated in ICU at this time.    "   HPI:   Gera Chavez is a 37 y.o. male with no known PMH, who was transferred from [facility] for level 1 trauma activation, after patient reportedly jumped off a bridge.  Sustained rib fractures x4 left-sided, pelvic ring fracture, acetabulum fracture, L2 burst fracture, and left pneumothorax with chest tube in place.  Jump witnessed by bystander who called 911.      Hospital Course/Significant events:  6/4/2023 - Admitted to ICU   6/5/2023 - s/p bronchoscopy     24 Hour Interval History:  Patient remains intubated mechanically ventilated.  Seroquel was started yesterday as were the remainder of his psychiatric meds.  oxygenation has improved significantly now on 50% FiO2.          Past Medical History:   Diagnosis Date    Asthma      Depression      Diabetes mellitus      Encounter for blood transfusion      Generalized anxiety disorder      Hypertension      Schizophrenia, unspecified      Sleep apnea      Unspecified glaucoma"          "Patient has a left posterior column acetabular fracture and a distal radius fracture still in need of orthopedic stabilization.  I spoke to neurosurgery attending recommends strict supine position until Wednesday.  We will then prioritize the patient and try to get his remaining fractures stabilized so he can continue his rehab appropriately."                 Echo:          EKG:      Pre-op Assessment    I have reviewed the Patient Summary Reports.     I have reviewed the Nursing Notes. I have reviewed the NPO Status.   I have reviewed the Medications.     Review of Systems  Anesthesia Hx:  No problems with previous Anesthesia  Denies Family Hx " of Anesthesia complications.   Denies Personal Hx of Anesthesia complications.   Social:  Non-Smoker    Hematology/Oncology:  Hematology Normal   Oncology Normal     EENT/Dental:EENT/Dental Normal   Cardiovascular:  Cardiovascular Normal Exercise tolerance: good Hypertension   Functional Capacity good / => 4 METS    Pulmonary:  Pulmonary Normal Asthma Sleep Apnea    Renal/:  Renal/ Normal     Hepatic/GI:  Hepatic/GI Normal    Musculoskeletal:  Musculoskeletal Normal    Neurological:  Neurology Normal    Endocrine:   Diabetes    Dermatological:  Skin Normal    Psych:   Psychiatric History depression H/o Suicide attempt         Physical Exam  General: Well nourished  Sedated, intubated  Airway:  Intubated  Chest/Lungs:  Clear to auscultation, Normal Respiratory Rate    Heart:  Rate: Normal  Rhythm: Regular Rhythm        Anesthesia Plan  Type of Anesthesia, risks & benefits discussed:    Anesthesia Type: Gen ETT  Intra-op Monitoring Plan: Standard ASA Monitors  Post Op Pain Control Plan: IV/PO Opioids PRN  Induction:  IV and Inhalation  Airway Plan: Direct  Informed Consent: Informed consent signed with the Patient and all parties understand the risks and agree with anesthesia plan.  All questions answered. Patient consented to blood products? Yes  ASA Score: 3  Day of Surgery Review of History & Physical: H&P Update referred to the surgeon/provider.  Anesthesia Plan Notes: Strict supine positioning per neurosurgery.    Ready For Surgery From Anesthesia Perspective.     .

## 2023-06-12 NOTE — OP NOTE
[facility]                       [address]                      [place] [address]    PATIENT NAME:      ANGIE COBOS   YOB: 1985  CSN:               [id number]  MRN:               [id number]  ADMIT DATE:        06/04/2023 15:32:00  PHYSICIAN:         Compa Bernal MD                          OPERATIVE REPORT      DATE OF SURGERY:    06/12/2023 00:00:00    SURGEON:  Compa Bernal MD    PREOPERATIVE DIAGNOSIS:  Right intra-articular distal radius fracture.    POSTOPERATIVE DIAGNOSIS:  Right intra-articular distal radius fracture.    PROCEDURE:  Open reduction and internal fixation of right intra-articular distal   radius fracture including 3 or more fragments.    ANESTHESIA:  General.    ESTIMATED BLOOD LOSS:  15 mL.    TOURNIQUET TIME:  45 minutes.    ASSISTANT:  Tata Rodas nurse practitioner, necessary for a skilled set of   hands to assist with reduction of the fracture as well as application of   hardware and deep closure.    IMPLANTS:  [employer] Protean central column fracture ossific plate as   well as a dorsal spanning long plate with locking and nonlocking screws.    COMPLICATIONS:  None.    COUNTS:  All counts correct x2 at the end of the case.    INDICATIONS FOR PROCEDURE:  Mr. Cobos is a 37-year-old male who had a fall from   a bridge and sustained multiple pelvic fractures with a left-sided acetabulum   fracture as well as a right intra-articular distal radius fracture and a burst   fracture.  He has had fixation of his pelvic ring and his back.  He needs   fixation of his left acetabulum.  However, he cannot go into the lateral   position currently due to a dural tear caused by this fracture.  He has a   comminuted intra-articular fracture of the distal radius.  He has remained   intubated in the ICU.  We were planning to take him to the operating room today   for stabilization of his distal radius  with plans for fixation of his posterior   column once he is cleared to be positioned laterally.    PROCEDURE IN DETAIL:  After informed consent was obtained from his mother, the   patient was met in the ICU and brought directly to the operating room after his   site was marked.  He remained on his ICU bed.  His left upper extremity was   prepped and draped in a standard sterile fashion on an arm board and all bony   prominences were well padded.  Preoperative antibiotics were given.  Time-out   was done indicating correct operative limb and procedure.  The limb was   exsanguinated and tourniquet was raised.  A dorsal approach to the distal radius   was performed.  He had a comminuted intra-articular fracture of the distal   radius extended into the radial styloid and across into the lunate facet was 1   large dorsal piece that was able to be identified.  The remaining   intra-articular fragments were debrided through the fracture site.  We were able   to reduce the dorsal fragment.  Multiple K-wires were used for fixation of the   multiple intra-articular fragments.  The dorsal plate was then applied.  The   pins were used for provisional stabilization.  Nonlocking screws were used to   bring the plate down to bone.  Multiple locking screws were then placed under   the joint surface.  They were confirmed to be appropriate on AP, oblique, and   lateral imaging.  He did have some dorsal subluxation of the carpal row due to   the ligamentous instability dorsally.  We elected to apply a dorsal spanning   plate in order to prevent dorsal subluxation of his carpal row to allow for   distraction across his intra-articular fracture with healing.  An incision was   made over the 2nd metacarpal and proximally over the radius.  The plate was   placed in the submuscular extraperiosteal position.  The nonlocking screw was   placed into the metacarpal.  It was pulled out to length.  It was confirmed to   be appropriate, it was  clamped and nonlocking screw was placed proximally   followed by multiple screws on either side.  The overall alignment of the wrist   was confirmed to be appropriate on AP mortise and lateral views.  The hardware   was in appropriate position.  The wounds were thoroughly irrigated and a layered   closure was performed with a #1 Vicryl closure over the extensor retinaculum,   followed by 2-0 Monocryl, and 3-0 nylon.  Xeroform, 4 x 4's, cast padding, Ace   bandage, and a Velcro wrist splint were applied.  The patient was taken back to   the ICU in stable condition.    POSTOPERATIVE PLAN:  He will be admitted back to the ICU, nonweightbearing to   the right hand and wrist except for ADLs.  He can use the right forearm for a   platform walker.  He will be nonweightbearing to the bilateral lower   extremities.  Currently, plan for return to the operating room for stabilization   of his left posterior column acetabulum fracture once he is able to be turned   lateral per Neurosurgery.        ______________________________  MD CHERELLE Owen/AQS  DD:  06/12/2023  Time:  11:05AM  DT:  06/12/2023  Time:  03:34PM  Job #:  [id number]      OPERATIVE REPORT

## 2023-06-12 NOTE — PLAN OF CARE
Problem: Adult Inpatient Plan of Care  Goal: Plan of Care Review  Outcome: Ongoing, Progressing  Goal: Patient-Specific Goal (Individualized)  Outcome: Ongoing, Progressing  Goal: Absence of Hospital-Acquired Illness or Injury  Outcome: Ongoing, Progressing  Goal: Optimal Comfort and Wellbeing  Outcome: Ongoing, Progressing  Goal: Readiness for Transition of Care  Outcome: Ongoing, Progressing     Problem: Infection  Goal: Absence of Infection Signs and Symptoms  Outcome: Ongoing, Progressing     Problem: Communication Impairment (Mechanical Ventilation, Invasive)  Goal: Effective Communication  Outcome: Ongoing, Progressing     Problem: Device-Related Complication Risk (Mechanical Ventilation, Invasive)  Goal: Optimal Device Function  Outcome: Ongoing, Progressing

## 2023-06-12 NOTE — PLAN OF CARE
Problem: Adult Inpatient Plan of Care  Goal: Plan of Care Review  Outcome: Ongoing, Progressing  Goal: Patient-Specific Goal (Individualized)  Outcome: Ongoing, Progressing  Goal: Absence of Hospital-Acquired Illness or Injury  Outcome: Ongoing, Progressing  Goal: Optimal Comfort and Wellbeing  Outcome: Ongoing, Progressing  Goal: Readiness for Transition of Care  Outcome: Ongoing, Progressing     Problem: Infection  Goal: Absence of Infection Signs and Symptoms  Outcome: Ongoing, Progressing     Problem: Communication Impairment (Mechanical Ventilation, Invasive)  Goal: Effective Communication  Outcome: Ongoing, Progressing     Problem: Device-Related Complication Risk (Mechanical Ventilation, Invasive)  Goal: Optimal Device Function  Outcome: Ongoing, Progressing     Problem: Inability to Wean (Mechanical Ventilation, Invasive)  Goal: Mechanical Ventilation Liberation  Outcome: Ongoing, Progressing     Problem: Nutrition Impairment (Mechanical Ventilation, Invasive)  Goal: Optimal Nutrition Delivery  Outcome: Ongoing, Progressing     Problem: Skin and Tissue Injury (Mechanical Ventilation, Invasive)  Goal: Absence of Device-Related Skin and Tissue Injury  Outcome: Ongoing, Progressing     Problem: Ventilator-Induced Lung Injury (Mechanical Ventilation, Invasive)  Goal: Absence of Ventilator-Induced Lung Injury  Outcome: Ongoing, Progressing     Problem: Communication Impairment (Artificial Airway)  Goal: Effective Communication  Outcome: Ongoing, Progressing     Problem: Device-Related Complication Risk (Artificial Airway)  Goal: Optimal Device Function  Outcome: Ongoing, Progressing     Problem: Skin and Tissue Injury (Artificial Airway)  Goal: Absence of Device-Related Skin or Tissue Injury  Outcome: Ongoing, Progressing     Problem: Noninvasive Ventilation Acute  Goal: Effective Unassisted Ventilation and Oxygenation  Outcome: Ongoing, Progressing     Problem: Skin Injury Risk Increased  Goal: Skin Health and  Integrity  Outcome: Ongoing, Progressing     Problem: Fall Injury Risk  Goal: Absence of Fall and Fall-Related Injury  Outcome: Ongoing, Progressing     Problem: Restraint, Nonbehavioral (Nonviolent)  Goal: Absence of Harm or Injury  Outcome: Ongoing, Progressing     Problem: Adjustment to Illness (Delirium)  Goal: Optimal Coping  Outcome: Ongoing, Progressing     Problem: Altered Behavior (Delirium)  Goal: Improved Behavioral Control  Outcome: Ongoing, Progressing     Problem: Attention and Thought Clarity Impairment (Delirium)  Goal: Improved Attention and Thought Clarity  Outcome: Ongoing, Progressing     Problem: Sleep Disturbance (Delirium)  Goal: Improved Sleep  Outcome: Ongoing, Progressing

## 2023-06-12 NOTE — NURSING
Nurses Note -- 4 Eyes      6/12/2023   5:53 AM      Skin assessed during: Daily Assessment      [x] No Altered Skin Integrity Present    []Prevention Measures Documented      [] Yes- Altered Skin Integrity Present or Discovered   [] LDA Added if Not in Epic (Describe Wound)   [] New Altered Skin Integrity was Present on Admit and Documented in LDA   [] Wound Image Taken    Wound Care Consulted? No    Attending Nurse:  Telly Mays RN     Second RN/Staff Member:  Macie CRUZ RN

## 2023-06-12 NOTE — PROGRESS NOTES
Inpatient Nutrition Assessment    Admit Date: 6/4/2023   Total duration of encounter: 8 days     Nutrition Recommendation/Prescription     Start tube feeding when appropriate.  Tube feeding recommendation:   Impact Peptide 1.5 @ 25ml/hr (goal rate) to provide  750 kcal/d (44% est needs, 100% with meds)  47g protein/d (30% est needs)  380 ml free water/d (22% est needs)  (calculations based on estimated 20 hr/d run time)     Communication of Recommendations: reviewed with nurse    Nutrition Assessment     Malnutrition Assessment/Nutrition-Focused Physical Exam    Malnutrition Context: other (see comments) (does not meet criteria)     Energy Intake (Malnutrition): other (see comments) (unable to obtain)  Weight Loss (Malnutrition): other (see comments) (unable to obtain)  Subcutaneous Fat (Malnutrition): other (see comments) (does not meet criteria)           Muscle Mass (Malnutrition): other (see comments) (does not meet criteria)                          Fluid Accumulation (Malnutrition): other (see comments) (does not meet criteria)        A minimum of two characteristics is recommended for diagnosis of either severe or non-severe malnutrition.    Chart Review    Reason Seen: continuous nutrition monitoring    Malnutrition Screening Tool Results   Have you recently lost weight without trying?: No  Have you been eating poorly because of a decreased appetite?: No   MST Score: 0     Diagnosis:  L pelvic rim fx w/ open book pelvis  L pelvic wall hematoma  L2 burst fx  L apical PTX  L 4-11 rib fx  pulm contusion  (Fall from bridge)    Relevant Medical History: none noted    Nutrition-Related Medications: LR @ 75ml/hr, diprivan, Ca +Vit D  Calorie Containing IV Medications: Diprivan @ 36 ml/hr (provides 950 kcal/d)    Nutrition-Related Labs:  6/5 K 5.3, Cl 115, .9, PAB 24.7  6/8 Glu 107, Phos 2.1  6/12 Cl 110, Glu 111, .9, PAB 9.8    Diet/PN Order: Diet NPO Except for: Medication  Oral Supplement Order:  "none  Tube Feeding Order: none  Appetite/Oral Intake: not applicable/not applicable  Factors Affecting Nutritional Intake: on mechanical ventilation  Food/Jehovah's witness/Cultural Preferences: unable to obtain  Food Allergies: none reported    Skin Integrity: drain/device(s), incision  Wound(s):   incision noted    Comments    23: Discussed with RN. Will provide tube feeding recommendations for when appropriate to start tube feeding. Receiving kcal from meds.      23: Pt self extubated this AM, now reintubated. Discussed starting TF with RN. Receiving kcal from meds.     23: Noted TF ordered. Not appropriate formula at this time. Pt in OR at time of RD visit. Plans to restart TF today per RN. Pt still having to be flat until post-op Wednesday. Since TF having to be run @ lower rates due to repeated OR visits, will use more concentrated formula. Will update formula and goal rate.     Anthropometrics    Height: 5' 10.98" (180.3 cm)    Last Weight: 121 kg (266 lb 12.1 oz) (23 1555)    BMI (Calculated): 37.2  BMI Classification: obese grade II (BMI 35-39.9)        Ideal Body Weight (IBW), Male: 171.88 lb     % Ideal Body Weight, Male (lb): 155.09 %                          Usual Weight Provided By: unable to obtain usual weight    Wt Readings from Last 5 Encounters:   23 121 kg (266 lb 12.1 oz)   23 90.7 kg (199 lb 15.3 oz)   23 90.7 kg (200 lb)   23 90.7 kg (200 lb)   23 100 kg (220 lb 7.4 oz)     Weight Change(s) Since Admission:  Admit Weight: 121 kg (266 lb 12.1 oz) (23 1555)  23: no new    Estimated Needs    Weight Used For Calorie Calculations: 121 kg (266 lb 12.1 oz)  Energy Calorie Requirements (kcal): 1331-1694kcal (11-14kcal/kg)  Energy Need Method: Kcal/kg  Weight Used For Protein Calculations: 78.2 kg (172 lb 5.7 oz) (IBW)  Protein Requirements: 157gm  Fluid Requirements (mL): 1694ml (1ml/kcal)  Temp (24hrs), Av.7 °F (37.6 °C), Min:98.6 °F (37 °C), " Max:100.5 °F (38.1 °C)         Enteral Nutrition    Patient not receiving enteral nutrition at this time.    Parenteral Nutrition    Patient not receiving parenteral nutrition support at this time.    Evaluation of Received Nutrient Intake    Calories: not meeting estimated needs  Protein: not meeting estimated needs    Patient Education    Not applicable.    Nutrition Diagnosis     PES: Inadequate oral intake related to acute illness as evidenced by intubation since admit. (continues)    Interventions/Goals     Intervention(s): modified composition of enteral nutrition, modified rate of enteral nutrition, and collaboration with other providers  Goal: Meet greater than 75% of nutritional needs by follow-up. (goal progressing)    Monitoring & Evaluation     Dietitian will monitor energy intake.  Nutrition Risk/Follow-Up: high (follow-up in 1-4 days)   Please consult if re-assessment needed sooner.

## 2023-06-12 NOTE — PROGRESS NOTES
POD#3 T11-L4 fusion for L2 burst fx  He is lying flat in bed, currently intubated and sedated  Fentanyl and diprivan, recently given Ativan for agitation  No acute events overnight  H/H 8.1/24 today    AFVSS  Intubated, mechanically ventilated  Exam limited d/t sedation  He does open his eyes to noxious stimuli  With sedation decreased, he does move all ext  Follows command bilateral UE and LE  No deficits appreciated  Incision c/d/I  Drain output 50/40, serosanguineous    Plan: Continue drain to gravity  Continue frequent neuro checks  Continue current dressing for now  Continue gabapentin TID and Os-shreya BID  Strict flat until Wednesday per Dr. Matos  SCDs for DVT prophylaxis

## 2023-06-12 NOTE — TRANSFER OF CARE
"Anesthesia Transfer of Care Note    Patient: Gera Chavez    Procedure(s) Performed: Procedure(s) (LRB):  ORIF, FRACTURE, RADIUS, DISTAL (Right)    Patient location: ICU    Anesthesia Type: general    Transport from OR: Transported from OR intubated on 100% O2 by AMBU with assisted ventilation. Continuous SpO2 monitoring in transport. Continuous ECG monitoring in transport. Continuos invasive BP monitoring in transport    Post pain: adequate analgesia    Post assessment: no apparent anesthetic complications    Post vital signs: stable    Level of consciousness: sedated    Nausea/Vomiting: no nausea/vomiting    Complications: none    Transfer of care protocol was followed      Last vitals:   Visit Vitals  BP (!) 96/43 (BP Location: Left arm, Patient Position: Lying)   Pulse (!) 114   Temp 37 °C (98.6 °F)   Resp 20   Ht 5' 10.98" (1.803 m)   Wt 121 kg (266 lb 12.1 oz)   SpO2 (!) 92%   BMI 37.22 kg/m²     "

## 2023-06-12 NOTE — BRIEF OP NOTE
[facility] - [facility] ICU  Brief Operative Note    SUMMARY     Surgery Date: 6/12/2023     Surgeon(s) and Role:     * Compa Bernal MD - Primary     * SHARMILA Garner - Assisting        Pre-op Diagnosis:  Closed fracture of distal end of right radius, intraarticular, initial encounter [S52.408N]    Post-op Diagnosis:  Same    Procedure(s) (LRB):  ORIF, FRACTURE, RADIUS, DISTAL (Right)- intra-articular, 3 or more fragment    Anesthesia: General    Operative Findings: see op report    Estimated Blood Loss: 15mL    Estimated Blood Loss has been documented.         Specimens:   Specimen (24h ago, onward)      None            [id number]    A/P: Tolerated procedure well. Admit to ICU. NWB R hand/wrist except ADLs, ok to use platform walker through forearm. Ok for Lovenox from ortho standpoint. Will need ORIF of L posterior column acetabulum once cleared for lateral positioning by neurosurgery.    This note/OR report was created with the assistance of  voice recognition software or phone  dictation.  There may be transcription errors as a result of using this technology however minimal. Effort has been made to assure accuracy of transcription but any obvious errors or omissions should be clarified with the author of the document.       Compa Bernal MD  Orthopedic Trauma  [facility]

## 2023-06-13 LAB
ALBUMIN SERPL-MCNC: 1.7 G/DL (ref 3.5–5)
ALBUMIN/GLOB SERPL: 0.5 RATIO (ref 1.1–2)
ALLENS TEST BLOOD GAS (OHS): YES
ALP SERPL-CCNC: 87 UNIT/L (ref 40–150)
ALT SERPL-CCNC: 19 UNIT/L (ref 0–55)
APPEARANCE UR: CLEAR
AST SERPL-CCNC: 36 UNIT/L (ref 5–34)
BACTERIA #/AREA URNS AUTO: ABNORMAL /HPF
BASE EXCESS BLD CALC-SCNC: 4.1 MMOL/L
BASOPHILS # BLD AUTO: 0.03 X10(3)/MCL
BASOPHILS NFR BLD AUTO: 0.4 %
BILIRUB UR QL STRIP.AUTO: ABNORMAL MG/DL
BILIRUBIN DIRECT+TOT PNL SERPL-MCNC: 5.9 MG/DL
BLOOD GAS SAMPLE TYPE (OHS): ABNORMAL
BUN SERPL-MCNC: 14.5 MG/DL (ref 8.9–20.6)
CA-I BLD-SCNC: 1.15 MMOL/L (ref 1.12–1.23)
CALCIUM SERPL-MCNC: 8 MG/DL (ref 8.4–10.2)
CHLORIDE SERPL-SCNC: 113 MMOL/L (ref 98–107)
CO2 BLDA-SCNC: 29.8 MMOL/L
CO2 SERPL-SCNC: 22 MMOL/L (ref 22–29)
COLOR UR: ABNORMAL
CREAT SERPL-MCNC: 0.92 MG/DL (ref 0.73–1.18)
DRAWN BY BLOOD GAS (OHS): ABNORMAL
EOSINOPHIL # BLD AUTO: 0.13 X10(3)/MCL (ref 0–0.9)
EOSINOPHIL NFR BLD AUTO: 1.9 %
ERYTHROCYTE [DISTWIDTH] IN BLOOD BY AUTOMATED COUNT: 16.5 % (ref 11.5–17)
FIO2 BLOOD GAS (OHS): 35 %
GFR SERPLBLD CREATININE-BSD FMLA CKD-EPI: >60 MLS/MIN/1.73/M2
GLOBULIN SER-MCNC: 3.5 GM/DL (ref 2.4–3.5)
GLUCOSE SERPL-MCNC: 130 MG/DL (ref 74–100)
GLUCOSE UR QL STRIP.AUTO: NEGATIVE MG/DL
GRAN CASTS URNS QL MICRO: ABNORMAL /LPF
HCO3 BLDA-SCNC: 28.5 MMOL/L
HCT VFR BLD AUTO: 24.6 % (ref 42–52)
HGB BLD-MCNC: 7.8 G/DL (ref 14–18)
IMM GRANULOCYTES # BLD AUTO: 0.11 X10(3)/MCL (ref 0–0.04)
IMM GRANULOCYTES NFR BLD AUTO: 1.6 %
KETONES UR QL STRIP.AUTO: NEGATIVE MG/DL
LEUKOCYTE ESTERASE UR QL STRIP.AUTO: ABNORMAL UNIT/L
LYMPHOCYTES # BLD AUTO: 0.91 X10(3)/MCL (ref 0.6–4.6)
LYMPHOCYTES NFR BLD AUTO: 13.2 %
MAGNESIUM SERPL-MCNC: 2.2 MG/DL (ref 1.6–2.6)
MCH RBC QN AUTO: 28.4 PG (ref 27–31)
MCHC RBC AUTO-ENTMCNC: 31.7 G/DL (ref 33–36)
MCV RBC AUTO: 89.5 FL (ref 80–94)
MECH RR (OHS): 28 B/MIN
MECH VT (OHS): 500 ML
MODE (OHS): AC
MONOCYTES # BLD AUTO: 0.55 X10(3)/MCL (ref 0.1–1.3)
MONOCYTES NFR BLD AUTO: 8 %
NEUTROPHILS # BLD AUTO: 5.15 X10(3)/MCL (ref 2.1–9.2)
NEUTROPHILS NFR BLD AUTO: 74.9 %
NITRITE UR QL STRIP.AUTO: POSITIVE
NRBC BLD AUTO-RTO: 0.3 %
OXYGEN DEVICE BLOOD GAS (OHS): ABNORMAL
PCO2 BLDA: 41 MMHG (ref 35–45)
PEEP (OHS): 10 CMH2O
PH BLDA: 7.45 [PH] (ref 7.35–7.45)
PH UR STRIP.AUTO: 5.5 [PH]
PHOSPHATE SERPL-MCNC: 3.4 MG/DL (ref 2.3–4.7)
PLATELET # BLD AUTO: 286 X10(3)/MCL (ref 130–400)
PMV BLD AUTO: 10.6 FL (ref 7.4–10.4)
PO2 BLDA: 62 MMHG (ref 80–100)
POTASSIUM BLOOD GAS (OHS): 3.5 MMOL/L (ref 3.5–5)
POTASSIUM SERPL-SCNC: 3.6 MMOL/L (ref 3.5–5.1)
PROT SERPL-MCNC: 5.2 GM/DL (ref 6.4–8.3)
PROT UR QL STRIP.AUTO: ABNORMAL MG/DL
RBC # BLD AUTO: 2.75 X10(6)/MCL (ref 4.7–6.1)
RBC #/AREA URNS AUTO: ABNORMAL /HPF
RBC UR QL AUTO: ABNORMAL UNIT/L
RENAL EPI CELLS #/AREA UR COMP ASSIST: ABNORMAL /HPF
SAMPLE SITE BLOOD GAS (OHS): ABNORMAL
SAO2 % BLDA: 92 %
SODIUM BLOOD GAS (OHS): 143 MMOL/L (ref 137–145)
SODIUM SERPL-SCNC: 146 MMOL/L (ref 136–145)
SP GR UR STRIP.AUTO: 1.02 (ref 1–1.03)
SQUAMOUS #/AREA URNS AUTO: <5 /HPF
UROBILINOGEN UR STRIP-ACNC: 2 MG/DL
WBC # SPEC AUTO: 6.88 X10(3)/MCL (ref 4.5–11.5)
WBC #/AREA URNS AUTO: <5 /HPF

## 2023-06-13 PROCEDURE — 99900035 HC TECH TIME PER 15 MIN (STAT)

## 2023-06-13 PROCEDURE — 25000003 PHARM REV CODE 250: Performed by: SURGERY

## 2023-06-13 PROCEDURE — 81001 URINALYSIS AUTO W/SCOPE: CPT

## 2023-06-13 PROCEDURE — 87040 BLOOD CULTURE FOR BACTERIA: CPT

## 2023-06-13 PROCEDURE — 99024 POSTOP FOLLOW-UP VISIT: CPT | Mod: ,,, | Performed by: NEUROLOGICAL SURGERY

## 2023-06-13 PROCEDURE — 80053 COMPREHEN METABOLIC PANEL: CPT | Performed by: STUDENT IN AN ORGANIZED HEALTH CARE EDUCATION/TRAINING PROGRAM

## 2023-06-13 PROCEDURE — 63600175 PHARM REV CODE 636 W HCPCS: Performed by: NURSE PRACTITIONER

## 2023-06-13 PROCEDURE — 99024 PR POST-OP FOLLOW-UP VISIT: ICD-10-PCS | Mod: ,,, | Performed by: NEUROLOGICAL SURGERY

## 2023-06-13 PROCEDURE — 63600175 PHARM REV CODE 636 W HCPCS: Performed by: SURGERY

## 2023-06-13 PROCEDURE — 94003 VENT MGMT INPAT SUBQ DAY: CPT

## 2023-06-13 PROCEDURE — 94761 N-INVAS EAR/PLS OXIMETRY MLT: CPT

## 2023-06-13 PROCEDURE — 99900026 HC AIRWAY MAINTENANCE (STAT)

## 2023-06-13 PROCEDURE — 27200966 HC CLOSED SUCTION SYSTEM

## 2023-06-13 PROCEDURE — 94640 AIRWAY INHALATION TREATMENT: CPT

## 2023-06-13 PROCEDURE — 99900031 HC PATIENT EDUCATION (STAT)

## 2023-06-13 PROCEDURE — 20000000 HC ICU ROOM

## 2023-06-13 PROCEDURE — 87077 CULTURE AEROBIC IDENTIFY: CPT

## 2023-06-13 PROCEDURE — 20800000 HC ICU TRAUMA

## 2023-06-13 PROCEDURE — 63600175 PHARM REV CODE 636 W HCPCS

## 2023-06-13 PROCEDURE — 36600 WITHDRAWAL OF ARTERIAL BLOOD: CPT

## 2023-06-13 PROCEDURE — 85025 COMPLETE CBC W/AUTO DIFF WBC: CPT | Performed by: STUDENT IN AN ORGANIZED HEALTH CARE EDUCATION/TRAINING PROGRAM

## 2023-06-13 PROCEDURE — 83735 ASSAY OF MAGNESIUM: CPT | Performed by: STUDENT IN AN ORGANIZED HEALTH CARE EDUCATION/TRAINING PROGRAM

## 2023-06-13 PROCEDURE — 27000221 HC OXYGEN, UP TO 24 HOURS

## 2023-06-13 PROCEDURE — 84100 ASSAY OF PHOSPHORUS: CPT | Performed by: STUDENT IN AN ORGANIZED HEALTH CARE EDUCATION/TRAINING PROGRAM

## 2023-06-13 PROCEDURE — 25000242 PHARM REV CODE 250 ALT 637 W/ HCPCS: Performed by: HOSPITALIST

## 2023-06-13 PROCEDURE — 25000003 PHARM REV CODE 250: Performed by: STUDENT IN AN ORGANIZED HEALTH CARE EDUCATION/TRAINING PROGRAM

## 2023-06-13 PROCEDURE — 82803 BLOOD GASES ANY COMBINATION: CPT

## 2023-06-13 RX ORDER — ENOXAPARIN SODIUM 100 MG/ML
40 INJECTION SUBCUTANEOUS EVERY 12 HOURS
Status: DISCONTINUED | OUTPATIENT
Start: 2023-06-14 | End: 2023-06-26 | Stop reason: HOSPADM

## 2023-06-13 RX ADMIN — GABAPENTIN 300 MG: 300 CAPSULE ORAL at 08:06

## 2023-06-13 RX ADMIN — LAMOTRIGINE 25 MG: 25 TABLET ORAL at 08:06

## 2023-06-13 RX ADMIN — IPRATROPIUM BROMIDE AND ALBUTEROL SULFATE 3 ML: 2.5; .5 SOLUTION RESPIRATORY (INHALATION) at 07:06

## 2023-06-13 RX ADMIN — ACETAMINOPHEN 650 MG: 650 SOLUTION ORAL at 08:06

## 2023-06-13 RX ADMIN — CEFAZOLIN SODIUM 2 G: 2 SOLUTION INTRAVENOUS at 01:06

## 2023-06-13 RX ADMIN — DEXMEDETOMIDINE HYDROCHLORIDE 1.4 MCG/KG/HR: 400 INJECTION INTRAVENOUS at 02:06

## 2023-06-13 RX ADMIN — GABAPENTIN 300 MG: 300 CAPSULE ORAL at 02:06

## 2023-06-13 RX ADMIN — DEXMEDETOMIDINE HYDROCHLORIDE 1.4 MCG/KG/HR: 400 INJECTION INTRAVENOUS at 06:06

## 2023-06-13 RX ADMIN — MIDAZOLAM HYDROCHLORIDE 2 MG: 5 INJECTION, SOLUTION INTRAMUSCULAR; INTRAVENOUS at 07:06

## 2023-06-13 RX ADMIN — FLUOXETINE 40 MG: 20 CAPSULE ORAL at 08:06

## 2023-06-13 RX ADMIN — CEFAZOLIN SODIUM 2 G: 2 SOLUTION INTRAVENOUS at 09:06

## 2023-06-13 RX ADMIN — Medication 250 MCG/HR: at 04:06

## 2023-06-13 RX ADMIN — Medication 1 TABLET: at 08:06

## 2023-06-13 RX ADMIN — QUETIAPINE FUMARATE 50 MG: 25 TABLET ORAL at 08:06

## 2023-06-13 RX ADMIN — MIDAZOLAM HYDROCHLORIDE 2 MG: 5 INJECTION, SOLUTION INTRAMUSCULAR; INTRAVENOUS at 08:06

## 2023-06-13 RX ADMIN — DEXMEDETOMIDINE HYDROCHLORIDE 1.4 MCG/KG/HR: 400 INJECTION INTRAVENOUS at 03:06

## 2023-06-13 RX ADMIN — MIDAZOLAM HYDROCHLORIDE 2 MG: 5 INJECTION, SOLUTION INTRAMUSCULAR; INTRAVENOUS at 03:06

## 2023-06-13 RX ADMIN — PROPOFOL 50 MCG/KG/MIN: 10 INJECTION, EMULSION INTRAVENOUS at 08:06

## 2023-06-13 RX ADMIN — DEXMEDETOMIDINE HYDROCHLORIDE 1.4 MCG/KG/HR: 400 INJECTION INTRAVENOUS at 11:06

## 2023-06-13 RX ADMIN — PROPOFOL 50 MCG/KG/MIN: 10 INJECTION, EMULSION INTRAVENOUS at 10:06

## 2023-06-13 RX ADMIN — FAMOTIDINE 20 MG: 10 INJECTION, SOLUTION INTRAVENOUS at 08:06

## 2023-06-13 RX ADMIN — PROPOFOL 50 MCG/KG/MIN: 10 INJECTION, EMULSION INTRAVENOUS at 01:06

## 2023-06-13 RX ADMIN — PROPOFOL 50 MCG/KG/MIN: 10 INJECTION, EMULSION INTRAVENOUS at 02:06

## 2023-06-13 RX ADMIN — PROPOFOL 50 MCG/KG/MIN: 10 INJECTION, EMULSION INTRAVENOUS at 06:06

## 2023-06-13 RX ADMIN — PROPOFOL 50 MCG/KG/MIN: 10 INJECTION, EMULSION INTRAVENOUS at 03:06

## 2023-06-13 RX ADMIN — Medication 250 MCG/HR: at 07:06

## 2023-06-13 RX ADMIN — DEXMEDETOMIDINE HYDROCHLORIDE 1.4 MCG/KG/HR: 400 INJECTION INTRAVENOUS at 10:06

## 2023-06-13 RX ADMIN — DEXMEDETOMIDINE HYDROCHLORIDE 1.4 MCG/KG/HR: 400 INJECTION INTRAVENOUS at 04:06

## 2023-06-13 RX ADMIN — PROPOFOL 50 MCG/KG/MIN: 10 INJECTION, EMULSION INTRAVENOUS at 04:06

## 2023-06-13 RX ADMIN — IPRATROPIUM BROMIDE AND ALBUTEROL SULFATE 3 ML: 2.5; .5 SOLUTION RESPIRATORY (INHALATION) at 01:06

## 2023-06-13 RX ADMIN — DEXMEDETOMIDINE HYDROCHLORIDE 1.4 MCG/KG/HR: 400 INJECTION INTRAVENOUS at 08:06

## 2023-06-13 RX ADMIN — PROPOFOL 50 MCG/KG/MIN: 10 INJECTION, EMULSION INTRAVENOUS at 11:06

## 2023-06-13 RX ADMIN — SODIUM CHLORIDE, POTASSIUM CHLORIDE, SODIUM LACTATE AND CALCIUM CHLORIDE: 600; 310; 30; 20 INJECTION, SOLUTION INTRAVENOUS at 06:06

## 2023-06-13 NOTE — PROGRESS NOTES
POD#4 T11-L4 fusion for L2 burst fx  He is lying flat in bed, currently intubated and sedated  Fentanyl and diprivan, on precedex  No acute events overnight  He did attempt to get OOB prior to my rounds. Very agitated with wean in sedation.    AFVSS  Intubated, mechanically ventilated  Exam limited d/t sedation  He does open his eyes to noxious stimuli  Incision c/d/I  Drain output 25/24hrs, serosanguineous    Plan: We will dc drain today  Continue frequent neuro checks  Monitor dressing; please report any active drainage  Continue gabapentin TID and Os-shreya BID  Strict flat until 6/14 per Dr. Matos  SCDs for DVT prophylaxis; ok for lovenox tomorrow

## 2023-06-13 NOTE — PROGRESS NOTES
[facility] - [facility] ICU  Pulmonary Critical Care Note    Patient Name: Gera Chavez  MRN: [id number]  Admission Date: 6/4/2023  Hospital Length of Stay: 9 days  Code Status: Full Code  Attending Provider: Luis Bermudez Jr., MD  Primary Care Provider: Primary Doctor No     Subjective:     HPI:   Gera Chavez is a 37 y.o. male with no known PMH, who was transferred from [facility] for level 1 trauma activation, after patient reportedly jumped off a bridge.  Sustained rib fractures x4 left-sided, pelvic ring fracture, acetabulum fracture, L2 burst fracture, and left pneumothorax with chest tube in place.  Jump witnessed by bystander who called 911.     Hospital Course/Significant events:  6/4/2023 - Admitted to ICU   6/5/2023 - s/p bronchoscopy  6/12/2023 - s/p ORIF right radius fx    24 Hour Interval History:  AF. HTN 140s/70s, SpO2 mid 90s. No acute events overnight. AM Labs: Hg 7.8 from 8.0, bili 5.9 from 5.8. CT with serosanguineous output     Past Medical History:   Diagnosis Date    Asthma     Depression     Diabetes mellitus     Encounter for blood transfusion     Generalized anxiety disorder     Hypertension     Schizophrenia, unspecified     Sleep apnea     Unspecified glaucoma        Past Surgical History:   Procedure Laterality Date    INTRAMEDULLARY RODDING OF FEMUR Left 02/05/2023    Procedure: INSERTION, INTRAMEDULLARY NOEL, FEMUR;  Surgeon: Tuan Zepeda DO;  Location: [facility];  Service: Orthopedics;  Laterality: Left;    OPEN REDUCTION AND INTERNAL FIXATION (ORIF) OF INJURY OF HIP Left 6/5/2023    Procedure: ORIF,PELVIS;  Surgeon: Tuan Zepeda DO;  Location: [facility] OR;  Service: Orthopedics;  Laterality: Left;  supine deven traction LLE CELL SAVER, teo    POSTERIOR LUMBAR INTERBODY FUSION (PLIF) WITH COMPUTER-ASSISTED NAVIGATION N/A 6/9/2023    Procedure: FUSION, SPINE, LUMBAR, PLIF, USING COMPUTER-ASSISTED NAVIGATION;  Surgeon: Angelika Matos MD;  Location: [facility]  OR;  Service: Neurosurgery;  Laterality: N/A;  T11-L4 posterolateral fusion, L1-2 laminectomies with O-arm, Stealth // NTI // TIVA SETUP // [employer]    REMOVAL OF HARDWARE FROM LOWER EXTREMITY Left 5/9/2023    Procedure: REMOVAL, HARDWARE, LOWER EXTREMITY;  Surgeon: Tuan Zepeda DO;  Location: [facility] OR;  Service: Orthopedics;  Laterality: Left;    TRACH TUBE EXCHANGER  6/5/2023    UVULOPALATOPHARYNGOPLASTY         Social History     Socioeconomic History    Marital status: Single   Tobacco Use    Smoking status: Every Day     Packs/day: 0.50     Years: 10.00     Pack years: 5.00     Types: Cigarettes    Smokeless tobacco: Never   Substance and Sexual Activity    Alcohol use: Not Currently    Drug use: Never    Sexual activity: Not Currently     Social Determinants of Health     Financial Resource Strain: Unknown    Difficulty of Paying Living Expenses: Patient refused   Food Insecurity: Unknown    Worried About Running Out of Food in the Last Year: Patient refused    Ran Out of Food in the Last Year: Patient refused   Transportation Needs: Unknown    Lack of Transportation (Medical): Patient refused    Lack of Transportation (Non-Medical): Patient refused   Physical Activity: Unknown    Days of Exercise per Week: Patient refused   Stress: Unknown    Feeling of Stress : Patient refused   Social Connections: Unknown    Frequency of Communication with Friends and Family: Patient refused    Frequency of Social Gatherings with Friends and Family: Patient refused    Attends Club or Organization Meetings: Patient refused    Marital Status: Never    Housing Stability: Unknown    Unable to Pay for Housing in the Last Year: Patient refused    Unstable Housing in the Last Year: Patient refused           Current Outpatient Medications   Medication Instructions    albuterol (PROVENTIL/VENTOLIN HFA) 90 mcg/actuation inhaler 2 puffs, Inhalation, Every 4 hours PRN    ALBUTEROL INHL 90 mcg, Inhalation, Every 4 hours PRN     amLODIPine (NORVASC) 5 mg, Oral, Daily    amoxicillin-clavulanate 875-125mg (AUGMENTIN) 875-125 mg per tablet 1 tablet, Oral, Every 12 hours (non-standard times)    atorvastatin (LIPITOR) 20 mg, Oral, Daily    azithromycin (Z-CELESTINA) 500 mg, Oral, Daily    clonazePAM (KLONOPIN) 1 mg, Oral, 2 times daily PRN    dextroamphetamine-amphetamine (ADDERALL XR) 25 MG 24 hr capsule 25 mg, Oral, 2 times daily    FLUoxetine 20 MG capsule fluoxetine 20 mg capsule    FLUoxetine 40 mg, Oral, Daily    insulin glargine 100 units/mL SubQ pen Lantus Solostar U-100 Insulin 100 unit/mL (3 mL) subcutaneous pen    ipratropium (ATROVENT) 42 mcg (0.06 %) nasal spray Each Nostril    lamoTRIgine (LAMICTAL) 25 MG tablet lamotrigine 25 mg tablet    LANTUS SOLOSTAR U-100 INSULIN glargine 100 units/mL SubQ pen Subcutaneous    latanoprost 0.005 % ophthalmic solution latanoprost 0.005 % eye drops    lisinopriL (PRINIVIL,ZESTRIL) 40 mg, Oral    mirtazapine (REMERON) 7.5 MG Tab mirtazapine 7.5 mg tablet    potassium chloride (K-TAB) 20 mEq potassium chloride ER 20 mEq tablet,extended release   TAKE 1 TABLET BY MOUTH EVERY DAY    prochlorperazine (COMPAZINE) 10 MG tablet prochlorperazine maleate 10 mg tablet    QUEtiapine (SEROQUEL) 200 mg, Oral, Nightly    testosterone cypionate (DEPOTESTOTERONE CYPIONATE) 200 mg/mL injection SMARTSIG:Milliliter(s) IM       Current Inpatient Medications   albuterol-ipratropium  3 mL Nebulization Q6H    calcium-vitamin D3  1 tablet Per NG tube BID    ceFAZolin (ANCEF) IVPB  2 g Intravenous Q8H    [START ON 6/14/2023] enoxaparin  40 mg Subcutaneous Q12H    famotidine (PF)  20 mg Intravenous BID    FLUoxetine  40 mg Per NG tube Daily    gabapentin  300 mg Per NG tube TID    lamoTRIgine  25 mg Per NG tube Daily    QUEtiapine  50 mg Per NG tube BID       Current Intravenous Infusions   dexmedeTOMIDine (Precedex) infusion (titrating) 1.4 mcg/kg/hr (06/13/23 0617)    fentanyl 250 mcg/hr (06/13/23 0704)    lactated ringers 75  mL/hr at 06/12/23 1306    NORepinephrine bitartrate-D5W Stopped (06/06/23 1135)    propofoL 50 mcg/kg/min (06/13/23 0618)         Review of Systems   Reason unable to perform ROS: intubated and sedated.        Objective:       Intake/Output Summary (Last 24 hours) at 6/13/2023 0721  Last data filed at 6/13/2023 0600  Gross per 24 hour   Intake 6169.6 ml   Output 3125 ml   Net 3044.6 ml         Vital Signs (Most Recent):  Temp: 98.8 °F (37.1 °C) (06/13/23 0430)  Pulse: 91 (06/13/23 0600)  Resp: 19 (06/13/23 0600)  BP: (!) 145/63 (06/13/23 0600)  SpO2: 96 % (06/13/23 0600)  Body mass index is 37.22 kg/m².  Weight: 121 kg (266 lb 12.1 oz) Vital Signs (24h Range):  Temp:  [98.4 °F (36.9 °C)-100.5 °F (38.1 °C)] 98.8 °F (37.1 °C)  Pulse:  [] 91  Resp:  [0-44] 19  SpO2:  [92 %-100 %] 96 %  BP: ()/() 145/63     Physical Exam  General: Intubated and sedated  HEENT: NC/AT; PERRL; nasal and oral mucosa moist and clear; ET tube in place  Pulm: Rhonchi in upper lobes bilaterally,mechanically ventilated, oxygenating well   CV: S1, S2 w/o murmurs or gallops; no edema noted, CT w/ serosanguineous output   GI: Soft with normal bowel sounds in all quadrants, no masses on palpation  Neuro: Limited d/t sedation    Lines/Drains/Airways       Central Venous Catheter Line  Duration             Percutaneous Central Line Insertion/Assessment - Double Lumen  06/04/23 1530 Subclavian Left 8 days              Drain  Duration                  Chest Tube 06/04/23 1549 Left Midaxillary 28 Fr. 8 days         Urethral Catheter 06/05/23 1610 Silicone 16 Fr. 7 days         NG/OG Tube 06/08/23 0915 [place] sump Right mouth 4 days         Closed/Suction Drain 06/09/23 1537 Midline Back Accordion 10 Fr. 3 days              Airway  Duration                  Airway - Non-Surgical 06/08/23 0855 Endotracheal Tube 4 days              Peripheral Intravenous Line  Duration                  Peripheral IV - Single Lumen 06/09/23 0829 18 G  Left;Posterior Hand 3 days                    Significant Labs:    Lab Results   Component Value Date    WBC 6.88 06/13/2023    HGB 7.8 (L) 06/13/2023    HCT 24.6 (L) 06/13/2023    MCV 89.5 06/13/2023     06/13/2023           BMP  Lab Results   Component Value Date     (H) 06/13/2023    K 3.6 06/13/2023    CHLORIDE 113 (H) 06/13/2023    CO2 22 06/13/2023    BUN 14.5 06/13/2023    CREATININE 0.92 06/13/2023    CALCIUM 8.0 (L) 06/13/2023    AGAP 11.0 06/12/2023    AGAP 10.0 06/12/2023         ABG  Recent Labs   Lab 06/13/23 0600   PH 7.450   PO2 62.0*   HCO3 28.5   POCBASEDEF 4.10       Mechanical Ventilation Support:  Vent Mode: VOLUME A/C (06/13/23 0506)  Set Rate: 28 BPM (06/13/23 0506)  Vt Set: 500 mL (06/13/23 0506)  Pressure Support: 12 cmH20 (06/11/23 0527)  PEEP/CPAP: 10 cmH20 (06/13/23 0506)  Oxygen Concentration (%): 35 (06/13/23 0506)  Peak Airway Pressure: 30 cmH20 (06/13/23 0506)  Total Ve: 12.8 L/m (06/13/23 0506)  F/VT Ratio<105 (RSBI): (!) 62.08 (06/13/23 0506)      Significant Imaging:  I have reviewed the pertinent imaging within the past 24 hours.        Assessment/Plan:     Assessment  Pelvic ring fx s/p ORIF on 6/5/2023  Left-sided rib fractures with pneumo/hemo thorax s/p chest tube placement  L2 burst fracture and L1-2 stenosis s/p laminectomy on 6/9/2023  Right distal radius fracture s/p ORIF      Plan  -Continue ICU level of care per primary team  -Trauma, neurosurgery, and orthopedic surgery teams following  -Continue chest tube management   -CXR 6/11/2023 revealed improved bilateral pleural effusions  -OR tomorrow with orthopedic surgery for acetabular fx  -Wean vent settings as tolerated      DVT Prophylaxis: SCD  GI Prophylaxis: Famotidine     32 minutes of critical care was time spent personally by me on the following activities: development of treatment plan with patient or surrogate and bedside caregivers, discussions with consultants, evaluation of patient's response to  treatment, examination of patient, ordering and performing treatments and interventions, ordering and review of laboratory studies, ordering and review of radiographic studies, pulse oximetry, re-evaluation of patient's condition.  This critical care time did not overlap with that of any other provider or involve time for any procedures.     Chucho Erickson MD  Pulmonary Critical Care Medicine, PGY1  [facility] - [facility] ICU

## 2023-06-13 NOTE — PROGRESS NOTES
Patient Name: Gera Chavez  MRN: [id number]  Admission Date: 6/4/2023  Hospital Length of Stay: 9 days  Attending Provider: Luis Bermudez Jr., MD  Primary Care Provider: Primary Doctor No  Follow-up For: Procedure(s) (LRB):  ORIF, FRACTURE, RADIUS, DISTAL (Right)    Post-Operative Day: Day of Surgery  Subjective:       Principal Orthopedic Problem: 1 Day Post-Op       Interval History:  POD 1 ORIF right distal radius. Nursing staff at bedside, denies any acute ortho issues. Patient remains intubated and sedated in ICU.     Review of patient's allergies indicates:   Allergen Reactions    Iodine     Trazodone        Current Facility-Administered Medications   Medication    0.9%  NaCl infusion (for blood administration)    0.9%  NaCl infusion (for blood administration)    acetaminophen oral solution 650 mg    albuterol-ipratropium 2.5 mg-0.5 mg/3 mL nebulizer solution 3 mL    calcium-vitamin D3 500 mg-5 mcg (200 unit) per tablet 1 tablet    dexmedetomidine (PRECEDEX) 400mcg/100mL 0.9% NaCL infusion    [START ON 6/14/2023] enoxaparin injection 40 mg    famotidine (PF) injection 20 mg    fentaNYL 2500 mcg in 0.9% sodium chloride 250 mL infusion premix (titrating)    fentaNYL injection 50 mcg    FLUoxetine capsule 40 mg    gabapentin capsule 300 mg    hydrALAZINE injection 10 mg    labetaloL injection 10 mg    lactated ringers infusion    lamoTRIgine tablet 25 mg    midazolam (VERSED) 5 mg/mL injection 2 mg    NORepinephrine 8 mg in dextrose 5% 250 mL infusion    oxyCODONE immediate release tablet 5 mg    oxyCODONE immediate release tablet Tab 10 mg    propofol (DIPRIVAN) 10 mg/mL infusion    QUEtiapine tablet 50 mg     Objective:     Vital Signs (Most Recent):  Temp: 99.5 °F (37.5 °C) (06/13/23 1200)  Pulse: 95 (06/13/23 1200)  Resp: 20 (06/13/23 1200)  BP: (!) 182/96 (06/13/23 1200)  SpO2: 96 % (06/13/23 1200) Vital Signs (24h Range):  Temp:  [98.4 °F (36.9 °C)-101.7 °F (38.7 °C)] 99.5 °F (37.5 °C)  Pulse:  []  "95  Resp:  [0-56] 20  SpO2:  [92 %-100 %] 96 %  BP: (117-195)/() 182/96     Weight: 121 kg (266 lb 12.1 oz)  Height: 5' 10.98" (180.3 cm)  Body mass index is 37.22 kg/m².      Intake/Output Summary (Last 24 hours) at 6/13/2023 1246  Last data filed at 6/13/2023 0900  Gross per 24 hour   Intake 4669.6 ml   Output 2840 ml   Net 1829.6 ml         Physical Exam:   Ortho/SPM Exam    General: sedated, intubated, unable to participate in exam.     Musculoskeletal Exam:  Right upper extremity:  dressing to wrist is clean, dry, and intact. Compartments are soft and compressible. Velcro brace in place.Palpable radial pulse. Brisk cap refill to digits.     Left lower extremity: no obvious deformity. Compartments soft and compressible. Palpable DP pulse. Brisk cap refill distally.     Diagnostic Findings:   Significant Labs: BMP:   Recent Labs   Lab 06/13/23  0215   *   K 3.6   CO2 22   BUN 14.5   CREATININE 0.92   CALCIUM 8.0*   MG 2.20       CBC:   Recent Labs   Lab 06/12/23  1224 06/12/23  1225 06/13/23  0215   WBC 7.06 7.39 6.88   HGB 8.3* 8.0* 7.8*   HCT 25.5* 24.5* 24.6*    262 286       CMP:   Recent Labs   Lab 06/12/23  0150 06/12/23  1224 06/13/23  0215    144  144 146*   K 3.6 3.9  3.8 3.6   CO2 24 23  24 22   BUN 13.1 15.1  15.2 14.5   CREATININE 0.77 0.84  0.84 0.92   CALCIUM 7.8* 8.0*  8.3* 8.0*   ALBUMIN 1.8*  --  1.7*   BILITOT 5.8*  --  5.9*   ALKPHOS 67  --  87   AST 42*  --  36*   ALT 20  --  19       All pertinent labs within the past 24 hours have been reviewed.        Significant Imaging: I have reviewed and interpreted all pertinent imaging results/findings.     Assessment/Plan:     Active Diagnoses:    Diagnosis Date Noted POA    PRINCIPAL PROBLEM:  Closed pelvic ring fracture [S32.810A] 06/05/2023 Yes    Displaced fracture of posterior column (ilioischial) of left acetabulum, initial encounter for closed fracture [S32.442A] 06/05/2023 Yes      Problems Resolved During this " Admission:   POD 1 ORIF R distal radius. NWB R wrist, ok for platform walker. Continue velcro brace. Plan to begin daily dry dressing changes tomorrow. He will need a second operation in 10-12 weeks for removal of dorsal spanning plate.     Plan to take to operating room tomorrow for open reduction internal fixation of acetabulum fracture once he is cleared for lateral positioning by neurosurgery.    The above findings, diagnosis, and treatment plan were discussed with Dr. Compa Bernal who is in agreement.

## 2023-06-13 NOTE — NURSING
Nurses Note -- 4 Eyes      6/13/2023   6:53 AM      Skin assessed during: Q Shift Change      [] No Altered Skin Integrity Present    [x]Prevention Measures Documented      [x] Yes- Altered Skin Integrity Present or Discovered   [] LDA Added if Not in Epic (Describe Wound)   [] New Altered Skin Integrity was Present on Admit and Documented in LDA   [] Wound Image Taken    Wound Care Consulted? No    Attending Nurse:  Slava Belle RN     Second RN/Staff Member:  Juanito Rodriguez RN

## 2023-06-13 NOTE — PLAN OF CARE
Chest Tube Removal Procedure  Patient Name: Gera Chavez  MRN: [id number]  YOB: 1985  Admit Date: 6/4/2023  HD#9  Date of Procedure: 06/13/2023    Procedure details:  L chest tube removed. Gera Chavez tolerated the removal well with no immediate complications. Chest tube site secured with petroleum gauze, 4x4, and foam tape. O2 saturation stable around 98%. HR and RR stable.     Plan:  Will obtain post pull chest xray.        6/13/2023 8:37 AM     The above findings, diagnostics, and treatment plan were discussed with Dr. Luis Bermudez who will follow with further assessments and recommendations. Please call with any questions, concerns, or clinical status changes.

## 2023-06-13 NOTE — PROGRESS NOTES
[facility] ICU PROGRESS NOTE    HD# [id number]  Admission Summary:   In brief, Gera Chavez is a 37 y.o. male admitted on 6/4/2023 following fall from bridge at 25 ft.  He reportedly jumped off a bridge and landed on dry land.  He arrived intubated with a left-sided chest tube.  He was found to have a pelvic ring fracture including open book fracture, L2 burst fracture, left pneumothorax, multiple left-sided rib fractures.    Interval history:    NAEON  AFVSS  Post op day 1 ORIF of L distal radius    Consults:   Neurosurgery  Orthopedic surgery Injuries:  L pelvic rim fx w/ open book pelvis  L pelvic wall hematoma  L2 burst fx  L apical PTX  L 4-11 rib fx  pulmonary contusion  R distal radius fracture Operations/Procedures:  Left chest tube placement at outside facility 6/4  Pelvic ring fx ORIF 6/5  ORIF L distal radius fx 6/12     Past medical history:  1. Asthma   2. Depression  3. Diabetes   4. Generalized anxiety disorder   5. Hypertension  6. Schizophrenia   7. Sleep apnea    Medications: [x] Medications reviewed/updated   Home Meds:    Current Outpatient Medications   Medication Instructions    albuterol (PROVENTIL/VENTOLIN HFA) 90 mcg/actuation inhaler 2 puffs, Inhalation, Every 4 hours PRN    ALBUTEROL INHL 90 mcg, Inhalation, Every 4 hours PRN    amLODIPine (NORVASC) 5 mg, Oral, Daily    amoxicillin-clavulanate 875-125mg (AUGMENTIN) 875-125 mg per tablet 1 tablet, Oral, Every 12 hours (non-standard times)    atorvastatin (LIPITOR) 20 mg, Oral, Daily    azithromycin (Z-CELESTINA) 500 mg, Oral, Daily    clonazePAM (KLONOPIN) 1 mg, Oral, 2 times daily PRN    dextroamphetamine-amphetamine (ADDERALL XR) 25 MG 24 hr capsule 25 mg, Oral, 2 times daily    FLUoxetine 20 MG capsule fluoxetine 20 mg capsule    FLUoxetine 40 mg, Oral, Daily    insulin glargine 100 units/mL SubQ pen Lantus Solostar U-100 Insulin 100 unit/mL (3 mL) subcutaneous pen    ipratropium (ATROVENT) 42 mcg (0.06 %) nasal spray Each Nostril    lamoTRIgine  "(LAMICTAL) 25 MG tablet lamotrigine 25 mg tablet    LANTUS SOLOSTAR U-100 INSULIN glargine 100 units/mL SubQ pen Subcutaneous    latanoprost 0.005 % ophthalmic solution latanoprost 0.005 % eye drops    lisinopriL (PRINIVIL,ZESTRIL) 40 mg, Oral    mirtazapine (REMERON) 7.5 MG Tab mirtazapine 7.5 mg tablet    potassium chloride (K-TAB) 20 mEq potassium chloride ER 20 mEq tablet,extended release   TAKE 1 TABLET BY MOUTH EVERY DAY    prochlorperazine (COMPAZINE) 10 MG tablet prochlorperazine maleate 10 mg tablet    QUEtiapine (SEROQUEL) 200 mg, Oral, Nightly    testosterone cypionate (DEPOTESTOTERONE CYPIONATE) 200 mg/mL injection SMARTSIG:Milliliter(s) IM      Scheduled Meds:    albuterol-ipratropium  3 mL Nebulization Q6H    calcium-vitamin D3  1 tablet Per NG tube BID    ceFAZolin (ANCEF) IVPB  2 g Intravenous Q8H    [START ON 6/14/2023] enoxaparin  40 mg Subcutaneous Q12H    famotidine (PF)  20 mg Intravenous BID    FLUoxetine  40 mg Per NG tube Daily    gabapentin  300 mg Per NG tube TID    lamoTRIgine  25 mg Per NG tube Daily    QUEtiapine  50 mg Per NG tube BID     Continuous Infusions:    dexmedeTOMIDine (Precedex) infusion (titrating) 1.4 mcg/kg/hr (06/13/23 0617)    fentanyl 250 mcg/hr (06/13/23 0704)    lactated ringers 75 mL/hr at 06/12/23 1306    NORepinephrine bitartrate-D5W Stopped (06/06/23 1135)    propofoL 50 mcg/kg/min (06/13/23 0618)     PRN Meds: sodium chloride, sodium chloride, acetaminophen, fentaNYL, hydrALAZINE, labetaloL, midazolam, oxyCODONE, oxyCODONE     Vitals:  VITAL SIGNS: 24 HR MIN & MAX LAST   Temp  Min: 98.4 °F (36.9 °C)  Max: 100.5 °F (38.1 °C)  98.8 °F (37.1 °C)   BP  Min: 96/43  Max: 195/104  (!) 145/63    Pulse  Min: 84  Max: 120  91    Resp  Min: 0  Max: 44  19    SpO2  Min: 92 %  Max: 100 %  96 %      HT: 5' 10.98" (180.3 cm)  WT: 121 kg (266 lb 12.1 oz)  BMI: 37.2   Ideal Body Weight (IBW), Male: 171.88 lb  % Ideal Body Weight, Male (lb): 155.09 %        General  Exam: NAD, " sedated and intubated     Neuro/Psych  GCS: 3T on sedation  Exam: limited exam secondary to sedation, reportedly follows commands off sedation  ICP monitor: No  ICP treatment: ICP Treatment: N/A  C-Collar: Yes    Plan:  Q2 hour neurochecks  Flat bedrest until   Wean sedation  Pain control  HV per NSGY  Lovenox timed to start      HEENT  Exam: NC    Plan:   none     Pulmonary  Vitals: Resp  Av.4  Min: 0  Max: 44  SpO2  Av.5 %  Min: 92 %  Max: 100 %    Ventilator/Oxygen Settings:   Vent Mode: VOLUME A/C  Vt Set: 500 mL  Set Rate: 28 BPM  Pressure Support: 12 cmH20  I:E Ratio Measured: 1:1.7     PaO2/FiO2 ratio (if ventilated): 70/40  RSBI RR/TV (if ventilated): 28/530  ABG:   Recent Labs   Lab 23  0600   PH 7.450   PO2 62.0*   HCO3 28.5        CXR:    No results found in the last 24 hours.           Rib fractures: yes  Chest Tube: Left - without air leak    Exam: mechanically ventilated  Incentive Spirometry/RT Treatments: RT    Plan:     Will pull L CT  Wean to extubate   Monitor daily CXR/ABG     Cardiovascular  Vitals: Pulse  Av.3  Min: 84  Max: 120  BP  Min: 96/43  Max: 195/104  Vasoactive Agents: None  Exam: tachy    Echo Findings:   Left Ventricle  The left ventricle is  with normal systolic function. The estimated ejection fraction is 65%. There is normal diastolic function.     Right Ventricle  Normal cavity size.     Left Atrium  The left atrial volume index is normal.     Right Atrium  The right atrium is normal in size.     Aortic Valve  The aortic valve appears structurally normal.     Mitral Valve  The mean pressure gradient across the mitral valve is 3 mmHg at a heart rate of. The mitral valve appears structurally normal. The estimated mitral valve area by pressure half time is 2.78 cm2.     Tricuspid Valve  The tricuspid valve appears structurally normal.     Pulmonic Valve  Pulmonic valve is structurally normal.     Plan:   Continuous cardiac monitoring while in the ICU      Renal  Recent Labs     23  0138 23  0150 23  1224 23  0215   BUN 9.1 13.1 15.1  15.2 14.5   CREATININE 0.73 0.77 0.84  0.84 0.92         No results for input(s): LACTIC in the last 72 hours.      Intake/Output - Last 3 Shifts          07 06 06 07 0659    I.V. (mL/kg) 3439.3 (28.4) 4516.6 (37.3)     Blood 307.5      NG/GT  153     IV Piggyback 100 1500     Total Intake(mL/kg) 3846.8 (31.8) 6169.6 (51)     Urine (mL/kg/hr) 2580 (0.9) 2820 (1)     Drains 440 175     Blood  10     Chest Tube 150 120     Total Output 3170 3125     Net +676.8 +3044.6                     Intake/Output Summary (Last 24 hours) at 2023 0733  Last data filed at 2023 0600  Gross per 24 hour   Intake 6169.6 ml   Output 3125 ml   Net 3044.6 ml           Titus: Yes     Plan:   I's and O's  Lactic normalized  CTM BUN/Cr/UOP     FEN/GI  Recent Labs     23  0150 23  1224 23  0215    142 144  144 146*   K 3.7 3.6 3.9  3.8 3.6   CO2  24 23  24 22   CALCIUM 7.4* 7.8* 8.0*  8.3* 8.0*   MG 2.10 2.10  --  2.20   PHOS 3.2 4.1  --  3.4   ALBUMIN 1.8* 1.8*  --  1.7*   BILITOT 3.9* 5.8*  --  5.9*   AST 53* 42*  --  36*   ALKPHOS 62 67  --  87   ALT 22 20  --  19         Diet: NPO    Last BM:  Prior to arrival    Abdominal Exam:  Soft, nondistended  Abdominal Dressing: None    Plan:   Continue TF  Replace electrolytes based on daily labs     Heme/Onc  Recent Labs     23  0150 23  1224 23  1225 23   HGB 8.1* 8.3* 8.0* 7.8*   HCT 24.0* 25.5* 24.5* 24.6*    277 262 286         Transfusions (over past 24h):  1 units of PRBC    Plan:   H&H slightly down, will CTM   Transfuse greater than 7 hemoglobin or symptomatic     ID  Temp  Av.5 °F (37.5 °C)  Min: 98.4 °F (36.9 °C)  Max: 100.5 °F (38.1 °C)      Recent Labs     23  0150 23  1224 23  1225 23   WBC 7.83 7.06  7.39 6.88         Cultures:  None new  Antibiotics:   Ancef 6/5    Plan:   Continue to trend fever curve, WBC WNL     Endocrine  Recent Labs     06/11/23  0138 06/12/23  0150 06/12/23  1224 06/13/23  0215   GLUCOSE 117* 111* 107*  104* 130*        No results for input(s): POCTGLUCOSE in the last 72 hours.     Insulin treatment: no medications    Plan:   BG <180     Musculoskeletal/Wounds  Weight bearing status:   RUE: NWB  LUE: WBAT  RLE: NWB  LLE: NWB    [x] Tertiary exam performed    Extremity/wound exam:     Plan:   Surgical planning for pelvic fracture  PT/OT     Precautions  Fall, Spinal, and Standard  Strict bedrest until 6/14     Prophylaxis  Seizure: Not indicated.  DVT: lovenox 40 BID to start 6/14  GI: H2B     Lines/drains/airway [x] LDA reviewed/updated   PIV  OGT  ETT  L chest tube  Titus  PIV  CVC  A-line    Plan:  Maintain peripheral access  Maintain ETT/OGT  Will pull L chest tube     Restraints  Face to face evaluation of need for restraints on rounds today:   Currently restrained? no     Disposition  Continue ongoing ICU level care.        6/13/2023 9:56 AM     The above findings, diagnostics, and treatment plan were discussed with Dr. Luis Bermudez who will follow with further assessments and recommendations. Please call with any questions, concerns, or clinical status changes.

## 2023-06-14 ENCOUNTER — ANESTHESIA EVENT (OUTPATIENT)
Dept: SURGERY | Facility: HOSPITAL | Age: 38
DRG: 956 | End: 2023-06-14
Payer: MEDICARE

## 2023-06-14 ENCOUNTER — ANESTHESIA (OUTPATIENT)
Dept: SURGERY | Facility: HOSPITAL | Age: 38
DRG: 956 | End: 2023-06-14
Payer: MEDICARE

## 2023-06-14 LAB
ABO + RH BLD: NORMAL
ABO + RH BLD: NORMAL
ALBUMIN SERPL-MCNC: 1.8 G/DL (ref 3.5–5)
ALBUMIN SERPL-MCNC: 1.9 G/DL (ref 3.5–5)
ALBUMIN/GLOB SERPL: 0.6 RATIO (ref 1.1–2)
ALBUMIN/GLOB SERPL: 0.6 RATIO (ref 1.1–2)
ALLENS TEST BLOOD GAS (OHS): YES
ALLENS TEST BLOOD GAS (OHS): YES
ALP SERPL-CCNC: 111 UNIT/L (ref 40–150)
ALP SERPL-CCNC: 115 UNIT/L (ref 40–150)
ALT SERPL-CCNC: 16 UNIT/L (ref 0–55)
ALT SERPL-CCNC: 17 UNIT/L (ref 0–55)
AST SERPL-CCNC: 32 UNIT/L (ref 5–34)
AST SERPL-CCNC: 37 UNIT/L (ref 5–34)
BASE EXCESS BLD CALC-SCNC: 3.9 MMOL/L (ref -2–2)
BASE EXCESS BLD CALC-SCNC: 4.7 MMOL/L
BASOPHILS # BLD AUTO: 0.03 X10(3)/MCL
BASOPHILS # BLD AUTO: 0.04 X10(3)/MCL
BASOPHILS NFR BLD AUTO: 0.4 %
BASOPHILS NFR BLD AUTO: 0.4 %
BILIRUBIN DIRECT+TOT PNL SERPL-MCNC: 5.1 MG/DL
BILIRUBIN DIRECT+TOT PNL SERPL-MCNC: 6.4 MG/DL
BLD PROD TYP BPU: NORMAL
BLD PROD TYP BPU: NORMAL
BLOOD GAS SAMPLE TYPE (OHS): ABNORMAL
BLOOD GAS SAMPLE TYPE (OHS): ABNORMAL
BLOOD UNIT EXPIRATION DATE: NORMAL
BLOOD UNIT EXPIRATION DATE: NORMAL
BLOOD UNIT TYPE CODE: 7300
BLOOD UNIT TYPE CODE: 7300
BUN SERPL-MCNC: 11.9 MG/DL (ref 8.9–20.6)
BUN SERPL-MCNC: 13.8 MG/DL (ref 8.9–20.6)
CA-I BLD-SCNC: 1.13 MMOL/L (ref 1.12–1.23)
CA-I BLD-SCNC: 1.19 MMOL/L (ref 1.12–1.23)
CALCIUM SERPL-MCNC: 7.8 MG/DL (ref 8.4–10.2)
CALCIUM SERPL-MCNC: 8.4 MG/DL (ref 8.4–10.2)
CHLORIDE SERPL-SCNC: 114 MMOL/L (ref 98–107)
CHLORIDE SERPL-SCNC: 114 MMOL/L (ref 98–107)
CO2 BLDA-SCNC: 28.5 MMOL/L
CO2 BLDA-SCNC: 30.5 MMOL/L
CO2 SERPL-SCNC: 23 MMOL/L (ref 22–29)
CO2 SERPL-SCNC: 23 MMOL/L (ref 22–29)
COHGB MFR BLDA: 3.9 % (ref 0.5–1.5)
CREAT SERPL-MCNC: 0.79 MG/DL (ref 0.73–1.18)
CREAT SERPL-MCNC: 0.88 MG/DL (ref 0.73–1.18)
CROSSMATCH INTERPRETATION: NORMAL
CROSSMATCH INTERPRETATION: NORMAL
DISPENSE STATUS: NORMAL
DISPENSE STATUS: NORMAL
DRAWN BY BLOOD GAS (OHS): ABNORMAL
DRAWN BY BLOOD GAS (OHS): ABNORMAL
EOSINOPHIL # BLD AUTO: 0.11 X10(3)/MCL (ref 0–0.9)
EOSINOPHIL # BLD AUTO: 0.13 X10(3)/MCL (ref 0–0.9)
EOSINOPHIL NFR BLD AUTO: 1.1 %
EOSINOPHIL NFR BLD AUTO: 1.7 %
ERYTHROCYTE [DISTWIDTH] IN BLOOD BY AUTOMATED COUNT: 16.1 % (ref 11.5–17)
ERYTHROCYTE [DISTWIDTH] IN BLOOD BY AUTOMATED COUNT: 16.3 % (ref 11.5–17)
FIO2 BLOOD GAS (OHS): 35 %
FIO2 BLOOD GAS (OHS): 35 %
GFR SERPLBLD CREATININE-BSD FMLA CKD-EPI: >60 MLS/MIN/1.73/M2
GFR SERPLBLD CREATININE-BSD FMLA CKD-EPI: >60 MLS/MIN/1.73/M2
GLOBULIN SER-MCNC: 3 GM/DL (ref 2.4–3.5)
GLOBULIN SER-MCNC: 3.2 GM/DL (ref 2.4–3.5)
GLUCOSE SERPL-MCNC: 113 MG/DL (ref 74–100)
GLUCOSE SERPL-MCNC: 124 MG/DL (ref 74–100)
HCO3 BLDA-SCNC: 27.4 MMOL/L (ref 22–26)
HCO3 BLDA-SCNC: 29.2 MMOL/L
HCT VFR BLD AUTO: 24 % (ref 42–52)
HCT VFR BLD AUTO: 24.8 % (ref 42–52)
HGB BLD-MCNC: 7.6 G/DL (ref 14–18)
HGB BLD-MCNC: 8.1 G/DL (ref 14–18)
IMM GRANULOCYTES # BLD AUTO: 0.13 X10(3)/MCL (ref 0–0.04)
IMM GRANULOCYTES # BLD AUTO: 0.19 X10(3)/MCL (ref 0–0.04)
IMM GRANULOCYTES NFR BLD AUTO: 1.7 %
IMM GRANULOCYTES NFR BLD AUTO: 1.9 %
LYMPHOCYTES # BLD AUTO: 0.92 X10(3)/MCL (ref 0.6–4.6)
LYMPHOCYTES # BLD AUTO: 1.07 X10(3)/MCL (ref 0.6–4.6)
LYMPHOCYTES NFR BLD AUTO: 14.1 %
LYMPHOCYTES NFR BLD AUTO: 9.2 %
MAGNESIUM SERPL-MCNC: 2.1 MG/DL (ref 1.6–2.6)
MCH RBC QN AUTO: 28.3 PG (ref 27–31)
MCH RBC QN AUTO: 28.7 PG (ref 27–31)
MCHC RBC AUTO-ENTMCNC: 31.7 G/DL (ref 33–36)
MCHC RBC AUTO-ENTMCNC: 32.7 G/DL (ref 33–36)
MCV RBC AUTO: 87.9 FL (ref 80–94)
MCV RBC AUTO: 89.2 FL (ref 80–94)
MECH RR (OHS): 20 B/MIN
MECH RR (OHS): 28 B/MIN
MECH VT (OHS): 500 ML
MECH VT (OHS): 500 ML
METHGB MFR BLDA: 1 % (ref 0.4–1.5)
MODE (OHS): AC
MODE (OHS): AC
MONOCYTES # BLD AUTO: 0.64 X10(3)/MCL (ref 0.1–1.3)
MONOCYTES # BLD AUTO: 0.67 X10(3)/MCL (ref 0.1–1.3)
MONOCYTES NFR BLD AUTO: 6.7 %
MONOCYTES NFR BLD AUTO: 8.4 %
NEUTROPHILS # BLD AUTO: 5.6 X10(3)/MCL (ref 2.1–9.2)
NEUTROPHILS # BLD AUTO: 8.03 X10(3)/MCL (ref 2.1–9.2)
NEUTROPHILS NFR BLD AUTO: 73.7 %
NEUTROPHILS NFR BLD AUTO: 80.7 %
NRBC BLD AUTO-RTO: 0.5 %
NRBC BLD AUTO-RTO: 0.6 %
O2 HB BLOOD GAS (OHS): 94.3 % (ref 94–97)
OXYGEN DEVICE BLOOD GAS (OHS): ABNORMAL
OXYHGB MFR BLDA: 8.6 G/DL (ref 12–16)
PCO2 BLDA: 36 MMHG (ref 35–45)
PCO2 BLDA: 42 MMHG (ref 35–45)
PEEP (OHS): 10 CMH2O
PEEP (OHS): 10 CMH2O
PH BLDA: 7.45 [PH] (ref 7.35–7.45)
PH BLDA: 7.49 [PH] (ref 7.35–7.45)
PHOSPHATE SERPL-MCNC: 3.5 MG/DL (ref 2.3–4.7)
PLATELET # BLD AUTO: 344 X10(3)/MCL (ref 130–400)
PLATELET # BLD AUTO: 356 X10(3)/MCL (ref 130–400)
PMV BLD AUTO: 10.2 FL (ref 7.4–10.4)
PMV BLD AUTO: 10.7 FL (ref 7.4–10.4)
PO2 BLDA: 64 MMHG (ref 80–100)
PO2 BLDA: 85 MMHG (ref 80–100)
POTASSIUM BLOOD GAS (OHS): 3.4 MMOL/L (ref 3.5–5)
POTASSIUM BLOOD GAS (OHS): 3.5 MMOL/L (ref 3.5–5)
POTASSIUM SERPL-SCNC: 3.6 MMOL/L (ref 3.5–5.1)
POTASSIUM SERPL-SCNC: 3.8 MMOL/L (ref 3.5–5.1)
PROT SERPL-MCNC: 4.8 GM/DL (ref 6.4–8.3)
PROT SERPL-MCNC: 5.1 GM/DL (ref 6.4–8.3)
RBC # BLD AUTO: 2.69 X10(6)/MCL (ref 4.7–6.1)
RBC # BLD AUTO: 2.82 X10(6)/MCL (ref 4.7–6.1)
SAMPLE SITE BLOOD GAS (OHS): ABNORMAL
SAMPLE SITE BLOOD GAS (OHS): ABNORMAL
SAO2 % BLDA: 93 %
SAO2 % BLDA: 97.2 %
SODIUM BLOOD GAS (OHS): 142 MMOL/L (ref 137–145)
SODIUM BLOOD GAS (OHS): 145 MMOL/L (ref 137–145)
SODIUM SERPL-SCNC: 146 MMOL/L (ref 136–145)
SODIUM SERPL-SCNC: 147 MMOL/L (ref 136–145)
UNIT NUMBER: NORMAL
UNIT NUMBER: NORMAL
WBC # SPEC AUTO: 7.6 X10(3)/MCL (ref 4.5–11.5)
WBC # SPEC AUTO: 9.96 X10(3)/MCL (ref 4.5–11.5)

## 2023-06-14 PROCEDURE — 99900026 HC AIRWAY MAINTENANCE (STAT)

## 2023-06-14 PROCEDURE — 63600175 PHARM REV CODE 636 W HCPCS: Performed by: SURGERY

## 2023-06-14 PROCEDURE — P9047 ALBUMIN (HUMAN), 25%, 50ML: HCPCS | Mod: JZ,JG | Performed by: NURSE ANESTHETIST, CERTIFIED REGISTERED

## 2023-06-14 PROCEDURE — 94761 N-INVAS EAR/PLS OXIMETRY MLT: CPT

## 2023-06-14 PROCEDURE — 25000003 PHARM REV CODE 250: Performed by: SURGERY

## 2023-06-14 PROCEDURE — 27227 PR OPEN INTERN FIX ACETABULAR FX: ICD-10-PCS | Mod: 58,AS,LT, | Performed by: NURSE PRACTITIONER

## 2023-06-14 PROCEDURE — 36600 WITHDRAWAL OF ARTERIAL BLOOD: CPT

## 2023-06-14 PROCEDURE — 25000003 PHARM REV CODE 250: Performed by: NURSE ANESTHETIST, CERTIFIED REGISTERED

## 2023-06-14 PROCEDURE — 82803 BLOOD GASES ANY COMBINATION: CPT

## 2023-06-14 PROCEDURE — 27201423 OPTIME MED/SURG SUP & DEVICES STERILE SUPPLY: Performed by: ORTHOPAEDIC SURGERY

## 2023-06-14 PROCEDURE — 83735 ASSAY OF MAGNESIUM: CPT | Performed by: STUDENT IN AN ORGANIZED HEALTH CARE EDUCATION/TRAINING PROGRAM

## 2023-06-14 PROCEDURE — P9016 RBC LEUKOCYTES REDUCED: HCPCS

## 2023-06-14 PROCEDURE — 63600175 PHARM REV CODE 636 W HCPCS

## 2023-06-14 PROCEDURE — 36000709 HC OR TIME LEV III EA ADD 15 MIN: Performed by: ORTHOPAEDIC SURGERY

## 2023-06-14 PROCEDURE — 99024 POSTOP FOLLOW-UP VISIT: CPT | Mod: ,,, | Performed by: NEUROLOGICAL SURGERY

## 2023-06-14 PROCEDURE — 94003 VENT MGMT INPAT SUBQ DAY: CPT

## 2023-06-14 PROCEDURE — 20000000 HC ICU ROOM

## 2023-06-14 PROCEDURE — 25000003 PHARM REV CODE 250

## 2023-06-14 PROCEDURE — 27227 TREAT HIP FRACTURE(S): CPT | Mod: 58,LT,, | Performed by: ORTHOPAEDIC SURGERY

## 2023-06-14 PROCEDURE — 99900031 HC PATIENT EDUCATION (STAT)

## 2023-06-14 PROCEDURE — 99024 PR POST-OP FOLLOW-UP VISIT: ICD-10-PCS | Mod: ,,, | Performed by: NEUROLOGICAL SURGERY

## 2023-06-14 PROCEDURE — 36000708 HC OR TIME LEV III 1ST 15 MIN: Performed by: ORTHOPAEDIC SURGERY

## 2023-06-14 PROCEDURE — 85025 COMPLETE CBC W/AUTO DIFF WBC: CPT

## 2023-06-14 PROCEDURE — D9220A PRA ANESTHESIA: Mod: ANES,,, | Performed by: ANESTHESIOLOGY

## 2023-06-14 PROCEDURE — 80053 COMPREHEN METABOLIC PANEL: CPT | Performed by: STUDENT IN AN ORGANIZED HEALTH CARE EDUCATION/TRAINING PROGRAM

## 2023-06-14 PROCEDURE — 25000003 PHARM REV CODE 250: Performed by: STUDENT IN AN ORGANIZED HEALTH CARE EDUCATION/TRAINING PROGRAM

## 2023-06-14 PROCEDURE — D9220A PRA ANESTHESIA: Mod: CRNA,,, | Performed by: NURSE ANESTHETIST, CERTIFIED REGISTERED

## 2023-06-14 PROCEDURE — 85025 COMPLETE CBC W/AUTO DIFF WBC: CPT | Performed by: STUDENT IN AN ORGANIZED HEALTH CARE EDUCATION/TRAINING PROGRAM

## 2023-06-14 PROCEDURE — 99900035 HC TECH TIME PER 15 MIN (STAT)

## 2023-06-14 PROCEDURE — 94640 AIRWAY INHALATION TREATMENT: CPT

## 2023-06-14 PROCEDURE — 37000009 HC ANESTHESIA EA ADD 15 MINS: Performed by: ORTHOPAEDIC SURGERY

## 2023-06-14 PROCEDURE — 84100 ASSAY OF PHOSPHORUS: CPT | Performed by: STUDENT IN AN ORGANIZED HEALTH CARE EDUCATION/TRAINING PROGRAM

## 2023-06-14 PROCEDURE — C1713 ANCHOR/SCREW BN/BN,TIS/BN: HCPCS | Performed by: ORTHOPAEDIC SURGERY

## 2023-06-14 PROCEDURE — 27000221 HC OXYGEN, UP TO 24 HOURS

## 2023-06-14 PROCEDURE — 20800000 HC ICU TRAUMA

## 2023-06-14 PROCEDURE — 27227 PR OPEN INTERN FIX ACETABULAR FX: ICD-10-PCS | Mod: 58,LT,, | Performed by: ORTHOPAEDIC SURGERY

## 2023-06-14 PROCEDURE — 63600175 PHARM REV CODE 636 W HCPCS: Performed by: ORTHOPAEDIC SURGERY

## 2023-06-14 PROCEDURE — 37000008 HC ANESTHESIA 1ST 15 MINUTES: Performed by: ORTHOPAEDIC SURGERY

## 2023-06-14 PROCEDURE — D9220A PRA ANESTHESIA: ICD-10-PCS | Mod: CRNA,,, | Performed by: NURSE ANESTHETIST, CERTIFIED REGISTERED

## 2023-06-14 PROCEDURE — 25000242 PHARM REV CODE 250 ALT 637 W/ HCPCS: Performed by: HOSPITALIST

## 2023-06-14 PROCEDURE — 63600175 PHARM REV CODE 636 W HCPCS: Performed by: NURSE ANESTHETIST, CERTIFIED REGISTERED

## 2023-06-14 PROCEDURE — 27227 TREAT HIP FRACTURE(S): CPT | Mod: 58,AS,LT, | Performed by: NURSE PRACTITIONER

## 2023-06-14 PROCEDURE — 27200966 HC CLOSED SUCTION SYSTEM

## 2023-06-14 PROCEDURE — 80053 COMPREHEN METABOLIC PANEL: CPT

## 2023-06-14 PROCEDURE — D9220A PRA ANESTHESIA: ICD-10-PCS | Mod: ANES,,, | Performed by: ANESTHESIOLOGY

## 2023-06-14 DEVICE — SCREW CORT SELF TAP 3.5X55MM: Type: IMPLANTABLE DEVICE | Site: ACETABULUM | Status: FUNCTIONAL

## 2023-06-14 DEVICE — SCREW BONE 3.5X36MM SS: Type: IMPLANTABLE DEVICE | Site: ACETABULUM | Status: FUNCTIONAL

## 2023-06-14 DEVICE — SCREW CORT SELF-TAP 3.5X30MM S: Type: IMPLANTABLE DEVICE | Site: ACETABULUM | Status: FUNCTIONAL

## 2023-06-14 DEVICE — SCREW CORT SELF TAP 3.5X60MM: Type: IMPLANTABLE DEVICE | Site: ACETABULUM | Status: FUNCTIONAL

## 2023-06-14 DEVICE — SCREW CORT SELF TAP 3.5X70MM: Type: IMPLANTABLE DEVICE | Site: ACETABULUM | Status: FUNCTIONAL

## 2023-06-14 DEVICE — SCREW CORTEX SS 3.5X40MM: Type: IMPLANTABLE DEVICE | Site: ACETABULUM | Status: FUNCTIONAL

## 2023-06-14 RX ORDER — ALBUMIN HUMAN 250 G/1000ML
SOLUTION INTRAVENOUS CONTINUOUS PRN
Status: DISCONTINUED | OUTPATIENT
Start: 2023-06-14 | End: 2023-06-14

## 2023-06-14 RX ORDER — MIDAZOLAM HYDROCHLORIDE 1 MG/ML
INJECTION INTRAMUSCULAR; INTRAVENOUS
Status: DISCONTINUED | OUTPATIENT
Start: 2023-06-14 | End: 2023-06-14

## 2023-06-14 RX ORDER — ROCURONIUM BROMIDE 10 MG/ML
INJECTION, SOLUTION INTRAVENOUS
Status: DISCONTINUED | OUTPATIENT
Start: 2023-06-14 | End: 2023-06-14

## 2023-06-14 RX ORDER — CALCIUM CHLORIDE INJECTION 100 MG/ML
INJECTION, SOLUTION INTRAVENOUS
Status: DISCONTINUED | OUTPATIENT
Start: 2023-06-14 | End: 2023-06-14

## 2023-06-14 RX ORDER — PHENYLEPHRINE HYDROCHLORIDE 10 MG/ML
INJECTION INTRAVENOUS
Status: DISCONTINUED | OUTPATIENT
Start: 2023-06-14 | End: 2023-06-14

## 2023-06-14 RX ORDER — VANCOMYCIN HYDROCHLORIDE 1 G/20ML
INJECTION, POWDER, LYOPHILIZED, FOR SOLUTION INTRAVENOUS
Status: DISCONTINUED | OUTPATIENT
Start: 2023-06-14 | End: 2023-06-14 | Stop reason: HOSPADM

## 2023-06-14 RX ORDER — CEFAZOLIN SODIUM 2 G/50ML
2 SOLUTION INTRAVENOUS
Status: DISCONTINUED | OUTPATIENT
Start: 2023-06-14 | End: 2023-06-14 | Stop reason: SDUPTHER

## 2023-06-14 RX ORDER — CEFAZOLIN SODIUM 1 G/3ML
INJECTION, POWDER, FOR SOLUTION INTRAMUSCULAR; INTRAVENOUS
Status: DISCONTINUED | OUTPATIENT
Start: 2023-06-14 | End: 2023-06-14

## 2023-06-14 RX ORDER — FENTANYL CITRATE 50 UG/ML
INJECTION, SOLUTION INTRAMUSCULAR; INTRAVENOUS
Status: DISCONTINUED | OUTPATIENT
Start: 2023-06-14 | End: 2023-06-14

## 2023-06-14 RX ORDER — HYDROCODONE BITARTRATE AND ACETAMINOPHEN 500; 5 MG/1; MG/1
TABLET ORAL
Status: DISCONTINUED | OUTPATIENT
Start: 2023-06-14 | End: 2023-06-23

## 2023-06-14 RX ORDER — HYDROMORPHONE HYDROCHLORIDE 2 MG/ML
INJECTION, SOLUTION INTRAMUSCULAR; INTRAVENOUS; SUBCUTANEOUS
Status: DISCONTINUED | OUTPATIENT
Start: 2023-06-14 | End: 2023-06-14

## 2023-06-14 RX ORDER — GLYCOPYRROLATE 0.2 MG/ML
INJECTION INTRAMUSCULAR; INTRAVENOUS
Status: DISCONTINUED | OUTPATIENT
Start: 2023-06-14 | End: 2023-06-14

## 2023-06-14 RX ADMIN — PIPERACILLIN AND TAZOBACTAM 4.5 G: 4; .5 INJECTION, POWDER, LYOPHILIZED, FOR SOLUTION INTRAVENOUS; PARENTERAL at 04:06

## 2023-06-14 RX ADMIN — FLUOXETINE 40 MG: 20 CAPSULE ORAL at 10:06

## 2023-06-14 RX ADMIN — PHENYLEPHRINE HYDROCHLORIDE 100 MCG: 10 INJECTION INTRAVENOUS at 08:06

## 2023-06-14 RX ADMIN — ALBUMIN (HUMAN): 12.5 SOLUTION INTRAVENOUS at 08:06

## 2023-06-14 RX ADMIN — PROPOFOL 50 MCG/KG/MIN: 10 INJECTION, EMULSION INTRAVENOUS at 03:06

## 2023-06-14 RX ADMIN — DEXMEDETOMIDINE HYDROCHLORIDE 1.4 MCG/KG/HR: 400 INJECTION INTRAVENOUS at 11:06

## 2023-06-14 RX ADMIN — DEXMEDETOMIDINE HYDROCHLORIDE 1.4 MCG/KG/HR: 400 INJECTION INTRAVENOUS at 05:06

## 2023-06-14 RX ADMIN — IPRATROPIUM BROMIDE AND ALBUTEROL SULFATE 3 ML: 2.5; .5 SOLUTION RESPIRATORY (INHALATION) at 08:06

## 2023-06-14 RX ADMIN — DEXMEDETOMIDINE HYDROCHLORIDE 1.4 MCG/KG/HR: 400 INJECTION INTRAVENOUS at 01:06

## 2023-06-14 RX ADMIN — Medication 1 TABLET: at 08:06

## 2023-06-14 RX ADMIN — PROPOFOL 50 MCG/KG/MIN: 10 INJECTION, EMULSION INTRAVENOUS at 01:06

## 2023-06-14 RX ADMIN — HYDROMORPHONE HYDROCHLORIDE 1 MG: 2 INJECTION, SOLUTION INTRAMUSCULAR; INTRAVENOUS; SUBCUTANEOUS at 09:06

## 2023-06-14 RX ADMIN — IPRATROPIUM BROMIDE AND ALBUTEROL SULFATE 3 ML: 2.5; .5 SOLUTION RESPIRATORY (INHALATION) at 02:06

## 2023-06-14 RX ADMIN — MIDAZOLAM HYDROCHLORIDE 5 MG: 1 INJECTION, SOLUTION INTRAMUSCULAR; INTRAVENOUS at 07:06

## 2023-06-14 RX ADMIN — CALCIUM CHLORIDE INJECTION 0.5 G: 100 INJECTION, SOLUTION INTRAVENOUS at 09:06

## 2023-06-14 RX ADMIN — MIDAZOLAM HYDROCHLORIDE 2 MG: 5 INJECTION, SOLUTION INTRAMUSCULAR; INTRAVENOUS at 10:06

## 2023-06-14 RX ADMIN — QUETIAPINE FUMARATE 50 MG: 25 TABLET ORAL at 08:06

## 2023-06-14 RX ADMIN — CEFAZOLIN 2 G: 330 INJECTION, POWDER, FOR SOLUTION INTRAMUSCULAR; INTRAVENOUS at 07:06

## 2023-06-14 RX ADMIN — PROPOFOL 50 MCG/KG/MIN: 10 INJECTION, EMULSION INTRAVENOUS at 09:06

## 2023-06-14 RX ADMIN — ENOXAPARIN SODIUM 40 MG: 40 INJECTION SUBCUTANEOUS at 10:06

## 2023-06-14 RX ADMIN — PROPOFOL 50 MCG/KG/MIN: 10 INJECTION, EMULSION INTRAVENOUS at 04:06

## 2023-06-14 RX ADMIN — ROCURONIUM BROMIDE 100 MG: 10 SOLUTION INTRAVENOUS at 07:06

## 2023-06-14 RX ADMIN — LAMOTRIGINE 25 MG: 25 TABLET ORAL at 10:06

## 2023-06-14 RX ADMIN — PROPOFOL 50 MCG/KG/MIN: 10 INJECTION, EMULSION INTRAVENOUS at 11:06

## 2023-06-14 RX ADMIN — Medication 250 MCG/HR: at 12:06

## 2023-06-14 RX ADMIN — FENTANYL CITRATE 250 MCG: 50 INJECTION, SOLUTION INTRAMUSCULAR; INTRAVENOUS at 07:06

## 2023-06-14 RX ADMIN — PROPOFOL 50 MCG/KG/MIN: 10 INJECTION, EMULSION INTRAVENOUS at 05:06

## 2023-06-14 RX ADMIN — GABAPENTIN 300 MG: 300 CAPSULE ORAL at 10:06

## 2023-06-14 RX ADMIN — Medication 250 MCG/HR: at 11:06

## 2023-06-14 RX ADMIN — ENOXAPARIN SODIUM 40 MG: 40 INJECTION SUBCUTANEOUS at 08:06

## 2023-06-14 RX ADMIN — DEXMEDETOMIDINE HYDROCHLORIDE 1.4 MCG/KG/HR: 400 INJECTION INTRAVENOUS at 06:06

## 2023-06-14 RX ADMIN — ACETAMINOPHEN 650 MG: 650 SOLUTION ORAL at 04:06

## 2023-06-14 RX ADMIN — DEXMEDETOMIDINE HYDROCHLORIDE 1.4 MCG/KG/HR: 400 INJECTION INTRAVENOUS at 09:06

## 2023-06-14 RX ADMIN — FAMOTIDINE 20 MG: 10 INJECTION, SOLUTION INTRAVENOUS at 10:06

## 2023-06-14 RX ADMIN — Medication 1 TABLET: at 10:06

## 2023-06-14 RX ADMIN — ACETAMINOPHEN 650 MG: 650 SOLUTION ORAL at 08:06

## 2023-06-14 RX ADMIN — MIDAZOLAM HYDROCHLORIDE 2 MG: 5 INJECTION, SOLUTION INTRAMUSCULAR; INTRAVENOUS at 04:06

## 2023-06-14 RX ADMIN — MIDAZOLAM HYDROCHLORIDE 2 MG: 5 INJECTION, SOLUTION INTRAMUSCULAR; INTRAVENOUS at 12:06

## 2023-06-14 RX ADMIN — GABAPENTIN 300 MG: 300 CAPSULE ORAL at 08:06

## 2023-06-14 RX ADMIN — SODIUM CHLORIDE, SODIUM GLUCONATE, SODIUM ACETATE, POTASSIUM CHLORIDE AND MAGNESIUM CHLORIDE: 526; 502; 368; 37; 30 INJECTION, SOLUTION INTRAVENOUS at 07:06

## 2023-06-14 RX ADMIN — GABAPENTIN 300 MG: 300 CAPSULE ORAL at 02:06

## 2023-06-14 RX ADMIN — FAMOTIDINE 20 MG: 10 INJECTION, SOLUTION INTRAVENOUS at 08:06

## 2023-06-14 RX ADMIN — ROCURONIUM BROMIDE 50 MG: 10 SOLUTION INTRAVENOUS at 08:06

## 2023-06-14 RX ADMIN — IPRATROPIUM BROMIDE AND ALBUTEROL SULFATE 3 ML: 2.5; .5 SOLUTION RESPIRATORY (INHALATION) at 07:06

## 2023-06-14 RX ADMIN — QUETIAPINE FUMARATE 50 MG: 25 TABLET ORAL at 10:06

## 2023-06-14 RX ADMIN — Medication 250 MCG/HR: at 09:06

## 2023-06-14 RX ADMIN — GLYCOPYRROLATE 0.2 MG: 0.2 INJECTION INTRAMUSCULAR; INTRAVENOUS at 07:06

## 2023-06-14 RX ADMIN — PIPERACILLIN AND TAZOBACTAM 4.5 G: 4; .5 INJECTION, POWDER, LYOPHILIZED, FOR SOLUTION INTRAVENOUS; PARENTERAL at 11:06

## 2023-06-14 RX ADMIN — DEXMEDETOMIDINE HYDROCHLORIDE 1.4 MCG/KG/HR: 400 INJECTION INTRAVENOUS at 03:06

## 2023-06-14 RX ADMIN — DEXMEDETOMIDINE HYDROCHLORIDE 1.4 MCG/KG/HR: 400 INJECTION INTRAVENOUS at 04:06

## 2023-06-14 NOTE — BRIEF OP NOTE
[facility] - [facility] ICU  Brief Operative Note    SUMMARY     Surgery Date: 6/14/2023     Surgeon(s) and Role:     * Compa Bernal MD - Primary     * HSARMILA Garner - Assisting        Pre-op Diagnosis:  Displaced fracture of posterior column (ilioischial) of left acetabulum, initial encounter for closed fracture [S32.442A]    Post-op Diagnosis:  Post-Op Diagnosis Codes:     * Displaced fracture of posterior column (ilioischial) of left acetabulum, initial encounter for closed fracture [S32.442A]    Procedure(s) (LRB):  ORIF, FRACTURE, ACETABULUM, POSTERIOR (Left)- column    Anesthesia: General    Operative Findings: see op report    Estimated Blood Loss: 500 mL    Estimated Blood Loss has been documented.         Specimens:   Specimen (24h ago, onward)      None            [id number]  A/P: Tolerated procedure well. Given 150mL with cell saver and 2 units PRBC. Ok for loveox for DVT ppx. Once able he can pivot transfer with RLE, no ambulation. Platform walker with forearm. Monitor drain output and pull on POD#2 with dressing changes.         Compa Bernal MD  Orthopedic Trauma  [facility]

## 2023-06-14 NOTE — PT/OT/SLP PROGRESS
OT checked on pt this afternoon after ortho sx.  Pt still sedated and intubated.  OT to f/u 6/15.

## 2023-06-14 NOTE — PROGRESS NOTES
[facility] - [facility] ICU  Pulmonary Critical Care Note    Patient Name: Gera Chavez  MRN: [id number]  Admission Date: 6/4/2023  Hospital Length of Stay: 10 days  Code Status: Full Code  Attending Provider: Luis Bermudez Jr., MD  Primary Care Provider: Primary Doctor No     Subjective:     HPI:   Gera Chavez is a 37 y.o. male with no known PMH, who was transferred from [facility] for level 1 trauma activation, after patient reportedly jumped off a bridge.  Sustained rib fractures x4 left-sided, pelvic ring fracture, acetabulum fracture, L2 burst fracture, and left pneumothorax with chest tube in place.  Jump witnessed by bystander who called 911.     Hospital Course/Significant events:  6/4/2023 - Admitted to ICU   6/5/2023 - s/p bronchoscopy  6/12/2023 - s/p ORIF right radius fx    24 Hour Interval History:  AF, HTN 140s/70s. Tube feeds restarted yesterday. Tolerated will at a rate of 20 mL/hr until discontinued at midnight in anticipation of surgery today.     Past Medical History:   Diagnosis Date    Asthma     Depression     Diabetes mellitus     Encounter for blood transfusion     Generalized anxiety disorder     Hypertension     Schizophrenia, unspecified     Sleep apnea     Unspecified glaucoma        Past Surgical History:   Procedure Laterality Date    INTRAMEDULLARY RODDING OF FEMUR Left 02/05/2023    Procedure: INSERTION, INTRAMEDULLARY NOEL, FEMUR;  Surgeon: Tuan Zepeda DO;  Location: [facility];  Service: Orthopedics;  Laterality: Left;    OPEN REDUCTION AND INTERNAL FIXATION (ORIF) OF INJURY OF HIP Left 6/5/2023    Procedure: ORIF,PELVIS;  Surgeon: Tuan Zepeda DO;  Location: [facility];  Service: Orthopedics;  Laterality: Left;  supine deven traction LLE CELL SAVER, teo    POSTERIOR LUMBAR INTERBODY FUSION (PLIF) WITH COMPUTER-ASSISTED NAVIGATION N/A 6/9/2023    Procedure: FUSION, SPINE, LUMBAR, PLIF, USING COMPUTER-ASSISTED NAVIGATION;  Surgeon: Angelika Matos,  MD;  Location: [facility] OR;  Service: Neurosurgery;  Laterality: N/A;  T11-L4 posterolateral fusion, L1-2 laminectomies with O-arm, Stealth // NTI // TIVA SETUP // [employer]    REMOVAL OF HARDWARE FROM LOWER EXTREMITY Left 5/9/2023    Procedure: REMOVAL, HARDWARE, LOWER EXTREMITY;  Surgeon: Tuan Zepeda DO;  Location: [facility] OR;  Service: Orthopedics;  Laterality: Left;    TRACH TUBE EXCHANGER  6/5/2023    UVULOPALATOPHARYNGOPLASTY         Social History     Socioeconomic History    Marital status: Single   Tobacco Use    Smoking status: Every Day     Packs/day: 0.50     Years: 10.00     Pack years: 5.00     Types: Cigarettes    Smokeless tobacco: Never   Substance and Sexual Activity    Alcohol use: Not Currently    Drug use: Never    Sexual activity: Not Currently     Social Determinants of Health     Financial Resource Strain: Unknown    Difficulty of Paying Living Expenses: Patient refused   Food Insecurity: Unknown    Worried About Running Out of Food in the Last Year: Patient refused    Ran Out of Food in the Last Year: Patient refused   Transportation Needs: Unknown    Lack of Transportation (Medical): Patient refused    Lack of Transportation (Non-Medical): Patient refused   Physical Activity: Unknown    Days of Exercise per Week: Patient refused   Stress: Unknown    Feeling of Stress : Patient refused   Social Connections: Unknown    Frequency of Communication with Friends and Family: Patient refused    Frequency of Social Gatherings with Friends and Family: Patient refused    Attends Club or Organization Meetings: Patient refused    Marital Status: Never    Housing Stability: Unknown    Unable to Pay for Housing in the Last Year: Patient refused    Unstable Housing in the Last Year: Patient refused           Current Outpatient Medications   Medication Instructions    albuterol (PROVENTIL/VENTOLIN HFA) 90 mcg/actuation inhaler 2 puffs, Inhalation, Every 4 hours PRN    ALBUTEROL INHL 90 mcg, Inhalation,  Every 4 hours PRN    amLODIPine (NORVASC) 5 mg, Oral, Daily    amoxicillin-clavulanate 875-125mg (AUGMENTIN) 875-125 mg per tablet 1 tablet, Oral, Every 12 hours (non-standard times)    atorvastatin (LIPITOR) 20 mg, Oral, Daily    azithromycin (Z-CELESTINA) 500 mg, Oral, Daily    clonazePAM (KLONOPIN) 1 mg, Oral, 2 times daily PRN    dextroamphetamine-amphetamine (ADDERALL XR) 25 MG 24 hr capsule 25 mg, Oral, 2 times daily    FLUoxetine 20 MG capsule fluoxetine 20 mg capsule    FLUoxetine 40 mg, Oral, Daily    insulin glargine 100 units/mL SubQ pen Lantus Solostar U-100 Insulin 100 unit/mL (3 mL) subcutaneous pen    ipratropium (ATROVENT) 42 mcg (0.06 %) nasal spray Each Nostril    lamoTRIgine (LAMICTAL) 25 MG tablet lamotrigine 25 mg tablet    LANTUS SOLOSTAR U-100 INSULIN glargine 100 units/mL SubQ pen Subcutaneous    latanoprost 0.005 % ophthalmic solution latanoprost 0.005 % eye drops    lisinopriL (PRINIVIL,ZESTRIL) 40 mg, Oral    mirtazapine (REMERON) 7.5 MG Tab mirtazapine 7.5 mg tablet    potassium chloride (K-TAB) 20 mEq potassium chloride ER 20 mEq tablet,extended release   TAKE 1 TABLET BY MOUTH EVERY DAY    prochlorperazine (COMPAZINE) 10 MG tablet prochlorperazine maleate 10 mg tablet    QUEtiapine (SEROQUEL) 200 mg, Oral, Nightly    testosterone cypionate (DEPOTESTOTERONE CYPIONATE) 200 mg/mL injection SMARTSIG:Milliliter(s) IM       Current Inpatient Medications   albuterol-ipratropium  3 mL Nebulization Q6H    calcium-vitamin D3  1 tablet Per NG tube BID    enoxaparin  40 mg Subcutaneous Q12H    famotidine (PF)  20 mg Intravenous BID    FLUoxetine  40 mg Per NG tube Daily    gabapentin  300 mg Per NG tube TID    lamoTRIgine  25 mg Per NG tube Daily    QUEtiapine  50 mg Per NG tube BID       Current Intravenous Infusions   dexmedeTOMIDine (Precedex) infusion (titrating) 1.4 mcg/kg/hr (06/14/23 0307)    fentanyl 250 mcg/hr (06/14/23 0013)    lactated ringers 75 mL/hr at 06/13/23 1808    NORepinephrine  bitartrate-D5W Stopped (06/06/23 1135)    propofoL 50 mcg/kg/min (06/14/23 0307)         Objective:       Intake/Output Summary (Last 24 hours) at 6/14/2023 0443  Last data filed at 6/14/2023 0200  Gross per 24 hour   Intake 5302 ml   Output 2740 ml   Net 2562 ml         Vital Signs (Most Recent):  Temp: 99.3 °F (37.4 °C) (06/14/23 0000)  Pulse: 92 (06/14/23 0220)  Resp: (!) 28 (06/14/23 0220)  BP: 138/61 (06/14/23 0200)  SpO2: 96 % (06/14/23 0220)  Body mass index is 37.22 kg/m².  Weight: 121 kg (266 lb 12.1 oz) Vital Signs (24h Range):  Temp:  [99.3 °F (37.4 °C)-101.7 °F (38.7 °C)] 99.3 °F (37.4 °C)  Pulse:  [] 92  Resp:  [13-56] 28  SpO2:  [93 %-100 %] 96 %  BP: (133-182)/(58-96) 138/61     Physical Exam  General: Intubated and sedated  HEENT: NC/AT; PERRL; nasal and oral mucosa moist and clear; ET tube in place  Pulm: Rhonchi in upper lobes bilaterally,mechanically ventilated, oxygenating well   CV: S1, S2 w/o murmurs or gallops; no edema noted, CT dressing in place  GI: Soft with normal bowel sounds in all quadrants, no masses on palpation  Neuro: Limited d/t sedation      Lines/Drains/Airways       Central Venous Catheter Line  Duration             Percutaneous Central Line Insertion/Assessment - Double Lumen  06/04/23 1530 Subclavian Left 9 days              Drain  Duration                  NG/OG Tube 06/08/23 0915 [place] sump Right mouth 5 days         Urethral Catheter 06/13/23 0901 Non-latex 16 Fr. <1 day              Airway  Duration                  Airway - Non-Surgical 06/08/23 0855 Endotracheal Tube 5 days              Peripheral Intravenous Line  Duration                  Peripheral IV - Single Lumen 06/09/23 0829 18 G Left;Posterior Hand 4 days                    Significant Labs:    Lab Results   Component Value Date    WBC 7.60 06/14/2023    HGB 7.6 (L) 06/14/2023    HCT 24.0 (L) 06/14/2023    MCV 89.2 06/14/2023     06/14/2023           BMP  Lab Results   Component Value Date    NA  146 (H) 06/14/2023    K 3.8 06/14/2023    CHLORIDE 114 (H) 06/14/2023    CO2 23 06/14/2023    BUN 11.9 06/14/2023    CREATININE 0.79 06/14/2023    CALCIUM 7.8 (L) 06/14/2023    AGAP 11.0 06/12/2023    AGAP 10.0 06/12/2023         ABG  Recent Labs   Lab 06/13/23  0600   PH 7.450   PO2 62.0*   HCO3 28.5   POCBASEDEF 4.10       Mechanical Ventilation Support:  Vent Mode: VOLUME A/C (06/14/23 0220)  Set Rate: 28 BPM (06/14/23 0220)  Vt Set: 500 mL (06/14/23 0220)  Pressure Support: 12 cmH20 (06/11/23 0527)  PEEP/CPAP: 10 cmH20 (06/14/23 0220)  Oxygen Concentration (%): 35 (06/14/23 0220)  Peak Airway Pressure: 28 cmH20 (06/14/23 0220)  Total Ve: 12.9 L/m (06/14/23 0220)  F/VT Ratio<105 (RSBI): (!) 62.78 (06/14/23 0220)      Significant Imaging:  I have reviewed the pertinent imaging within the past 24 hours.        Assessment/Plan:     Assessment  Pelvic ring fx s/p ORIF on 6/5/2023  Left-sided rib fractures with pneumo/hemo thorax s/p chest tube placement  L2 burst fracture and L1-2 stenosis s/p laminectomy on 6/9/2023  Right distal radius fracture s/p ORIF      Plan  -Continue ICU level of care per primary team  -Trauma, neurosurgery, and orthopedic surgery teams following  -CXR 6/11/2023 revealed improved bilateral pleural effusions  -OR today with orthopedic surgery for acetabular fx  -Wean vent settings as tolerated, pCO2 of 41 despite RR of 28 indicating likely dead space ventilation, oxygenating well   -No no longer requiring strict bed rest from neurosurgery perspective at 7:00AM today, okay for mobilization with [place] TLSO brace once extubated   -Blood and respiratory cultures from 6/13 pending      DVT Prophylaxis: SCD  GI Prophylaxis: Famotidine     32 minutes of critical care was time spent personally by me on the following activities: development of treatment plan with patient or surrogate and bedside caregivers, discussions with consultants, evaluation of patient's response to treatment, examination of  patient, ordering and performing treatments and interventions, ordering and review of laboratory studies, ordering and review of radiographic studies, pulse oximetry, re-evaluation of patient's condition.  This critical care time did not overlap with that of any other provider or involve time for any procedures.     Chucho Erickson MD  Pulmonary Critical Care Medicine, PGY1  [facility] - [facility] ICU

## 2023-06-14 NOTE — PT/OT/SLP PROGRESS
Physical Therapy      Patient Name:  Gera Chavez   MRN:  [id number]    Patient not seen today for PT eval. This AM pt was off of the floor for ortho sx. In the PM pt is still intubated and sedated. Will follow-up tomorrow/as appropriate.

## 2023-06-14 NOTE — NURSING
Nurses Note -- 4 Eyes      6/14/2023   6:46 AM      Skin assessed during: Q Shift Change      [x] No Altered Skin Integrity Present    [x]Prevention Measures Documented      [] Yes- Altered Skin Integrity Present or Discovered   [] LDA Added if Not in Epic (Describe Wound)   [] New Altered Skin Integrity was Present on Admit and Documented in LDA   [] Wound Image Taken    Wound Care Consulted? No    Attending Nurse:  Slava Belle RN     Second RN/Staff Member:  Juanito Rodriguez RN

## 2023-06-14 NOTE — PLAN OF CARE
Plan of Care     Failed spontaneous breathing trial post op. Patient was tachycardic to the 140s with RR in the 40s. Sedation was resumed. Back to original vent settings. Satting 97%. Restart tube feeds. Will obtain ABG. Likely repeat SBT tomorrow.      6/14/2023 1:46 PM     The above findings, diagnostics, and treatment plan were discussed with Dr. Luis Bermudez who will follow with further assessments and recommendations. Please call with any questions, concerns, or clinical status changes.

## 2023-06-14 NOTE — PROGRESS NOTES
Patient Name: Gera Chavez  MRN: [id number]  Admission Date: 6/4/2023  Hospital Length of Stay: 10 days  Attending Provider: Luis Bermudez Jr., MD  Primary Care Provider: Primary Doctor No  Follow-up For: Procedure(s) (LRB):  ORIF, FRACTURE, RADIUS, DISTAL (Right)    Post-Operative Day: Day of Surgery  Subjective:       Principal Orthopedic Problem: Day of Surgery       Interval History:  POD 2 s/p ORIF right distal radius. Nursing staff at bedside, denies any acute ortho issues. Patient remains intubated and sedated in ICU. Plan for ORIF L Acetabulum today.    Review of patient's allergies indicates:   Allergen Reactions    Iodine     Trazodone        Current Facility-Administered Medications   Medication    0.9%  NaCl infusion (for blood administration)    acetaminophen oral solution 650 mg    albuterol-ipratropium 2.5 mg-0.5 mg/3 mL nebulizer solution 3 mL    calcium-vitamin D3 500 mg-5 mcg (200 unit) per tablet 1 tablet    dexmedetomidine (PRECEDEX) 400mcg/100mL 0.9% NaCL infusion    enoxaparin injection 40 mg    famotidine (PF) injection 20 mg    fentaNYL 2500 mcg in 0.9% sodium chloride 250 mL infusion premix (titrating)    fentaNYL injection 50 mcg    FLUoxetine capsule 40 mg    gabapentin capsule 300 mg    hydrALAZINE injection 10 mg    labetaloL injection 10 mg    lactated ringers infusion    lamoTRIgine tablet 25 mg    midazolam (VERSED) 5 mg/mL injection 2 mg    NORepinephrine 8 mg in dextrose 5% 250 mL infusion    oxyCODONE immediate release tablet 5 mg    oxyCODONE immediate release tablet Tab 10 mg    piperacillin-tazobactam (ZOSYN) 4.5 g in dextrose 5 % in water (D5W) 5 % 100 mL IVPB (MB+)    propofol (DIPRIVAN) 10 mg/mL infusion    QUEtiapine tablet 50 mg     Facility-Administered Medications Ordered in Other Encounters   Medication    electrolyte-A infusion    fentaNYL injection    glycopyrrolate injection    midazolam (VERSED) 1 mg/mL injection    PHENYLephrine injection    rocuronium injection  "    Objective:     Vital Signs (Most Recent):  Temp: 99.3 °F (37.4 °C) (06/14/23 0400)  Pulse: (!) 132 (06/14/23 0719)  Resp: (!) 28 (06/14/23 0719)  BP: (!) 132/54 (06/14/23 0600)  SpO2: 97 % (06/14/23 0719) Vital Signs (24h Range):  Temp:  [99.3 °F (37.4 °C)-99.9 °F (37.7 °C)] 99.3 °F (37.4 °C)  Pulse:  [] 132  Resp:  [0-29] 28  SpO2:  [93 %-100 %] 97 %  BP: (132-182)/(50-96) 132/54     Weight: 121 kg (266 lb 12.1 oz)  Height: 5' 10.98" (180.3 cm)  Body mass index is 37.22 kg/m².      Intake/Output Summary (Last 24 hours) at 6/14/2023 0811  Last data filed at 6/14/2023 0600  Gross per 24 hour   Intake 4640 ml   Output 2585 ml   Net 2055 ml         Physical Exam:   Ortho/SPM Exam    General: sedated, intubated, unable to participate in exam.     Musculoskeletal Exam:  Right upper extremity:  dressing to wrist is clean, dry, and intact. Compartments are soft and compressible. Velcro brace in place.Palpable radial pulse. Brisk cap refill to digits.     Left lower extremity: no obvious deformity. Compartments soft and compressible. Palpable DP pulse. Brisk cap refill distally. Unable to participate in exam.     Diagnostic Findings:   Significant Labs: BMP:   Recent Labs   Lab 06/14/23  0117   *   K 3.8   CO2 23   BUN 11.9   CREATININE 0.79   CALCIUM 7.8*   MG 2.10       CBC:   Recent Labs   Lab 06/12/23  1225 06/13/23  0215 06/14/23 0117   WBC 7.39 6.88 7.60   HGB 8.0* 7.8* 7.6*   HCT 24.5* 24.6* 24.0*    286 344       CMP:   Recent Labs   Lab 06/12/23  1224 06/13/23  0215 06/14/23  0117     144 146* 146*   K 3.9  3.8 3.6 3.8   CO2 23  24 22 23   BUN 15.1  15.2 14.5 11.9   CREATININE 0.84  0.84 0.92 0.79   CALCIUM 8.0*  8.3* 8.0* 7.8*   ALBUMIN  --  1.7* 1.8*   BILITOT  --  5.9* 5.1*   ALKPHOS  --  87 111   AST  --  36* 32   ALT  --  19 16       All pertinent labs within the past 24 hours have been reviewed.        Significant Imaging: I have reviewed and interpreted all pertinent " imaging results/findings.     Assessment/Plan:     Active Diagnoses:    Diagnosis Date Noted POA    PRINCIPAL PROBLEM:  Closed pelvic ring fracture [S32.810A] 06/05/2023 Yes    Displaced fracture of posterior column (ilioischial) of left acetabulum, initial encounter for closed fracture [S32.442A] 06/05/2023 Yes      Problems Resolved During this Admission:   POD 2 ORIF R distal radius. NWB R wrist, ok for platform walker. Continue velcro brace. Plan to begin daily dry dressing changes tomorrow. He will need a second operation in 10-12 weeks for removal of dorsal spanning plate.     Plan to take to operating room today for open reduction internal fixation of acetabulum fracture. Will have 2 units PRBC available for surgery and plan to use cell saver. I have spoke with Dr. Matos and he is clear to be turned lateral. The risks, benefits and alternatives treatment were discussed at length with the patient's mother today including but not limited to pain, bleeding, scarring, infection, damage to neurovascular structures, malunion/nonunion, hardware failure/irritation, need for future procedures and complications leading to amputation and even death.      Compa Bernal MD  Orthopedic Trauma  [facility]

## 2023-06-14 NOTE — TRANSFER OF CARE
"Anesthesia Transfer of Care Note    Patient: Gera Chavez    Procedure(s) Performed: Procedure(s) (LRB):  ORIF, FRACTURE, ACETABULUM, POSTERIOR (Left)    Patient location: ICU    Anesthesia Type: general    Transport from OR: Transported from OR intubated on 100% O2 by AMBU with assisted ventilation. Upon arrival to PACU/ICU, patient attached to ventilator and auscultated to confirm bilateral breath sounds and adequate TV. Continuous ECG monitoring in transport. Continuous SpO2 monitoring in transport. Continuos invasive BP monitoring in transport    Post pain: adequate analgesia    Post assessment: no apparent anesthetic complications    Post vital signs: stable    Level of consciousness: sedated    Nausea/Vomiting: no nausea/vomiting    Complications: none    Transfer of care protocol was followed      Last vitals:   Visit Vitals  /86   Pulse (!) 129   Temp 37.1 °C (98.8 °F)   Resp (!) 29   Ht 5' 10.98" (1.803 m)   Wt 121 kg (266 lb 12.1 oz)   SpO2 100%   BMI 37.22 kg/m²     "

## 2023-06-14 NOTE — PROGRESS NOTES
[facility] ICU PROGRESS NOTE    # 10  Admission Summary:   In brief, Gera Chavez is a 37 y.o. male admitted on 6/4/2023 following fall from bridge at 25 ft.  He reportedly jumped off a bridge and landed on dry land.  He arrived intubated with a left-sided chest tube.  He was found to have a pelvic ring fracture including open book fracture, L2 burst fracture, left pneumothorax, multiple left-sided rib fractures.    Interval history:    No acute events overnight   OR today with Orthopedi surgery   We will extubate postop  Increasing opacification in the right upper lobe, we will initiate Zosyn and followup respiratory culture/blood cultures    Consults:   Neurosurgery  Orthopedic surgery Injuries:  L pelvic rim fx w/ open book pelvis  L pelvic wall hematoma  L2 burst fx  L apical PTX  L 4-11 rib fx  pulmonary contusion  R distal radius fracture Operations/Procedures:  Left chest tube placement at outside facility 6/4  Pelvic ring fx ORIF 6/5  ORIF L distal radius fx 6/12     Past medical history:  1. Asthma   2. Depression  3. Diabetes   4. Generalized anxiety disorder   5. Hypertension  6. Schizophrenia   7. Sleep apnea    Medications: [x] Medications reviewed/updated   Home Meds:    Current Outpatient Medications   Medication Instructions    albuterol (PROVENTIL/VENTOLIN HFA) 90 mcg/actuation inhaler 2 puffs, Inhalation, Every 4 hours PRN    ALBUTEROL INHL 90 mcg, Inhalation, Every 4 hours PRN    amLODIPine (NORVASC) 5 mg, Oral, Daily    amoxicillin-clavulanate 875-125mg (AUGMENTIN) 875-125 mg per tablet 1 tablet, Oral, Every 12 hours (non-standard times)    atorvastatin (LIPITOR) 20 mg, Oral, Daily    azithromycin (Z-CELESTINA) 500 mg, Oral, Daily    clonazePAM (KLONOPIN) 1 mg, Oral, 2 times daily PRN    dextroamphetamine-amphetamine (ADDERALL XR) 25 MG 24 hr capsule 25 mg, Oral, 2 times daily    FLUoxetine 20 MG capsule fluoxetine 20 mg capsule    FLUoxetine 40 mg, Oral, Daily    insulin glargine 100 units/mL SubQ  "pen Lantus Solostar U-100 Insulin 100 unit/mL (3 mL) subcutaneous pen    ipratropium (ATROVENT) 42 mcg (0.06 %) nasal spray Each Nostril    lamoTRIgine (LAMICTAL) 25 MG tablet lamotrigine 25 mg tablet    LANTUS SOLOSTAR U-100 INSULIN glargine 100 units/mL SubQ pen Subcutaneous    latanoprost 0.005 % ophthalmic solution latanoprost 0.005 % eye drops    lisinopriL (PRINIVIL,ZESTRIL) 40 mg, Oral    mirtazapine (REMERON) 7.5 MG Tab mirtazapine 7.5 mg tablet    potassium chloride (K-TAB) 20 mEq potassium chloride ER 20 mEq tablet,extended release   TAKE 1 TABLET BY MOUTH EVERY DAY    prochlorperazine (COMPAZINE) 10 MG tablet prochlorperazine maleate 10 mg tablet    QUEtiapine (SEROQUEL) 200 mg, Oral, Nightly    testosterone cypionate (DEPOTESTOTERONE CYPIONATE) 200 mg/mL injection SMARTSIG:Milliliter(s) IM      Scheduled Meds:    albuterol-ipratropium  3 mL Nebulization Q6H    calcium-vitamin D3  1 tablet Per NG tube BID    enoxaparin  40 mg Subcutaneous Q12H    famotidine (PF)  20 mg Intravenous BID    FLUoxetine  40 mg Per NG tube Daily    gabapentin  300 mg Per NG tube TID    lamoTRIgine  25 mg Per NG tube Daily    QUEtiapine  50 mg Per NG tube BID     Continuous Infusions:    dexmedeTOMIDine (Precedex) infusion (titrating) 1.4 mcg/kg/hr (06/14/23 0524)    fentanyl 250 mcg/hr (06/14/23 0013)    lactated ringers 75 mL/hr at 06/13/23 1808    NORepinephrine bitartrate-D5W Stopped (06/06/23 1135)    propofoL 50 mcg/kg/min (06/14/23 0524)     PRN Meds: sodium chloride, sodium chloride, acetaminophen, fentaNYL, hydrALAZINE, labetaloL, midazolam, oxyCODONE, oxyCODONE     Vitals:  VITAL SIGNS: 24 HR MIN & MAX LAST   Temp  Min: 99.3 °F (37.4 °C)  Max: 101.7 °F (38.7 °C)  99.3 °F (37.4 °C)   BP  Min: 133/61  Max: 182/96  (!) 140/62    Pulse  Min: 85  Max: 118  92    Resp  Min: 17  Max: 56  (!) 28    SpO2  Min: 93 %  Max: 100 %  (!) 94 %      HT: 5' 10.98" (180.3 cm)  WT: 121 kg (266 lb 12.1 oz)  BMI: 37.2   Ideal Body Weight " (IBW), Male: 171.88 lb  % Ideal Body Weight, Male (lb): 155.09 %        General  Exam: NAD, sedated and intubated     Neuro/Psych  GCS: 3T on sedation  Exam: limited exam secondary to sedation, reportedly follows commands off sedation  ICP monitor: No  ICP treatment: ICP Treatment: N/A  C-Collar: Yes    Plan:  Q2 hour neurochecks  Wean sedation  Pain control     HEENT  Exam: NC    Plan:   none     Pulmonary  Vitals: Resp  Av.5  Min: 17  Max: 56  SpO2  Av.7 %  Min: 93 %  Max: 100 %    Ventilator/Oxygen Settings:   Vent Mode: VOLUME A/C  Vt Set: 500 mL  Set Rate: 28 BPM  Pressure Support: 12 cmH20  I:E Ratio Measured: 1:1.7     PaO2/FiO2 ratio (if ventilated): 70/40  RSBI RR/TV (if ventilated): 28/530  ABG:   Recent Labs   Lab 23  0518   PH 7.450   PO2 64.0*   HCO3 29.2        CXR:    No results found in the last 24 hours.           Rib fractures: yes  Chest Tube: Left - without air leak    Exam: mechanically ventilated  Incentive Spirometry/RT Treatments: RT    Plan:     Wean to extubate postop  Monitor daily CXR/ABG     Cardiovascular  Vitals: Pulse  Av.8  Min: 85  Max: 118  BP  Min: 133/61  Max: 182/96  Vasoactive Agents: None  Exam: tachy    Echo Findings:   Left Ventricle  The left ventricle is  with normal systolic function. The estimated ejection fraction is 65%. There is normal diastolic function.     Right Ventricle  Normal cavity size.     Left Atrium  The left atrial volume index is normal.     Right Atrium  The right atrium is normal in size.     Aortic Valve  The aortic valve appears structurally normal.     Mitral Valve  The mean pressure gradient across the mitral valve is 3 mmHg at a heart rate of. The mitral valve appears structurally normal. The estimated mitral valve area by pressure half time is 2.78 cm2.     Tricuspid Valve  The tricuspid valve appears structurally normal.     Pulmonic Valve  Pulmonic valve is structurally normal.     Plan:   Continuous cardiac monitoring  while in the ICU     Renal  Recent Labs     23  0150 23  1224 23  0215 23  0117   BUN 13.1 15.1  15.2 14.5 11.9   CREATININE 0.77 0.84  0.84 0.92 0.79         No results for input(s): LACTIC in the last 72 hours.      Intake/Output - Last 3 Shifts          07 0659  07 0659    I.V. (mL/kg) 4516.6 (37.3) 2128 (17.6)    NG/ 170    IV Piggyback 1500 50    Total Intake(mL/kg) 6169.6 (51) 2348 (19.4)    Urine (mL/kg/hr) 2820 (1) 2710 (0.9)    Drains 175     Blood 10     Chest Tube 120     Total Output 3125 2710    Net +3044.6 -362                   Intake/Output Summary (Last 24 hours) at 2023 0542  Last data filed at 2023 0400  Gross per 24 hour   Intake 5302 ml   Output 2940 ml   Net 2362 ml           Titus: Yes     Plan:   I's and O's  Lactic normalized  CTM BUN/Cr/UOP     FEN/GI  Recent Labs     23  0150 23  1224 23  0215 23  0117    144  144 146* 146*   K 3.6 3.9  3.8 3.6 3.8   CO2 24 23  24 22 23   CALCIUM 7.8* 8.0*  8.3* 8.0* 7.8*   MG 2.10  --  2.20 2.10   PHOS 4.1  --  3.4 3.5   ALBUMIN 1.8*  --  1.7* 1.8*   BILITOT 5.8*  --  5.9* 5.1*   AST 42*  --  36* 32   ALKPHOS 67  --  87 111   ALT 20  --  19 16         Diet: NPO    Last BM:  Prior to arrival    Abdominal Exam:  Soft, nondistended  Abdominal Dressing: None    Plan:   Hold tube feeds in anticipation of surgery today.  Resume tube feeds postop.  Replace electrolytes based on daily labs  Postop labs     Heme/Onc  Recent Labs     23  1224 23  1225 23  0215 23  0117   HGB 8.3* 8.0* 7.8* 7.6*   HCT 25.5* 24.5* 24.6* 24.0*    262 286 344         Transfusions (over past 24h):  1 units of PRBC    Plan:   H&H slightly down, we will order 2 units packed red blood cells on-call to the OR   Follow-up postop CBC  Transfuse greater than 7 hemoglobin or symptomatic     ID  Temp  Av.8 °F (37.7 °C)  Min: 99.3 °F (37.4 °C)  Max: 101.7 °F  (38.7 °C)      Recent Labs     06/12/23  1224 06/12/23  1225 06/13/23  0215 06/14/23  0117   WBC 7.06 7.39 6.88 7.60         Cultures:  Blood culture-pending  Blood culture pending   Respiratory culture-pending    Antibiotics:   Zosyn    Plan:   Continue to trend fever curve, WBC WNL  Initiate Zosyn for 5 days until cultures result     Endocrine  Recent Labs     06/12/23  0150 06/12/23  1224 06/13/23  0215 06/14/23  0117   GLUCOSE 111* 107*  104* 130* 113*        No results for input(s): POCTGLUCOSE in the last 72 hours.     Insulin treatment: no medications    Plan:   BG <180     Musculoskeletal/Wounds  Weight bearing status:   RUE: NWB  LUE: WBAT  RLE: NWB  LLE: NWB    [x] Tertiary exam performed    Extremity/wound exam:     Plan:   Surgical planning for pelvic fracture  PT/OT     Precautions  Fall, Spinal, and Standard  Strict bedrest until 6/14     Prophylaxis  Seizure: Not indicated.  DVT: lovenox 40 BID to start 6/14  GI: H2B     Lines/drains/airway [x] LDA reviewed/updated   PIV  OGT  ETT  L chest tube  Titus  PIV  CVC  A-line    Plan:  Maintain peripheral access     Restraints  Face to face evaluation of need for restraints on rounds today:   Currently restrained? no     Disposition  Continue ongoing ICU level care.        6/14/2023 9:56 AM     The above findings, diagnostics, and treatment plan were discussed with Dr. Lius Bermudez who will follow with further assessments and recommendations. Please call with any questions, concerns, or clinical status changes.

## 2023-06-14 NOTE — PROGRESS NOTES
Patient in surgery at time of rounds for left ORIF of the acetabulum.    Future recommendations for the patient:    Continue strict, flat bedrest due to repaired CSF leak.  As long as the patient's wound remains dry, he will be able to mobilize on Wednesday, 6/14/2023 at 7:00 AM- Today         Today, Mr. Chavez and start mobilizing as long as he wears his [place] TLSO brace whenever his head of bed is greater than 30°.  Physical therapy and occupational therapy services will need to be consulted  with orthopedic guidelines.       Mr. Chavez is anticipated to need inpatient rehab placement after he is discharged from the hospital.  Social work/ case management has been consulted.     The patient's thoracolumbar sutures need to be removed 3 weeks postoperatively by the nursing staff on Tuesday, 06/27/2023.        Arrangements are being made for Mr. Chavez to follow up in the neurosurgery clinic with DAVIDE Ware 4 weeks postoperatively.  Two days prior to his visit, he needs to complete upright thoracic and lumbar (not thoracolumbar) x-rays in his [place] TLSO brace.

## 2023-06-14 NOTE — OP NOTE
[facility]                       [address] [place] [address]    PATIENT NAME:      ANGIE CHAVEZ   YOB: 1985  CSN:               [id number]  MRN:               [id number]  ADMIT DATE:        06/04/2023 15:32:00  PHYSICIAN:         Compa Bernal MD                          OPERATIVE REPORT      DATE OF SURGERY:    06/14/2023 00:00:00    SURGEON:  Compa Bernal MD    PREOPERATIVE DIAGNOSIS:  Left posterior column acetabulum fracture.    POSTOPERATIVE DIAGNOSIS:  Left posterior column acetabulum fracture.    PROCEDURE:  Open reduction and internal fixation of left posterior column   acetabulum fracture.    ANESTHESIA:  General.    ESTIMATED BLOOD LOSS:  Total 500 mL, returned 150 with Cell Saver.    ASSISTANT:  Tata Rodas nurse practitioner, necessary for a skilled set of   hands to assist with reduction of the fracture as well as application of   hardware and deep closure.    IMPLANTS:  Callender Solis pelvic system 6 hole straight wall plate as well as a 6   hole curved column plate with 3.5 cortex screws.    COMPLICATIONS:  None.    COUNTS:  All counts correct x2 at the end of the case.    INDICATIONS FOR PROCEDURE:  Mr. Chavez is a 37-year-old male who sustained   multiple injuries in a fall from bridge.  He had a pelvic ring injury that was   fixed by my partner, Dr. Tuan Zepeda.  He also had a comminuted intra-articular   fracture of his distal radius that I had performed open reduction and internal   fixation on.  He is returning to the operating room today for stabilization of a   posterior column acetabulum fracture on the left side.    PROCEDURE IN DETAIL:  The patient has been intubated.  Consent was obtained from   his mother.  He was brought directly to the operating room, placed supine on   the operating room table and general anesthesia was induced.  All bony   prominences were well padded.   He was turned into the right lateral decubitus   position and stabilized on a bean bag.  His left lower extremity was prepped and   draped in a standard sterile fashion.  A time-out was done indicating correct   operative limb and procedure.  A Kocher-Langenbeck incision was made and carried   down through the IT band.  The gluteus kaitlin was split in line with its   fibers and between the raphae between the anterior 3rd and posterior 2/3rd.  His   fracture was identified.  The sciatic nerve was identified and protected   throughout the course of the case.  His obturator internus piriformis tendons   were tagged and cut for later repair.  Once the fracture was able to be cleaned   thoroughly, it was irrigated.  A 2 screw clamp was then applied along the   posterior aspect of the column, squeezing the fracture in place.  It was able to   be reduced.  It was very mobile.  A 6-hole column plate was then contoured   along the anterior aspect of the column with 2 screws above and below.  The   clamp was removed.  A 2nd plate was used for additional stability using an   annealed plate with 2 screws again above and below the fracture.  They were all   confirmed to be in appropriate position on AP and Judet views of the pelvis.    The wound was thoroughly irrigated.  A gram of vancomycin was placed deep within   the wound.  The obturator internus and piriformis tendons were repaired and a   layered closure was performed with #1 looped PDS, #1 Vicryl, 2-0 Monocryl, and   staples.  Xeroform and island dressings were applied.  The patient was turned   supine, remain intubated, and taken to the ICU in stable condition.    POSTOPERATIVE PLAN:  He will be admitted back to the ICU.  He is going to be   nonweightbearing bilateral lower extremities.  He can use the right pivot   transfer once he is able to be using a platform walker due to his wrist injury.    No further planned operative interventions at this hospitalization.  He  will   need to return to the operating room in approximately 3 months for removal of   his dorsal spanning plate to his wrist.        ______________________________  MD CHERELLE Owen/ASHLEY  DD:  06/14/2023  Time:  09:30AM  DT:  06/14/2023  Time:  01:35PM  Job #:  [id number]      OPERATIVE REPORT

## 2023-06-14 NOTE — ANESTHESIA PREPROCEDURE EVALUATION
06/14/2023  Gera Chavez is a 37 y.o., male.  S/p jump from bridge with polytrauma. Intubated/sedated. Needs acetabular fx ORIF    Hemodynamically stable    Pre-op Assessment    I have reviewed the Patient Summary Reports.     I have reviewed the Nursing Notes. I have reviewed the NPO Status.   I have reviewed the Medications.     Review of Systems  Anesthesia Hx:  No problems with previous Anesthesia    Cardiovascular:   Hypertension    Pulmonary:   Asthma    Endocrine:   Diabetes    Psych:   Psychiatric History          Physical Exam  General: Somnolent and Combative    Airway:  Mallampati: unable to assess   Mouth Opening: Normal  TM Distance: Normal  Neck ROM: Normal ROM  Pre-Existing Airway: Oral Endotracheal tube    Dental:  Intact        Anesthesia Plan  Type of Anesthesia, risks & benefits discussed:    Anesthesia Type: Gen ETT  Intra-op Monitoring Plan: Standard ASA Monitors  Post Op Pain Control Plan: multimodal analgesia  Induction:  IV  Airway Plan: Direct, Post-Induction  Informed Consent: Informed consent signed with the Patient and all parties understand the risks and agree with anesthesia plan.  All questions answered. Patient consented to blood products? Yes  ASA Score: 2  Day of Surgery Review of History & Physical: H&P Update referred to the surgeon/provider.    Ready For Surgery From Anesthesia Perspective.     .

## 2023-06-15 LAB
ALBUMIN SERPL-MCNC: 1.9 G/DL (ref 3.5–5)
ALBUMIN/GLOB SERPL: 0.6 RATIO (ref 1.1–2)
ALLENS TEST BLOOD GAS (OHS): YES
ALLENS TEST BLOOD GAS (OHS): YES
ALP SERPL-CCNC: 134 UNIT/L (ref 40–150)
ALT SERPL-CCNC: 18 UNIT/L (ref 0–55)
AST SERPL-CCNC: 41 UNIT/L (ref 5–34)
BACTERIA SPT CULT: ABNORMAL
BASE EXCESS BLD CALC-SCNC: 3.2 MMOL/L
BASE EXCESS BLD CALC-SCNC: 6.3 MMOL/L (ref -2–2)
BASOPHILS # BLD AUTO: 0.05 X10(3)/MCL
BASOPHILS NFR BLD AUTO: 0.5 %
BILIRUBIN DIRECT+TOT PNL SERPL-MCNC: 5.2 MG/DL
BLOOD GAS SAMPLE TYPE (OHS): ABNORMAL
BLOOD GAS SAMPLE TYPE (OHS): ABNORMAL
BUN SERPL-MCNC: 17 MG/DL (ref 8.9–20.6)
CA-I BLD-SCNC: 1.06 MMOL/L (ref 1.12–1.23)
CA-I BLD-SCNC: 1.15 MMOL/L (ref 1.12–1.23)
CALCIUM SERPL-MCNC: 7.9 MG/DL (ref 8.4–10.2)
CHLORIDE SERPL-SCNC: 111 MMOL/L (ref 98–107)
CO2 BLDA-SCNC: 29.2 MMOL/L
CO2 BLDA-SCNC: 30.1 MMOL/L
CO2 SERPL-SCNC: 21 MMOL/L (ref 22–29)
COHGB MFR BLDA: 3.9 % (ref 0.5–1.5)
CREAT SERPL-MCNC: 1.06 MG/DL (ref 0.73–1.18)
CRP SERPL-MCNC: 276 MG/L
DRAWN BY BLOOD GAS (OHS): ABNORMAL
DRAWN BY BLOOD GAS (OHS): ABNORMAL
EOSINOPHIL # BLD AUTO: 0.18 X10(3)/MCL (ref 0–0.9)
EOSINOPHIL NFR BLD AUTO: 1.7 %
ERYTHROCYTE [DISTWIDTH] IN BLOOD BY AUTOMATED COUNT: 17.1 % (ref 11.5–17)
FIO2 BLOOD GAS (OHS): 100 %
FIO2 BLOOD GAS (OHS): 35 %
GFR SERPLBLD CREATININE-BSD FMLA CKD-EPI: >60 MLS/MIN/1.73/M2
GLOBULIN SER-MCNC: 3.2 GM/DL (ref 2.4–3.5)
GLUCOSE SERPL-MCNC: 131 MG/DL (ref 70–110)
GLUCOSE SERPL-MCNC: 150 MG/DL (ref 74–100)
GRAM STN SPEC: ABNORMAL
GRAM STN SPEC: ABNORMAL
HCO3 BLDA-SCNC: 27.9 MMOL/L
HCO3 BLDA-SCNC: 29.1 MMOL/L (ref 22–26)
HCO3 UR-SCNC: 23.4 MMOL/L (ref 24–28)
HCT VFR BLD AUTO: 25.9 % (ref 42–52)
HCT VFR BLD CALC: 28 %PCV (ref 36–54)
HGB BLD-MCNC: 10 G/DL
HGB BLD-MCNC: 8.3 G/DL (ref 14–18)
IMM GRANULOCYTES # BLD AUTO: 0.17 X10(3)/MCL (ref 0–0.04)
IMM GRANULOCYTES NFR BLD AUTO: 1.6 %
LPM (OHS): 10
LYMPHOCYTES # BLD AUTO: 1.12 X10(3)/MCL (ref 0.6–4.6)
LYMPHOCYTES NFR BLD AUTO: 10.4 %
MAGNESIUM SERPL-MCNC: 2.1 MG/DL (ref 1.6–2.6)
MCH RBC QN AUTO: 28.4 PG (ref 27–31)
MCHC RBC AUTO-ENTMCNC: 32 G/DL (ref 33–36)
MCV RBC AUTO: 88.7 FL (ref 80–94)
MECH RR (OHS): 24 B/MIN
MECH VT (OHS): 500 ML
METHGB MFR BLDA: 1.1 % (ref 0.4–1.5)
MODE (OHS): AC
MONOCYTES # BLD AUTO: 0.88 X10(3)/MCL (ref 0.1–1.3)
MONOCYTES NFR BLD AUTO: 8.2 %
NEUTROPHILS # BLD AUTO: 8.34 X10(3)/MCL (ref 2.1–9.2)
NEUTROPHILS NFR BLD AUTO: 77.6 %
NRBC BLD AUTO-RTO: 0.6 %
O2 HB BLOOD GAS (OHS): 95.3 % (ref 94–97)
OXYGEN DEVICE BLOOD GAS (OHS): ABNORMAL
OXYGEN DEVICE BLOOD GAS (OHS): ABNORMAL
OXYHGB MFR BLDA: 8.9 G/DL (ref 12–16)
PCO2 BLDA: 34 MMHG (ref 35–45)
PCO2 BLDA: 40.6 MMHG (ref 35–45)
PCO2 BLDA: 42 MMHG (ref 35–45)
PEEP (OHS): 10 CMH2O
PH BLDA: 7.43 [PH] (ref 7.35–7.45)
PH BLDA: 7.54 [PH] (ref 7.35–7.45)
PH SMN: 7.37 [PH] (ref 7.35–7.45)
PHOSPHATE SERPL-MCNC: 4.6 MG/DL (ref 2.3–4.7)
PLATELET # BLD AUTO: 281 X10(3)/MCL (ref 130–400)
PMV BLD AUTO: 10.6 FL (ref 7.4–10.4)
PO2 BLDA: 151 MMHG (ref 80–100)
PO2 BLDA: 72 MMHG (ref 80–100)
PO2 BLDA: 91 MMHG (ref 80–100)
POC BE: -2 MMOL/L
POC IONIZED CALCIUM: 1.06 MMOL/L (ref 1.06–1.42)
POC SATURATED O2: 99 % (ref 95–100)
POC TCO2: 25 MMOL/L (ref 23–27)
POTASSIUM BLD-SCNC: 4.1 MMOL/L (ref 3.5–5.1)
POTASSIUM BLOOD GAS (OHS): 3.3 MMOL/L (ref 3.5–5)
POTASSIUM BLOOD GAS (OHS): 3.4 MMOL/L (ref 3.5–5)
POTASSIUM SERPL-SCNC: 3.9 MMOL/L (ref 3.5–5.1)
PREALB SERPL-MCNC: 11.5 MG/DL (ref 18–45)
PROT SERPL-MCNC: 5.1 GM/DL (ref 6.4–8.3)
RBC # BLD AUTO: 2.92 X10(6)/MCL (ref 4.7–6.1)
SAMPLE SITE BLOOD GAS (OHS): ABNORMAL
SAMPLE SITE BLOOD GAS (OHS): ABNORMAL
SAMPLE: ABNORMAL
SAO2 % BLDA: 95 %
SAO2 % BLDA: 98 %
SODIUM BLD-SCNC: 140 MMOL/L (ref 136–145)
SODIUM BLOOD GAS (OHS): 143 MMOL/L (ref 137–145)
SODIUM BLOOD GAS (OHS): 144 MMOL/L (ref 137–145)
SODIUM SERPL-SCNC: 145 MMOL/L (ref 136–145)
WBC # SPEC AUTO: 10.74 X10(3)/MCL (ref 4.5–11.5)

## 2023-06-15 PROCEDURE — 25000003 PHARM REV CODE 250: Performed by: STUDENT IN AN ORGANIZED HEALTH CARE EDUCATION/TRAINING PROGRAM

## 2023-06-15 PROCEDURE — 25000242 PHARM REV CODE 250 ALT 637 W/ HCPCS: Performed by: HOSPITALIST

## 2023-06-15 PROCEDURE — 94761 N-INVAS EAR/PLS OXIMETRY MLT: CPT

## 2023-06-15 PROCEDURE — 85025 COMPLETE CBC W/AUTO DIFF WBC: CPT | Performed by: STUDENT IN AN ORGANIZED HEALTH CARE EDUCATION/TRAINING PROGRAM

## 2023-06-15 PROCEDURE — 63600175 PHARM REV CODE 636 W HCPCS: Performed by: SURGERY

## 2023-06-15 PROCEDURE — 86140 C-REACTIVE PROTEIN: CPT | Performed by: STUDENT IN AN ORGANIZED HEALTH CARE EDUCATION/TRAINING PROGRAM

## 2023-06-15 PROCEDURE — 99900035 HC TECH TIME PER 15 MIN (STAT)

## 2023-06-15 PROCEDURE — 80053 COMPREHEN METABOLIC PANEL: CPT | Performed by: STUDENT IN AN ORGANIZED HEALTH CARE EDUCATION/TRAINING PROGRAM

## 2023-06-15 PROCEDURE — 82803 BLOOD GASES ANY COMBINATION: CPT

## 2023-06-15 PROCEDURE — 94640 AIRWAY INHALATION TREATMENT: CPT

## 2023-06-15 PROCEDURE — 84100 ASSAY OF PHOSPHORUS: CPT | Performed by: STUDENT IN AN ORGANIZED HEALTH CARE EDUCATION/TRAINING PROGRAM

## 2023-06-15 PROCEDURE — 99900031 HC PATIENT EDUCATION (STAT)

## 2023-06-15 PROCEDURE — 20000000 HC ICU ROOM

## 2023-06-15 PROCEDURE — 83735 ASSAY OF MAGNESIUM: CPT | Performed by: STUDENT IN AN ORGANIZED HEALTH CARE EDUCATION/TRAINING PROGRAM

## 2023-06-15 PROCEDURE — 25000003 PHARM REV CODE 250

## 2023-06-15 PROCEDURE — 20800000 HC ICU TRAUMA

## 2023-06-15 PROCEDURE — 36600 WITHDRAWAL OF ARTERIAL BLOOD: CPT

## 2023-06-15 PROCEDURE — 94667 MNPJ CHEST WALL 1ST: CPT

## 2023-06-15 PROCEDURE — 27000221 HC OXYGEN, UP TO 24 HOURS

## 2023-06-15 PROCEDURE — 63600175 PHARM REV CODE 636 W HCPCS

## 2023-06-15 PROCEDURE — 99900026 HC AIRWAY MAINTENANCE (STAT)

## 2023-06-15 PROCEDURE — 94003 VENT MGMT INPAT SUBQ DAY: CPT

## 2023-06-15 PROCEDURE — 63600175 PHARM REV CODE 636 W HCPCS: Performed by: NURSE PRACTITIONER

## 2023-06-15 PROCEDURE — 25000003 PHARM REV CODE 250: Performed by: SURGERY

## 2023-06-15 PROCEDURE — 84134 ASSAY OF PREALBUMIN: CPT | Performed by: STUDENT IN AN ORGANIZED HEALTH CARE EDUCATION/TRAINING PROGRAM

## 2023-06-15 RX ORDER — MORPHINE SULFATE 4 MG/ML
4 INJECTION, SOLUTION INTRAMUSCULAR; INTRAVENOUS EVERY 4 HOURS PRN
Status: DISCONTINUED | OUTPATIENT
Start: 2023-06-15 | End: 2023-06-23

## 2023-06-15 RX ORDER — GUAIFENESIN 600 MG/1
600 TABLET, EXTENDED RELEASE ORAL 2 TIMES DAILY
Status: DISCONTINUED | OUTPATIENT
Start: 2023-06-15 | End: 2023-06-26 | Stop reason: HOSPADM

## 2023-06-15 RX ORDER — ACETAMINOPHEN 10 MG/ML
1000 INJECTION, SOLUTION INTRAVENOUS ONCE
Status: DISCONTINUED | OUTPATIENT
Start: 2023-06-15 | End: 2023-06-15

## 2023-06-15 RX ORDER — ACETAMINOPHEN 10 MG/ML
1000 INJECTION, SOLUTION INTRAVENOUS EVERY 8 HOURS
Status: DISCONTINUED | OUTPATIENT
Start: 2023-06-15 | End: 2023-06-15

## 2023-06-15 RX ORDER — ACETAMINOPHEN 10 MG/ML
1000 INJECTION, SOLUTION INTRAVENOUS EVERY 8 HOURS
Status: COMPLETED | OUTPATIENT
Start: 2023-06-15 | End: 2023-06-16

## 2023-06-15 RX ORDER — METOPROLOL TARTRATE 1 MG/ML
5 INJECTION, SOLUTION INTRAVENOUS EVERY 5 MIN PRN
Status: DISCONTINUED | OUTPATIENT
Start: 2023-06-15 | End: 2023-06-16

## 2023-06-15 RX ADMIN — IPRATROPIUM BROMIDE AND ALBUTEROL SULFATE 3 ML: 2.5; .5 SOLUTION RESPIRATORY (INHALATION) at 01:06

## 2023-06-15 RX ADMIN — MIDAZOLAM HYDROCHLORIDE 2 MG: 5 INJECTION, SOLUTION INTRAMUSCULAR; INTRAVENOUS at 01:06

## 2023-06-15 RX ADMIN — MORPHINE SULFATE 4 MG: 4 INJECTION, SOLUTION INTRAMUSCULAR; INTRAVENOUS at 07:06

## 2023-06-15 RX ADMIN — FAMOTIDINE 20 MG: 10 INJECTION, SOLUTION INTRAVENOUS at 08:06

## 2023-06-15 RX ADMIN — DEXMEDETOMIDINE HYDROCHLORIDE 1.4 MCG/KG/HR: 400 INJECTION INTRAVENOUS at 11:06

## 2023-06-15 RX ADMIN — DEXMEDETOMIDINE HYDROCHLORIDE 1.4 MCG/KG/HR: 400 INJECTION INTRAVENOUS at 04:06

## 2023-06-15 RX ADMIN — ACETAMINOPHEN 1000 MG: 10 INJECTION, SOLUTION INTRAVENOUS at 10:06

## 2023-06-15 RX ADMIN — SODIUM CHLORIDE, POTASSIUM CHLORIDE, SODIUM LACTATE AND CALCIUM CHLORIDE: 600; 310; 30; 20 INJECTION, SOLUTION INTRAVENOUS at 11:06

## 2023-06-15 RX ADMIN — ACETAMINOPHEN 650 MG: 650 SOLUTION ORAL at 12:06

## 2023-06-15 RX ADMIN — PIPERACILLIN AND TAZOBACTAM 4.5 G: 4; .5 INJECTION, POWDER, LYOPHILIZED, FOR SOLUTION INTRAVENOUS; PARENTERAL at 07:06

## 2023-06-15 RX ADMIN — MIDAZOLAM HYDROCHLORIDE 2 MG: 5 INJECTION, SOLUTION INTRAMUSCULAR; INTRAVENOUS at 08:06

## 2023-06-15 RX ADMIN — PIPERACILLIN AND TAZOBACTAM 4.5 G: 4; .5 INJECTION, POWDER, LYOPHILIZED, FOR SOLUTION INTRAVENOUS; PARENTERAL at 04:06

## 2023-06-15 RX ADMIN — DEXMEDETOMIDINE HYDROCHLORIDE 1.4 MCG/KG/HR: 400 INJECTION INTRAVENOUS at 09:06

## 2023-06-15 RX ADMIN — PROPOFOL 50 MCG/KG/MIN: 10 INJECTION, EMULSION INTRAVENOUS at 04:06

## 2023-06-15 RX ADMIN — DEXMEDETOMIDINE HYDROCHLORIDE 1.4 MCG/KG/HR: 400 INJECTION INTRAVENOUS at 02:06

## 2023-06-15 RX ADMIN — PROPOFOL 50 MCG/KG/MIN: 10 INJECTION, EMULSION INTRAVENOUS at 02:06

## 2023-06-15 RX ADMIN — MORPHINE SULFATE 4 MG: 4 INJECTION, SOLUTION INTRAMUSCULAR; INTRAVENOUS at 10:06

## 2023-06-15 RX ADMIN — DEXMEDETOMIDINE HYDROCHLORIDE 1.4 MCG/KG/HR: 400 INJECTION INTRAVENOUS at 01:06

## 2023-06-15 RX ADMIN — IPRATROPIUM BROMIDE AND ALBUTEROL SULFATE 3 ML: 2.5; .5 SOLUTION RESPIRATORY (INHALATION) at 07:06

## 2023-06-15 RX ADMIN — ENOXAPARIN SODIUM 40 MG: 40 INJECTION SUBCUTANEOUS at 08:06

## 2023-06-15 RX ADMIN — METOPROLOL TARTRATE 5 MG: 1 INJECTION, SOLUTION INTRAVENOUS at 07:06

## 2023-06-15 RX ADMIN — MIDAZOLAM HYDROCHLORIDE 2 MG: 5 INJECTION, SOLUTION INTRAMUSCULAR; INTRAVENOUS at 06:06

## 2023-06-15 RX ADMIN — PIPERACILLIN AND TAZOBACTAM 4.5 G: 4; .5 INJECTION, POWDER, LYOPHILIZED, FOR SOLUTION INTRAVENOUS; PARENTERAL at 12:06

## 2023-06-15 RX ADMIN — DEXMEDETOMIDINE HYDROCHLORIDE 1.4 MCG/KG/HR: 400 INJECTION INTRAVENOUS at 10:06

## 2023-06-15 RX ADMIN — DEXMEDETOMIDINE HYDROCHLORIDE 1.4 MCG/KG/HR: 400 INJECTION INTRAVENOUS at 06:06

## 2023-06-15 RX ADMIN — DEXMEDETOMIDINE HYDROCHLORIDE 1.4 MCG/KG/HR: 400 INJECTION INTRAVENOUS at 05:06

## 2023-06-15 RX ADMIN — Medication 250 MCG/HR: at 05:06

## 2023-06-15 NOTE — PROGRESS NOTES
Patient Name: Gera Chavez  MRN: [id number]  Admission Date: 6/4/2023  Hospital Length of Stay: 11 days  Attending Provider: Luis Bermudez Jr., MD  Primary Care Provider: Primary Doctor No  Follow-up For: Procedure(s) (LRB):  ORIF, FRACTURE, RADIUS, DISTAL (Right)    Post-Operative Day: Post op day 1  Subjective:       Principal Orthopedic Problem: 1 Day Post-Op       Interval History:  POD 3 s/p ORIF right distal radius. POD 1 ORIF L acetabulum fracture. He is 10 days out from fixation of his pelvic fractures with Dr. Zepeda. Nursing staff and respiratory therapy at bedside. Patient was just extubated. No acute ortho issues since surgery.    Review of patient's allergies indicates:   Allergen Reactions    Iodine     Trazodone        Current Facility-Administered Medications   Medication    0.9%  NaCl infusion (for blood administration)    acetaminophen oral solution 650 mg    albuterol-ipratropium 2.5 mg-0.5 mg/3 mL nebulizer solution 3 mL    calcium-vitamin D3 500 mg-5 mcg (200 unit) per tablet 1 tablet    dexmedetomidine (PRECEDEX) 400mcg/100mL 0.9% NaCL infusion    enoxaparin injection 40 mg    famotidine (PF) injection 20 mg    fentaNYL 2500 mcg in 0.9% sodium chloride 250 mL infusion premix (titrating)    fentaNYL injection 50 mcg    FLUoxetine capsule 40 mg    gabapentin capsule 300 mg    guaiFENesin 12 hr tablet 600 mg    hydrALAZINE injection 10 mg    labetaloL injection 10 mg    lactated ringers infusion    lamoTRIgine tablet 25 mg    metoprolol injection 5 mg    midazolam (VERSED) 5 mg/mL injection 2 mg    morphine injection 4 mg    oxyCODONE immediate release tablet 5 mg    oxyCODONE immediate release tablet Tab 10 mg    piperacillin-tazobactam (ZOSYN) 4.5 g in dextrose 5 % in water (D5W) 5 % 100 mL IVPB (MB+)    propofol (DIPRIVAN) 10 mg/mL infusion    QUEtiapine tablet 50 mg     Objective:     Vital Signs (Most Recent):  Temp: 99.7 °F (37.6 °C) (06/15/23 0800)  Pulse: (!) 123 (06/15/23 0900)  Resp:  "(!) 25 (06/15/23 0900)  BP: 136/82 (06/15/23 0900)  SpO2: (!) 90 % (06/15/23 0900) Vital Signs (24h Range):  Temp:  [98.8 °F (37.1 °C)-101.7 °F (38.7 °C)] 99.7 °F (37.6 °C)  Pulse:  [] 123  Resp:  [3-42] 25  SpO2:  [90 %-100 %] 90 %  BP: ()/(37-86) 136/82     Weight: 121 kg (266 lb 12.1 oz)  Height: 5' 10.98" (180.3 cm)  Body mass index is 37.22 kg/m².      Intake/Output Summary (Last 24 hours) at 6/15/2023 1003  Last data filed at 6/15/2023 0800  Gross per 24 hour   Intake 5318.13 ml   Output 2755 ml   Net 2563.13 ml         Physical Exam:   Ortho/SPM Exam    General: extubated, awake, agitated; he is not verbalizing of following commands for exam    Musculoskeletal Exam:  Right upper extremity:  dressing to wrist is clean, dry, and intact. Compartments are soft and compressible. Velcro brace in place.Palpable radial pulse. Brisk cap refill to digits.     Left lower extremity:dressing clean, dry, intact. HV intact with 130 mL out since surgery.. Compartments soft and compressible. Palpable DP pulse. Brisk cap refill distally. Unable to participate in exam.     Diagnostic Findings:   Significant Labs: BMP:   Recent Labs   Lab 06/15/23  0134      K 3.9   CO2 21*   BUN 17.0   CREATININE 1.06   CALCIUM 7.9*   MG 2.10       CBC:   Recent Labs   Lab 06/14/23  0117 06/14/23  1229 06/15/23  0300   WBC 7.60 9.96 10.74   HGB 7.6* 8.1* 8.3*   HCT 24.0* 24.8* 25.9*    356 281       CMP:   Recent Labs   Lab 06/14/23  0117 06/14/23  1229 06/15/23  0134   * 147* 145   K 3.8 3.6 3.9   CO2 23 23 21*   BUN 11.9 13.8 17.0   CREATININE 0.79 0.88 1.06   CALCIUM 7.8* 8.4 7.9*   ALBUMIN 1.8* 1.9* 1.9*   BILITOT 5.1* 6.4* 5.2*   ALKPHOS 111 115 134   AST 32 37* 41*   ALT 16 17 18       All pertinent labs within the past 24 hours have been reviewed.        Significant Imaging: I have reviewed and interpreted all pertinent imaging results/findings.     Assessment/Plan:     Active Diagnoses:    Diagnosis Date " Noted POA    PRINCIPAL PROBLEM:  Closed pelvic ring fracture [S32.810A] 06/05/2023 Yes    Displaced fracture of posterior column (ilioischial) of left acetabulum, initial encounter for closed fracture [S32.442A] 06/05/2023 Yes      Problems Resolved During this Admission:       POD 3 ORIF R distal radius. NWB R wrist, ok for platform walker. Continue velcro brace. Plan to begin daily dry dressing changes today. He will need a second operation in 10-12 weeks for removal of dorsal spanning plate.     POD 1 ORIF L acetabulum fracture. HV with 130 mL out since surgery. Continue drain today and continue to monitor output. H&H stable compared to yesterday. Continue to monitor; will defer to primary team on transfusion. Ok for PT when able. NWB L LE, full ROM. He may use the R LE to pivot/transfer but no hopping/ambulation. Ancef x 3 doses post op. Lovenox for dvt ppx.     The above findings, diagnosis, and treatment plan were discussed with Dr. Compa Bernal who is in agreement.

## 2023-06-15 NOTE — ANESTHESIA POSTPROCEDURE EVALUATION
Anesthesia Post Evaluation    Patient: Gera Chavez    Procedure(s) Performed: Procedure(s) (LRB):  ORIF, FRACTURE, ACETABULUM, POSTERIOR (Left)    Final Anesthesia Type: general      Patient location during evaluation: PACU  Patient participation: Yes- Able to Participate  Level of consciousness: awake and alert  Post-procedure vital signs: reviewed and stable  Pain management: adequate  Airway patency: patent      Anesthetic complications: no      Cardiovascular status: hemodynamically stable  Respiratory status: unassisted  Hydration status: euvolemic  Follow-up not needed.          Vitals Value Taken Time   /76 06/15/23 1001   Temp 37.6 °C (99.7 °F) 06/15/23 0800   Pulse 122 06/15/23 1035   Resp 31 06/15/23 1035   SpO2 96 % 06/15/23 1035   Vitals shown include unvalidated device data.      No case tracking events are documented in the log.      Pain/Mp Score: Pain Rating Prior to Med Admin: 0 (6/15/2023  7:48 AM)  Pain Rating Post Med Admin: 0 (6/15/2023  8:18 AM)

## 2023-06-15 NOTE — PROGRESS NOTES
Inpatient Nutrition Assessment    Admit Date: 6/4/2023   Total duration of encounter: 11 days     Nutrition Recommendation/Prescription     Advance diet when appropriate. Goal diet: regular with SLP recs for consistency.  If not able to advance diet, consider NG placement.  If placed, consider:  Impact Peptide 1.5 goal rate 90 ml/hr to provide  2700 kcal/d (96% est needs)  169 g protein/d (100% est needs)  1386 ml free water/d (49% est needs)  (calculations based on estimated 20 hr/d run time)     Communication of Recommendations: reviewed with nurse    Nutrition Assessment     Malnutrition Assessment/Nutrition-Focused Physical Exam    Malnutrition Context: acute illness or injury  Malnutrition Level: other (see comments) (does not meet criteria)  Energy Intake (Malnutrition): less than or equal to 50% for greater than or equal to 5 days  Weight Loss (Malnutrition): other (see comments) (unable to obtain)  Subcutaneous Fat (Malnutrition): other (see comments) (does not meet criteria)           Muscle Mass (Malnutrition): other (see comments) (does not meet criteria)                          Fluid Accumulation (Malnutrition): other (see comments) (does not meet criteria)        A minimum of two characteristics is recommended for diagnosis of either severe or non-severe malnutrition.    Chart Review    Reason Seen: continuous nutrition monitoring and follow-up    Malnutrition Screening Tool Results   Have you recently lost weight without trying?: No  Have you been eating poorly because of a decreased appetite?: No   MST Score: 0     Diagnosis:  L pelvic rim fx w/ open book pelvis  L pelvic wall hematoma  L2 burst fx  L apical PTX  L 4-11 rib fx  pulm contusion  (Fall from bridge)    Relevant Medical History: none noted    Nutrition-Related Medications: LR @ 75ml/hr, Ca +Vit D  Calorie Containing IV Medications: no significant kcals from medications at this time    Nutrition-Related Labs:  6/5 K 5.3, Cl 115, .9,  "PAB 24.7  6/8 Glu 107, Phos 2.1  6/12 Cl 110, Glu 111, .9, PAB 9.8  6/15 Cl 111, Glu 150, , PAB 11.5    Diet/PN Order: Diet NPO Except for: Medication  Oral Supplement Order: none  Tube Feeding Order: none  Appetite/Oral Intake: not applicable/not applicable  Factors Affecting Nutritional Intake: NPO  Food/Yarsanism/Cultural Preferences: unable to obtain  Food Allergies: none reported    Skin Integrity: incision, drain/device(s)  Wound(s):   incision noted    Comments    6/5/23: Discussed with RN. Will provide tube feeding recommendations for when appropriate to start tube feeding. Receiving kcal from meds.      6/8/23: Pt self extubated this AM, now reintubated. Discussed starting TF with RN. Receiving kcal from meds.     6/12/23: Noted TF ordered. Not appropriate formula at this time. Pt in OR at time of RD visit. Plans to restart TF today per RN. Pt still having to be flat until post-op Wednesday. Since TF having to be run @ lower rates due to repeated OR visits, will use more concentrated formula. Will update formula and goal rate.     6/15/23: Pt now extubated. Plans for SLP eval. Discussed placing NG with RN if not able to advance diet. Will provide TF recs in case needed.    Anthropometrics    Height: 5' 10.98" (180.3 cm)    Last Weight: 121 kg (266 lb 12.1 oz) (06/04/23 1555)    BMI (Calculated): 37.2  BMI Classification: obese grade II (BMI 35-39.9)        Ideal Body Weight (IBW), Male: 171.88 lb     % Ideal Body Weight, Male (lb): 155.09 %                          Usual Weight Provided By: unable to obtain usual weight    Wt Readings from Last 5 Encounters:   06/04/23 121 kg (266 lb 12.1 oz)   05/09/23 90.7 kg (199 lb 15.3 oz)   05/09/23 90.7 kg (200 lb)   02/28/23 90.7 kg (200 lb)   02/07/23 100 kg (220 lb 7.4 oz)     Weight Change(s) Since Admission:  Admit Weight: 121 kg (266 lb 12.1 oz) (06/04/23 1555)  6/12/23: no new  6/15/23: no new    Estimated Needs    Weight Used For Calorie " Calculations: 121 kg (266 lb 12.1 oz)  Energy Calorie Requirements (kcal): 2803kcal (1.3 stress factor)  Energy Need Method: [place]-St Jeor  Weight Used For Protein Calculations: 121 kg (266 lb 12.1 oz)  Protein Requirements: 146-194gm (1.4-1.6g/kg)  Fluid Requirements (mL): 2803ml (1ml/kcal)  Temp (24hrs), Av.4 °F (38 °C), Min:99.6 °F (37.6 °C), Max:101.7 °F (38.7 °C)         Enteral Nutrition    Patient not receiving enteral nutrition at this time.    Parenteral Nutrition    Patient not receiving parenteral nutrition support at this time.    Evaluation of Received Nutrient Intake    Calories: not meeting estimated needs  Protein: not meeting estimated needs    Patient Education    Not applicable.    Nutrition Diagnosis     PES: Inadequate oral intake related to acute illness as evidenced by NPO post extubation. (continues)    Interventions/Goals     Intervention(s): general/healthful diet, modified composition of enteral nutrition, modified rate of enteral nutrition, and collaboration with other providers  Goal: Meet greater than 75% of nutritional needs by follow-up. (goal progressing)    Monitoring & Evaluation     Dietitian will monitor energy intake.  Nutrition Risk/Follow-Up: high (follow-up in 1-4 days)   Please consult if re-assessment needed sooner.

## 2023-06-15 NOTE — ANESTHESIA POSTPROCEDURE EVALUATION
Anesthesia Post Evaluation    Patient: Gera Chavez    Procedure(s) Performed: Procedure(s) (LRB):  ORIF, FRACTURE, RADIUS, DISTAL (Right)    Final Anesthesia Type: general      Patient location during evaluation: ICU  Post-procedure vital signs: reviewed and stable      Anesthetic complications: no      Cardiovascular status: blood pressure returned to baseline  Hydration status: euvolemic  Follow-up not needed.                No case tracking events are documented in the log.      Pain/Mp Score: Pain Rating Prior to Med Admin: 0 (6/15/2023  7:48 AM)  Pain Rating Post Med Admin: 0 (6/15/2023  8:18 AM)

## 2023-06-15 NOTE — NURSING
Nurses Note -- 4 Eyes      6/15/2023   8:02 AM      Skin assessed during: Q Shift Change      [x] No Altered Skin Integrity Present    [x]Prevention Measures Documented      [] Yes- Altered Skin Integrity Present or Discovered   [] LDA Added if Not in Epic (Describe Wound)   [] New Altered Skin Integrity was Present on Admit and Documented in LDA   [] Wound Image Taken    Wound Care Consulted? No    Attending Nurse:  Slava Belle RN     Second RN/Staff Member:  Talia Landry RN

## 2023-06-15 NOTE — PROGRESS NOTES
[facility] ICU PROGRESS NOTE    HD# 11  Admission Summary:   In brief, Gera Chavez is a 37 y.o. male admitted on 6/4/2023 following fall from bridge at 25 ft.  He reportedly jumped off a bridge and landed on dry land.  He arrived intubated with a left-sided chest tube.  He was found to have a pelvic ring fracture including open book fracture, L2 burst fracture, left pneumothorax, multiple left-sided rib fractures.    Interval history:    No acute events overnight.  T-max 101.7° however white count within normal limits.  Respiratory cultures growing Pseudomonas; on Zosyn. Wean to extubate this morning.    Consults:   Neurosurgery  Orthopedic surgery Injuries:  L pelvic rim fx w/ open book pelvis  L pelvic wall hematoma  L2 burst fx  L apical PTX  L 4-11 rib fx  pulmonary contusion  R distal radius fracture Operations/Procedures:  Left chest tube placement at outside facility 6/4  Pelvic ring fx ORIF 6/5  ORIF L distal radius fx 6/12     Past medical history:  1. Asthma   2. Depression  3. Diabetes   4. Generalized anxiety disorder   5. Hypertension  6. Schizophrenia   7. Sleep apnea    Medications: [x] Medications reviewed/updated   Home Meds:    Current Outpatient Medications   Medication Instructions    albuterol (PROVENTIL/VENTOLIN HFA) 90 mcg/actuation inhaler 2 puffs, Inhalation, Every 4 hours PRN    ALBUTEROL INHL 90 mcg, Inhalation, Every 4 hours PRN    amLODIPine (NORVASC) 5 mg, Oral, Daily    amoxicillin-clavulanate 875-125mg (AUGMENTIN) 875-125 mg per tablet 1 tablet, Oral, Every 12 hours (non-standard times)    atorvastatin (LIPITOR) 20 mg, Oral, Daily    azithromycin (Z-CELESTINA) 500 mg, Oral, Daily    clonazePAM (KLONOPIN) 1 mg, Oral, 2 times daily PRN    dextroamphetamine-amphetamine (ADDERALL XR) 25 MG 24 hr capsule 25 mg, Oral, 2 times daily    FLUoxetine 20 MG capsule fluoxetine 20 mg capsule    FLUoxetine 40 mg, Oral, Daily    insulin glargine 100 units/mL SubQ pen Lantus Solostar U-100 Insulin 100  "unit/mL (3 mL) subcutaneous pen    ipratropium (ATROVENT) 42 mcg (0.06 %) nasal spray Each Nostril    lamoTRIgine (LAMICTAL) 25 MG tablet lamotrigine 25 mg tablet    LANTUS SOLOSTAR U-100 INSULIN glargine 100 units/mL SubQ pen Subcutaneous    latanoprost 0.005 % ophthalmic solution latanoprost 0.005 % eye drops    lisinopriL (PRINIVIL,ZESTRIL) 40 mg, Oral    mirtazapine (REMERON) 7.5 MG Tab mirtazapine 7.5 mg tablet    potassium chloride (K-TAB) 20 mEq potassium chloride ER 20 mEq tablet,extended release   TAKE 1 TABLET BY MOUTH EVERY DAY    prochlorperazine (COMPAZINE) 10 MG tablet prochlorperazine maleate 10 mg tablet    QUEtiapine (SEROQUEL) 200 mg, Oral, Nightly    testosterone cypionate (DEPOTESTOTERONE CYPIONATE) 200 mg/mL injection SMARTSIG:Milliliter(s) IM      Scheduled Meds:    albuterol-ipratropium  3 mL Nebulization Q6H    calcium-vitamin D3  1 tablet Per NG tube BID    enoxaparin  40 mg Subcutaneous Q12H    famotidine (PF)  20 mg Intravenous BID    FLUoxetine  40 mg Per NG tube Daily    gabapentin  300 mg Per NG tube TID    lamoTRIgine  25 mg Per NG tube Daily    piperacillin-tazobactam (Zosyn) IV (PEDS and ADULTS) (extended infusion is not appropriate)  4.5 g Intravenous Q8H    QUEtiapine  50 mg Per NG tube BID     Continuous Infusions:    dexmedeTOMIDine (Precedex) infusion (titrating) 1.4 mcg/kg/hr (06/15/23 0632)    fentanyl 250 mcg/hr (06/15/23 0552)    lactated ringers Stopped (06/14/23 0730)    propofoL 50 mcg/kg/min (06/15/23 0654)     PRN Meds: sodium chloride, acetaminophen, fentaNYL, hydrALAZINE, labetaloL, midazolam, oxyCODONE, oxyCODONE     Vitals:  VITAL SIGNS: 24 HR MIN & MAX LAST   Temp  Min: 98.8 °F (37.1 °C)  Max: 101.7 °F (38.7 °C)  100.1 °F (37.8 °C)   BP  Min: 99/37  Max: 137/58  (!) 105/44    Pulse  Min: 84  Max: 132  87    Resp  Min: 0  Max: 32  (!) 24    SpO2  Min: 95 %  Max: 100 %  98 %      HT: 5' 10.98" (180.3 cm)  WT: 121 kg (266 lb 12.1 oz)  BMI: 37.2   Ideal Body Weight " (IBW), Male: 171.88 lb  % Ideal Body Weight, Male (lb): 155.09 %        General  Exam: NAD, sedated and intubated     Neuro/Psych  GCS:  10T  Exam:  Follows commands, mouthing responses, alert  ICP monitor: No  ICP treatment: ICP Treatment: N/A  C-Collar: Yes    Plan:  Q2 hour neurochecks  Wean sedation  Pain control     HEENT  Exam: NC    Plan:   none     Pulmonary  Vitals: Resp  Av.2  Min: 0  Max: 32  SpO2  Av.2 %  Min: 95 %  Max: 100 %    Ventilator/Oxygen Settings:   Vent Mode: A/C  Vt Set: 500 mL  Set Rate: 24 BPM  Pressure Support: 12 cmH20  I:E Ratio Measured: 1:2.1     ABG:   Recent Labs   Lab 06/15/23  0556   PH 7.430   PO2 72.0*   HCO3 27.9      CXR:    No results found in the last 24 hours.           Rib fractures: yes  Chest Tube: none    Exam: mechanically ventilated  Incentive Spirometry/RT Treatments: RT    Plan:     Wean to extubate postop  Monitor daily CXR/ABG  DC CXR and ABG once extubated     Cardiovascular  Vitals: Pulse  Av.8  Min: 84  Max: 132  BP  Min: 99/37  Max: 137/58  Vasoactive Agents: None  Exam: tachy    Echo Findings:   Left Ventricle  The left ventricle is  with normal systolic function. The estimated ejection fraction is 65%. There is normal diastolic function.     Right Ventricle  Normal cavity size.     Left Atrium  The left atrial volume index is normal.     Right Atrium  The right atrium is normal in size.     Aortic Valve  The aortic valve appears structurally normal.     Mitral Valve  The mean pressure gradient across the mitral valve is 3 mmHg at a heart rate of. The mitral valve appears structurally normal. The estimated mitral valve area by pressure half time is 2.78 cm2.     Tricuspid Valve  The tricuspid valve appears structurally normal.     Pulmonic Valve  Pulmonic valve is structurally normal.     Plan:   Continuous cardiac monitoring while in the ICU  Start lopressor     Renal  Recent Labs     23  0215 23  0117 23  1229 06/15/23  0134    BUN 14.5 11.9 13.8 17.0   CREATININE 0.92 0.79 0.88 1.06       No results for input(s): LACTIC in the last 72 hours.      Intake/Output - Last 3 Shifts          07 0659  0700  06/15 0659    I.V. (mL/kg) 4344 (35.9) 1387.6 (11.5)    Blood  747    NG/ 196    IV Piggyback 50 637.5    Total Intake(mL/kg) 4640 (38.3) 2968.1 (24.5)    Urine (mL/kg/hr) 2885 (1) 1465 (0.5)    Drains  100    Blood  500    Total Output 2885 2065    Net +1755 +903.1                   Intake/Output Summary (Last 24 hours) at 6/15/2023 0646  Last data filed at 2023 1800  Gross per 24 hour   Intake 2968.13 ml   Output 2065 ml   Net 903.13 ml         Titus: Yes     Plan:   I's and O's  Lactic normalized  CTM BUN/Cr/UOP     FEN/GI  Recent Labs     23  0117 23  1229 06/15/23  0134   * 146* 147* 145   K 3.6 3.8 3.6 3.9   CO2  23 21*   CALCIUM 8.0* 7.8* 8.4 7.9*   MG 2.20 2.10  --  2.10   PHOS 3.4 3.5  --  4.6   ALBUMIN 1.7* 1.8* 1.9* 1.9*   BILITOT 5.9* 5.1* 6.4* 5.2*   AST 36* 32 37* 41*   ALKPHOS 87 111 115 134   ALT 19 16 17 18       Diet: NPO    Last BM:  Prior to arrival    Abdominal Exam:  Soft, nondistended  Abdominal Dressing: None    Plan:   Replace electrolytes based on daily labs     Heme/Onc  Recent Labs     23  0117 23  1229 06/15/23  0300   HGB 7.8* 7.6* 8.1* 8.3*   HCT 24.6* 24.0* 24.8* 25.9*    344 356 281       Transfusions (over past 24h):  2 units of PRBC    Plan:   H&H stable  Transfuse greater than 7 hemoglobin or symptomatic     ID  Temp  Av.5 °F (38.1 °C)  Min: 98.8 °F (37.1 °C)  Max: 101.7 °F (38.7 °C)      Recent Labs     23  0215 23  0117 23  1229 06/15/23  0300   WBC 6.88 7.60 9.96 10.74       Cultures:  Blood culture-pending  Blood culture pending   Respiratory culture-Pseudomonas aeruginosa    Antibiotics:   Zosyn    Plan:   Continue to trend fever curve, WBC WNL  Zosyn for 7 days awaiting  sensitivities     Endocrine  Recent Labs     06/13/23  0215 06/14/23  0117 06/14/23  1229 06/15/23  0134   GLUCOSE 130* 113* 124* 150*      No results for input(s): POCTGLUCOSE in the last 72 hours.     Insulin treatment: no medications    Plan:   BG <180     Musculoskeletal/Wounds  Weight bearing status:   RUE: NWB  LUE: WBAT  RLE: NWB  LLE: NWB    [x] Tertiary exam performed    Extremity/wound exam:     Plan:   PT/OT     Precautions  Fall, Spinal, and Standard  Strict bedrest until 6/14     Prophylaxis  Seizure: Not indicated.  DVT: lovenox 40 BID to start 6/14  GI: H2B     Lines/drains/airway [x] LDA reviewed/updated   OGT  ETT  Titus  CVC  A-line    Plan:  Maintain peripheral access  Discontinue arterial line and central venous catheter once peripheral IV is obtained     Restraints  Face to face evaluation of need for restraints on rounds today:   Currently restrained? no     Disposition  Continue ongoing ICU level care.        6/15/2023 9:56 AM     The above findings, diagnostics, and treatment plan were discussed with Dr. Luis Bermudez who will follow with further assessments and recommendations. Please call with any questions, concerns, or clinical status changes.

## 2023-06-15 NOTE — PT/OT/SLP PROGRESS
"Physical Therapy      Patient Name:  Gera Chavez   MRN:  [id number]    Patient not seen today for PT eval. Pt sitting with HOB elevated upon arrival. Pt was oriented to person and time. Patient appeared paranoid with aggressive, nonsense talking. Multiple attempts made at orienting pt however unable to redirect. Patient unwilling to work with therapy at this time- "don't touch me, I want the ". Will follow-up tomorrow/as appropriate .    "

## 2023-06-15 NOTE — PT/OT/SLP PROGRESS
SLP orders received, chart reviewed, and nursing consulted.  Pt was transferred from an outside facility for a level 1 trauma activation after reportedly jumping from a bridge.  Pt with multiple orthopedic injuries as well as sSmall areas of extra-axial hemorrhage most conspicuous over the left temporal and parietal lobes per CT report.  Pt was intubated upon arrival on 6/4 and extubated this AM.  Attempted to see pt for clinical swallow evaluation, however pt confused with increased work of breathing and requiring 10L O2 via oxymask.  Attempts at PO intake unsafe at this time.  Will follow up in AM.

## 2023-06-15 NOTE — NURSING
Nurses Note -- 4 Eyes      6/15/2023   12:56 PM      Skin assessed during: Q Shift Change      [x] No Altered Skin Integrity Present    [x]Prevention Measures Documented      [] Yes- Altered Skin Integrity Present or Discovered   [] LDA Added if Not in Epic (Describe Wound)   [] New Altered Skin Integrity was Present on Admit and Documented in LDA   [] Wound Image Taken    Wound Care Consulted? No    Attending Nurse:  Slava Belle RN     Second RN/Staff Member:  Hyacinth Trent RN

## 2023-06-15 NOTE — PROGRESS NOTES
[facility] - 7 North ICU  Pulmonary Critical Care Note    Patient Name: Gera Chavez  MRN: [id number]  Admission Date: 6/4/2023  Hospital Length of Stay: 11 days  Code Status: Full Code  Attending Provider: Luis Bermudez Jr., MD  Primary Care Provider: Primary Doctor No     Subjective:     HPI:   Gera Chavez is a 37 y.o. male with no known PMH, who was transferred from [facility] for level 1 trauma activation, after patient reportedly jumped off a bridge.  Sustained rib fractures x4 left-sided, pelvic ring fracture, acetabulum fracture, L2 burst fracture, and left pneumothorax with chest tube in place.  Jump witnessed by bystander who called 911.     Hospital Course/Significant events:  6/4/2023 - Admitted to ICU   6/5/2023 - s/p bronchoscopy  6/12/2023 - s/p ORIF right radius fx  6/14/23 - ORIF left acetabulum    24 Hour Interval History:  Febrile overnight with T-max of 101.7° F, weaning sedation, alert, following commands, mouthing responses, remains intubated on CPAP.    Past Medical History:   Diagnosis Date    Asthma     Depression     Diabetes mellitus     Encounter for blood transfusion     Generalized anxiety disorder     Hypertension     Schizophrenia, unspecified     Sleep apnea     Unspecified glaucoma        Past Surgical History:   Procedure Laterality Date    INTRAMEDULLARY RODDING OF FEMUR Left 02/05/2023    Procedure: INSERTION, INTRAMEDULLARY NOEL, FEMUR;  Surgeon: Tuan Zepeda DO;  Location: [facility];  Service: Orthopedics;  Laterality: Left;    OPEN REDUCTION AND INTERNAL FIXATION (ORIF) OF INJURY OF HIP Left 6/5/2023    Procedure: ORIF,PELVIS;  Surgeon: Tuan Zepeda DO;  Location: [facility];  Service: Orthopedics;  Laterality: Left;  supine deven traction LLE CELL SAVER, teo    POSTERIOR LUMBAR INTERBODY FUSION (PLIF) WITH COMPUTER-ASSISTED NAVIGATION N/A 6/9/2023    Procedure: FUSION, SPINE, LUMBAR, PLIF, USING COMPUTER-ASSISTED NAVIGATION;  Surgeon:  Angelika Matos MD;  Location: [facility] OR;  Service: Neurosurgery;  Laterality: N/A;  T11-L4 posterolateral fusion, L1-2 laminectomies with O-arm, Stealth // NTI // TIVA SETUP // [employer]    REMOVAL OF HARDWARE FROM LOWER EXTREMITY Left 5/9/2023    Procedure: REMOVAL, HARDWARE, LOWER EXTREMITY;  Surgeon: Tuan Zepeda DO;  Location: [facility] OR;  Service: Orthopedics;  Laterality: Left;    TRACH TUBE EXCHANGER  6/5/2023    UVULOPALATOPHARYNGOPLASTY         Social History     Socioeconomic History    Marital status: Single   Tobacco Use    Smoking status: Every Day     Packs/day: 0.50     Years: 10.00     Pack years: 5.00     Types: Cigarettes    Smokeless tobacco: Never   Substance and Sexual Activity    Alcohol use: Not Currently    Drug use: Never    Sexual activity: Not Currently     Social Determinants of Health     Financial Resource Strain: Unknown    Difficulty of Paying Living Expenses: Patient refused   Food Insecurity: Unknown    Worried About Running Out of Food in the Last Year: Patient refused    Ran Out of Food in the Last Year: Patient refused   Transportation Needs: Unknown    Lack of Transportation (Medical): Patient refused    Lack of Transportation (Non-Medical): Patient refused   Physical Activity: Unknown    Days of Exercise per Week: Patient refused   Stress: Unknown    Feeling of Stress : Patient refused   Social Connections: Unknown    Frequency of Communication with Friends and Family: Patient refused    Frequency of Social Gatherings with Friends and Family: Patient refused    Attends Club or Organization Meetings: Patient refused    Marital Status: Never    Housing Stability: Unknown    Unable to Pay for Housing in the Last Year: Patient refused    Unstable Housing in the Last Year: Patient refused           Current Outpatient Medications   Medication Instructions    albuterol (PROVENTIL/VENTOLIN HFA) 90 mcg/actuation inhaler 2 puffs, Inhalation, Every 4 hours PRN    ALBUTEROL INHL 90  mcg, Inhalation, Every 4 hours PRN    amLODIPine (NORVASC) 5 mg, Oral, Daily    amoxicillin-clavulanate 875-125mg (AUGMENTIN) 875-125 mg per tablet 1 tablet, Oral, Every 12 hours (non-standard times)    atorvastatin (LIPITOR) 20 mg, Oral, Daily    azithromycin (Z-CELESTINA) 500 mg, Oral, Daily    clonazePAM (KLONOPIN) 1 mg, Oral, 2 times daily PRN    dextroamphetamine-amphetamine (ADDERALL XR) 25 MG 24 hr capsule 25 mg, Oral, 2 times daily    FLUoxetine 20 MG capsule fluoxetine 20 mg capsule    FLUoxetine 40 mg, Oral, Daily    insulin glargine 100 units/mL SubQ pen Lantus Solostar U-100 Insulin 100 unit/mL (3 mL) subcutaneous pen    ipratropium (ATROVENT) 42 mcg (0.06 %) nasal spray Each Nostril    lamoTRIgine (LAMICTAL) 25 MG tablet lamotrigine 25 mg tablet    LANTUS SOLOSTAR U-100 INSULIN glargine 100 units/mL SubQ pen Subcutaneous    latanoprost 0.005 % ophthalmic solution latanoprost 0.005 % eye drops    lisinopriL (PRINIVIL,ZESTRIL) 40 mg, Oral    mirtazapine (REMERON) 7.5 MG Tab mirtazapine 7.5 mg tablet    potassium chloride (K-TAB) 20 mEq potassium chloride ER 20 mEq tablet,extended release   TAKE 1 TABLET BY MOUTH EVERY DAY    prochlorperazine (COMPAZINE) 10 MG tablet prochlorperazine maleate 10 mg tablet    QUEtiapine (SEROQUEL) 200 mg, Oral, Nightly    testosterone cypionate (DEPOTESTOTERONE CYPIONATE) 200 mg/mL injection SMARTSIG:Milliliter(s) IM       Current Inpatient Medications   albuterol-ipratropium  3 mL Nebulization Q6H    calcium-vitamin D3  1 tablet Per NG tube BID    enoxaparin  40 mg Subcutaneous Q12H    famotidine (PF)  20 mg Intravenous BID    FLUoxetine  40 mg Per NG tube Daily    gabapentin  300 mg Per NG tube TID    lamoTRIgine  25 mg Per NG tube Daily    piperacillin-tazobactam (Zosyn) IV (PEDS and ADULTS) (extended infusion is not appropriate)  4.5 g Intravenous Q8H    QUEtiapine  50 mg Per NG tube BID       Current Intravenous Infusions   dexmedeTOMIDine (Precedex) infusion (titrating) 1.4  mcg/kg/hr (06/15/23 0424)    fentanyl 250 mcg/hr (06/15/23 0552)    lactated ringers Stopped (06/14/23 0730)    NORepinephrine bitartrate-D5W Stopped (06/06/23 1135)    propofoL 50 mcg/kg/min (06/15/23 0424)         Objective:       Intake/Output Summary (Last 24 hours) at 6/15/2023 0612  Last data filed at 6/14/2023 1800  Gross per 24 hour   Intake 2968.13 ml   Output 2065 ml   Net 903.13 ml           Vital Signs (Most Recent):  Temp: 100.1 °F (37.8 °C) (06/15/23 0200)  Pulse: 87 (06/15/23 0542)  Resp: (!) 24 (06/15/23 0542)  BP: (!) 105/44 (06/15/23 0100)  SpO2: 98 % (06/15/23 0542)  Body mass index is 37.22 kg/m².  Weight: 121 kg (266 lb 12.1 oz) Vital Signs (24h Range):  Temp:  [98.8 °F (37.1 °C)-101.7 °F (38.7 °C)] 100.1 °F (37.8 °C)  Pulse:  [] 87  Resp:  [0-32] 24  SpO2:  [95 %-100 %] 98 %  BP: ()/(37-86) 105/44     Physical Exam  General: Patient resting in bed, in no acute distress   Eye: EOMI, clear conjunctiva, eyelids normal  HENT: Head-normocephalic and atraumatic, ET tube in place, OG tube in place  Neck:  No gross thyromegaly  Respiratory:  Coarse breath sounds auscultated on right, no wheezes  Cardiovascular: regular rate and rhythm without murmurs.  No gallops or rubs no JVD.  Capillary refill within normal limits.  Gastrointestinal: soft, non-tender, non-distended with normal bowel sounds, without masses to palpation  Musculoskeletal:  Left arm in brace, right arm under bandage, dry, intact  Integumentary: no rashes or skin lesions present  Neurologic: no signs of peripheral neurological deficit, motor/sensory function intact  Psychiatric:  Sedated, alert, follows commands, responds appropriately        Lines/Drains/Airways       Central Venous Catheter Line  Duration             Percutaneous Central Line Insertion/Assessment - Double Lumen  06/04/23 1530 Subclavian Left 10 days              Drain  Duration                  NG/OG Tube 06/08/23 0915 [place] sump Right mouth 6 days          Closed/Suction Drain 06/14/23 Left Buttock Accordion 10 Fr. 1 day         Urethral Catheter 06/13/23 0901 Non-latex 16 Fr. 1 day              Airway  Duration                  Airway - Non-Surgical 06/08/23 0855 Endotracheal Tube 6 days                    Significant Labs:    Lab Results   Component Value Date    WBC 10.74 06/15/2023    HGB 8.3 (L) 06/15/2023    HCT 25.9 (L) 06/15/2023    MCV 88.7 06/15/2023     06/15/2023           BMP  Lab Results   Component Value Date     06/15/2023    K 3.9 06/15/2023    CHLORIDE 111 (H) 06/15/2023    CO2 21 (L) 06/15/2023    BUN 17.0 06/15/2023    CREATININE 1.06 06/15/2023    CALCIUM 7.9 (L) 06/15/2023    AGAP 11.0 06/12/2023    AGAP 10.0 06/12/2023         ABG  Recent Labs   Lab 06/15/23  0556   PH 7.430   PO2 72.0*   HCO3 27.9   POCBASEDEF 3.20         Mechanical Ventilation Support:  Vent Mode: A/C (06/15/23 0542)  Set Rate: 24 BPM (06/15/23 0542)  Vt Set: 500 mL (06/15/23 0542)  Pressure Support: 12 cmH20 (06/11/23 0527)  PEEP/CPAP: 10 cmH20 (06/15/23 0542)  Oxygen Concentration (%): 35 (06/15/23 0542)  Peak Airway Pressure: 28 cmH20 (06/15/23 0542)  Total Ve: 11.1 L/m (06/15/23 0542)  F/VT Ratio<105 (RSBI): (!) 52.17 (06/15/23 0542)      Significant Imaging:  I have reviewed the pertinent imaging within the past 24 hours.    CXR on 06/15/2023 by my read depicts bilateral opacification left greater than right improved from study yesterday, ET tube in place to mid trachea, PICC line in place, OG tube to gastric bubble, cardiomegaly    Assessment/Plan:     Assessment  Pelvic ring fx s/p ORIF on 6/5/2023  Left-sided rib fractures with pneumo/hemo thorax s/p chest tube placement  L2 burst fracture and L1-2 stenosis s/p laminectomy on 6/9/2023  Right distal radius fracture s/p ORIF on 6/14/23        Plan  -Continue ICU level of care per primary team  -Trauma, neurosurgery, and orthopedic surgery teams following  -Wean vent settings as tolerated, oxygenating well  on CPAP settings  -No no longer requiring strict bed rest from neurosurgery perspective at 7:00AM today, okay for mobilization with [place] TLSO brace once extubated   -sputum cultures depict pseudomonas, sensitivities pending  - agree with Zosyn (D 2)       DVT Prophylaxis: SCD  GI Prophylaxis: Famotidine     32 minutes of critical care was time spent personally by me on the following activities: development of treatment plan with patient or surrogate and bedside caregivers, discussions with consultants, evaluation of patient's response to treatment, examination of patient, ordering and performing treatments and interventions, ordering and review of laboratory studies, ordering and review of radiographic studies, pulse oximetry, re-evaluation of patient's condition.  This critical care time did not overlap with that of any other provider or involve time for any procedures.     Pradeep Bay, DO  Pulmonary Critical Care Medicine  [facility] - [address] [place] [address]

## 2023-06-15 NOTE — NURSING
Nurses Note -- 4 Eyes      6/15/2023   04:00 AM      Skin assessed during: Daily Assessment      [x] No Altered Skin Integrity Present    [x]Prevention Measures Documented      [] Yes- Altered Skin Integrity Present or Discovered   [] LDA Added if Not in Epic (Describe Wound)   [] New Altered Skin Integrity was Present on Admit and Documented in LDA   [] Wound Image Taken    Wound Care Consulted? No    Attending Nurse:  Talia Landry RN     Second RN/Staff Member:  BRICE Leo RN

## 2023-06-15 NOTE — PT/OT/SLP PROGRESS
"Pt with paranoid verbalizations and behaviors, "I want the police now".  Unable to be redirected to safely participate in therapy.  RN aware.  OT will f/u 6/16.  Noted psych consult.  "

## 2023-06-16 ENCOUNTER — ANESTHESIA EVENT (OUTPATIENT)
Dept: SURGERY | Facility: HOSPITAL | Age: 38
DRG: 956 | End: 2023-06-16
Payer: MEDICARE

## 2023-06-16 LAB
ALBUMIN SERPL-MCNC: 2.1 G/DL (ref 3.5–5)
ALBUMIN/GLOB SERPL: 0.7 RATIO (ref 1.1–2)
ALP SERPL-CCNC: 181 UNIT/L (ref 40–150)
ALT SERPL-CCNC: 25 UNIT/L (ref 0–55)
AST SERPL-CCNC: 54 UNIT/L (ref 5–34)
BASOPHILS # BLD AUTO: 0.04 X10(3)/MCL
BASOPHILS NFR BLD AUTO: 0.3 %
BILIRUBIN DIRECT+TOT PNL SERPL-MCNC: 5.4 MG/DL
BUN SERPL-MCNC: 12.2 MG/DL (ref 8.9–20.6)
CALCIUM SERPL-MCNC: 7.8 MG/DL (ref 8.4–10.2)
CHLORIDE SERPL-SCNC: 111 MMOL/L (ref 98–107)
CO2 SERPL-SCNC: 24 MMOL/L (ref 22–29)
CREAT SERPL-MCNC: 0.79 MG/DL (ref 0.73–1.18)
EOSINOPHIL # BLD AUTO: 0.14 X10(3)/MCL (ref 0–0.9)
EOSINOPHIL NFR BLD AUTO: 1 %
ERYTHROCYTE [DISTWIDTH] IN BLOOD BY AUTOMATED COUNT: 16.4 % (ref 11.5–17)
GFR SERPLBLD CREATININE-BSD FMLA CKD-EPI: >60 MLS/MIN/1.73/M2
GLOBULIN SER-MCNC: 3.2 GM/DL (ref 2.4–3.5)
GLUCOSE SERPL-MCNC: 119 MG/DL (ref 74–100)
HCT VFR BLD AUTO: 27.8 % (ref 42–52)
HGB BLD-MCNC: 8.9 G/DL (ref 14–18)
IMM GRANULOCYTES # BLD AUTO: 0.17 X10(3)/MCL (ref 0–0.04)
IMM GRANULOCYTES NFR BLD AUTO: 1.2 %
LYMPHOCYTES # BLD AUTO: 1.17 X10(3)/MCL (ref 0.6–4.6)
LYMPHOCYTES NFR BLD AUTO: 8.2 %
MCH RBC QN AUTO: 27.9 PG (ref 27–31)
MCHC RBC AUTO-ENTMCNC: 32 G/DL (ref 33–36)
MCV RBC AUTO: 87.1 FL (ref 80–94)
MONOCYTES # BLD AUTO: 0.93 X10(3)/MCL (ref 0.1–1.3)
MONOCYTES NFR BLD AUTO: 6.5 %
NEUTROPHILS # BLD AUTO: 11.89 X10(3)/MCL (ref 2.1–9.2)
NEUTROPHILS NFR BLD AUTO: 82.8 %
NRBC BLD AUTO-RTO: 0.3 %
PLATELET # BLD AUTO: 440 X10(3)/MCL (ref 130–400)
PMV BLD AUTO: 10.6 FL (ref 7.4–10.4)
POTASSIUM SERPL-SCNC: 3.5 MMOL/L (ref 3.5–5.1)
PROT SERPL-MCNC: 5.3 GM/DL (ref 6.4–8.3)
RBC # BLD AUTO: 3.19 X10(6)/MCL (ref 4.7–6.1)
SODIUM SERPL-SCNC: 148 MMOL/L (ref 136–145)
WBC # SPEC AUTO: 14.34 X10(3)/MCL (ref 4.5–11.5)

## 2023-06-16 PROCEDURE — 63600175 PHARM REV CODE 636 W HCPCS: Performed by: SURGERY

## 2023-06-16 PROCEDURE — 63600175 PHARM REV CODE 636 W HCPCS

## 2023-06-16 PROCEDURE — 25000003 PHARM REV CODE 250: Performed by: SURGERY

## 2023-06-16 PROCEDURE — 27000221 HC OXYGEN, UP TO 24 HOURS

## 2023-06-16 PROCEDURE — 94640 AIRWAY INHALATION TREATMENT: CPT

## 2023-06-16 PROCEDURE — 63600175 PHARM REV CODE 636 W HCPCS: Performed by: NURSE PRACTITIONER

## 2023-06-16 PROCEDURE — 25000242 PHARM REV CODE 250 ALT 637 W/ HCPCS: Performed by: HOSPITALIST

## 2023-06-16 PROCEDURE — 99900026 HC AIRWAY MAINTENANCE (STAT)

## 2023-06-16 PROCEDURE — 25000003 PHARM REV CODE 250: Performed by: STUDENT IN AN ORGANIZED HEALTH CARE EDUCATION/TRAINING PROGRAM

## 2023-06-16 PROCEDURE — 80053 COMPREHEN METABOLIC PANEL: CPT | Performed by: STUDENT IN AN ORGANIZED HEALTH CARE EDUCATION/TRAINING PROGRAM

## 2023-06-16 PROCEDURE — 20800000 HC ICU TRAUMA

## 2023-06-16 PROCEDURE — 97163 PT EVAL HIGH COMPLEX 45 MIN: CPT

## 2023-06-16 PROCEDURE — 94761 N-INVAS EAR/PLS OXIMETRY MLT: CPT

## 2023-06-16 PROCEDURE — 97167 OT EVAL HIGH COMPLEX 60 MIN: CPT

## 2023-06-16 PROCEDURE — 92610 EVALUATE SWALLOWING FUNCTION: CPT

## 2023-06-16 PROCEDURE — 20000000 HC ICU ROOM

## 2023-06-16 PROCEDURE — 25000003 PHARM REV CODE 250

## 2023-06-16 PROCEDURE — 85025 COMPLETE CBC W/AUTO DIFF WBC: CPT | Performed by: STUDENT IN AN ORGANIZED HEALTH CARE EDUCATION/TRAINING PROGRAM

## 2023-06-16 RX ORDER — BUMETANIDE 0.25 MG/ML
1 INJECTION INTRAMUSCULAR; INTRAVENOUS ONCE
Status: COMPLETED | OUTPATIENT
Start: 2023-06-16 | End: 2023-06-16

## 2023-06-16 RX ORDER — PROPRANOLOL HYDROCHLORIDE 10 MG/1
40 TABLET ORAL 3 TIMES DAILY
Status: DISCONTINUED | OUTPATIENT
Start: 2023-06-16 | End: 2023-06-26 | Stop reason: HOSPADM

## 2023-06-16 RX ORDER — FUROSEMIDE 10 MG/ML
40 INJECTION INTRAMUSCULAR; INTRAVENOUS ONCE
Status: COMPLETED | OUTPATIENT
Start: 2023-06-16 | End: 2023-06-16

## 2023-06-16 RX ORDER — LEVOFLOXACIN 5 MG/ML
750 INJECTION, SOLUTION INTRAVENOUS
Status: COMPLETED | OUTPATIENT
Start: 2023-06-16 | End: 2023-06-22

## 2023-06-16 RX ADMIN — DEXMEDETOMIDINE HYDROCHLORIDE 0.4 MCG/KG/HR: 400 INJECTION INTRAVENOUS at 09:06

## 2023-06-16 RX ADMIN — QUETIAPINE FUMARATE 50 MG: 25 TABLET ORAL at 08:06

## 2023-06-16 RX ADMIN — FUROSEMIDE 40 MG: 10 INJECTION, SOLUTION INTRAMUSCULAR; INTRAVENOUS at 01:06

## 2023-06-16 RX ADMIN — PROPRANOLOL HYDROCHLORIDE 40 MG: 10 TABLET ORAL at 09:06

## 2023-06-16 RX ADMIN — ENOXAPARIN SODIUM 40 MG: 40 INJECTION SUBCUTANEOUS at 09:06

## 2023-06-16 RX ADMIN — MORPHINE SULFATE 4 MG: 4 INJECTION, SOLUTION INTRAMUSCULAR; INTRAVENOUS at 04:06

## 2023-06-16 RX ADMIN — Medication 1 TABLET: at 08:06

## 2023-06-16 RX ADMIN — LAMOTRIGINE 25 MG: 25 TABLET ORAL at 09:06

## 2023-06-16 RX ADMIN — PROPRANOLOL HYDROCHLORIDE 40 MG: 10 TABLET ORAL at 08:06

## 2023-06-16 RX ADMIN — FAMOTIDINE 20 MG: 10 INJECTION, SOLUTION INTRAVENOUS at 08:06

## 2023-06-16 RX ADMIN — BUMETANIDE 1 MG: 0.25 INJECTION INTRAMUSCULAR; INTRAVENOUS at 08:06

## 2023-06-16 RX ADMIN — IPRATROPIUM BROMIDE AND ALBUTEROL SULFATE 3 ML: 2.5; .5 SOLUTION RESPIRATORY (INHALATION) at 08:06

## 2023-06-16 RX ADMIN — MORPHINE SULFATE 4 MG: 4 INJECTION, SOLUTION INTRAMUSCULAR; INTRAVENOUS at 12:06

## 2023-06-16 RX ADMIN — ENOXAPARIN SODIUM 40 MG: 40 INJECTION SUBCUTANEOUS at 08:06

## 2023-06-16 RX ADMIN — IPRATROPIUM BROMIDE AND ALBUTEROL SULFATE 3 ML: 2.5; .5 SOLUTION RESPIRATORY (INHALATION) at 01:06

## 2023-06-16 RX ADMIN — LEVOFLOXACIN 750 MG: 5 INJECTION, SOLUTION INTRAVENOUS at 08:06

## 2023-06-16 RX ADMIN — DEXMEDETOMIDINE HYDROCHLORIDE 1.4 MCG/KG/HR: 400 INJECTION INTRAVENOUS at 12:06

## 2023-06-16 RX ADMIN — QUETIAPINE FUMARATE 50 MG: 25 TABLET ORAL at 09:06

## 2023-06-16 RX ADMIN — GUAIFENESIN 600 MG: 600 TABLET, EXTENDED RELEASE ORAL at 08:06

## 2023-06-16 RX ADMIN — IPRATROPIUM BROMIDE AND ALBUTEROL SULFATE 3 ML: 2.5; .5 SOLUTION RESPIRATORY (INHALATION) at 12:06

## 2023-06-16 RX ADMIN — FLUOXETINE 40 MG: 20 CAPSULE ORAL at 09:06

## 2023-06-16 RX ADMIN — DEXMEDETOMIDINE HYDROCHLORIDE 1.4 MCG/KG/HR: 400 INJECTION INTRAVENOUS at 03:06

## 2023-06-16 RX ADMIN — FAMOTIDINE 20 MG: 10 INJECTION, SOLUTION INTRAVENOUS at 09:06

## 2023-06-16 RX ADMIN — PIPERACILLIN AND TAZOBACTAM 4.5 G: 4; .5 INJECTION, POWDER, LYOPHILIZED, FOR SOLUTION INTRAVENOUS; PARENTERAL at 12:06

## 2023-06-16 RX ADMIN — DEXMEDETOMIDINE HYDROCHLORIDE 1.4 MCG/KG/HR: 400 INJECTION INTRAVENOUS at 05:06

## 2023-06-16 RX ADMIN — DEXMEDETOMIDINE HYDROCHLORIDE 0.4 MCG/KG/HR: 400 INJECTION INTRAVENOUS at 08:06

## 2023-06-16 RX ADMIN — ACETAMINOPHEN 1000 MG: 10 INJECTION, SOLUTION INTRAVENOUS at 02:06

## 2023-06-16 RX ADMIN — GABAPENTIN 300 MG: 300 CAPSULE ORAL at 08:06

## 2023-06-16 RX ADMIN — ACETAMINOPHEN 1000 MG: 10 INJECTION, SOLUTION INTRAVENOUS at 05:06

## 2023-06-16 NOTE — PROGRESS NOTES
[facility] - [facility] ICU  Pulmonary Critical Care Note    Patient Name: Gera Chavez  MRN: [id number]  Admission Date: 6/4/2023  Hospital Length of Stay: 12 days  Code Status: Full Code  Attending Provider: Luis Bermudez Jr., MD  Primary Care Provider: Primary Doctor No     Subjective:     HPI:   Gera Chavez is a 37 y.o. male with no known PMH, who was transferred from [facility] for level 1 trauma activation, after patient reportedly jumped off a bridge.  Sustained rib fractures x4 left-sided, pelvic ring fracture, acetabulum fracture, L2 burst fracture, and left pneumothorax with chest tube in place.  Jump witnessed by bystander who called 911.     Hospital Course/Significant events:  6/4/2023 - Admitted to ICU   6/5/2023 - s/p bronchoscopy  6/12/2023 - s/p ORIF right radius fx  6/14/23 - ORIF left acetabulum    24 Hour Interval History:  Patient with T-max of 101.5° F in last 24 hours, remains tachypneic, tachycardic, no complaints of chest pains no palpitations, fevers, chills, night sweats.  He is reporting some dry mouth.  And some generalized aching extremities.  Has no other complaints at this time.    Past Medical History:   Diagnosis Date    Asthma     Depression     Diabetes mellitus     Encounter for blood transfusion     Generalized anxiety disorder     Hypertension     Schizophrenia, unspecified     Sleep apnea     Unspecified glaucoma        Past Surgical History:   Procedure Laterality Date    INTRAMEDULLARY RODDING OF FEMUR Left 02/05/2023    Procedure: INSERTION, INTRAMEDULLARY NOEL, FEMUR;  Surgeon: Tuan Zepeda DO;  Location: [facility];  Service: Orthopedics;  Laterality: Left;    OPEN REDUCTION AND INTERNAL FIXATION (ORIF) OF FRACTURE OF ACETABULUM Left 6/14/2023    Procedure: ORIF, FRACTURE, ACETABULUM, POSTERIOR;  Surgeon: Compa Bernal MD;  Location: [facility] OR;  Service: Orthopedics;  Laterality: Left;  Lateral, Julio C table, de la rosa bag  [place] Solis  1st case  in second room    OPEN REDUCTION AND INTERNAL FIXATION (ORIF) OF FRACTURE OF DISTAL RADIUS Right 6/12/2023    Procedure: ORIF, FRACTURE, RADIUS, DISTAL;  Surgeon: Compa Bernal MD;  Location: [facility];  Service: Orthopedics;  Laterality: Right;  supine hand table skeleta dyamics c arm to folow    OPEN REDUCTION AND INTERNAL FIXATION (ORIF) OF INJURY OF HIP Left 6/5/2023    Procedure: ORIF,PELVIS;  Surgeon: Tuan Zepeda DO;  Location: [facility];  Service: Orthopedics;  Laterality: Left;  supine deven traction LLE CELL SAVER, teo    POSTERIOR LUMBAR INTERBODY FUSION (PLIF) WITH COMPUTER-ASSISTED NAVIGATION N/A 6/9/2023    Procedure: FUSION, SPINE, LUMBAR, PLIF, USING COMPUTER-ASSISTED NAVIGATION;  Surgeon: Angelika Matos MD;  Location: [facility];  Service: Neurosurgery;  Laterality: N/A;  T11-L4 posterolateral fusion, L1-2 laminectomies with O-arm, Stealth // NTI // TIVA SETUP // [employer]    REMOVAL OF HARDWARE FROM LOWER EXTREMITY Left 5/9/2023    Procedure: REMOVAL, HARDWARE, LOWER EXTREMITY;  Surgeon: Tuan Zepeda DO;  Location: [facility];  Service: Orthopedics;  Laterality: Left;    TRACH TUBE EXCHANGER  6/5/2023    UVULOPALATOPHARYNGOPLASTY         Social History     Socioeconomic History    Marital status: Single   Tobacco Use    Smoking status: Every Day     Packs/day: 0.50     Years: 10.00     Pack years: 5.00     Types: Cigarettes    Smokeless tobacco: Never   Substance and Sexual Activity    Alcohol use: Not Currently    Drug use: Never    Sexual activity: Not Currently     Social Determinants of Health     Financial Resource Strain: Unknown    Difficulty of Paying Living Expenses: Patient refused   Food Insecurity: Unknown    Worried About Running Out of Food in the Last Year: Patient refused    Ran Out of Food in the Last Year: Patient refused   Transportation Needs: Unknown    Lack of Transportation (Medical): Patient refused    Lack of Transportation (Non-Medical): Patient refused   Physical  Activity: Unknown    Days of Exercise per Week: Patient refused   Stress: Unknown    Feeling of Stress : Patient refused   Social Connections: Unknown    Frequency of Communication with Friends and Family: Patient refused    Frequency of Social Gatherings with Friends and Family: Patient refused    Attends Club or Organization Meetings: Patient refused    Marital Status: Never    Housing Stability: Unknown    Unable to Pay for Housing in the Last Year: Patient refused    Unstable Housing in the Last Year: Patient refused           Current Outpatient Medications   Medication Instructions    albuterol (PROVENTIL/VENTOLIN HFA) 90 mcg/actuation inhaler 2 puffs, Inhalation, Every 4 hours PRN    ALBUTEROL INHL 90 mcg, Inhalation, Every 4 hours PRN    amLODIPine (NORVASC) 5 mg, Oral, Daily    amoxicillin-clavulanate 875-125mg (AUGMENTIN) 875-125 mg per tablet 1 tablet, Oral, Every 12 hours (non-standard times)    atorvastatin (LIPITOR) 20 mg, Oral, Daily    azithromycin (Z-CELESTINA) 500 mg, Oral, Daily    clonazePAM (KLONOPIN) 1 mg, Oral, 2 times daily PRN    dextroamphetamine-amphetamine (ADDERALL XR) 25 MG 24 hr capsule 25 mg, Oral, 2 times daily    FLUoxetine 20 MG capsule fluoxetine 20 mg capsule    FLUoxetine 40 mg, Oral, Daily    insulin glargine 100 units/mL SubQ pen Lantus Solostar U-100 Insulin 100 unit/mL (3 mL) subcutaneous pen    ipratropium (ATROVENT) 42 mcg (0.06 %) nasal spray Each Nostril    lamoTRIgine (LAMICTAL) 25 MG tablet lamotrigine 25 mg tablet    LANTUS SOLOSTAR U-100 INSULIN glargine 100 units/mL SubQ pen Subcutaneous    latanoprost 0.005 % ophthalmic solution latanoprost 0.005 % eye drops    lisinopriL (PRINIVIL,ZESTRIL) 40 mg, Oral    mirtazapine (REMERON) 7.5 MG Tab mirtazapine 7.5 mg tablet    potassium chloride (K-TAB) 20 mEq potassium chloride ER 20 mEq tablet,extended release   TAKE 1 TABLET BY MOUTH EVERY DAY    prochlorperazine (COMPAZINE) 10 MG tablet prochlorperazine maleate 10 mg  tablet    QUEtiapine (SEROQUEL) 200 mg, Oral, Nightly    testosterone cypionate (DEPOTESTOTERONE CYPIONATE) 200 mg/mL injection SMARTSIG:Milliliter(s) IM       Current Inpatient Medications   acetaminophen  1,000 mg Intravenous Q8H    albuterol-ipratropium  3 mL Nebulization Q6H    calcium-vitamin D3  1 tablet Per NG tube BID    enoxaparin  40 mg Subcutaneous Q12H    famotidine (PF)  20 mg Intravenous BID    FLUoxetine  40 mg Per NG tube Daily    gabapentin  300 mg Per NG tube TID    guaiFENesin  600 mg Oral BID    lamoTRIgine  25 mg Per NG tube Daily    piperacillin-tazobactam (Zosyn) IV (PEDS and ADULTS) (extended infusion is not appropriate)  4.5 g Intravenous Q8H    QUEtiapine  50 mg Per NG tube BID       Current Intravenous Infusions   dexmedeTOMIDine (Precedex) infusion (titrating) 1.4 mcg/kg/hr (06/16/23 0313)    fentanyl Stopped (06/15/23 0715)    lactated ringers 75 mL/hr at 06/15/23 1127    propofoL Stopped (06/15/23 0715)         Objective:       Intake/Output Summary (Last 24 hours) at 6/16/2023 0522  Last data filed at 6/16/2023 0400  Gross per 24 hour   Intake 2505.92 ml   Output 4095 ml   Net -1589.08 ml           Vital Signs (Most Recent):  Temp: 98.8 °F (37.1 °C) (06/16/23 0400)  Pulse: (!) 116 (06/16/23 0500)  Resp: (!) 35 (06/16/23 0500)  BP: 120/89 (06/16/23 0500)  SpO2: 98 % (06/16/23 0500)  Body mass index is 37.22 kg/m².  Weight: 121 kg (266 lb 12.1 oz) Vital Signs (24h Range):  Temp:  [98.8 °F (37.1 °C)-101.5 °F (38.6 °C)] 98.8 °F (37.1 °C)  Pulse:  [] 116  Resp:  [16-63] 35  SpO2:  [90 %-100 %] 98 %  BP: (112-167)/(54-90) 120/89     Physical Exam  General: Patient resting in bed, in no acute distress   Eye: EOMI, clear conjunctiva, eyelids normal  HENT: Head-normocephalic and atraumatic, ET tube in place, OG tube in place  Neck:  No gross thyromegaly  Respiratory:  Coarse breath sounds auscultated on right, no wheezes  Cardiovascular: regular rate and rhythm without murmurs.  No  gallops or rubs no JVD.  Capillary refill within normal limits.  Gastrointestinal: soft, non-tender, non-distended with normal bowel sounds, without masses to palpation  Musculoskeletal:  Left arm in brace, right arm under bandage, dry, intact  Integumentary: no rashes or skin lesions present  Neurologic: no signs of peripheral neurological deficit, motor/sensory function intact  Psychiatric:  Sedated, alert, follows commands, responds appropriately        Lines/Drains/Airways       Drain  Duration                  Closed/Suction Drain 06/14/23 Left Buttock Accordion 10 Fr. 2 days         Urethral Catheter 06/13/23 0901 Non-latex 16 Fr. 2 days              Peripheral Intravenous Line  Duration                  Peripheral IV - Single Lumen 06/15/23 1436 18 G Anterior;Left;Proximal Upper Arm <1 day         Peripheral IV - Single Lumen 06/15/23 1437 20 G Anterior;Left Upper Arm <1 day                    Significant Labs:    Lab Results   Component Value Date    WBC 14.34 (H) 06/16/2023    HGB 8.9 (L) 06/16/2023    HCT 27.8 (L) 06/16/2023    MCV 87.1 06/16/2023     (H) 06/16/2023           BMP  Lab Results   Component Value Date     (H) 06/16/2023    K 3.5 06/16/2023    CHLORIDE 111 (H) 06/16/2023    CO2 24 06/16/2023    BUN 12.2 06/16/2023    CREATININE 0.79 06/16/2023    CALCIUM 7.8 (L) 06/16/2023    AGAP 11.0 06/12/2023    AGAP 10.0 06/12/2023         ABG  Recent Labs   Lab 06/09/23  1340 06/10/23  0508 06/15/23  2354   PH 7.369   < > 7.540*   PO2 151*   < > 91.0   PCO2 40.6  --   --    HCO3 23.4*   < > 29.1*   POCBASEDEF  --    < > 6.30*    < > = values in this interval not displayed.         Mechanical Ventilation Support:  OxyMask      Significant Imaging:  I have reviewed the pertinent imaging within the past 24 hours.    CXR on 06/15/2023 by my read depicts bilateral opacification left greater than right improved from study yesterday, ET tube in place to mid trachea, PICC line in place, OG tube to  gastric bubble, cardiomegaly    Assessment/Plan:     Assessment  Pelvic ring fx s/p ORIF on 6/5/2023  Left-sided rib fractures with pneumo/hemo thorax s/p chest tube placement  L2 burst fracture and L1-2 stenosis s/p laminectomy on 6/9/2023  Right distal radius fracture s/p ORIF on 6/14/23  Primary Respiratory alkalosis with secondary metabolic alkalosis        Plan  -Continue ICU level of care per primary team  -Trauma, neurosurgery, and orthopedic surgery teams following  -No no longer requiring strict bed rest from neurosurgery perspective at 7:00AM today, okay for mobilization with [place] TLSO brace once extubated   -sputum cultures depict pseudomonas, sensitivities pending  - agree with Aravind (D 3), will discuss with staff about double Pseudomonas coverage given recent ventilation and increasing leukocytosis and sepsis  -differential for respiratory alkalosis includes worsening sepsis, pain, anxiety  - continue supplemental oxygen  - continue to monitor with ABGs       DVT Prophylaxis: SCD  GI Prophylaxis: Famotidine     32 minutes of critical care was time spent personally by me on the following activities: development of treatment plan with patient or surrogate and bedside caregivers, discussions with consultants, evaluation of patient's response to treatment, examination of patient, ordering and performing treatments and interventions, ordering and review of laboratory studies, ordering and review of radiographic studies, pulse oximetry, re-evaluation of patient's condition.  This critical care time did not overlap with that of any other provider or involve time for any procedures.     Pradeep Bay, DO  Pulmonary Critical Care Medicine  [facility] - [facility] North ICU

## 2023-06-16 NOTE — PT/OT/SLP EVAL
"Occupational Therapy  Evaluation    Name: Gera Chavez  MRN: [id number]  Admitting Diagnosis: Closed pelvic ring fracture  Recent Surgery: Procedure(s) (LRB):  ORIF, FRACTURE, ACETABULUM, POSTERIOR (Left) 2 Days Post-Op    Recommendations:     Discharge Recommendations: rehabilitation facility  Discharge Equipment Recommendations:   (pending)    Assessment:   Gera Chavez is a 37 y.o. male with a medical diagnosis of L pelvic rim fx w/ open book pelvis, L pelvic wall hematoma, L2 burst fx, apical PTX, L 4-11 rib fx, pulmonary contusion, R distal radius fracture.  Pt is s/p ORIF L posterior column of acetabulum, ORIF L distal radius, T11-L4 fusion, repaired CSF leak.  He has to return to surgery with ortho in 10-12 weeks for removal of dorsal spanning plate per ortho note.  He is to continue RUE hand splint.  PT with hx of depression, schizophrenia, sleep apnea, diabetes, generalized anxiety disorder, htn.  He presents with minimal verbalizations, pain with ROM of L elbow- RN notified. Performance deficits affecting function: weakness, impaired endurance, impaired self care skills, impaired functional mobility, impaired balance, impaired cognition, decreased upper extremity function, decreased lower extremity function, pain, impaired fine motor, orthopedic precautions.      Rehab Prognosis: Good; patient would benefit from acute skilled OT services to address these deficits and reach maximum level of function.       Plan:     Patient to be seen 5 x/week to address the above listed problems via self-care/home management  Plan of Care Expires: 07/14/23  Plan of Care Reviewed with: patient, mother    Subjective     Chief Complaint: :"that hurts"  Patient/Family Comments/goals: "get better"    Occupational Profile:  Living Environment: lives with his mother, 3 steps B rail, tub/shower  Previous level of function: independent, unable to work  Roles and Routines: son, friend  Equipment Used at Home: none  Assistance upon " Discharge: TD    Pain/Comfort:  Pain Rating 1:  (L elbow with flexion; generalized pain, premedicated, repositioned)    Patients cultural, spiritual, Mosque conflicts given the current situation:      Objective:     Communicated with: LUPE Felix prior to session.  Patient found HOB elevated with  (TLSO, accordian drain, SCD, heel floats, vital monitoring, R velcro splint and acebandaged) upon OT entry to room.    General Precautions: Standard, fall (TLSO HOB >30 degrees, NWB R wrist ok for platform walker,NWB LLE, full ROM, ok to use RLE to pivot/transfer but no hopping/ambulation.  Orthopedic Precautions:    Braces: N/A  Respiratory Status:  oxymizer  5 L, HR 120s,  /94      Occupational Performance:    Bed Mobility:    Patient completed Rolling/Turning to Left with  total assistance  Patient completed Rolling/Turning to Right with total assistance  Patient completed Supine to Sit with total assistance  Patient completed Sit to Supine with total assistance    R lateral lean, total assist; resistance and pain behaviors demonstrated    Activities of Daily Living:  Grooming: maximal assistance    Lower Body Dressing: maximal assistance    Toileting: total assistance wilbert    Cognitive/Visual Perceptual:  Nonverbal today, attentive to mother, limited command followed.    Physical Exam:  Pain in L elbow region with flexion; limited active participation in physical movement.  He did actively move RLE EOB.    RN notified of L elbow pain    Therapeutic Positioning  Risk for acquired pressure injuries is increased due to impaired mobility, relative decrease in mobility d/t hospitalization , and ability to get to BSC/toilet with assist.    OT interventions performed during the course of today's session in an effort to prevent and/or reduce acquired pressure injuries:   Therapeutic positioning completed     Skin assessment: all bony prominences were assessed    Findings: no redness or breakdown noted  Sacral mepilex in  place; bandaged back and L hip  OT recommendations for therapeutic positioning throughout hospitalization:   Follow [facility] Pressure Injury Prevention Protocol  Geomat recommended for sacral protection while [facility]  Specialty mattress, will defer to     [facility] 6 Click ADL:  [facility] Total Score: 12    Patient Education:  Patient provided with verbal education regarding OT role/goals/POC, post op precautions, fall prevention, and pressure ulcer prevention.  Understanding was verbalized, however additional teaching warranted.     Patient left right sidelying with all lines intact, call button in reach, and one on one present    GOALS:   Multidisciplinary Problems       Occupational Therapy Goals          Problem: Occupational Therapy    Goal Priority Disciplines Outcome Interventions   Occupational Therapy Goal     OT, PT/OT Ongoing, Progressing    Description: Goals to be met by: 7/16/23     Patient will increase functional independence with ADLs by performing:    UE Dressing with Minimal Assistance.  Grooming while seated EOB with Min Assistance.  Toileting Moderate Assistance for hygiene and clothing management, indicating need.  Sitting at edge of bed x15 minutes with Stand-by Assistance.                         History:     Past Medical History:   Diagnosis Date    Asthma     Depression     Diabetes mellitus     Encounter for blood transfusion     Generalized anxiety disorder     Hypertension     Schizophrenia, unspecified     Sleep apnea     Unspecified glaucoma          Past Surgical History:   Procedure Laterality Date    INTRAMEDULLARY RODDING OF FEMUR Left 02/05/2023    Procedure: INSERTION, INTRAMEDULLARY NOEL, FEMUR;  Surgeon: Tuan Zepeda DO;  Location: [facility];  Service: Orthopedics;  Laterality: Left;    OPEN REDUCTION AND INTERNAL FIXATION (ORIF) OF FRACTURE OF ACETABULUM Left 6/14/2023    Procedure: ORIF, FRACTURE, ACETABULUM, POSTERIOR;  Surgeon: Compa Bernal MD;  Location: [facility];  Service: Orthopedics;   Laterality: Left;  Lateral, Julio C table, de la rosa bag  [place] [facility]  1st case in second room    OPEN REDUCTION AND INTERNAL FIXATION (ORIF) OF FRACTURE OF DISTAL RADIUS Right 6/12/2023    Procedure: ORIF, FRACTURE, RADIUS, DISTAL;  Surgeon: Compa Bernal MD;  Location: [facility];  Service: Orthopedics;  Laterality: Right;  supine hand table skeleta dyamics c arm to folow    OPEN REDUCTION AND INTERNAL FIXATION (ORIF) OF INJURY OF HIP Left 6/5/2023    Procedure: ORIF,PELVIS;  Surgeon: Tuan Zepeda DO;  Location: [facility];  Service: Orthopedics;  Laterality: Left;  supine julio c traction LLE CELL SAVER, teo    POSTERIOR LUMBAR INTERBODY FUSION (PLIF) WITH COMPUTER-ASSISTED NAVIGATION N/A 6/9/2023    Procedure: FUSION, SPINE, LUMBAR, PLIF, USING COMPUTER-ASSISTED NAVIGATION;  Surgeon: Angelika Matos MD;  Location: [facility];  Service: Neurosurgery;  Laterality: N/A;  T11-L4 posterolateral fusion, L1-2 laminectomies with O-arm, Stealth // NTI // TIVA SETUP // [employer]    REMOVAL OF HARDWARE FROM LOWER EXTREMITY Left 5/9/2023    Procedure: REMOVAL, HARDWARE, LOWER EXTREMITY;  Surgeon: Tuan Zepeda DO;  Location: [facility];  Service: Orthopedics;  Laterality: Left;    TRACH TUBE EXCHANGER  6/5/2023    UVULOPALATOPHARYNGOPLASTY         Time Tracking:     OT Date of Treatment:    OT Start Time: 1334  OT Stop Time: 1358  OT Total Time (min): 24 min    Billable Minutes:Evaluation HIGH    6/16/2023

## 2023-06-16 NOTE — PLAN OF CARE
Problem: Occupational Therapy  Goal: Occupational Therapy Goal  Description: Goals to be met by: 7/16/23     Patient will increase functional independence with ADLs by performing:    UE Dressing with Minimal Assistance.  Grooming while seated EOB with Min Assistance.  Toileting Moderate Assistance for hygiene and clothing management, indicating need.  Sitting at edge of bed x15 minutes with Stand-by Assistance.    Outcome: Ongoing, Progressing

## 2023-06-16 NOTE — PLAN OF CARE
Problem: Physical Therapy  Goal: Physical Therapy Goal  Description: Goals to be met by: 23     Patient will increase functional independence with mobility by performin. Supine to sit with Moderate Assistance  2. Sit to supine with Moderate Assistance  3. Sitting at edge of bed x15 minutes with Moderate Assistance  4. Pt to tolerate there ex/ROM to B LE for strengthening and to prevent contractures     Outcome: Ongoing, Progressing

## 2023-06-16 NOTE — PROGRESS NOTES
Patient Name: Gera Chavez  MRN: [id number]  Admission Date: 6/4/2023  Hospital Length of Stay: 12 days  Attending Provider: Luis Bermudez Jr., MD  Primary Care Provider: Primary Doctor No  Follow-up For: Procedure(s) (LRB):  ORIF, FRACTURE, RADIUS, DISTAL (Right)    Post-Operative Day: Post op day 2  Subjective:       Principal Orthopedic Problem: 2 Days Post-Op       Interval History:  POD 4 s/p ORIF right distal radius. POD 2 ORIF L acetabulum fracture. He is 11 days out from fixation of his pelvic fractures with Dr. Zepeda. No new ortho issues. HV with 20 mL out last shift per nursing staff. Pt with fever overnight. He has been anxious, tachypneic, and tachycardic. Primary team aware.     Review of patient's allergies indicates:   Allergen Reactions    Iodine     Trazodone        Current Facility-Administered Medications   Medication    0.9%  NaCl infusion (for blood administration)    acetaminophen 1,000 mg/100 mL (10 mg/mL) injection 1,000 mg    acetaminophen oral solution 650 mg    albuterol-ipratropium 2.5 mg-0.5 mg/3 mL nebulizer solution 3 mL    bumetanide injection 1 mg    calcium-vitamin D3 500 mg-5 mcg (200 unit) per tablet 1 tablet    dexmedetomidine (PRECEDEX) 400mcg/100mL 0.9% NaCL infusion    enoxaparin injection 40 mg    famotidine (PF) injection 20 mg    fentaNYL injection 50 mcg    FLUoxetine capsule 40 mg    gabapentin capsule 300 mg    guaiFENesin 12 hr tablet 600 mg    hydrALAZINE injection 10 mg    lactated ringers infusion    lamoTRIgine tablet 25 mg    levoFLOXacin 750 mg/150 mL IVPB 750 mg    midazolam (VERSED) 5 mg/mL injection 2 mg    morphine injection 4 mg    oxyCODONE immediate release tablet 5 mg    oxyCODONE immediate release tablet Tab 10 mg    propranoloL tablet 40 mg    QUEtiapine tablet 50 mg     Objective:     Vital Signs (Most Recent):  Temp: 98.8 °F (37.1 °C) (06/16/23 0400)  Pulse: (!) 118 (06/16/23 0837)  Resp: 20 (06/16/23 0837)  BP: 136/83 (06/16/23 0600)  SpO2: 96 %  "(06/16/23 0837) Vital Signs (24h Range):  Temp:  [98.8 °F (37.1 °C)-101.5 °F (38.6 °C)] 98.8 °F (37.1 °C)  Pulse:  [114-131] 118  Resp:  [20-63] 20  SpO2:  [91 %-100 %] 96 %  BP: (120-167)/(69-90) 136/83     Weight: 121 kg (266 lb 12.1 oz)  Height: 5' 10.98" (180.3 cm)  Body mass index is 37.22 kg/m².      Intake/Output Summary (Last 24 hours) at 6/16/2023 0910  Last data filed at 6/16/2023 0400  Gross per 24 hour   Intake 1085.92 ml   Output 3445 ml   Net -2359.08 ml         Physical Exam:   Ortho/SPM Exam    General: awake, alert    Musculoskeletal Exam:  Right upper extremity:  dressing to wrist is clean, dry, and intact. Compartments are soft and compressible. Velcro brace in place.Palpable radial pulse. Brisk cap refill to digits. Motor intact to digits.     Left lower extremity: incision to hip clean, dry, intact. HV intact with 20 mL out last shift. Tolerates gentle passive hip circumduction. Compartments soft and compressible. Palpable DP pulse. Brisk cap refill distally. Dorsi/plantar flexes foot.     Right lower extremity: Palpable DP pulse. Brisk cap refill distally. Dorsi/plantar flexes foot.     Diagnostic Findings:   Significant Labs: BMP:   Recent Labs   Lab 06/15/23  0134 06/16/23  0128    148*   K 3.9 3.5   CO2 21* 24   BUN 17.0 12.2   CREATININE 1.06 0.79   CALCIUM 7.9* 7.8*   MG 2.10  --        CBC:   Recent Labs   Lab 06/14/23  1229 06/15/23  0300 06/16/23  0128   WBC 9.96 10.74 14.34*   HGB 8.1* 8.3* 8.9*   HCT 24.8* 25.9* 27.8*    281 440*       CMP:   Recent Labs   Lab 06/14/23  1229 06/15/23  0134 06/16/23  0128   * 145 148*   K 3.6 3.9 3.5   CO2 23 21* 24   BUN 13.8 17.0 12.2   CREATININE 0.88 1.06 0.79   CALCIUM 8.4 7.9* 7.8*   ALBUMIN 1.9* 1.9* 2.1*   BILITOT 6.4* 5.2* 5.4*   ALKPHOS 115 134 181*   AST 37* 41* 54*   ALT 17 18 25       All pertinent labs within the past 24 hours have been reviewed.        Significant Imaging: I have reviewed and interpreted all pertinent " imaging results/findings.     Assessment/Plan:     Active Diagnoses:    Diagnosis Date Noted POA    PRINCIPAL PROBLEM:  Closed pelvic ring fracture [S32.810A] 06/05/2023 Yes    Displaced fracture of posterior column (ilioischial) of left acetabulum, initial encounter for closed fracture [S32.442A] 06/05/2023 Yes      Problems Resolved During this Admission:       POD 4 ORIF R distal radius. NWB R wrist, ok for platform walker. Continue velcro brace.daily dry dressing changes to incisions. Sutures will need to come out in 2 weeks. He will need a second operation in 10-12 weeks for removal of dorsal spanning plate.     POD 2 ORIF L acetabulum fracture. HV with 20 mL out last shift. Dc hemovac drain today.  Begin daily dry dressing changes to incision. Ok for PT when able. NWB L LE, full ROM. He may use the R LE to pivot/transfer but no hopping/ambulation. Lovenox for dvt ppx. Ok to remove atkins from ortho standpoint. Continue pain control. Staples out at 2 weeks post op.     Will follow weekly while in house. Please call with any questions or concerns.     The above findings, diagnosis, and treatment plan were discussed with Dr. Compa Bernal who is in agreement.

## 2023-06-16 NOTE — PT/OT/SLP EVAL
Physical Therapy Evaluation    Patient Name:  Gera Chavez   MRN:  [id number]    Recommendations:     Discharge Recommendations: rehabilitation facility, nursing facility, skilled (pending progress and tolerance)   Discharge Equipment Recommendations: to be determined by next level of care   Barriers to discharge:  medical dx, severity of deficits, impaired mobility, decreased independence     Assessment:     Gera Chavez is a 37 y.o. male admitted with a medical diagnosis of L pelvic rim fx w/ open book pelvis s/p ORIF L posterior column of acetabulum, L pelvic wall hematoma, L2 burst fx s/p T11-L4 fusion, apical PTX, L 4-11 rib fx, pulmonary contusion, R distal radius fracture s/p ORIF, and repaired CSF leak.   He presents with the following impairments/functional limitations: weakness, impaired endurance, impaired balance, decreased lower extremity function, decreased upper extremity function, impaired cardiopulmonary response to activity, decreased safety awareness, impaired self care skills, impaired functional mobility, pain, orthopedic precautions. Patient with few verbalizing throughout session; did attempt to speak a few times- more to mother than therapists. Will continue to progress as able.     Rehab Prognosis: Fair; patient would benefit from acute skilled PT services to address these deficits and reach maximum level of function.    Recent Surgery: Procedure(s) (LRB):  ORIF, FRACTURE, ACETABULUM, POSTERIOR (Left) 2 Days Post-Op    Plan:     During this hospitalization, patient to be seen 5 x/week to address the identified rehab impairments via therapeutic activities, therapeutic exercises and progress toward the following goals:    Plan of Care Expires:  07/16/23    Subjective     Chief Complaint: increased pain   Patient/Family Comments/goals: to get better   Pain/Comfort:  Pain Rating 1:  (unable to rate on the number scale; complained of increased pain with +grimacing with L elbow  flexion)    Patients cultural, spiritual, Pentecostalism conflicts given the current situation: no    Living Environment:  Pt lives with mother in a [facility]; 3 steps to enter/exit with bilateral railings   Prior to admission, patients level of function was independent.    Equipment used at home: none.  DME owned (not currently used): none.    Upon discharge, patient will have assistance from family however will need placement upon dc in order to maximize functional independence and reduce burden of care.    Objective:     Communicated with NSG prior to session.  Patient found HOB elevated with blood pressure cuff, pulse ox (continuous), telemetry, pressure relief boots, TLSO, oxygen, hemovac, peripheral IV (R U E in velcro splint)  upon PT entry to room.    General Precautions: Standard, fall  Orthopedic Precautions:spinal precautions (NWB LLE, ok to WB R LE as tolerated for stnad pivot transfer only, NO hop or ambulation + NWB R UE, ok for platform walker)   Braces: TLSO (with HOB >30 deg)  Respiratory Status:  5L oximizer , SpO2: 97%  Blood Pressure: 145/94  Heart rate: 107-122      Exams:  Cognitive Exam:  Patient appeared confused and required cues from his mother for all orientation questions   RLE  and LLE Strength/ROM: pt with impaired command following; unable to perform for MMT and ROM; pt does move B LE spontaneously  Skin integrity: Visible skin intact      Functional Mobility:  Bed Mobility:     Supine to Sit: total assistance  Sit to Supine: total assistance  Balance: pt required totA in order to sit EOB; pt was somewhat resitive and complaining of pain; pt with slight R Lateral lean (unsure if 2/2 attempting to off-load L hip); overall poor tolerance to an upright position 2/2 pain       Patient and family provided with verbal education regarding POC, mobility, safety, pxns.  Understanding was verbalized, however additional teaching warranted.     Patient left HOB elevated with all lines intact, call button  in reach, RN notified, 1:1 present, and B pressure relief boots donned, wedge placed on pt's L side, pillows under B UE  .    GOALS:   Multidisciplinary Problems       Physical Therapy Goals          Problem: Physical Therapy    Goal Priority Disciplines Outcome Goal Variances Interventions   Physical Therapy Goal     PT, PT/OT Ongoing, Progressing     Description: Goals to be met by: 23     Patient will increase functional independence with mobility by performin. Supine to sit with Moderate Assistance  2. Sit to supine with Moderate Assistance  3. Sitting at edge of bed x15 minutes with Moderate Assistance  4. Pt to tolerate there ex/ROM to B LE for strengthening and to prevent contractures                          History:     Past Medical History:   Diagnosis Date    Asthma     Depression     Diabetes mellitus     Encounter for blood transfusion     Generalized anxiety disorder     Hypertension     Schizophrenia, unspecified     Sleep apnea     Unspecified glaucoma        Past Surgical History:   Procedure Laterality Date    INTRAMEDULLARY RODDING OF FEMUR Left 2023    Procedure: INSERTION, INTRAMEDULLARY NOEL, FEMUR;  Surgeon: Tuan Zepeda DO;  Location: [facility];  Service: Orthopedics;  Laterality: Left;    OPEN REDUCTION AND INTERNAL FIXATION (ORIF) OF FRACTURE OF ACETABULUM Left 2023    Procedure: ORIF, FRACTURE, ACETABULUM, POSTERIOR;  Surgeon: Compa Bernal MD;  Location: [facility];  Service: Orthopedics;  Laterality: Left;  Lateral, Julio C table, de la rosa bag  [facility]  1st case in second room    OPEN REDUCTION AND INTERNAL FIXATION (ORIF) OF FRACTURE OF DISTAL RADIUS Right 2023    Procedure: ORIF, FRACTURE, RADIUS, DISTAL;  Surgeon: Compa Bernal MD;  Location: [facility];  Service: Orthopedics;  Laterality: Right;  supine hand table skeleta dyamics c arm to folow    OPEN REDUCTION AND INTERNAL FIXATION (ORIF) OF INJURY OF HIP Left 2023    Procedure: ORIF,PELVIS;   Surgeon: Tuan Zepeda DO;  Location: [facility];  Service: Orthopedics;  Laterality: Left;  supine deven traction LLE CELL SAVER, teo    POSTERIOR LUMBAR INTERBODY FUSION (PLIF) WITH COMPUTER-ASSISTED NAVIGATION N/A 6/9/2023    Procedure: FUSION, SPINE, LUMBAR, PLIF, USING COMPUTER-ASSISTED NAVIGATION;  Surgeon: Angelika Matos MD;  Location: [facility];  Service: Neurosurgery;  Laterality: N/A;  T11-L4 posterolateral fusion, L1-2 laminectomies with O-arm, Stealth // NTI // TIVA SETUP // [employer]    REMOVAL OF HARDWARE FROM LOWER EXTREMITY Left 5/9/2023    Procedure: REMOVAL, HARDWARE, LOWER EXTREMITY;  Surgeon: Tuan Zepeda DO;  Location: [facility] OR;  Service: Orthopedics;  Laterality: Left;    TRACH TUBE EXCHANGER  6/5/2023    UVULOPALATOPHARYNGOPLASTY         Time Tracking:     PT Received On: 06/16/23  PT Start Time: 1335     PT Stop Time: 1402  PT Total Time (min): 27 min     Billable Minutes: Evaluation high      06/16/2023

## 2023-06-16 NOTE — PROGRESS NOTES
[facility] PROGRESS NOTE    # 12  Admission Summary:   In brief, Gera Chavez is a 37 y.o. male admitted on 6/4/2023 following fall from bridge at 25 ft.  He reportedly jumped off a bridge and landed on dry land.  He arrived intubated with a left-sided chest tube.  He was found to have a pelvic ring fracture including open book fracture, L2 burst fracture, left pneumothorax, multiple left-sided rib fractures.    Interval history:    Extubated 6/15. Tachypneic overnight. CXR shows signs of pulmonary edema. Lasix was given. Sensitivities reviewed.     Consults:   Neurosurgery  Orthopedic surgery Injuries:  L pelvic rim fx w/ open book pelvis  L pelvic wall hematoma  L2 burst fx  L apical PTX  L 4-11 rib fx  pulmonary contusion  R distal radius fracture Operations/Procedures:  Left chest tube placement at outside facility 6/4  Pelvic ring fx ORIF 6/5  ORIF L distal radius fx 6/12     Past medical history:  1. Asthma   2. Depression  3. Diabetes   4. Generalized anxiety disorder   5. Hypertension  6. Schizophrenia   7. Sleep apnea    Medications: [x] Medications reviewed/updated   Home Meds:    Current Outpatient Medications   Medication Instructions    albuterol (PROVENTIL/VENTOLIN HFA) 90 mcg/actuation inhaler 2 puffs, Inhalation, Every 4 hours PRN    ALBUTEROL INHL 90 mcg, Inhalation, Every 4 hours PRN    amLODIPine (NORVASC) 5 mg, Oral, Daily    amoxicillin-clavulanate 875-125mg (AUGMENTIN) 875-125 mg per tablet 1 tablet, Oral, Every 12 hours (non-standard times)    atorvastatin (LIPITOR) 20 mg, Oral, Daily    azithromycin (Z-CELESTINA) 500 mg, Oral, Daily    clonazePAM (KLONOPIN) 1 mg, Oral, 2 times daily PRN    dextroamphetamine-amphetamine (ADDERALL XR) 25 MG 24 hr capsule 25 mg, Oral, 2 times daily    FLUoxetine 20 MG capsule fluoxetine 20 mg capsule    FLUoxetine 40 mg, Oral, Daily    insulin glargine 100 units/mL SubQ pen Lantus Solostar U-100 Insulin 100 unit/mL (3 mL) subcutaneous pen    ipratropium  "(ATROVENT) 42 mcg (0.06 %) nasal spray Each Nostril    lamoTRIgine (LAMICTAL) 25 MG tablet lamotrigine 25 mg tablet    LANTUS SOLOSTAR U-100 INSULIN glargine 100 units/mL SubQ pen Subcutaneous    latanoprost 0.005 % ophthalmic solution latanoprost 0.005 % eye drops    lisinopriL (PRINIVIL,ZESTRIL) 40 mg, Oral    mirtazapine (REMERON) 7.5 MG Tab mirtazapine 7.5 mg tablet    potassium chloride (K-TAB) 20 mEq potassium chloride ER 20 mEq tablet,extended release   TAKE 1 TABLET BY MOUTH EVERY DAY    prochlorperazine (COMPAZINE) 10 MG tablet prochlorperazine maleate 10 mg tablet    QUEtiapine (SEROQUEL) 200 mg, Oral, Nightly    testosterone cypionate (DEPOTESTOTERONE CYPIONATE) 200 mg/mL injection SMARTSIG:Milliliter(s) IM      Scheduled Meds:    acetaminophen  1,000 mg Intravenous Q8H    albuterol-ipratropium  3 mL Nebulization Q6H    bumetanide  1 mg Intravenous Once    calcium-vitamin D3  1 tablet Per NG tube BID    enoxaparin  40 mg Subcutaneous Q12H    famotidine (PF)  20 mg Intravenous BID    FLUoxetine  40 mg Per NG tube Daily    gabapentin  300 mg Per NG tube TID    guaiFENesin  600 mg Oral BID    lamoTRIgine  25 mg Per NG tube Daily    levoFLOXacin  750 mg Intravenous Q24H    meropenem (MERREM) IVPB  1 g Intravenous Q8H    QUEtiapine  50 mg Per NG tube BID     Continuous Infusions:    dexmedeTOMIDine (Precedex) infusion (titrating) 1.4 mcg/kg/hr (06/16/23 0527)    fentanyl Stopped (06/15/23 0715)    lactated ringers 75 mL/hr at 06/15/23 1127    propofoL Stopped (06/15/23 0715)     PRN Meds: sodium chloride, acetaminophen, fentaNYL, hydrALAZINE, labetaloL, metoprolol, midazolam, morphine, oxyCODONE, oxyCODONE     Vitals:  VITAL SIGNS: 24 HR MIN & MAX LAST   Temp  Min: 98.8 °F (37.1 °C)  Max: 101.5 °F (38.6 °C)  98.8 °F (37.1 °C)   BP  Min: 112/54  Max: 167/87  136/83    Pulse  Min: 114  Max: 131  (!) 115    Resp  Min: 19  Max: 63  (!) 39    SpO2  Min: 90 %  Max: 100 %  98 %      HT: 5' 10.98" (180.3 cm)  WT: 121 " kg (266 lb 12.1 oz)  BMI: 37.2   Ideal Body Weight (IBW), Male: 171.88 lb  % Ideal Body Weight, Male (lb): 155.09 %        General  Exam: diaphoretic, anxious     Neuro/Psych  GCS:  14  Exam:  Follows commands, awake, alert  ICP monitor: No  ICP treatment: ICP Treatment: N/A  C-Collar: Yes    Plan:  Pain control  TLSO brace  Psych consult  Sitter at bedside  CEC     HEENT  Exam: NC    Plan:   none     Pulmonary  Vitals: Resp  Av.4  Min: 19  Max: 63  SpO2  Av.2 %  Min: 90 %  Max: 100 %    Plan:     CXR and ABG PRN     Cardiovascular  Vitals: Pulse  Av.5  Min: 114  Max: 131  BP  Min: 112/54  Max: 167/87  Vasoactive Agents: None  Exam: tachy    Echo Findings:   Left Ventricle  The left ventricle is  with normal systolic function. The estimated ejection fraction is 65%. There is normal diastolic function.     Right Ventricle  Normal cavity size.     Left Atrium  The left atrial volume index is normal.     Right Atrium  The right atrium is normal in size.     Aortic Valve  The aortic valve appears structurally normal.     Mitral Valve  The mean pressure gradient across the mitral valve is 3 mmHg at a heart rate of. The mitral valve appears structurally normal. The estimated mitral valve area by pressure half time is 2.78 cm2.     Tricuspid Valve  The tricuspid valve appears structurally normal.     Pulmonic Valve  Pulmonic valve is structurally normal.     Plan:   Continuous cardiac monitoring while in the ICU  Start propranolol 40mg TID     Renal  Recent Labs     23  0117 23  1229 06/15/23  0134 23  0128   BUN 11.9 13.8 17.0 12.2   CREATININE 0.79 0.88 1.06 0.79         No results for input(s): LACTIC in the last 72 hours.    Intake/Output - Last 3 Shifts          0700  06/15 0659 06/15 0700   0659  07 0659    I.V. (mL/kg) 3822.6 (31.6) 959.7 (7.9)     Blood 747      NG/      IV Piggyback 737.5 126.3     Total Intake(mL/kg) 6165.1 (51) 1085.9 (9)     Urine  (mL/kg/hr) 2650 (0.9) 3775 (1.3)     Drains 130 45     Stool  0     Blood 500      Total Output 3280 3820     Net +2885.1 -2734.1            Stool Occurrence  1 x              Intake/Output Summary (Last 24 hours) at 2023 0749  Last data filed at 2023 0400  Gross per 24 hour   Intake 1085.92 ml   Output 3820 ml   Net -2734.08 ml           Titus: Yes     Plan:   I's and O's  Lactic normalized  CTM BUN/Cr/UOP  Bumex once     FEN/GI  Recent Labs     23  01123  1229 06/15/23  0134 23  0128   * 147* 145 148*   K 3.8 3.6 3.9 3.5   CO2  21* 24   CALCIUM 7.8* 8.4 7.9* 7.8*   MG 2.10  --  2.10  --    PHOS 3.5  --  4.6  --    ALBUMIN 1.8* 1.9* 1.9* 2.1*   BILITOT 5.1* 6.4* 5.2* 5.4*   AST 32 37* 41* 54*   ALKPHOS 111 115 134 181*   ALT 16 17 18 25         Diet: NPO    Last BM:  Prior to arrival    Abdominal Exam:  Soft, nondistended  Abdominal Dressing: None    Plan:   Replace electrolytes based on daily labs  SLP for swallow  NG tube if fails swallow or not evaluated by SLP      Heme/Onc  Recent Labs     23  0117 23  1229 06/15/23  0300 23  0128   HGB 7.6* 8.1* 8.3* 8.9*   HCT 24.0* 24.8* 25.9* 27.8*    356 281 440*       Transfusions (over past 24h):  2 units of PRBC    Plan:   H&H stable  Transfuse greater than 7 hemoglobin or symptomatic     ID  Temp  Av.2 °F (37.9 °C)  Min: 98.8 °F (37.1 °C)  Max: 101.5 °F (38.6 °C)      Recent Labs     23  0117 23  1229 06/15/23  0300 23  0128   WBC 7.60 9.96 10.74 14.34*       Cultures:  Blood culture  - no growth  Blood culture  - no growth  Urine culture -no growth   Respiratory culture -Pseudomonas aeruginosa  Blood culture  no growth  Blood culture  no growth    Antibiotics:   Levaquin    Plan:   Febrile, WBC slightly uptrending  Continue to trend fever curve, WBC WNL  Levaquin     Endocrine  Recent Labs     23  0117 23  1229 06/15/23  0134 23  0128    GLUCOSE 113* 124* 150* 119*        No results for input(s): POCTGLUCOSE in the last 72 hours.     Insulin treatment: no medications    Plan:   BG <180     Musculoskeletal/Wounds  Weight bearing status:   RUE: NWB  LUE: WBAT  RLE: NWB  LLE: NWB    [x] Tertiary exam performed    Extremity/wound exam: RUE brace    Plan:   PT/OT     Precautions  Fall, Spinal, and Standard     Prophylaxis  Seizure: Not indicated.  DVT: lovenox 40 BID  GI: H2B     Lines/drains/airway [x] LDA reviewed/updated   PIV  Titus    Plan:  Maintain peripheral access     Restraints  Face to face evaluation of need for restraints on rounds today:   Currently restrained? no     Disposition  Continue ongoing ICU level care. CEC.        6/16/2023 9:56 AM     The above findings, diagnostics, and treatment plan were discussed with Dr. Luis Bermudez who will follow with further assessments and recommendations. Please call with any questions, concerns, or clinical status changes.

## 2023-06-16 NOTE — PT/OT/SLP EVAL
Speech Language Pathology Department  Clinical Swallow Evaluation    Patient Name:  Gera Chavez   MRN:  [id number]    Recommendations:     General recommendations:  dysphagia therapy  Diet recommendations:  NPO, Liquid Diet Level: NPO, Other (Comment) (medications crushed in puree)   Precautions: Standard,      History:       Past Medical History:   Diagnosis Date    Asthma     Depression     Diabetes mellitus     Encounter for blood transfusion     Generalized anxiety disorder     Hypertension     Schizophrenia, unspecified     Sleep apnea     Unspecified glaucoma      Past Surgical History:   Procedure Laterality Date    INTRAMEDULLARY RODDING OF FEMUR Left 02/05/2023    Procedure: INSERTION, INTRAMEDULLARY NOEL, FEMUR;  Surgeon: Tuan Zepeda DO;  Location: [facility];  Service: Orthopedics;  Laterality: Left;    OPEN REDUCTION AND INTERNAL FIXATION (ORIF) OF FRACTURE OF ACETABULUM Left 6/14/2023    Procedure: ORIF, FRACTURE, ACETABULUM, POSTERIOR;  Surgeon: Compa Bernal MD;  Location: [facility];  Service: Orthopedics;  Laterality: Left;  Lateral, Julio C table, de la rosa bag  Eldora Solis  1st case in second room    OPEN REDUCTION AND INTERNAL FIXATION (ORIF) OF FRACTURE OF DISTAL RADIUS Right 6/12/2023    Procedure: ORIF, FRACTURE, RADIUS, DISTAL;  Surgeon: Compa Bernal MD;  Location: [facility];  Service: Orthopedics;  Laterality: Right;  supine hand table skeleta dyamics c arm to folow    OPEN REDUCTION AND INTERNAL FIXATION (ORIF) OF INJURY OF HIP Left 6/5/2023    Procedure: ORIF,PELVIS;  Surgeon: Tuan Zepeda DO;  Location: [facility];  Service: Orthopedics;  Laterality: Left;  supine julio c traction LLE CELL SAVER, teo    POSTERIOR LUMBAR INTERBODY FUSION (PLIF) WITH COMPUTER-ASSISTED NAVIGATION N/A 6/9/2023    Procedure: FUSION, SPINE, LUMBAR, PLIF, USING COMPUTER-ASSISTED NAVIGATION;  Surgeon: Angelika Matos MD;  Location: [facility];  Service: Neurosurgery;  Laterality: N/A;  T11-L4 posterolateral  fusion, L1-2 laminectomies with O-arm, Stealth // NTI // TIVA SETUP // [employer]    REMOVAL OF HARDWARE FROM LOWER EXTREMITY Left 5/9/2023    Procedure: REMOVAL, HARDWARE, LOWER EXTREMITY;  Surgeon: Tuan Zepeda DO;  Location: [facility];  Service: Orthopedics;  Laterality: Left;    TRACH TUBE EXCHANGER  6/5/2023    UVULOPALATOPHARYNGOPLASTY       Prior Intubation HX:  extubated 6/15    Home Diet: Regular and thin liquids  Current Method of Nutrition: NPO    Subjective     Patient awake, alert, and cooperative.    Pain/Comfort: Pain Rating 1: 0/10      Objective:     ORAL MUSCULATURE  Secretion Management: adequate  Facial Movement: WFL  Buccal Strength & Mobility: impaired  Mandibular Strength & Mobility: impaired  Oral Labial Strength & Mobility: impaired seal  Lingual Strength & Mobility: impaired strength  Vocal Quality: hoarse/breathy    Consistency Fed By Oral Symptoms Pharyngeal Symptoms   Thin liquid by spoon SLP Decreased lip closure  Anterior spillage Throat clear after the swallow  Cough after the swallow  Wet vocal quality     Puree SLP None Multiple swallows     Assessment:     Patient presents with dysphagia and is unsafe for PO intake at this time. Will continue to follow.    Goals:     LTG: Patient will tolerate safest and least restrictive PO diet without sign/symptoms of aspiration.    STG: Patient will tolerate ice chip and puree trials at bedside.  STG: Patient will complete MBS as appropriate.    Patient Education:     Patient provided with verbal education regarding ST POC.  Understanding was verbalized.    Plan:     SLP Follow-Up:  Yes   Patient to be seen:  daily   Plan of Care reviewed with:  patient, family      Time Tracking:     SLP Treatment Date:   06/16/23  Speech Start Time:  1320  Speech Stop Time:  1330     Speech Total Time (min):  10 min    Billable minutes:  Swallow and Oral Function Evaluation, 10 minutes     06/16/2023

## 2023-06-16 NOTE — NURSING
Nurses Note -- 4 Eyes      6/16/2023   2:26 PM      Skin assessed during: Q Shift Change      [x] No Altered Skin Integrity Present    [x]Prevention Measures Documented      [] Yes- Altered Skin Integrity Present or Discovered   [] LDA Added if Not in Epic (Describe Wound)   [] New Altered Skin Integrity was Present on Admit and Documented in LDA   [] Wound Image Taken    Wound Care Consulted? No    Attending Nurse:  Isidro Calero RN     Second RN/Staff Member:  Alireza Rosa

## 2023-06-17 ENCOUNTER — ANESTHESIA (OUTPATIENT)
Dept: SURGERY | Facility: HOSPITAL | Age: 38
DRG: 956 | End: 2023-06-17
Payer: MEDICARE

## 2023-06-17 PROBLEM — S52.022A CLOSED OLECRANON FRACTURE, LEFT, INITIAL ENCOUNTER: Status: ACTIVE | Noted: 2023-06-17

## 2023-06-17 LAB
ALBUMIN SERPL-MCNC: 2.1 G/DL (ref 3.5–5)
ALBUMIN SERPL-MCNC: 2.1 G/DL (ref 3.5–5)
ALBUMIN/GLOB SERPL: 0.6 RATIO (ref 1.1–2)
ALBUMIN/GLOB SERPL: 0.6 RATIO (ref 1.1–2)
ALP SERPL-CCNC: 186 UNIT/L (ref 40–150)
ALP SERPL-CCNC: 189 UNIT/L (ref 40–150)
ALT SERPL-CCNC: 29 UNIT/L (ref 0–55)
ALT SERPL-CCNC: 36 UNIT/L (ref 0–55)
AST SERPL-CCNC: 49 UNIT/L (ref 5–34)
AST SERPL-CCNC: 56 UNIT/L (ref 5–34)
BASOPHILS # BLD AUTO: 0.06 X10(3)/MCL
BASOPHILS # BLD AUTO: 0.08 X10(3)/MCL
BASOPHILS NFR BLD AUTO: 0.5 %
BASOPHILS NFR BLD AUTO: 0.7 %
BILIRUBIN DIRECT+TOT PNL SERPL-MCNC: 2.8 MG/DL
BILIRUBIN DIRECT+TOT PNL SERPL-MCNC: 3 MG/DL
BUN SERPL-MCNC: 15.4 MG/DL (ref 8.9–20.6)
BUN SERPL-MCNC: 18.9 MG/DL (ref 8.9–20.6)
CALCIUM SERPL-MCNC: 7.8 MG/DL (ref 8.4–10.2)
CALCIUM SERPL-MCNC: 7.9 MG/DL (ref 8.4–10.2)
CHLORIDE SERPL-SCNC: 114 MMOL/L (ref 98–107)
CHLORIDE SERPL-SCNC: 115 MMOL/L (ref 98–107)
CO2 SERPL-SCNC: 22 MMOL/L (ref 22–29)
CO2 SERPL-SCNC: 24 MMOL/L (ref 22–29)
CREAT SERPL-MCNC: 0.69 MG/DL (ref 0.73–1.18)
CREAT SERPL-MCNC: 0.72 MG/DL (ref 0.73–1.18)
EOSINOPHIL # BLD AUTO: 0.06 X10(3)/MCL (ref 0–0.9)
EOSINOPHIL # BLD AUTO: 0.17 X10(3)/MCL (ref 0–0.9)
EOSINOPHIL NFR BLD AUTO: 0.5 %
EOSINOPHIL NFR BLD AUTO: 1.3 %
ERYTHROCYTE [DISTWIDTH] IN BLOOD BY AUTOMATED COUNT: 16.5 % (ref 11.5–17)
ERYTHROCYTE [DISTWIDTH] IN BLOOD BY AUTOMATED COUNT: 16.9 % (ref 11.5–17)
GFR SERPLBLD CREATININE-BSD FMLA CKD-EPI: >60 MLS/MIN/1.73/M2
GFR SERPLBLD CREATININE-BSD FMLA CKD-EPI: >60 MLS/MIN/1.73/M2
GLOBULIN SER-MCNC: 3.3 GM/DL (ref 2.4–3.5)
GLOBULIN SER-MCNC: 3.5 GM/DL (ref 2.4–3.5)
GLUCOSE SERPL-MCNC: 100 MG/DL (ref 74–100)
GLUCOSE SERPL-MCNC: 142 MG/DL (ref 74–100)
HCT VFR BLD AUTO: 28 % (ref 42–52)
HCT VFR BLD AUTO: 31.9 % (ref 42–52)
HGB BLD-MCNC: 9.1 G/DL (ref 14–18)
HGB BLD-MCNC: 9.8 G/DL (ref 14–18)
IMM GRANULOCYTES # BLD AUTO: 0.14 X10(3)/MCL (ref 0–0.04)
IMM GRANULOCYTES # BLD AUTO: 0.19 X10(3)/MCL (ref 0–0.04)
IMM GRANULOCYTES NFR BLD AUTO: 1.1 %
IMM GRANULOCYTES NFR BLD AUTO: 1.6 %
LYMPHOCYTES # BLD AUTO: 0.99 X10(3)/MCL (ref 0.6–4.6)
LYMPHOCYTES # BLD AUTO: 1.6 X10(3)/MCL (ref 0.6–4.6)
LYMPHOCYTES NFR BLD AUTO: 12.5 %
LYMPHOCYTES NFR BLD AUTO: 8.5 %
MCH RBC QN AUTO: 28.3 PG (ref 27–31)
MCH RBC QN AUTO: 28.7 PG (ref 27–31)
MCHC RBC AUTO-ENTMCNC: 30.7 G/DL (ref 33–36)
MCHC RBC AUTO-ENTMCNC: 32.5 G/DL (ref 33–36)
MCV RBC AUTO: 87.2 FL (ref 80–94)
MCV RBC AUTO: 93.3 FL (ref 80–94)
MONOCYTES # BLD AUTO: 0.68 X10(3)/MCL (ref 0.1–1.3)
MONOCYTES # BLD AUTO: 0.76 X10(3)/MCL (ref 0.1–1.3)
MONOCYTES NFR BLD AUTO: 5.9 %
MONOCYTES NFR BLD AUTO: 6 %
NEUTROPHILS # BLD AUTO: 10.04 X10(3)/MCL (ref 2.1–9.2)
NEUTROPHILS # BLD AUTO: 9.58 X10(3)/MCL (ref 2.1–9.2)
NEUTROPHILS NFR BLD AUTO: 78.6 %
NEUTROPHILS NFR BLD AUTO: 82.8 %
NRBC BLD AUTO-RTO: 0.5 %
NRBC BLD AUTO-RTO: 0.6 %
PLATELET # BLD AUTO: 534 X10(3)/MCL (ref 130–400)
PLATELET # BLD AUTO: 583 X10(3)/MCL (ref 130–400)
PMV BLD AUTO: 10.4 FL (ref 7.4–10.4)
PMV BLD AUTO: 10.5 FL (ref 7.4–10.4)
POTASSIUM SERPL-SCNC: 3.8 MMOL/L (ref 3.5–5.1)
POTASSIUM SERPL-SCNC: 4.4 MMOL/L (ref 3.5–5.1)
PROT SERPL-MCNC: 5.4 GM/DL (ref 6.4–8.3)
PROT SERPL-MCNC: 5.6 GM/DL (ref 6.4–8.3)
RBC # BLD AUTO: 3.21 X10(6)/MCL (ref 4.7–6.1)
RBC # BLD AUTO: 3.42 X10(6)/MCL (ref 4.7–6.1)
SODIUM SERPL-SCNC: 150 MMOL/L (ref 136–145)
SODIUM SERPL-SCNC: 150 MMOL/L (ref 136–145)
WBC # SPEC AUTO: 11.58 X10(3)/MCL (ref 4.5–11.5)
WBC # SPEC AUTO: 12.77 X10(3)/MCL (ref 4.5–11.5)

## 2023-06-17 PROCEDURE — 80053 COMPREHEN METABOLIC PANEL: CPT | Performed by: SURGERY

## 2023-06-17 PROCEDURE — C1713 ANCHOR/SCREW BN/BN,TIS/BN: HCPCS | Performed by: ORTHOPAEDIC SURGERY

## 2023-06-17 PROCEDURE — 25000242 PHARM REV CODE 250 ALT 637 W/ HCPCS: Performed by: HOSPITALIST

## 2023-06-17 PROCEDURE — 25000003 PHARM REV CODE 250

## 2023-06-17 PROCEDURE — 25609 PR OPEN RX DISTAL RADIUS FX, INTRA-ARTICULAR, 3+ FRAG: ICD-10-PCS | Mod: 79,AS,RT,

## 2023-06-17 PROCEDURE — 20000000 HC ICU ROOM

## 2023-06-17 PROCEDURE — 63600175 PHARM REV CODE 636 W HCPCS

## 2023-06-17 PROCEDURE — D9220A PRA ANESTHESIA: ICD-10-PCS | Mod: CRNA,,, | Performed by: NURSE ANESTHETIST, CERTIFIED REGISTERED

## 2023-06-17 PROCEDURE — 94640 AIRWAY INHALATION TREATMENT: CPT

## 2023-06-17 PROCEDURE — 85025 COMPLETE CBC W/AUTO DIFF WBC: CPT | Performed by: SURGERY

## 2023-06-17 PROCEDURE — 99900026 HC AIRWAY MAINTENANCE (STAT)

## 2023-06-17 PROCEDURE — 25609 OPTX DST RD XART FX/EP SEP3+: CPT | Mod: 79,RT,, | Performed by: ORTHOPAEDIC SURGERY

## 2023-06-17 PROCEDURE — 25609 PR OPEN RX DISTAL RADIUS FX, INTRA-ARTICULAR, 3+ FRAG: ICD-10-PCS | Mod: 79,RT,, | Performed by: ORTHOPAEDIC SURGERY

## 2023-06-17 PROCEDURE — 99900035 HC TECH TIME PER 15 MIN (STAT)

## 2023-06-17 PROCEDURE — 25000003 PHARM REV CODE 250: Performed by: STUDENT IN AN ORGANIZED HEALTH CARE EDUCATION/TRAINING PROGRAM

## 2023-06-17 PROCEDURE — 27201423 OPTIME MED/SURG SUP & DEVICES STERILE SUPPLY: Performed by: ORTHOPAEDIC SURGERY

## 2023-06-17 PROCEDURE — 37000008 HC ANESTHESIA 1ST 15 MINUTES: Performed by: ORTHOPAEDIC SURGERY

## 2023-06-17 PROCEDURE — 37000009 HC ANESTHESIA EA ADD 15 MINS: Performed by: ORTHOPAEDIC SURGERY

## 2023-06-17 PROCEDURE — D9220A PRA ANESTHESIA: Mod: CRNA,,, | Performed by: NURSE ANESTHETIST, CERTIFIED REGISTERED

## 2023-06-17 PROCEDURE — 94002 VENT MGMT INPAT INIT DAY: CPT

## 2023-06-17 PROCEDURE — 36000710: Performed by: ORTHOPAEDIC SURGERY

## 2023-06-17 PROCEDURE — 63600175 PHARM REV CODE 636 W HCPCS: Performed by: SURGERY

## 2023-06-17 PROCEDURE — 25609 OPTX DST RD XART FX/EP SEP3+: CPT | Mod: 79,AS,RT,

## 2023-06-17 PROCEDURE — 85025 COMPLETE CBC W/AUTO DIFF WBC: CPT | Performed by: STUDENT IN AN ORGANIZED HEALTH CARE EDUCATION/TRAINING PROGRAM

## 2023-06-17 PROCEDURE — 27000221 HC OXYGEN, UP TO 24 HOURS

## 2023-06-17 PROCEDURE — D9220A PRA ANESTHESIA: Mod: ANES,,, | Performed by: ANESTHESIOLOGY

## 2023-06-17 PROCEDURE — 27200966 HC CLOSED SUCTION SYSTEM

## 2023-06-17 PROCEDURE — 25000003 PHARM REV CODE 250: Performed by: SURGERY

## 2023-06-17 PROCEDURE — D9220A PRA ANESTHESIA: ICD-10-PCS | Mod: ANES,,, | Performed by: ANESTHESIOLOGY

## 2023-06-17 PROCEDURE — 94761 N-INVAS EAR/PLS OXIMETRY MLT: CPT

## 2023-06-17 PROCEDURE — 25000242 PHARM REV CODE 250 ALT 637 W/ HCPCS

## 2023-06-17 PROCEDURE — 36000711: Performed by: ORTHOPAEDIC SURGERY

## 2023-06-17 PROCEDURE — 20800000 HC ICU TRAUMA

## 2023-06-17 PROCEDURE — 80053 COMPREHEN METABOLIC PANEL: CPT | Performed by: STUDENT IN AN ORGANIZED HEALTH CARE EDUCATION/TRAINING PROGRAM

## 2023-06-17 DEVICE — IMPLANTABLE DEVICE: Type: IMPLANTABLE DEVICE | Site: ELBOW | Status: FUNCTIONAL

## 2023-06-17 DEVICE — SCREW BONE CANC LOCK 2.7X16 SS: Type: IMPLANTABLE DEVICE | Site: ELBOW | Status: FUNCTIONAL

## 2023-06-17 DEVICE — SCREW BONE CANC LOCK 2.7X18 SS: Type: IMPLANTABLE DEVICE | Site: ELBOW | Status: FUNCTIONAL

## 2023-06-17 RX ORDER — PHENYLEPHRINE HCL IN 0.9% NACL 1 MG/10 ML
SYRINGE (ML) INTRAVENOUS
Status: DISCONTINUED | OUTPATIENT
Start: 2023-06-17 | End: 2023-06-17

## 2023-06-17 RX ORDER — ALBUTEROL SULFATE 90 UG/1
AEROSOL, METERED RESPIRATORY (INHALATION)
Status: DISCONTINUED | OUTPATIENT
Start: 2023-06-17 | End: 2023-06-17

## 2023-06-17 RX ORDER — LIDOCAINE HYDROCHLORIDE 20 MG/ML
INJECTION, SOLUTION EPIDURAL; INFILTRATION; INTRACAUDAL; PERINEURAL
Status: DISCONTINUED | OUTPATIENT
Start: 2023-06-17 | End: 2023-06-17

## 2023-06-17 RX ORDER — MIDAZOLAM HYDROCHLORIDE 1 MG/ML
INJECTION INTRAMUSCULAR; INTRAVENOUS
Status: DISCONTINUED | OUTPATIENT
Start: 2023-06-17 | End: 2023-06-17

## 2023-06-17 RX ORDER — PROPOFOL 10 MG/ML
INJECTION, EMULSION INTRAVENOUS
Status: DISPENSED
Start: 2023-06-17 | End: 2023-06-18

## 2023-06-17 RX ORDER — FENTANYL CITRATE 50 UG/ML
INJECTION, SOLUTION INTRAMUSCULAR; INTRAVENOUS
Status: DISCONTINUED | OUTPATIENT
Start: 2023-06-17 | End: 2023-06-17

## 2023-06-17 RX ORDER — ACETAMINOPHEN 10 MG/ML
1000 INJECTION, SOLUTION INTRAVENOUS ONCE
Status: CANCELLED | OUTPATIENT
Start: 2023-06-17 | End: 2023-06-17

## 2023-06-17 RX ORDER — DEXAMETHASONE SODIUM PHOSPHATE 4 MG/ML
INJECTION, SOLUTION INTRA-ARTICULAR; INTRALESIONAL; INTRAMUSCULAR; INTRAVENOUS; SOFT TISSUE
Status: DISCONTINUED | OUTPATIENT
Start: 2023-06-17 | End: 2023-06-17

## 2023-06-17 RX ORDER — PROPOFOL 10 MG/ML
VIAL (ML) INTRAVENOUS
Status: DISCONTINUED | OUTPATIENT
Start: 2023-06-17 | End: 2023-06-17

## 2023-06-17 RX ORDER — CEFAZOLIN SODIUM IN 0.9 % NACL 3 G/100 ML
3 INTRAVENOUS SOLUTION, PIGGYBACK (ML) INTRAVENOUS
Status: CANCELLED | OUTPATIENT
Start: 2023-06-17 | End: 2023-06-18

## 2023-06-17 RX ORDER — MEPERIDINE HYDROCHLORIDE 25 MG/ML
12.5 INJECTION INTRAMUSCULAR; INTRAVENOUS; SUBCUTANEOUS ONCE AS NEEDED
Status: CANCELLED | OUTPATIENT
Start: 2023-06-17 | End: 2023-06-18

## 2023-06-17 RX ORDER — ONDANSETRON 2 MG/ML
INJECTION INTRAMUSCULAR; INTRAVENOUS
Status: DISCONTINUED | OUTPATIENT
Start: 2023-06-17 | End: 2023-06-17

## 2023-06-17 RX ORDER — HYDROMORPHONE HYDROCHLORIDE 2 MG/ML
0.2 INJECTION, SOLUTION INTRAMUSCULAR; INTRAVENOUS; SUBCUTANEOUS EVERY 5 MIN PRN
Status: CANCELLED | OUTPATIENT
Start: 2023-06-17

## 2023-06-17 RX ORDER — MIRTAZAPINE 7.5 MG/1
7.5 TABLET, FILM COATED ORAL NIGHTLY
Status: DISCONTINUED | OUTPATIENT
Start: 2023-06-17 | End: 2023-06-18

## 2023-06-17 RX ORDER — ROCURONIUM BROMIDE 10 MG/ML
INJECTION, SOLUTION INTRAVENOUS
Status: DISCONTINUED | OUTPATIENT
Start: 2023-06-17 | End: 2023-06-17

## 2023-06-17 RX ADMIN — ROCURONIUM BROMIDE 30 MG: 10 SOLUTION INTRAVENOUS at 08:06

## 2023-06-17 RX ADMIN — ONDANSETRON 4 MG: 2 INJECTION INTRAMUSCULAR; INTRAVENOUS at 08:06

## 2023-06-17 RX ADMIN — ROCURONIUM BROMIDE 50 MG: 10 SOLUTION INTRAVENOUS at 07:06

## 2023-06-17 RX ADMIN — PROPRANOLOL HYDROCHLORIDE 40 MG: 10 TABLET ORAL at 10:06

## 2023-06-17 RX ADMIN — PROPOFOL 200 MG: 10 INJECTION, EMULSION INTRAVENOUS at 07:06

## 2023-06-17 RX ADMIN — FLUOXETINE 40 MG: 20 CAPSULE ORAL at 10:06

## 2023-06-17 RX ADMIN — LAMOTRIGINE 25 MG: 25 TABLET ORAL at 10:06

## 2023-06-17 RX ADMIN — Medication 100 MCG: at 08:06

## 2023-06-17 RX ADMIN — QUETIAPINE FUMARATE 50 MG: 25 TABLET ORAL at 10:06

## 2023-06-17 RX ADMIN — LEVOFLOXACIN 750 MG: 5 INJECTION, SOLUTION INTRAVENOUS at 07:06

## 2023-06-17 RX ADMIN — ENOXAPARIN SODIUM 40 MG: 40 INJECTION SUBCUTANEOUS at 10:06

## 2023-06-17 RX ADMIN — ALBUTEROL SULFATE 2 PUFF: 90 AEROSOL, METERED RESPIRATORY (INHALATION) at 08:06

## 2023-06-17 RX ADMIN — FAMOTIDINE 20 MG: 10 INJECTION, SOLUTION INTRAVENOUS at 10:06

## 2023-06-17 RX ADMIN — LIDOCAINE HYDROCHLORIDE 80 MG: 20 INJECTION, SOLUTION EPIDURAL; INFILTRATION; INTRACAUDAL; PERINEURAL at 07:06

## 2023-06-17 RX ADMIN — IPRATROPIUM BROMIDE AND ALBUTEROL SULFATE 3 ML: 2.5; .5 SOLUTION RESPIRATORY (INHALATION) at 12:06

## 2023-06-17 RX ADMIN — OXYCODONE HYDROCHLORIDE 10 MG: 10 TABLET ORAL at 10:06

## 2023-06-17 RX ADMIN — MORPHINE SULFATE 4 MG: 4 INJECTION, SOLUTION INTRAMUSCULAR; INTRAVENOUS at 02:06

## 2023-06-17 RX ADMIN — IPRATROPIUM BROMIDE AND ALBUTEROL SULFATE 3 ML: 2.5; .5 SOLUTION RESPIRATORY (INHALATION) at 08:06

## 2023-06-17 RX ADMIN — GUAIFENESIN 600 MG: 600 TABLET, EXTENDED RELEASE ORAL at 10:06

## 2023-06-17 RX ADMIN — SODIUM CHLORIDE, SODIUM GLUCONATE, SODIUM ACETATE, POTASSIUM CHLORIDE AND MAGNESIUM CHLORIDE: 526; 502; 368; 37; 30 INJECTION, SOLUTION INTRAVENOUS at 09:06

## 2023-06-17 RX ADMIN — MIRTAZAPINE 7.5 MG: 7.5 TABLET ORAL at 10:06

## 2023-06-17 RX ADMIN — MIDAZOLAM HYDROCHLORIDE 2 MG: 1 INJECTION, SOLUTION INTRAMUSCULAR; INTRAVENOUS at 07:06

## 2023-06-17 RX ADMIN — FENTANYL CITRATE 100 MCG: 50 INJECTION, SOLUTION INTRAMUSCULAR; INTRAVENOUS at 07:06

## 2023-06-17 RX ADMIN — MORPHINE SULFATE 4 MG: 4 INJECTION, SOLUTION INTRAMUSCULAR; INTRAVENOUS at 10:06

## 2023-06-17 RX ADMIN — SODIUM CHLORIDE, SODIUM GLUCONATE, SODIUM ACETATE, POTASSIUM CHLORIDE AND MAGNESIUM CHLORIDE: 526; 502; 368; 37; 30 INJECTION, SOLUTION INTRAVENOUS at 07:06

## 2023-06-17 RX ADMIN — IPRATROPIUM BROMIDE AND ALBUTEROL SULFATE 3 ML: 2.5; .5 SOLUTION RESPIRATORY (INHALATION) at 01:06

## 2023-06-17 RX ADMIN — Medication 1 TABLET: at 10:06

## 2023-06-17 RX ADMIN — GABAPENTIN 300 MG: 300 CAPSULE ORAL at 10:06

## 2023-06-17 RX ADMIN — DEXMEDETOMIDINE HYDROCHLORIDE 0.4 MCG/KG/HR: 400 INJECTION INTRAVENOUS at 04:06

## 2023-06-17 RX ADMIN — DEXAMETHASONE SODIUM PHOSPHATE 4 MG: 4 INJECTION, SOLUTION INTRA-ARTICULAR; INTRALESIONAL; INTRAMUSCULAR; INTRAVENOUS; SOFT TISSUE at 08:06

## 2023-06-17 NOTE — TRANSFER OF CARE
"Anesthesia Transfer of Care Note    Patient: Gera Chavez    Procedure(s) Performed: Procedure(s) (LRB):  ORIF, ELBOW (Left)    Patient location: ICU    Anesthesia Type: general    Transport from OR: Transported from OR intubated on 100% O2  with adequate ventilation controlled by transport ventilator. Continuous ECG monitoring in transport. Continuous SpO2 monitoring in transport. Upon arrival to PACU/ICU, patient attached to ventilator and auscultated to confirm bilateral breath sounds and adequate TV    Post pain: adequate analgesia    Post assessment: no apparent anesthetic complications    Post vital signs: stable    Level of consciousness: sedated    Nausea/Vomiting: no nausea/vomiting    Complications: none          Last vitals:   Visit Vitals  BP (!) 158/98   Pulse (!) 120   Temp 37.8 °C (100.1 °F) (Axillary)   Resp (!) 40   Ht 5' 10.98" (1.803 m)   Wt 121 kg (266 lb 12.1 oz)   SpO2 (!) 92%   BMI 37.22 kg/m²     "

## 2023-06-17 NOTE — BRIEF OP NOTE
[facility] - [facility] North ICU  Brief Operative Note    SUMMARY     Surgery Date: 6/17/2023     Surgeon(s) and Role:     * Eder Stein MD - Primary    Assisting Surgeon: None    Pre-op Diagnosis:  Closed fracture of olecranon process of left ulna, initial encounter [S52.022A]    Post-op Diagnosis:  Post-Op Diagnosis Codes:     * Closed fracture of olecranon process of left ulna, initial encounter [S52.022A]    Procedure(s) (LRB):  ORIF, ELBOW (Left)    Anesthesia: General    Operative Findings: orif left olecranon    Estimated Blood Loss: * No values recorded between 6/17/2023  8:25 AM and 6/17/2023  9:18 AM *    Estimated Blood Loss has been documented.         Specimens:   Specimen (24h ago, onward)      None            [id number]

## 2023-06-17 NOTE — PROGRESS NOTES
Orthopedics  Interval History:  POD 6s/p ORIF right distal radius. POD 3 ORIF L acetabulum fracture. He is 12 days out from fixation of his pelvic fractures with Dr. Zepeda. No new ortho issues. HV with 20 mL out last shift per nursing staff. Pt with fever overnight. He has been anxious, tachypneic, and tachycardic. Primary team aware.  Upon secondary survey patient was noticed to have pain about his left elbow especially with motion.  Patient was noted to have a displaced left olecranon fracture.  He denies any other complaints this morning.  Physical exam  Left upper extremity compartments are soft and warm.  Skin is intact.  He is appropriately tender about the posterior elbow, he is nontender about the shoulder wrist and hand.  He is able to flex and extend his fingers though he does have weakness in the ulnar distribution sensation is intact to light touch brisk capillary refill.  Plan  At this time we have discussed his physical exam and x-ray findings.  We have discussed at length the pros and cons to additional conservative treatments well surgical intervention.  The risks benefits and alternatives were discussed with the patient and patient's mom over the phone.  All questions were answered.  Informed consent was obtained.  We will plan for surgery on his left elbow this morning.

## 2023-06-17 NOTE — PSYCH
Pt lying in bed, sitter at BS. Recently received Morphine for stent placement to left arm, sedated at present. Nurse reports everyday slight improvement in cognition and involvement since admission to ICU. Family reported to staff long psych hx, AH possibly influencing recent intentional injury by jumping off a bridge, witnessed by bystander that called 911.    Reported hx taking Prozac, Seroquel, Lamictal but unsure of compliance or when last time taking.     External records confirmed hx taking Abilfy, Remeron, Lamictal, Klonopin, Adderall, Seroquel.    Pt unable to participate in assessment at present, will reattempt.

## 2023-06-17 NOTE — PROGRESS NOTES
[facility] ICU PROGRESS NOTE    # 13  Admission Summary:   In brief, Gera Chavez is a 37 y.o. male admitted on 6/4/2023 following fall from bridge at 25 ft.  He reportedly jumped off a bridge and landed on dry land.  He arrived intubated with a left-sided chest tube.  He was found to have a pelvic ring fracture including open book fracture, L2 burst fracture, left pneumothorax, multiple left-sided rib fractures.    Interval history:    Diuresed yesterday with improvement on CXR  States he's feeling well  Per night nurse no significant agitation  No further fevers, persistent tachy to 110s    Consults:   Neurosurgery  Orthopedic surgery Injuries:  L pelvic rim fx w/ open book pelvis  L pelvic wall hematoma  L2 burst fx  L apical PTX  L 4-11 rib fx  pulmonary contusion  R distal radius fracture Operations/Procedures:  Left chest tube placement at outside facility 6/4  Pelvic ring fx ORIF 6/5  ORIF L distal radius fx 6/12     Past medical history:  1. Asthma   2. Depression  3. Diabetes   4. Generalized anxiety disorder   5. Hypertension  6. Schizophrenia   7. Sleep apnea    Medications: [x] Medications reviewed/updated   Home Meds:    Current Outpatient Medications   Medication Instructions    albuterol (PROVENTIL/VENTOLIN HFA) 90 mcg/actuation inhaler 2 puffs, Inhalation, Every 4 hours PRN    ALBUTEROL INHL 90 mcg, Inhalation, Every 4 hours PRN    amLODIPine (NORVASC) 5 mg, Oral, Daily    amoxicillin-clavulanate 875-125mg (AUGMENTIN) 875-125 mg per tablet 1 tablet, Oral, Every 12 hours (non-standard times)    atorvastatin (LIPITOR) 20 mg, Oral, Daily    azithromycin (Z-CELESTINA) 500 mg, Oral, Daily    clonazePAM (KLONOPIN) 1 mg, Oral, 2 times daily PRN    dextroamphetamine-amphetamine (ADDERALL XR) 25 MG 24 hr capsule 25 mg, Oral, 2 times daily    FLUoxetine 20 MG capsule fluoxetine 20 mg capsule    FLUoxetine 40 mg, Oral, Daily    insulin glargine 100 units/mL SubQ pen Lantus Solostar U-100 Insulin 100 unit/mL (3 mL)  "subcutaneous pen    ipratropium (ATROVENT) 42 mcg (0.06 %) nasal spray Each Nostril    lamoTRIgine (LAMICTAL) 25 MG tablet lamotrigine 25 mg tablet    LANTUS SOLOSTAR U-100 INSULIN glargine 100 units/mL SubQ pen Subcutaneous    latanoprost 0.005 % ophthalmic solution latanoprost 0.005 % eye drops    lisinopriL (PRINIVIL,ZESTRIL) 40 mg, Oral    mirtazapine (REMERON) 7.5 MG Tab mirtazapine 7.5 mg tablet    potassium chloride (K-TAB) 20 mEq potassium chloride ER 20 mEq tablet,extended release   TAKE 1 TABLET BY MOUTH EVERY DAY    prochlorperazine (COMPAZINE) 10 MG tablet prochlorperazine maleate 10 mg tablet    QUEtiapine (SEROQUEL) 200 mg, Oral, Nightly    testosterone cypionate (DEPOTESTOTERONE CYPIONATE) 200 mg/mL injection SMARTSIG:Milliliter(s) IM      Scheduled Meds:    albuterol-ipratropium  3 mL Nebulization Q6H    calcium-vitamin D3  1 tablet Per NG tube BID    enoxaparin  40 mg Subcutaneous Q12H    famotidine (PF)  20 mg Intravenous BID    FLUoxetine  40 mg Per NG tube Daily    gabapentin  300 mg Per NG tube TID    guaiFENesin  600 mg Oral BID    lamoTRIgine  25 mg Per NG tube Daily    levoFLOXacin  750 mg Intravenous Q24H    propranoloL  40 mg Oral TID    QUEtiapine  50 mg Per NG tube BID     Continuous Infusions:    dexmedeTOMIDine (Precedex) infusion (titrating) 0.4 mcg/kg/hr (06/17/23 1061)    lactated ringers 75 mL/hr at 06/15/23 1127     PRN Meds: sodium chloride, acetaminophen, fentaNYL, hydrALAZINE, midazolam, morphine, oxyCODONE, oxyCODONE     Vitals:  VITAL SIGNS: 24 HR MIN & MAX LAST   Temp  Min: 98 °F (36.7 °C)  Max: 100.2 °F (37.9 °C)  100.1 °F (37.8 °C)   BP  Min: 132/88  Max: 167/100  (!) 158/98    Pulse  Min: 101  Max: 132  (!) 120    Resp  Min: 13  Max: 60  (!) 40    SpO2  Min: 90 %  Max: 100 %  (!) 92 %      HT: 5' 10.98" (180.3 cm)  WT: 121 kg (266 lb 12.1 oz)  BMI: 37.2   Ideal Body Weight (IBW), Male: 171.88 lb  % Ideal Body Weight, Male (lb): 155.09 %        General  Exam: awake alert "     Neuro/Psych  GCS:  14  Exam:  Follows commands, awake, alert  ICP monitor: No  ICP treatment: ICP Treatment: N/A  C-Collar: Yes    Plan:  Pain control  TLSO brace  Psych eval not completed, f/u today  Sitter at bedside  CEC     HEENT  Exam: nasal cannula in place    Plan:   CTM     Pulmonary  Vitals: Resp  Av  Min: 13  Max: 60  SpO2  Av.3 %  Min: 90 %  Max: 100 %    Plan:     CXR  Diurese as needed  Wean supp o2 as tolerated     Cardiovascular  Vitals: Pulse  Av.9  Min: 101  Max: 132  BP  Min: 132/88  Max: 167/100  Vasoactive Agents: None  Exam: tachycardic 116 normotensive    Echo Findings:   Left Ventricle  The left ventricle is  with normal systolic function. The estimated ejection fraction is 65%. There is normal diastolic function.     Right Ventricle  Normal cavity size.     Left Atrium  The left atrial volume index is normal.     Right Atrium  The right atrium is normal in size.     Aortic Valve  The aortic valve appears structurally normal.     Mitral Valve  The mean pressure gradient across the mitral valve is 3 mmHg at a heart rate of. The mitral valve appears structurally normal. The estimated mitral valve area by pressure half time is 2.78 cm2.     Tricuspid Valve  The tricuspid valve appears structurally normal.     Pulmonic Valve  Pulmonic valve is structurally normal.     Plan:   Continuous cardiac monitoring while in the ICU  Start propranolol 40mg TID     Renal  Recent Labs     23  1229 06/15/23  0134 23  0128 23  0137   BUN 13.8 17.0 12.2 15.4   CREATININE 0.88 1.06 0.79 0.69*         No results for input(s): LACTIC in the last 72 hours.    Intake/Output - Last 3 Shifts         06/15 0700  / 0659  0700   0659  0700   0659    I.V. (mL/kg) 959.7 (7.9)      Blood       NG/GT       IV Piggyback 126.3      Total Intake(mL/kg) 1085.9 (9)      Urine (mL/kg/hr) 3775 (1.3) 3400 (1.2)     Drains 45      Stool 0      Blood       Total Output 3820  3400     Net -2734.1 -3400            Stool Occurrence 1 x               Intake/Output Summary (Last 24 hours) at 2023 0851  Last data filed at 2023 0600  Gross per 24 hour   Intake --   Output 3400 ml   Net -3400 ml           Atkins: Yes     Plan:   Strict I's and O's  CTM BUN/Cr/UOP  Bumex x1   Discontinue atkins after OR today     FEN/GI  Recent Labs     23  1229 06/15/23  01323   * 145 148* 150*   K 3.6 3.9 3.5 3.8   CO2 23 21* 24 24   CALCIUM 8.4 7.9* 7.8* 7.9*   MG  --  2.10  --   --    PHOS  --  4.6  --   --    ALBUMIN 1.9* 1.9* 2.1* 2.1*   BILITOT 6.4* 5.2* 5.4* 3.0*   AST 37* 41* 54* 49*   ALKPHOS 115 134 181* 186*   ALT 17 18 25 29       Diet: NPO    Last BM:  Prior to arrival    Abdominal Exam:  Soft, nondistended  Abdominal Dressing: None    Plan:   Replace electrolytes based on daily labs  SLP re-eval/MBS  NGT postop for feeds     Heme/Onc  Recent Labs     06/14/23  1229 06/15/23  0300 06/16/23  0128 06/17/23  0137   HGB 8.1* 8.3* 8.9* 9.1*   HCT 24.8* 25.9* 27.8* 28.0*    281 440* 583*       Transfusions (over past 24h):  2 units of PRBC    Plan:   H&H stable  Transfuse greater than 7 hemoglobin or symptomatic     ID  Temp  Av.3 °F (37.4 °C)  Min: 98 °F (36.7 °C)  Max: 100.2 °F (37.9 °C)      Recent Labs     23  1229 06/15/23  03023   WBC 9.96 10.74 14.34* 12.77*       Cultures:  Blood culture  - no growth  Blood culture  - no growth  Urine culture -no growth   Respiratory culture -Pseudomonas aeruginosa  Blood culture  no growth  Blood culture  no growth    Antibiotics:   Levaquin    Plan:   Afebrile, WBC downtrending  Levaquin until      Endocrine  Recent Labs     23  1229 06/15/23  0134 23  0128 23  0137   GLUCOSE 124* 150* 119* 100        No results for input(s): POCTGLUCOSE in the last 72 hours.     Insulin treatment: no medications    Plan:   BG <180      Musculoskeletal/Wounds  Weight bearing status:   RUE: NWB  LUE: WBAT  RLE: NWB  LLE: NWB    [x] Tertiary exam performed    Extremity/wound exam: RUE brace, neurovasc intact, moves 4 extremities    Plan:   PT/OT  OR with ortho today     Precautions  Fall, Spinal, and Standard     Prophylaxis  Seizure: Not indicated.  DVT: lovenox 40 BID  GI: H2B     Lines/drains/airway [x] LDA reviewed/updated   PIV  Titus    Plan:  Maintain peripheral access  Discontinue Titus postop today     Restraints  Face to face evaluation of need for restraints on rounds today:   Currently restrained? no     Disposition  Transfer to floor postoperatively. CEC.      Melissa Titus MD  [employer] [facility]

## 2023-06-17 NOTE — OP NOTE
DATE OF PROCEDURE:   06/17/2023    SURGEON:  Eder Stein M.D.    ASSISTANT: ZAIDA Rodriges     ASSISTANT ATTESTATION:  PA was essential throughout key and critical portions of the case, including soft tissue retraction, implant placement, and wound closure.    [place] [facility] [place]     PREOPERATIVE DIAGNOSIS:  Left displaced intra-articular comminuted olecranon fracture     POSTOPERATIVE DIAGNOSIS:  Same     PROCEDURES PERFORMED:  ORIF left displaced intra-articular comminuted olecranon fracture     ANESTHESIA: general    IV FLUIDS: Per Anesthesia    ESTIMATED BLOOD LOSS:  20cc     COUNTS:  Correct.    COMPLICATIONS:  None.    IMPLANTS: * No implants in log *    CONDITION: Stable to PACU.        INDICATIONS FOR PROCEDURE:    Gera Chavez is a 37 y.o. year old male with continued pain loss of motion left elbow. The risks, benefits, and alternatives were discussed with the patient in detail. All questions were answered. Informed consent was obtained.       PROCEDURE IN DETAIL: Patient was found in preoperative holding by Anesthesia, confirmed fit for surgery.  The patient was taken to the operating room, placed on the operating table in supine position.  All prominences were well padded.  Time-out was called, identifying the correct patient, correct procedure, correct site.  All were in agreement.  Patient underwent general anesthesia without complications.  The patient was then prepped and draped in normal sterile fashion, leaving the left upper extremity exposed to surgery.  After exsanguination of the left upper extremity tourniquet was inflated.  Attention was turned to the posterior aspect of the left elbow where a 15 cm incision was made soft tissue dissection was carefully down to the fracture site, large hematoma was evacuated.  Patient had proximally 2-3 cm of displacement, intra-articular, it was comminuted.  With careful meticulous dissection the curette was used to debride the fracture site.  It was then  anatomically reduced with multiple point-to-point clamps.  One 3.5 screw was placed perpendicular to the main fracture fragment.  After this was done 3 cortical screws were placed distal the fracture and 4 locking screws placed proximal fracture compressing the fracture.  This is done multiple views of the big C-arm found the fracture well reduced the hardware was in appropriate position the joint looked appropriate.  There is no crepitance with motion is motion was 0-125 degrees stable to stressing.  Tourniquet was released hemostasis achieved copious irrigation was used to wash out the wound the fascia was closed with 0 Vicryl subcutaneous tissue was closed with 2-0 Vicryl skin staples were then used to close the skin Xeroform 4x4s soft tissue dressing and a well-padded posterior splint was placed on left upper extremity, he was then brought to the ICU, intubated.       ______________________________  Eder Stein MD

## 2023-06-17 NOTE — ANESTHESIA PREPROCEDURE EVALUATION
06/17/2023  Gera Chavez is a 37 y.o., male, with no known PMH, who was transferred from [facility] for level 1 trauma activation, after patient reportedly jumped off a bridge.  Sustained rib fractures x4 left-sided, pelvic ring fracture, acetabulum fracture, L2 burst fracture, and left pneumothorax with chest tube in place. Has been to OR multiple times, now returns for further ortho surgery.  Pt is combative so will go straight from ICU to OR and possible straight back if indicated.      Pre-op Assessment    I have reviewed the Patient Summary Reports.     I have reviewed the Nursing Notes. I have reviewed the NPO Status.   I have reviewed the Medications.     Review of Systems      Physical Exam  General: Well nourished and Combative    Airway:  Mallampati: II   Mouth Opening: Normal  TM Distance: Normal  Tongue: Normal  Neck ROM: Normal ROM    Chest/Lungs:  Clear to auscultation    Heart:  Rate: Normal        Anesthesia Plan  Type of Anesthesia, risks & benefits discussed:    Anesthesia Type: Gen ETT  Intra-op Monitoring Plan: Standard ASA Monitors  Post Op Pain Control Plan: multimodal analgesia  Induction:  IV  Informed Consent: Informed consent signed with the Patient representative and all parties understand the risks and agree with anesthesia plan.  All questions answered.   ASA Score: 3  Day of Surgery Review of History & Physical: H&P Update referred to the surgeon/provider.  Anesthesia Plan Notes: Na 150, Hct 28,     Ready For Surgery From Anesthesia Perspective.     .  I explained anesthesia plan to patient/responsbile party if available.  Anesthesia consent done going over the material facts, risks, complications & alternatives, obtained which includes the possibility of altering the anesthesia plan.  I reviewed problem list, appropriate labs, any workup, Xray, EKG etc noted below.   "Patients condition is satisfactory to proceed with anesthesia plan unless otherwise noted (see anesthesia chart for details of the anesthesia plan carried out).      Pre-operative evaluation for Procedure(s) (LRB):  ORIF, ELBOW (Left)    BP (!) 158/98   Pulse (!) 120   Temp 37.8 °C (100.1 °F) (Axillary)   Resp (!) 40   Ht 5' 10.98" (1.803 m)   Wt 121 kg (266 lb 12.1 oz)   SpO2 (!) 92%   BMI 37.22 kg/m²     Patient Active Problem List   Diagnosis    Fracture of proximal end of left femur, open type I or II, initial encounter    Closed pelvic ring fracture    Displaced fracture of posterior column (ilioischial) of left acetabulum, initial encounter for closed fracture       Review of patient's allergies indicates:   Allergen Reactions    Iodine     Trazodone        Current Outpatient Medications   Medication Instructions    albuterol (PROVENTIL/VENTOLIN HFA) 90 mcg/actuation inhaler 2 puffs, Inhalation, Every 4 hours PRN    ALBUTEROL INHL 90 mcg, Inhalation, Every 4 hours PRN    amLODIPine (NORVASC) 5 mg, Oral, Daily    amoxicillin-clavulanate 875-125mg (AUGMENTIN) 875-125 mg per tablet 1 tablet, Oral, Every 12 hours (non-standard times)    atorvastatin (LIPITOR) 20 mg, Oral, Daily    azithromycin (Z-CELESTINA) 500 mg, Oral, Daily    clonazePAM (KLONOPIN) 1 mg, Oral, 2 times daily PRN    dextroamphetamine-amphetamine (ADDERALL XR) 25 MG 24 hr capsule 25 mg, Oral, 2 times daily    FLUoxetine 20 MG capsule fluoxetine 20 mg capsule    FLUoxetine 40 mg, Oral, Daily    insulin glargine 100 units/mL SubQ pen Lantus Solostar U-100 Insulin 100 unit/mL (3 mL) subcutaneous pen    ipratropium (ATROVENT) 42 mcg (0.06 %) nasal spray Each Nostril    lamoTRIgine (LAMICTAL) 25 MG tablet lamotrigine 25 mg tablet    LANTUS SOLOSTAR U-100 INSULIN glargine 100 units/mL SubQ pen Subcutaneous    latanoprost 0.005 % ophthalmic solution latanoprost 0.005 % eye drops    lisinopriL (PRINIVIL,ZESTRIL) 40 mg, Oral    " mirtazapine (REMERON) 7.5 MG Tab mirtazapine 7.5 mg tablet    potassium chloride (K-TAB) 20 mEq potassium chloride ER 20 mEq tablet,extended release   TAKE 1 TABLET BY MOUTH EVERY DAY    prochlorperazine (COMPAZINE) 10 MG tablet prochlorperazine maleate 10 mg tablet    QUEtiapine (SEROQUEL) 200 mg, Oral, Nightly    testosterone cypionate (DEPOTESTOTERONE CYPIONATE) 200 mg/mL injection SMARTSIG:Milliliter(s) IM       Past Surgical History:   Procedure Laterality Date    INTRAMEDULLARY RODDING OF FEMUR Left 02/05/2023    Procedure: INSERTION, INTRAMEDULLARY NOEL, FEMUR;  Surgeon: Tuan Zepeda DO;  Location: [facility] OR;  Service: Orthopedics;  Laterality: Left;    OPEN REDUCTION AND INTERNAL FIXATION (ORIF) OF FRACTURE OF ACETABULUM Left 6/14/2023    Procedure: ORIF, FRACTURE, ACETABULUM, POSTERIOR;  Surgeon: Compa Bernal MD;  Location: [facility] OR;  Service: Orthopedics;  Laterality: Left;  Lateral, Julio C table, de la rosa bag  [place]  1st case in second room    OPEN REDUCTION AND INTERNAL FIXATION (ORIF) OF FRACTURE OF DISTAL RADIUS Right 6/12/2023    Procedure: ORIF, FRACTURE, RADIUS, DISTAL;  Surgeon: Compa Bernal MD;  Location: [facility] OR;  Service: Orthopedics;  Laterality: Right;  supine hand table skeleta dyamics c arm to folow    OPEN REDUCTION AND INTERNAL FIXATION (ORIF) OF INJURY OF HIP Left 6/5/2023    Procedure: ORIF,PELVIS;  Surgeon: uTan Zepeda DO;  Location: [facility] OR;  Service: Orthopedics;  Laterality: Left;  supine julio c traction LLE CELL SAVER, teo    POSTERIOR LUMBAR INTERBODY FUSION (PLIF) WITH COMPUTER-ASSISTED NAVIGATION N/A 6/9/2023    Procedure: FUSION, SPINE, LUMBAR, PLIF, USING COMPUTER-ASSISTED NAVIGATION;  Surgeon: Angelika Matos MD;  Location: [facility] OR;  Service: Neurosurgery;  Laterality: N/A;  T11-L4 posterolateral fusion, L1-2 laminectomies with O-arm, Stealth // NTI // TIVA SETUP // [employer]    REMOVAL OF HARDWARE FROM LOWER EXTREMITY Left 5/9/2023    Procedure:  REMOVAL, HARDWARE, LOWER EXTREMITY;  Surgeon: Tuan Zepeda DO;  [facility];  Service: Orthopedics;  Laterality: Left;    TRACH TUBE EXCHANGER  6/5/2023    UVULOPALATOPHARYNGOPLASTY         Social History     Socioeconomic History    Marital status: Single   Tobacco Use    Smoking status: Every Day     Packs/day: 0.50     Years: 10.00     Pack years: 5.00     Types: Cigarettes    Smokeless tobacco: Never   Substance and Sexual Activity    Alcohol use: Not Currently    Drug use: Never    Sexual activity: Not Currently     Social Determinants of Health     Financial Resource Strain: Unknown    Difficulty of Paying Living Expenses: Patient refused   Food Insecurity: Unknown    Worried About Running Out of Food in the Last Year: Patient refused    Ran Out of Food in the Last Year: Patient refused   Transportation Needs: Unknown    Lack of Transportation (Medical): Patient refused    Lack of Transportation (Non-Medical): Patient refused   Physical Activity: Unknown    Days of Exercise per Week: Patient refused   Stress: Unknown    Feeling of Stress : Patient refused   Social Connections: Unknown    Frequency of Communication with Friends and Family: Patient refused    Frequency of Social Gatherings with Friends and Family: Patient refused    Attends Club or Organization Meetings: Patient refused    Marital Status: Never    Housing Stability: Unknown    Unable to Pay for Housing in the Last Year: Patient refused    Unstable Housing in the Last Year: Patient refused       Lab Results   Component Value Date    WBC 12.77 (H) 06/17/2023    HGB 9.1 (L) 06/17/2023    HCT 28.0 (L) 06/17/2023    MCV 87.2 06/17/2023     (H) 06/17/2023          BMP  Lab Results   Component Value Date    HCT 28.0 (L) 06/17/2023     (H) 06/17/2023    K 3.8 06/17/2023    BUN 15.4 06/17/2023    CREATININE 0.69 (L) 06/17/2023    CALCIUM 7.9 (L) 06/17/2023        INR  No results for input(s): PT, INR,  PROTIME, APTT in the last 72 hours.        Diagnostic Studies:      EKG:  Results for orders placed or performed during the hospital encounter of 06/04/23   EKG 12-lead    Collection Time: 06/06/23  1:28 AM    Narrative    Test Reason : R00.0,    Vent. Rate : 131 BPM     Atrial Rate : 131 BPM     P-R Int : 140 ms          QRS Dur : 078 ms      QT Int : 300 ms       P-R-T Axes : 063 044 087 degrees     QTc Int : 443 ms    Sinus tachycardia  Nonspecific T wave abnormality  Abnormal ECG  Confirmed by Kenton Collado MD ([id number]) on 6/6/2023 9:41:51 AM    Referred By: MIRIAN ABAD           Confirmed By:Kenton Collado MD

## 2023-06-17 NOTE — ANESTHESIA POSTPROCEDURE EVALUATION
Anesthesia Post Evaluation    Patient: Gera Chavez    Procedure(s) Performed: Procedure(s) (LRB):  ORIF, ELBOW (Left)    Final Anesthesia Type: general (/Regional//MAC)      Patient location during evaluation: PACU  Post-procedure mental status: @ basline.  Post-procedure vital signs: reviewed and stable  Pain management: adequate    PONV status: See postop meds for drugs used to control n/v if any.  Anesthetic complications: no      Cardiovascular status: blood pressure returned to baseline  Respiratory status: @ baseline.  Hydration status: euvolemic            Vitals Value Taken Time   /101 06/17/23 1007   Temp 98 06/17/23 1007   Pulse 118 06/17/23 1007   Resp 32 06/17/23 1007   SpO2 97 % 06/17/23 1007   Vitals shown include unvalidated device data.      No case tracking events are documented in the log.      Pain/Mp Score: Pain Rating Prior to Med Admin: 10 (6/17/2023  2:12 AM)

## 2023-06-17 NOTE — PROGRESS NOTES
[facility] - [facility] ICU  Pulmonary Critical Care Note    Patient Name: Gera Chavez  MRN: [id number]  Admission Date: 6/4/2023  Hospital Length of Stay: 13 days  Code Status: Full Code  Attending Provider: Luis Bermudez Jr., MD  Primary Care Provider: Primary Doctor No     Subjective:     HPI:   Gera Chavez is a 37 y.o. male with no known PMH, who was transferred from [facility] for level 1 trauma activation, after patient reportedly jumped off a bridge.  Sustained rib fractures x4 left-sided, pelvic ring fracture, acetabulum fracture, L2 burst fracture, and left pneumothorax with chest tube in place.  Jump witnessed by bystander who called 911.     Hospital Course/Significant events:  6/4/2023 - Admitted to ICU   6/5/2023 - s/p bronchoscopy  6/12/2023 - s/p ORIF right radius fx  6/14/23 - ORIF left acetabulum    24 Hour Interval History:  Tmax 100.2, tachy to 110s, HTN 150s/90s. No acute events overnight. He is resting comfortably this AM with NC in place, no complaints this AM.     Past Medical History:   Diagnosis Date    Asthma     Depression     Diabetes mellitus     Encounter for blood transfusion     Generalized anxiety disorder     Hypertension     Schizophrenia, unspecified     Sleep apnea     Unspecified glaucoma        Past Surgical History:   Procedure Laterality Date    INTRAMEDULLARY RODDING OF FEMUR Left 02/05/2023    Procedure: INSERTION, INTRAMEDULLARY NOEL, FEMUR;  Surgeon: Tuan Zepeda DO;  Location: [facility];  Service: Orthopedics;  Laterality: Left;    OPEN REDUCTION AND INTERNAL FIXATION (ORIF) OF FRACTURE OF ACETABULUM Left 6/14/2023    Procedure: ORIF, FRACTURE, ACETABULUM, POSTERIOR;  Surgeon: Compa Bernal MD;  Location: [facility];  Service: Orthopedics;  Laterality: Left;  Lateral, Julio C table, de la rosa bag  Ho Solis  1st case in second room    OPEN REDUCTION AND INTERNAL FIXATION (ORIF) OF FRACTURE OF DISTAL RADIUS Right 6/12/2023    Procedure: ORIF,  FRACTURE, RADIUS, DISTAL;  Surgeon: Compa Bernal MD;  Location: [facility] OR;  Service: Orthopedics;  Laterality: Right;  supine hand table skeleta dyamics c arm to folow    OPEN REDUCTION AND INTERNAL FIXATION (ORIF) OF INJURY OF HIP Left 6/5/2023    Procedure: ORIF,PELVIS;  Surgeon: Tuan Zepeda DO;  Location: [facility] OR;  Service: Orthopedics;  Laterality: Left;  supine deven traction LLE CELL SAVER, teo    POSTERIOR LUMBAR INTERBODY FUSION (PLIF) WITH COMPUTER-ASSISTED NAVIGATION N/A 6/9/2023    Procedure: FUSION, SPINE, LUMBAR, PLIF, USING COMPUTER-ASSISTED NAVIGATION;  Surgeon: Angelika Matos MD;  Location: [facility] OR;  Service: Neurosurgery;  Laterality: N/A;  T11-L4 posterolateral fusion, L1-2 laminectomies with O-arm, Stealth // NTI // TIVA SETUP // [employer]    REMOVAL OF HARDWARE FROM LOWER EXTREMITY Left 5/9/2023    Procedure: REMOVAL, HARDWARE, LOWER EXTREMITY;  Surgeon: Tuan Zepeda DO;  Location: [facility];  Service: Orthopedics;  Laterality: Left;    TRACH TUBE EXCHANGER  6/5/2023    UVULOPALATOPHARYNGOPLASTY         Social History     Socioeconomic History    Marital status: Single   Tobacco Use    Smoking status: Every Day     Packs/day: 0.50     Years: 10.00     Pack years: 5.00     Types: Cigarettes    Smokeless tobacco: Never   Substance and Sexual Activity    Alcohol use: Not Currently    Drug use: Never    Sexual activity: Not Currently     Social Determinants of Health     Financial Resource Strain: Unknown    Difficulty of Paying Living Expenses: Patient refused   Food Insecurity: Unknown    Worried About Running Out of Food in the Last Year: Patient refused    Ran Out of Food in the Last Year: Patient refused   Transportation Needs: Unknown    Lack of Transportation (Medical): Patient refused    Lack of Transportation (Non-Medical): Patient refused   Physical Activity: Unknown    Days of Exercise per Week: Patient refused   Stress: Unknown    Feeling of Stress : Patient refused   Social  Connections: Unknown    Frequency of Communication with Friends and Family: Patient refused    Frequency of Social Gatherings with Friends and Family: Patient refused    Attends Club or Organization Meetings: Patient refused    Marital Status: Never    Housing Stability: Unknown    Unable to Pay for Housing in the Last Year: Patient refused    Unstable Housing in the Last Year: Patient refused           Current Outpatient Medications   Medication Instructions    albuterol (PROVENTIL/VENTOLIN HFA) 90 mcg/actuation inhaler 2 puffs, Inhalation, Every 4 hours PRN    ALBUTEROL INHL 90 mcg, Inhalation, Every 4 hours PRN    amLODIPine (NORVASC) 5 mg, Oral, Daily    amoxicillin-clavulanate 875-125mg (AUGMENTIN) 875-125 mg per tablet 1 tablet, Oral, Every 12 hours (non-standard times)    atorvastatin (LIPITOR) 20 mg, Oral, Daily    azithromycin (Z-CELESTINA) 500 mg, Oral, Daily    clonazePAM (KLONOPIN) 1 mg, Oral, 2 times daily PRN    dextroamphetamine-amphetamine (ADDERALL XR) 25 MG 24 hr capsule 25 mg, Oral, 2 times daily    FLUoxetine 20 MG capsule fluoxetine 20 mg capsule    FLUoxetine 40 mg, Oral, Daily    insulin glargine 100 units/mL SubQ pen Lantus Solostar U-100 Insulin 100 unit/mL (3 mL) subcutaneous pen    ipratropium (ATROVENT) 42 mcg (0.06 %) nasal spray Each Nostril    lamoTRIgine (LAMICTAL) 25 MG tablet lamotrigine 25 mg tablet    LANTUS SOLOSTAR U-100 INSULIN glargine 100 units/mL SubQ pen Subcutaneous    latanoprost 0.005 % ophthalmic solution latanoprost 0.005 % eye drops    lisinopriL (PRINIVIL,ZESTRIL) 40 mg, Oral    mirtazapine (REMERON) 7.5 MG Tab mirtazapine 7.5 mg tablet    potassium chloride (K-TAB) 20 mEq potassium chloride ER 20 mEq tablet,extended release   TAKE 1 TABLET BY MOUTH EVERY DAY    prochlorperazine (COMPAZINE) 10 MG tablet prochlorperazine maleate 10 mg tablet    QUEtiapine (SEROQUEL) 200 mg, Oral, Nightly    testosterone cypionate (DEPOTESTOTERONE CYPIONATE) 200 mg/mL injection  SMARTSIG:Milliliter(s) IM       Current Inpatient Medications   albuterol-ipratropium  3 mL Nebulization Q6H    calcium-vitamin D3  1 tablet Per NG tube BID    enoxaparin  40 mg Subcutaneous Q12H    famotidine (PF)  20 mg Intravenous BID    FLUoxetine  40 mg Per NG tube Daily    gabapentin  300 mg Per NG tube TID    guaiFENesin  600 mg Oral BID    lamoTRIgine  25 mg Per NG tube Daily    levoFLOXacin  750 mg Intravenous Q24H    propranoloL  40 mg Oral TID    QUEtiapine  50 mg Per NG tube BID       Current Intravenous Infusions   dexmedeTOMIDine (Precedex) infusion (titrating) 0.4 mcg/kg/hr (06/17/23 0451)    lactated ringers 75 mL/hr at 06/15/23 1127         Objective:       Intake/Output Summary (Last 24 hours) at 6/17/2023 0641  Last data filed at 6/17/2023 0600  Gross per 24 hour   Intake --   Output 3400 ml   Net -3400 ml         Vital Signs (Most Recent):  Temp: 100.1 °F (37.8 °C) (06/17/23 0400)  Pulse: (!) 120 (06/17/23 0600)  Resp: (!) 40 (06/17/23 0600)  BP: (!) 158/98 (06/17/23 0600)  SpO2: (!) 92 % (06/17/23 0500)  Body mass index is 37.22 kg/m².  Weight: 121 kg (266 lb 12.1 oz) Vital Signs (24h Range):  Temp:  [98 °F (36.7 °C)-100.2 °F (37.9 °C)] 100.1 °F (37.8 °C)  Pulse:  [101-132] 120  Resp:  [13-60] 40  SpO2:  [90 %-100 %] 92 %  BP: (132-167)/() 158/98     Physical Exam  General: Patient resting in bed, in no acute distress   Eye: EOMI, clear conjunctiva, eyelids normal  HENT: Head-normocephalic and atraumatic, NC in place  Neck:  No gross thyromegaly  Respiratory:  mild coarse breath sounds bilaterally, no increased work of breathing  Cardiovascular: regular rate and rhythm without murmurs.  No gallops or rubs no JVD.  Capillary refill within normal limits.  Gastrointestinal: soft, non-tender, non-distended with normal bowel sounds, without masses to palpation  Musculoskeletal:  Left arm in brace, right arm under bandage, dry, intact  Integumentary: no rashes or skin lesions  present  Neurologic: no signs of peripheral neurological deficit, motor/sensory function intact  Psychiatric:  Alert, follows commands, responds appropriately      Lines/Drains/Airways       Drain  Duration                  Urethral Catheter 06/13/23 0901 Non-latex 16 Fr. 3 days              Peripheral Intravenous Line  Duration                  Peripheral IV - Single Lumen 06/15/23 1436 18 G Anterior;Left;Proximal Upper Arm 1 day         Peripheral IV - Single Lumen 06/15/23 1437 20 G Anterior;Left Upper Arm 1 day                    Significant Labs:    Lab Results   Component Value Date    WBC 12.77 (H) 06/17/2023    HGB 9.1 (L) 06/17/2023    HCT 28.0 (L) 06/17/2023    MCV 87.2 06/17/2023     (H) 06/17/2023           BMP  Lab Results   Component Value Date     (H) 06/17/2023    K 3.8 06/17/2023    CHLORIDE 114 (H) 06/17/2023    CO2 24 06/17/2023    BUN 15.4 06/17/2023    CREATININE 0.69 (L) 06/17/2023    CALCIUM 7.9 (L) 06/17/2023    AGAP 11.0 06/12/2023    AGAP 10.0 06/12/2023         ABG  Recent Labs   Lab 06/15/23  2354   PH 7.540*   PO2 91.0   HCO3 29.1*   POCBASEDEF 6.30*       Mechanical Ventilation Support:  Vent Mode: CPAP PSV (done per dr alaniz) (06/15/23 0716)  Set Rate: 24 BPM (06/15/23 0542)  Vt Set: 500 mL (06/15/23 0542)  Pressure Support: 12 cmH20 (06/11/23 0527)  PEEP/CPAP: 10 cmH20 (06/15/23 0542)  Oxygen Concentration (%): 35 (06/15/23 0542)  Peak Airway Pressure: 28 cmH20 (06/15/23 0542)  Total Ve: 11.1 L/m (06/15/23 0542)  F/VT Ratio<105 (RSBI): (!) 52.17 (06/15/23 0542)      Significant Imaging:  I have reviewed the pertinent imaging within the past 24 hours.        Assessment/Plan:     Assessment  Pelvic ring fx s/p ORIF on 6/5/2023  Left-sided rib fractures with pneumo/hemo thorax s/p chest tube placement  L2 burst fracture and L1-2 stenosis s/p laminectomy on 6/9/2023  Right distal radius fracture s/p ORIF on 6/14/23  Primary Respiratory alkalosis with secondary metabolic  alkalosis      Plan  -Continue ICU level of care per primary team  -Trauma, neurosurgery, and orthopedic surgery teams following  -No no longer requiring strict bed rest from neurosurgery perspective, okay for mobilization with Medina TLSO brace once extubated   -sputum cultures depict pseudomonas  - agree with levofloxacin given zosyn resistance  -differential for respiratory alkalosis includes worsening sepsis, pain, anxiety  - continue supplemental oxygen  - continue to monitor with ABGs  - likely OR today with ortho for L forearm fx    DVT Prophylaxis: SCD  GI Prophylaxis: Famotidine     32 minutes of critical care was time spent personally by me on the following activities: development of treatment plan with patient or surrogate and bedside caregivers, discussions with consultants, evaluation of patient's response to treatment, examination of patient, ordering and performing treatments and interventions, ordering and review of laboratory studies, ordering and review of radiographic studies, pulse oximetry, re-evaluation of patient's condition.  This critical care time did not overlap with that of any other provider or involve time for any procedures.     Chucho Erickson MD  Pulmonary Critical Care Medicine, PGY1  [facility] - [facility] ICU
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
## 2023-06-17 NOTE — ANESTHESIA PROCEDURE NOTES
Intubation    Date/Time: 6/17/2023 7:34 AM  Performed by: Raisa Mae  Authorized by: Brent Forbes MD     Intubation:     Induction:  Intravenous    Intubated:  Postinduction    Mask Ventilation:  Easy with oral airway    Attempts:  2    Attempted By:  Student    Method of Intubation:  Video laryngoscopy    Blade:  Flynn 4    Laryngeal View Grade: Grade IIA - cords partially seen      Attempted By (2nd Attempt):  Staff anesthesiologist    Method of Intubation (2nd Attempt):  Video laryngoscopy    Blade (2nd Attempt):  Glidescope 4    Laryngeal View Grade (2nd Attempt): Grade I - full view of cords      Difficult Airway Encountered?: Yes      Future Airway Recommendations:  Copious amounts of secretions made it difficult to get clear view of vocal cords with flynn    Complications:  Desaturation (clinically insignificant), laryngospasm and bronchospasm    Airway Device Size:  7.5    Style/Cuff Inflation:  Cuffed (inflated to minimal occlusive pressure)    Tube secured:  23    Secured at:  The lips    Placement Verified By:  Capnometry    Complicating Factors:  None    Findings Post-Intubation:  BS equal bilateral and atraumatic/condition of teeth unchanged  Notes:      Significant air leak noted after first successful intubation. Decision made to re-intubate with new ET tube. Rapid desaturation after removal of ET tube. LMA inserted, but unable to ventilate due to laryngospasm. 2 person hand ventilation initiated and laryngospasm broken with positive pressure. Hand ventilated until SpO2 improved. Successfully intubated per Dr. Forbes using glidescope. Copious amount of secretions then suctioned from ET tube.

## 2023-06-18 LAB
ALBUMIN SERPL-MCNC: 2.1 G/DL (ref 3.5–5)
ALBUMIN/GLOB SERPL: 0.7 RATIO (ref 1.1–2)
ALP SERPL-CCNC: 187 UNIT/L (ref 40–150)
ALT SERPL-CCNC: 36 UNIT/L (ref 0–55)
AST SERPL-CCNC: 45 UNIT/L (ref 5–34)
BACTERIA BLD CULT: NORMAL
BACTERIA BLD CULT: NORMAL
BASOPHILS # BLD AUTO: 0.06 X10(3)/MCL
BASOPHILS NFR BLD AUTO: 0.5 %
BILIRUBIN DIRECT+TOT PNL SERPL-MCNC: 2.3 MG/DL
BUN SERPL-MCNC: 15.8 MG/DL (ref 8.9–20.6)
CALCIUM SERPL-MCNC: 7.7 MG/DL (ref 8.4–10.2)
CHLORIDE SERPL-SCNC: 113 MMOL/L (ref 98–107)
CO2 SERPL-SCNC: 26 MMOL/L (ref 22–29)
CREAT SERPL-MCNC: 0.7 MG/DL (ref 0.73–1.18)
EOSINOPHIL # BLD AUTO: 0.17 X10(3)/MCL (ref 0–0.9)
EOSINOPHIL NFR BLD AUTO: 1.4 %
ERYTHROCYTE [DISTWIDTH] IN BLOOD BY AUTOMATED COUNT: 16.4 % (ref 11.5–17)
GFR SERPLBLD CREATININE-BSD FMLA CKD-EPI: >60 MLS/MIN/1.73/M2
GLOBULIN SER-MCNC: 3.2 GM/DL (ref 2.4–3.5)
GLUCOSE SERPL-MCNC: 106 MG/DL (ref 74–100)
HCT VFR BLD AUTO: 26.9 % (ref 42–52)
HGB BLD-MCNC: 8.6 G/DL (ref 14–18)
IMM GRANULOCYTES # BLD AUTO: 0.13 X10(3)/MCL (ref 0–0.04)
IMM GRANULOCYTES NFR BLD AUTO: 1.1 %
LYMPHOCYTES # BLD AUTO: 1.9 X10(3)/MCL (ref 0.6–4.6)
LYMPHOCYTES NFR BLD AUTO: 16.1 %
MCH RBC QN AUTO: 28.7 PG (ref 27–31)
MCHC RBC AUTO-ENTMCNC: 32 G/DL (ref 33–36)
MCV RBC AUTO: 89.7 FL (ref 80–94)
MONOCYTES # BLD AUTO: 0.87 X10(3)/MCL (ref 0.1–1.3)
MONOCYTES NFR BLD AUTO: 7.4 %
NEUTROPHILS # BLD AUTO: 8.64 X10(3)/MCL (ref 2.1–9.2)
NEUTROPHILS NFR BLD AUTO: 73.5 %
NRBC BLD AUTO-RTO: 0.8 %
PLATELET # BLD AUTO: 584 X10(3)/MCL (ref 130–400)
PMV BLD AUTO: 10.4 FL (ref 7.4–10.4)
POTASSIUM SERPL-SCNC: 3.8 MMOL/L (ref 3.5–5.1)
PROT SERPL-MCNC: 5.3 GM/DL (ref 6.4–8.3)
RBC # BLD AUTO: 3 X10(6)/MCL (ref 4.7–6.1)
SODIUM SERPL-SCNC: 150 MMOL/L (ref 136–145)
WBC # SPEC AUTO: 11.77 X10(3)/MCL (ref 4.5–11.5)

## 2023-06-18 PROCEDURE — 25000242 PHARM REV CODE 250 ALT 637 W/ HCPCS: Performed by: HOSPITALIST

## 2023-06-18 PROCEDURE — 94640 AIRWAY INHALATION TREATMENT: CPT

## 2023-06-18 PROCEDURE — 63600175 PHARM REV CODE 636 W HCPCS

## 2023-06-18 PROCEDURE — 27000221 HC OXYGEN, UP TO 24 HOURS

## 2023-06-18 PROCEDURE — 25000003 PHARM REV CODE 250: Performed by: STUDENT IN AN ORGANIZED HEALTH CARE EDUCATION/TRAINING PROGRAM

## 2023-06-18 PROCEDURE — 85025 COMPLETE CBC W/AUTO DIFF WBC: CPT | Performed by: STUDENT IN AN ORGANIZED HEALTH CARE EDUCATION/TRAINING PROGRAM

## 2023-06-18 PROCEDURE — 80053 COMPREHEN METABOLIC PANEL: CPT | Performed by: STUDENT IN AN ORGANIZED HEALTH CARE EDUCATION/TRAINING PROGRAM

## 2023-06-18 PROCEDURE — 63600175 PHARM REV CODE 636 W HCPCS: Performed by: SURGERY

## 2023-06-18 PROCEDURE — 25000003 PHARM REV CODE 250: Performed by: SURGERY

## 2023-06-18 PROCEDURE — 25000003 PHARM REV CODE 250

## 2023-06-18 PROCEDURE — 11000001 HC ACUTE MED/SURG PRIVATE ROOM

## 2023-06-18 PROCEDURE — 94761 N-INVAS EAR/PLS OXIMETRY MLT: CPT

## 2023-06-18 RX ORDER — FLUOXETINE HYDROCHLORIDE 20 MG/1
40 CAPSULE ORAL DAILY
Status: DISCONTINUED | OUTPATIENT
Start: 2023-06-19 | End: 2023-06-26 | Stop reason: HOSPADM

## 2023-06-18 RX ORDER — MIRTAZAPINE 15 MG/1
15 TABLET, FILM COATED ORAL NIGHTLY
Status: DISCONTINUED | OUTPATIENT
Start: 2023-06-18 | End: 2023-06-26 | Stop reason: HOSPADM

## 2023-06-18 RX ORDER — QUETIAPINE FUMARATE 100 MG/1
100 TABLET, FILM COATED ORAL NIGHTLY
Status: DISCONTINUED | OUTPATIENT
Start: 2023-06-18 | End: 2023-06-26 | Stop reason: HOSPADM

## 2023-06-18 RX ORDER — LAMOTRIGINE 25 MG/1
25 TABLET ORAL DAILY
Status: DISCONTINUED | OUTPATIENT
Start: 2023-06-19 | End: 2023-06-26 | Stop reason: HOSPADM

## 2023-06-18 RX ORDER — QUETIAPINE FUMARATE 25 MG/1
50 TABLET, FILM COATED ORAL DAILY
Status: DISCONTINUED | OUTPATIENT
Start: 2023-06-19 | End: 2023-06-26 | Stop reason: HOSPADM

## 2023-06-18 RX ADMIN — LEVOFLOXACIN 750 MG: 5 INJECTION, SOLUTION INTRAVENOUS at 08:06

## 2023-06-18 RX ADMIN — FAMOTIDINE 20 MG: 10 INJECTION, SOLUTION INTRAVENOUS at 09:06

## 2023-06-18 RX ADMIN — PROPRANOLOL HYDROCHLORIDE 40 MG: 10 TABLET ORAL at 09:06

## 2023-06-18 RX ADMIN — FAMOTIDINE 20 MG: 10 INJECTION, SOLUTION INTRAVENOUS at 08:06

## 2023-06-18 RX ADMIN — IPRATROPIUM BROMIDE AND ALBUTEROL SULFATE 3 ML: 2.5; .5 SOLUTION RESPIRATORY (INHALATION) at 01:06

## 2023-06-18 RX ADMIN — FLUOXETINE 40 MG: 20 CAPSULE ORAL at 08:06

## 2023-06-18 RX ADMIN — MORPHINE SULFATE 4 MG: 4 INJECTION, SOLUTION INTRAMUSCULAR; INTRAVENOUS at 12:06

## 2023-06-18 RX ADMIN — ENOXAPARIN SODIUM 40 MG: 40 INJECTION SUBCUTANEOUS at 08:06

## 2023-06-18 RX ADMIN — GABAPENTIN 300 MG: 300 CAPSULE ORAL at 03:06

## 2023-06-18 RX ADMIN — MIRTAZAPINE 15 MG: 15 TABLET, FILM COATED ORAL at 09:06

## 2023-06-18 RX ADMIN — Medication 1 TABLET: at 09:06

## 2023-06-18 RX ADMIN — ENOXAPARIN SODIUM 40 MG: 40 INJECTION SUBCUTANEOUS at 09:06

## 2023-06-18 RX ADMIN — QUETIAPINE FUMARATE 50 MG: 25 TABLET ORAL at 08:06

## 2023-06-18 RX ADMIN — PROPRANOLOL HYDROCHLORIDE 40 MG: 10 TABLET ORAL at 08:06

## 2023-06-18 RX ADMIN — MORPHINE SULFATE 4 MG: 4 INJECTION, SOLUTION INTRAMUSCULAR; INTRAVENOUS at 11:06

## 2023-06-18 RX ADMIN — IPRATROPIUM BROMIDE AND ALBUTEROL SULFATE 3 ML: 2.5; .5 SOLUTION RESPIRATORY (INHALATION) at 08:06

## 2023-06-18 RX ADMIN — QUETIAPINE FUMARATE 100 MG: 100 TABLET ORAL at 09:06

## 2023-06-18 RX ADMIN — GABAPENTIN 300 MG: 300 CAPSULE ORAL at 09:06

## 2023-06-18 RX ADMIN — LAMOTRIGINE 25 MG: 25 TABLET ORAL at 08:06

## 2023-06-18 RX ADMIN — GUAIFENESIN 600 MG: 600 TABLET, EXTENDED RELEASE ORAL at 09:06

## 2023-06-18 RX ADMIN — GABAPENTIN 300 MG: 300 CAPSULE ORAL at 08:06

## 2023-06-18 RX ADMIN — PROPRANOLOL HYDROCHLORIDE 40 MG: 10 TABLET ORAL at 03:06

## 2023-06-18 NOTE — PROGRESS NOTES
"Status post POD 7 s/p ORIF right distal radius. POD 4 ORIF L acetabulum fracture. He is 13 days out from fixation of his pelvic fractures with Dr. Zepeda.  POD1 ORIF left olecranon fx   No acute events overnight.    Pain controlled.    Resting in bed.     Vital Signs  Temp: 99.4 °F (37.4 °C)  Temp Source: Axillary  Pulse: (!) 117  Heart Rate Source: Monitor, Continuous  Resp: 20  SpO2: (!) 94 %  Pulse Oximetry Type: Continuous  Flow (L/min): 3  Oxygen Concentration (%): 40  Device (Oxygen Therapy): nasal cannula  BP: (!) 162/93  BP Location: Right arm  BP Method: Automatic  Patient Position: Lying  Arousal Level: opens eyes spontaneously  Height and Weight  Height: 5' 10.98" (180.3 cm)  Weight: 121 kg (266 lb 12.1 oz)  BSA (Calculated - sq m): 2.46 sq meters  BMI (Calculated): 37.2  Weight in (lb) to have BMI = 25: 178.8]    Left upper extremity compartment soft and warm  No s/s of DVT or infection  Splint   Dressing c/d/i  NVI distally  RUE splint  NVI  Ble comp soft warm   nvi    Recent Lab Results         06/18/23  0123   06/17/23  1131        Albumin/Globulin Ratio 0.7   0.6       Albumin 2.1   2.1       Alkaline Phosphatase 187   189       ALT 36   36       AST 45   56       Baso # 0.06   0.08       Basophil % 0.5   0.7       BILIRUBIN TOTAL 2.3   2.8       BUN 15.8   18.9       Calcium 7.7   7.8       Chloride 113   115       CO2 26   22       Creatinine 0.70   0.72       eGFR >60   >60       Eos # 0.17   0.06       Eosinophil % 1.4   0.5       Globulin, Total 3.2   3.5       Glucose 106   142       Hematocrit 26.9   31.9       Hemoglobin 8.6   9.8       Immature Grans (Abs) 0.13   0.19       Immature Granulocytes 1.1   1.6       Lymph # 1.90   0.99       LYMPH % 16.1   8.5       MCH 28.7   28.7       MCHC 32.0   30.7       MCV 89.7   93.3       Mono # 0.87   0.68       Mono % 7.4   5.9       MPV 10.4   10.4       Neut # 8.64   9.58       Neut % 73.5   82.8       nRBC 0.8   0.6       Platelets 583 664       " Potassium 3.8   4.4       PROTEIN TOTAL 5.3   5.6       RBC 3.00   3.42       RDW 16.4   16.9       Sodium 150   150       WBC 11.77   11.58               A/P:    Overall patient doing well, improving  Nwb to LUE, nwb to R wrist, ok for platform, nwb to LLE, pivot/transfer to RLE  Post op abx  Lovenox dvt ppx

## 2023-06-18 NOTE — PSYCH
".6/18/2023 5:05 PM   Gera Chavez   1985   [id number]       Psychiatry Consult Progress Note     SUBJECTIVE:   Gera Chavez is a 37 y.o. male seen for follow-up for a hx of schizophrenia. Pt is lying in bed restless, diaphoretic (room is a lukewarm, personal fan near patient), one-on-one sitter at bedside. Pt states "they won't let me sleep." When further questioned, pt does not identify when "they" are but states that they speak to him throughout the day, especially at nighttime. Provider confirmed with sitter and nurse that the patient is experiencing auditory hallucinations. Pt denies any anxiety, elevated/expansive mood, impulsiveness, irritability. Does admit to trouble falling asleep and remaining asleep. Staff denies agressive behavior. Complains of being hungry. Explained to patient, ST has to elevated him for safety to resume diet. Pt verbalizes understanding. Pt is currently NPO w/ exception of meds per orders. Pt denies any pain. States "it's okay. No pain." Pt can make needs known.      Pts PEC was reinstated today. Awaiting corner for CEC. Per nurse, the office was notified once more this AM.     Current Medications:   Scheduled Meds:    albuterol-ipratropium  3 mL Nebulization Q6H    calcium-vitamin D3  1 tablet Per NG tube BID    enoxaparin  40 mg Subcutaneous Q12H    famotidine (PF)  20 mg Intravenous BID    FLUoxetine  40 mg Per NG tube Daily    gabapentin  300 mg Per NG tube TID    guaiFENesin  600 mg Oral BID    lamoTRIgine  25 mg Per NG tube Daily    levoFLOXacin  750 mg Intravenous Q24H    mirtazapine  7.5 mg Per NG tube QHS    propranoloL  40 mg Oral TID    QUEtiapine  50 mg Per NG tube BID      PRN Meds: sodium chloride, acetaminophen, fentaNYL, hydrALAZINE, midazolam, morphine, oxyCODONE, oxyCODONE   Psychotherapeutics (From admission, onward)      Start     Stop Route Frequency Ordered    06/17/23 2100  mirtazapine tablet 7.5 mg         -- PER NG TUBE Nightly 06/17/23 1723    06/13/23 " 0900  FLUoxetine capsule 40 mg         -- PER NG TUBE Daily 06/12/23 1138    06/12/23 2100  QUEtiapine tablet 50 mg         -- PER NG TUBE 2 times daily 06/12/23 1138    06/06/23 0930  dexmedetomidine (PRECEDEX) 400mcg/100mL 0.9% NaCL infusion        Question Answer Comment   Begin at (in mcg/kg/hr): 0.4    Titrate by (in mcg/kg/hr): 0.2    Titrate interval: (in minutes) 10    To maintain a RASS goal of: RASS (-2) Light sedation - Briefly awakens with eye contact to voice (<10 seconds)    Maximum dose of (in mcg/kg/hr): 1.4        -- IV Continuous 06/06/23 0924            Allergies:   Review of patient's allergies indicates:   Allergen Reactions    Iodine     Trazodone         OBJECTIVE:   Vitals   Vitals:    06/18/23 1330   BP:    Pulse: 105   Resp: 19   Temp:         Labs/Imaging/Studies:   Recent Results (from the past 36 hour(s))   Comprehensive Metabolic Panel    Collection Time: 06/17/23 11:31 AM   Result Value Ref Range    Sodium Level 150 (H) 136 - 145 mmol/L    Potassium Level 4.4 3.5 - 5.1 mmol/L    Chloride 115 (H) 98 - 107 mmol/L    Carbon Dioxide 22 22 - 29 mmol/L    Glucose Level 142 (H) 74 - 100 mg/dL    Blood Urea Nitrogen 18.9 8.9 - 20.6 mg/dL    Creatinine 0.72 (L) 0.73 - 1.18 mg/dL    Calcium Level Total 7.8 (L) 8.4 - 10.2 mg/dL    Protein Total 5.6 (L) 6.4 - 8.3 gm/dL    Albumin Level 2.1 (L) 3.5 - 5.0 g/dL    Globulin 3.5 2.4 - 3.5 gm/dL    Albumin/Globulin Ratio 0.6 (L) 1.1 - 2.0 ratio    Bilirubin Total 2.8 (H) <=1.5 mg/dL    Alkaline Phosphatase 189 (H) 40 - 150 unit/L    Alanine Aminotransferase 36 0 - 55 unit/L    Aspartate Aminotransferase 56 (H) 5 - 34 unit/L    eGFR >60 mls/min/1.73/m2   CBC with Differential    Collection Time: 06/17/23 11:31 AM   Result Value Ref Range    WBC 11.58 (H) 4.50 - 11.50 x10(3)/mcL    RBC 3.42 (L) 4.70 - 6.10 x10(6)/mcL    Hgb 9.8 (L) 14.0 - 18.0 g/dL    Hct 31.9 (L) 42.0 - 52.0 %    MCV 93.3 80.0 - 94.0 fL    MCH 28.7 27.0 - 31.0 pg    MCHC 30.7 (L) 33.0 -  36.0 g/dL    RDW 16.9 11.5 - 17.0 %    Platelet 534 (H) 130 - 400 x10(3)/mcL    MPV 10.4 7.4 - 10.4 fL    Neut % 82.8 %    Lymph % 8.5 %    Mono % 5.9 %    Eos % 0.5 %    Basophil % 0.7 %    Lymph # 0.99 0.6 - 4.6 x10(3)/mcL    Neut # 9.58 (H) 2.1 - 9.2 x10(3)/mcL    Mono # 0.68 0.1 - 1.3 x10(3)/mcL    Eos # 0.06 0 - 0.9 x10(3)/mcL    Baso # 0.08 <=0.2 x10(3)/mcL    IG# 0.19 (H) 0 - 0.04 x10(3)/mcL    IG% 1.6 %    NRBC% 0.6 %   Comprehensive metabolic panel    Collection Time: 06/18/23  1:23 AM   Result Value Ref Range    Sodium Level 150 (H) 136 - 145 mmol/L    Potassium Level 3.8 3.5 - 5.1 mmol/L    Chloride 113 (H) 98 - 107 mmol/L    Carbon Dioxide 26 22 - 29 mmol/L    Glucose Level 106 (H) 74 - 100 mg/dL    Blood Urea Nitrogen 15.8 8.9 - 20.6 mg/dL    Creatinine 0.70 (L) 0.73 - 1.18 mg/dL    Calcium Level Total 7.7 (L) 8.4 - 10.2 mg/dL    Protein Total 5.3 (L) 6.4 - 8.3 gm/dL    Albumin Level 2.1 (L) 3.5 - 5.0 g/dL    Globulin 3.2 2.4 - 3.5 gm/dL    Albumin/Globulin Ratio 0.7 (L) 1.1 - 2.0 ratio    Bilirubin Total 2.3 (H) <=1.5 mg/dL    Alkaline Phosphatase 187 (H) 40 - 150 unit/L    Alanine Aminotransferase 36 0 - 55 unit/L    Aspartate Aminotransferase 45 (H) 5 - 34 unit/L    eGFR >60 mls/min/1.73/m2   CBC with Differential    Collection Time: 06/18/23  1:23 AM   Result Value Ref Range    WBC 11.77 (H) 4.50 - 11.50 x10(3)/mcL    RBC 3.00 (L) 4.70 - 6.10 x10(6)/mcL    Hgb 8.6 (L) 14.0 - 18.0 g/dL    Hct 26.9 (L) 42.0 - 52.0 %    MCV 89.7 80.0 - 94.0 fL    MCH 28.7 27.0 - 31.0 pg    MCHC 32.0 (L) 33.0 - 36.0 g/dL    RDW 16.4 11.5 - 17.0 %    Platelet 584 (H) 130 - 400 x10(3)/mcL    MPV 10.4 7.4 - 10.4 fL    Neut % 73.5 %    Lymph % 16.1 %    Mono % 7.4 %    Eos % 1.4 %    Basophil % 0.5 %    Lymph # 1.90 0.6 - 4.6 x10(3)/mcL    Neut # 8.64 2.1 - 9.2 x10(3)/mcL    Mono # 0.87 0.1 - 1.3 x10(3)/mcL    Eos # 0.17 0 - 0.9 x10(3)/mcL    Baso # 0.06 <=0.2 x10(3)/mcL    IG# 0.13 (H) 0 - 0.04 x10(3)/mcL    IG% 1.1 %     "NRBC% 0.8 %            Psychiatric Mental Status Exam:  General Appearance: appears stated age, well-nourished, dressed in hospital garb, lying in bed, diaphoretic, obese, partially or completely disrobed  Arousal: alert  Behavior: cooperative, polite, appropriate eye-contact, restless and fidgety  Movements and Motor Activity: no abnormal involuntary movements noted; no tics, no tremors, no akathisia, no dystonia, no evidence of tardive dyskinesia; no psychomotor agitation or retardation  Orientation: oriented to person and place, oriented to year  Speech: soft, responds to questions minimally/briefly, hoarse  Mood: Anxious, Restless, Preoccupied  Affect: anxious, preoccupied   Thought Process: difficult to assess due to poverty of thought  Thought Content and Perceptions: no suicidal or homicidal ideation, no auditory or visual hallucinations, no paranoid ideation, no ideas of reference, no evidence of delusions or psychosis, + auditory hallucinations  Recent and Remote Memory: recent memory intact; pt states he now remembers what took place before his admission to [facility]. "I just don't want to talk about it."  Attention and Concentration: impaired, easily distractible  Fund of Knowledge: impaired  Insight: impaired, poor  Judgment: impaired, poor    ASSESSMENT/PLAN:   Diagnosis:  Schizophrenia. Unspecified (F20.9)  Suicidal attempt (T14.91)  Past Medical History:   Diagnosis Date    Asthma     Depression     Diabetes mellitus     Encounter for blood transfusion     Generalized anxiety disorder     Hypertension     Schizophrenia, unspecified     Sleep apnea     Unspecified glaucoma       Recommendations:  Medication Management  -Increase Seroquel to 50mg daily and 100mg qHS, monitor for excessive sedation   -Continue Prozac 40mg daily  -Lamictal at 25mg daily, with goal target of 100mg daily  -Increase Remeron to 15mg qHS. Continue to monitor hepatic levels (AST trending down 06/17/23 level was 56; 6/18/23 " level 45 ), monitor for excessive sedation.  Continue to monitor neuro checks for improvement in mental status and evaluate for psychosis   Psych continue to follow        DEBBIE NUNN

## 2023-06-18 NOTE — PROGRESS NOTES
[facility]MA ICU PROGRESS NOTE    HD# 14  Admission Summary:   In brief, Gera Chavez is a 37 y.o. male admitted on 6/4/2023 following fall from bridge at 25 ft.  He reportedly jumped off a bridge and landed on dry land.  He arrived intubated with a left-sided chest tube.  He was found to have a pelvic ring fracture including open book fracture, L2 burst fracture, left pneumothorax, multiple left-sided rib fractures.    Interval history:    OR yesterday with ortho for elbow  Remained intubated due to bronchospasm, extubated in afternoon  Pulled out 2x ngt  Restarted psych meds    Consults:   Neurosurgery  Orthopedic surgery Injuries:  L pelvic rim fx w/ open book pelvis  L pelvic wall hematoma  L2 burst fx  L apical PTX  L 4-11 rib fx  pulmonary contusion  R distal radius fracture Operations/Procedures:  Left chest tube placement at outside facility 6/4  Pelvic ring fx ORIF 6/5  ORIF L distal radius fx 6/12  ORIF L elbow 6/17     Past medical history:  1. Asthma   2. Depression  3. Diabetes   4. Generalized anxiety disorder   5. Hypertension  6. Schizophrenia   7. Sleep apnea    Medications: [x] Medications reviewed/updated   Home Meds:    Current Outpatient Medications   Medication Instructions    albuterol (PROVENTIL/VENTOLIN HFA) 90 mcg/actuation inhaler 2 puffs, Inhalation, Every 4 hours PRN    ALBUTEROL INHL 90 mcg, Inhalation, Every 4 hours PRN    amLODIPine (NORVASC) 5 mg, Oral, Daily    amoxicillin-clavulanate 875-125mg (AUGMENTIN) 875-125 mg per tablet 1 tablet, Oral, Every 12 hours (non-standard times)    atorvastatin (LIPITOR) 20 mg, Oral, Daily    azithromycin (Z-CELESTINA) 500 mg, Oral, Daily    clonazePAM (KLONOPIN) 1 mg, Oral, 2 times daily PRN    dextroamphetamine-amphetamine (ADDERALL XR) 25 MG 24 hr capsule 25 mg, Oral, 2 times daily    FLUoxetine 20 MG capsule fluoxetine 20 mg capsule    FLUoxetine 40 mg, Oral, Daily    insulin glargine 100 units/mL SubQ pen Lantus Solostar U-100 Insulin 100 unit/mL (3  "mL) subcutaneous pen    ipratropium (ATROVENT) 42 mcg (0.06 %) nasal spray Each Nostril    lamoTRIgine (LAMICTAL) 25 MG tablet lamotrigine 25 mg tablet    LANTUS SOLOSTAR U-100 INSULIN glargine 100 units/mL SubQ pen Subcutaneous    latanoprost 0.005 % ophthalmic solution latanoprost 0.005 % eye drops    lisinopriL (PRINIVIL,ZESTRIL) 40 mg, Oral    mirtazapine (REMERON) 7.5 MG Tab mirtazapine 7.5 mg tablet    potassium chloride (K-TAB) 20 mEq potassium chloride ER 20 mEq tablet,extended release   TAKE 1 TABLET BY MOUTH EVERY DAY    prochlorperazine (COMPAZINE) 10 MG tablet prochlorperazine maleate 10 mg tablet    QUEtiapine (SEROQUEL) 200 mg, Oral, Nightly    testosterone cypionate (DEPOTESTOTERONE CYPIONATE) 200 mg/mL injection SMARTSIG:Milliliter(s) IM      Scheduled Meds:    albuterol-ipratropium  3 mL Nebulization Q6H    calcium-vitamin D3  1 tablet Per NG tube BID    enoxaparin  40 mg Subcutaneous Q12H    famotidine (PF)  20 mg Intravenous BID    FLUoxetine  40 mg Per NG tube Daily    gabapentin  300 mg Per NG tube TID    guaiFENesin  600 mg Oral BID    lamoTRIgine  25 mg Per NG tube Daily    levoFLOXacin  750 mg Intravenous Q24H    mirtazapine  7.5 mg Per NG tube QHS    propranoloL  40 mg Oral TID    QUEtiapine  50 mg Per NG tube BID     Continuous Infusions:    dexmedeTOMIDine (Precedex) infusion (titrating) Stopped (06/17/23 1900)    lactated ringers 75 mL/hr at 06/15/23 1127     PRN Meds: sodium chloride, acetaminophen, fentaNYL, hydrALAZINE, midazolam, morphine, oxyCODONE, oxyCODONE     Vitals:  VITAL SIGNS: 24 HR MIN & MAX LAST   Temp  Min: 99 °F (37.2 °C)  Max: 99.4 °F (37.4 °C)  99.4 °F (37.4 °C)   BP  Min: 95/78  Max: 163/105  (!) 162/93    Pulse  Min: 102  Max: 131  (!) 117    Resp  Min: 16  Max: 45  20    SpO2  Min: 93 %  Max: 100 %  (!) 94 %      HT: 5' 10.98" (180.3 cm)  WT: 121 kg (266 lb 12.1 oz)  BMI: 37.2   Ideal Body Weight (IBW), Male: 171.88 lb  % Ideal Body Weight, Male (lb): 155.09 %    "     General  Exam: awake alert     Neuro/Psych  GCS:  14  Exam:  Follows commands, awake, alert  ICP monitor: No  ICP treatment: ICP Treatment: N/A  C-Collar: Yes    Plan:  Pain control  TLSO brace  Psych evaluated restarted meds  Sitter at bedside  CEC     HEENT  Exam: on room air    Plan:   CTM     Pulmonary  Vitals: Resp  Av.3  Min: 16  Max: 45  SpO2  Av.5 %  Min: 93 %  Max: 100 %    Plan:     CXR  Diurese as needed  Wean supp o2 as tolerated     Cardiovascular  Vitals: Pulse  Av.9  Min: 102  Max: 131  BP  Min: 95/78  Max: 163/105  Vasoactive Agents: None  Exam: tachycardic low 100s normotensive    Echo Findings:   Left Ventricle  The left ventricle is  with normal systolic function. The estimated ejection fraction is 65%. There is normal diastolic function.     Right Ventricle  Normal cavity size.     Left Atrium  The left atrial volume index is normal.     Right Atrium  The right atrium is normal in size.     Aortic Valve  The aortic valve appears structurally normal.     Mitral Valve  The mean pressure gradient across the mitral valve is 3 mmHg at a heart rate of. The mitral valve appears structurally normal. The estimated mitral valve area by pressure half time is 2.78 cm2.     Tricuspid Valve  The tricuspid valve appears structurally normal.     Pulmonic Valve  Pulmonic valve is structurally normal.     Plan:   Continuous cardiac monitoring while in the ICU  Continue propranolol 40mg TID     Renal  Recent Labs     23  0128 23  0137 23  1131 23  0123   BUN 12.2 15.4 18.9 15.8   CREATININE 0.79 0.69* 0.72* 0.70*         No results for input(s): LACTIC in the last 72 hours.    Intake/Output - Last 3 Shifts          0700   0659  0700   0659  07 0659    I.V. (mL/kg)  4525 (37.4)     NG/GT  500     IV Piggyback  1100     Total Intake(mL/kg)  6125 (50.6)     Urine (mL/kg/hr) 3400 (1.2) 2150 (0.7)     Drains       Stool       Total Output 3400  2150     Net -3400 +3975                     Intake/Output Summary (Last 24 hours) at 2023 0820  Last data filed at 2023 0600  Gross per 24 hour   Intake 6125 ml   Output 2150 ml   Net 3975 ml           Atkins: Yes     Plan:   Strict I's and O's  CTM BUN/Cr/UOP  Bumex x1  may need another dose  Discontinue atkins today f/u void     FEN/GI  Recent Labs     23  0128 23  0137 23  1131 23  0123   * 150* 150* 150*   K 3.5 3.8 4.4 3.8   CO2 24 24 22 26   CALCIUM 7.8* 7.9* 7.8* 7.7*   ALBUMIN 2.1* 2.1* 2.1* 2.1*   BILITOT 5.4* 3.0* 2.8* 2.3*   AST 54* 49* 56* 45*   ALKPHOS 181* 186* 189* 187*   ALT 25 29 36 36       Diet: NPO    Last BM:  Prior to arrival    Abdominal Exam:  Soft, nondistended  Abdominal Dressing: None    Plan:   Replace electrolytes based on daily labs  SLP re-eval/MBS, continue dysphagia therapy  NGT      Heme/Onc  Recent Labs     23  0128 23  0137 23  1131 23  0123   HGB 8.9* 9.1* 9.8* 8.6*   HCT 27.8* 28.0* 31.9* 26.9*   * 583* 534* 584*       Transfusions (over past 24h): none    Plan:   H&H stable     ID  Temp  Av.2 °F (37.3 °C)  Min: 99 °F (37.2 °C)  Max: 99.4 °F (37.4 °C)      Recent Labs     23  0128 23  0137 23  1131 23  0123   WBC 14.34* 12.77* 11.58* 11.77*       Cultures:  Blood culture  - no growth  Blood culture  - no growth  Urine culture -no growth   Respiratory culture -Pseudomonas aeruginosa  Blood culture  no growth  Blood culture  no growth    Antibiotics:   Levaquin    Plan:   Afebrile, WBC downtrending  Levaquin until      Endocrine  Recent Labs     23  0128 23  0137 23  1131 23  0123   GLUCOSE 119* 100 142* 106*        No results for input(s): POCTGLUCOSE in the last 72 hours.     Insulin treatment: no medications    Plan:   BG <180     Musculoskeletal/Wounds  Weight bearing status:   RUE: NWB  LUE: WBAT  RLE: NWB  LLE: NWB    [x]  Tertiary exam performed    Extremity/wound exam: compartments soft, neurovasc intact, moves 4 extremities    Plan:   PT/OT     Precautions  Fall, Standard     Prophylaxis  Seizure: Not indicated.  DVT: lovenox 40 BID  GI: H2B     Lines/drains/airway [x] LDA reviewed/updated   PIV  Titus    Plan:  Maintain peripheral access  Discontinue Titus today     Restraints  Face to face evaluation of need for restraints on rounds today:   Currently restrained? no     Disposition  Transfer to floor today. CEC.      Melissa Titus MD  [employer] General [facility]

## 2023-06-19 LAB
ALBUMIN SERPL-MCNC: 2.3 G/DL (ref 3.5–5)
ALBUMIN/GLOB SERPL: 0.6 RATIO (ref 1.1–2)
ALP SERPL-CCNC: 223 UNIT/L (ref 40–150)
ALT SERPL-CCNC: 37 UNIT/L (ref 0–55)
AST SERPL-CCNC: 43 UNIT/L (ref 5–34)
BASOPHILS # BLD AUTO: 0.07 X10(3)/MCL
BASOPHILS NFR BLD AUTO: 0.6 %
BILIRUBIN DIRECT+TOT PNL SERPL-MCNC: 2.2 MG/DL
BUN SERPL-MCNC: 15.3 MG/DL (ref 8.9–20.6)
CALCIUM SERPL-MCNC: 8.2 MG/DL (ref 8.4–10.2)
CHLORIDE SERPL-SCNC: 114 MMOL/L (ref 98–107)
CO2 SERPL-SCNC: 25 MMOL/L (ref 22–29)
CREAT SERPL-MCNC: 0.66 MG/DL (ref 0.73–1.18)
CRP SERPL-MCNC: 154.3 MG/L
EOSINOPHIL # BLD AUTO: 0.19 X10(3)/MCL (ref 0–0.9)
EOSINOPHIL NFR BLD AUTO: 1.7 %
ERYTHROCYTE [DISTWIDTH] IN BLOOD BY AUTOMATED COUNT: 15.9 % (ref 11.5–17)
GFR SERPLBLD CREATININE-BSD FMLA CKD-EPI: >60 MLS/MIN/1.73/M2
GLOBULIN SER-MCNC: 3.7 GM/DL (ref 2.4–3.5)
GLUCOSE SERPL-MCNC: 110 MG/DL (ref 74–100)
HCT VFR BLD AUTO: 28.4 % (ref 42–52)
HGB BLD-MCNC: 9 G/DL (ref 14–18)
IMM GRANULOCYTES # BLD AUTO: 0.14 X10(3)/MCL (ref 0–0.04)
IMM GRANULOCYTES NFR BLD AUTO: 1.2 %
LYMPHOCYTES # BLD AUTO: 1.88 X10(3)/MCL (ref 0.6–4.6)
LYMPHOCYTES NFR BLD AUTO: 16.5 %
MCH RBC QN AUTO: 28.3 PG (ref 27–31)
MCHC RBC AUTO-ENTMCNC: 31.7 G/DL (ref 33–36)
MCV RBC AUTO: 89.3 FL (ref 80–94)
MONOCYTES # BLD AUTO: 0.83 X10(3)/MCL (ref 0.1–1.3)
MONOCYTES NFR BLD AUTO: 7.3 %
NEUTROPHILS # BLD AUTO: 8.3 X10(3)/MCL (ref 2.1–9.2)
NEUTROPHILS NFR BLD AUTO: 72.7 %
NRBC BLD AUTO-RTO: 1.4 %
PLATELET # BLD AUTO: 667 X10(3)/MCL (ref 130–400)
PMV BLD AUTO: 10.2 FL (ref 7.4–10.4)
POTASSIUM SERPL-SCNC: 3.4 MMOL/L (ref 3.5–5.1)
PREALB SERPL-MCNC: 13.3 MG/DL (ref 18–45)
PROT SERPL-MCNC: 6 GM/DL (ref 6.4–8.3)
RBC # BLD AUTO: 3.18 X10(6)/MCL (ref 4.7–6.1)
SODIUM SERPL-SCNC: 148 MMOL/L (ref 136–145)
WBC # SPEC AUTO: 11.41 X10(3)/MCL (ref 4.5–11.5)

## 2023-06-19 PROCEDURE — 86140 C-REACTIVE PROTEIN: CPT | Performed by: STUDENT IN AN ORGANIZED HEALTH CARE EDUCATION/TRAINING PROGRAM

## 2023-06-19 PROCEDURE — 92611 MOTION FLUOROSCOPY/SWALLOW: CPT

## 2023-06-19 PROCEDURE — 25000003 PHARM REV CODE 250

## 2023-06-19 PROCEDURE — 84134 ASSAY OF PREALBUMIN: CPT | Performed by: STUDENT IN AN ORGANIZED HEALTH CARE EDUCATION/TRAINING PROGRAM

## 2023-06-19 PROCEDURE — 27000221 HC OXYGEN, UP TO 24 HOURS

## 2023-06-19 PROCEDURE — 94640 AIRWAY INHALATION TREATMENT: CPT

## 2023-06-19 PROCEDURE — 99900035 HC TECH TIME PER 15 MIN (STAT)

## 2023-06-19 PROCEDURE — 80053 COMPREHEN METABOLIC PANEL: CPT | Performed by: STUDENT IN AN ORGANIZED HEALTH CARE EDUCATION/TRAINING PROGRAM

## 2023-06-19 PROCEDURE — 97535 SELF CARE MNGMENT TRAINING: CPT

## 2023-06-19 PROCEDURE — 93010 ELECTROCARDIOGRAM REPORT: CPT | Mod: ,,, | Performed by: STUDENT IN AN ORGANIZED HEALTH CARE EDUCATION/TRAINING PROGRAM

## 2023-06-19 PROCEDURE — 97530 THERAPEUTIC ACTIVITIES: CPT

## 2023-06-19 PROCEDURE — 25000242 PHARM REV CODE 250 ALT 637 W/ HCPCS: Performed by: HOSPITALIST

## 2023-06-19 PROCEDURE — 97110 THERAPEUTIC EXERCISES: CPT

## 2023-06-19 PROCEDURE — 25000003 PHARM REV CODE 250: Performed by: SURGERY

## 2023-06-19 PROCEDURE — 63600175 PHARM REV CODE 636 W HCPCS

## 2023-06-19 PROCEDURE — A9698 NON-RAD CONTRAST MATERIALNOC: HCPCS | Performed by: SURGERY

## 2023-06-19 PROCEDURE — 25500020 PHARM REV CODE 255: Performed by: SURGERY

## 2023-06-19 PROCEDURE — 63600175 PHARM REV CODE 636 W HCPCS: Performed by: SURGERY

## 2023-06-19 PROCEDURE — 85025 COMPLETE CBC W/AUTO DIFF WBC: CPT | Performed by: STUDENT IN AN ORGANIZED HEALTH CARE EDUCATION/TRAINING PROGRAM

## 2023-06-19 PROCEDURE — 93005 ELECTROCARDIOGRAM TRACING: CPT

## 2023-06-19 PROCEDURE — 11000001 HC ACUTE MED/SURG PRIVATE ROOM

## 2023-06-19 PROCEDURE — 94761 N-INVAS EAR/PLS OXIMETRY MLT: CPT

## 2023-06-19 PROCEDURE — 93010 EKG 12-LEAD: ICD-10-PCS | Mod: ,,, | Performed by: STUDENT IN AN ORGANIZED HEALTH CARE EDUCATION/TRAINING PROGRAM

## 2023-06-19 PROCEDURE — 25000003 PHARM REV CODE 250: Performed by: STUDENT IN AN ORGANIZED HEALTH CARE EDUCATION/TRAINING PROGRAM

## 2023-06-19 PROCEDURE — 63600175 PHARM REV CODE 636 W HCPCS: Performed by: STUDENT IN AN ORGANIZED HEALTH CARE EDUCATION/TRAINING PROGRAM

## 2023-06-19 RX ADMIN — MORPHINE SULFATE 4 MG: 4 INJECTION, SOLUTION INTRAMUSCULAR; INTRAVENOUS at 05:06

## 2023-06-19 RX ADMIN — IPRATROPIUM BROMIDE AND ALBUTEROL SULFATE 3 ML: 2.5; .5 SOLUTION RESPIRATORY (INHALATION) at 01:06

## 2023-06-19 RX ADMIN — QUETIAPINE FUMARATE 50 MG: 25 TABLET ORAL at 08:06

## 2023-06-19 RX ADMIN — SODIUM CHLORIDE, POTASSIUM CHLORIDE, SODIUM LACTATE AND CALCIUM CHLORIDE: 600; 310; 30; 20 INJECTION, SOLUTION INTRAVENOUS at 08:06

## 2023-06-19 RX ADMIN — QUETIAPINE FUMARATE 100 MG: 100 TABLET ORAL at 09:06

## 2023-06-19 RX ADMIN — PROPRANOLOL HYDROCHLORIDE 40 MG: 10 TABLET ORAL at 09:06

## 2023-06-19 RX ADMIN — GUAIFENESIN 600 MG: 600 TABLET, EXTENDED RELEASE ORAL at 09:06

## 2023-06-19 RX ADMIN — OXYCODONE HYDROCHLORIDE 10 MG: 10 TABLET ORAL at 05:06

## 2023-06-19 RX ADMIN — Medication 1 TABLET: at 09:06

## 2023-06-19 RX ADMIN — GABAPENTIN 300 MG: 300 CAPSULE ORAL at 03:06

## 2023-06-19 RX ADMIN — PROPRANOLOL HYDROCHLORIDE 40 MG: 10 TABLET ORAL at 08:06

## 2023-06-19 RX ADMIN — FLUOXETINE 40 MG: 20 CAPSULE ORAL at 08:06

## 2023-06-19 RX ADMIN — ACETAMINOPHEN 650 MG: 650 SOLUTION ORAL at 01:06

## 2023-06-19 RX ADMIN — POTASSIUM PHOSPHATE, MONOBASIC AND POTASSIUM PHOSPHATE, DIBASIC 15 MMOL: 224; 236 INJECTION, SOLUTION, CONCENTRATE INTRAVENOUS at 01:06

## 2023-06-19 RX ADMIN — ENOXAPARIN SODIUM 40 MG: 40 INJECTION SUBCUTANEOUS at 08:06

## 2023-06-19 RX ADMIN — PROPRANOLOL HYDROCHLORIDE 40 MG: 10 TABLET ORAL at 03:06

## 2023-06-19 RX ADMIN — Medication 1 TABLET: at 08:06

## 2023-06-19 RX ADMIN — IPRATROPIUM BROMIDE AND ALBUTEROL SULFATE 3 ML: 2.5; .5 SOLUTION RESPIRATORY (INHALATION) at 07:06

## 2023-06-19 RX ADMIN — MIRTAZAPINE 15 MG: 15 TABLET, FILM COATED ORAL at 09:06

## 2023-06-19 RX ADMIN — LEVOFLOXACIN 750 MG: 5 INJECTION, SOLUTION INTRAVENOUS at 08:06

## 2023-06-19 RX ADMIN — BARIUM SULFATE 10 ML: 0.81 POWDER, FOR SUSPENSION ORAL at 12:06

## 2023-06-19 RX ADMIN — GABAPENTIN 300 MG: 300 CAPSULE ORAL at 09:06

## 2023-06-19 RX ADMIN — GUAIFENESIN 600 MG: 600 TABLET, EXTENDED RELEASE ORAL at 08:06

## 2023-06-19 RX ADMIN — LAMOTRIGINE 25 MG: 25 TABLET ORAL at 08:06

## 2023-06-19 RX ADMIN — FAMOTIDINE 20 MG: 10 INJECTION, SOLUTION INTRAVENOUS at 09:06

## 2023-06-19 RX ADMIN — HYDRALAZINE HYDROCHLORIDE 10 MG: 20 INJECTION INTRAMUSCULAR; INTRAVENOUS at 02:06

## 2023-06-19 RX ADMIN — GABAPENTIN 300 MG: 300 CAPSULE ORAL at 08:06

## 2023-06-19 RX ADMIN — ENOXAPARIN SODIUM 40 MG: 40 INJECTION SUBCUTANEOUS at 09:06

## 2023-06-19 RX ADMIN — MIDAZOLAM HYDROCHLORIDE 2 MG: 5 INJECTION, SOLUTION INTRAMUSCULAR; INTRAVENOUS at 12:06

## 2023-06-19 RX ADMIN — FAMOTIDINE 20 MG: 10 INJECTION, SOLUTION INTRAVENOUS at 08:06

## 2023-06-19 NOTE — PLAN OF CARE
Problem: SLP  Goal: SLP Goal  Description:   LTG: Tolerate least restrictive PO diet with no clinical signs/sx aspiration  STGs:  1.  Tolerate thermal stimulation to the anterior faucial pillars with 100% effortful swallow responses and delay less than 2 seconds.  2.  Complete base of tongue and laryngeal strengthening exercises with minimal cues      Outcome: Ongoing, Progressing

## 2023-06-19 NOTE — PROGRESS NOTES
"6/19/2023  Gera Chavez   1985   [id number]        Psychiatry Progress Note     Chief Complaint: "I am ok".    SUBJECTIVE:   Gera Chavez is a 37 y.o. male with past psychiatric history of Schizophrenia, unspecified, currently admitted with Closed olecranon fracture, left, initial encounter.  Psychiatry has been consulted to address medication management. He presents with depression and s/p SI attempt.    At time of visit, he was observed in bed with 1:1 at his bedside. He reports his mood is improving. He is less depressed. He reports depression increased due to non-compliance with meds x 4 months. He was treated at [facility]. He rates his depression 1 out of 10, anxiety 2 out of 10 with 10 being the worse. He denies thoughts to harm self. He reports he is not depressed that much now. He reports he benefited with the use of Seroquel and Prozac. He is sleeping and eating well. Energy is good. He denies AVH and paranoia.      Current Medications:   Scheduled Meds:    albuterol-ipratropium  3 mL Nebulization Q6H    calcium-vitamin D3  1 tablet Per NG tube BID    enoxaparin  40 mg Subcutaneous Q12H    famotidine (PF)  20 mg Intravenous BID    FLUoxetine  40 mg Oral Daily    gabapentin  300 mg Per NG tube TID    guaiFENesin  600 mg Oral BID    lamoTRIgine  25 mg Oral Daily    levoFLOXacin  750 mg Intravenous Q24H    mirtazapine  15 mg Oral QHS    potassium phosphate IVPB  15 mmol Intravenous Once    propranoloL  40 mg Oral TID    QUEtiapine  100 mg Oral QHS    QUEtiapine  50 mg Oral Daily      PRN Meds: sodium chloride, acetaminophen, hydrALAZINE, midazolam, morphine, oxyCODONE, oxyCODONE   Psychotherapeutics (From admission, onward)      Start     Stop Route Frequency Ordered    06/19/23 0900  FLUoxetine capsule 40 mg         -- Oral Daily 06/18/23 1731    06/19/23 0900  QUEtiapine tablet 50 mg         -- Oral Daily 06/18/23 1731    06/18/23 2100  mirtazapine tablet 15 mg         -- Oral Nightly 06/18/23 " 1731    06/18/23 2100  QUEtiapine tablet 100 mg         -- Oral Nightly 06/18/23 1731            Allergies:   Review of patient's allergies indicates:   Allergen Reactions    Iodine     Trazodone         OBJECTIVE:   Vitals   Vitals:    06/19/23 1600   BP: (!) 142/78   Pulse: (!) 121   Resp: (!) 22   Temp: 98.6 °F (37 °C)        Labs/Imaging/Studies:   Recent Results (from the past 36 hour(s))   Prealbumin    Collection Time: 06/19/23  1:20 AM   Result Value Ref Range    Prealbumin 13.3 (L) 18.0 - 45.0 mg/dL   C-reactive protein    Collection Time: 06/19/23  1:20 AM   Result Value Ref Range    C-Reactive Protein 154.30 (H) <5.00 mg/L   Comprehensive metabolic panel    Collection Time: 06/19/23  1:20 AM   Result Value Ref Range    Sodium Level 148 (H) 136 - 145 mmol/L    Potassium Level 3.4 (L) 3.5 - 5.1 mmol/L    Chloride 114 (H) 98 - 107 mmol/L    Carbon Dioxide 25 22 - 29 mmol/L    Glucose Level 110 (H) 74 - 100 mg/dL    Blood Urea Nitrogen 15.3 8.9 - 20.6 mg/dL    Creatinine 0.66 (L) 0.73 - 1.18 mg/dL    Calcium Level Total 8.2 (L) 8.4 - 10.2 mg/dL    Protein Total 6.0 (L) 6.4 - 8.3 gm/dL    Albumin Level 2.3 (L) 3.5 - 5.0 g/dL    Globulin 3.7 (H) 2.4 - 3.5 gm/dL    Albumin/Globulin Ratio 0.6 (L) 1.1 - 2.0 ratio    Bilirubin Total 2.2 (H) <=1.5 mg/dL    Alkaline Phosphatase 223 (H) 40 - 150 unit/L    Alanine Aminotransferase 37 0 - 55 unit/L    Aspartate Aminotransferase 43 (H) 5 - 34 unit/L    eGFR >60 mls/min/1.73/m2   CBC with Differential    Collection Time: 06/19/23  1:20 AM   Result Value Ref Range    WBC 11.41 4.50 - 11.50 x10(3)/mcL    RBC 3.18 (L) 4.70 - 6.10 x10(6)/mcL    Hgb 9.0 (L) 14.0 - 18.0 g/dL    Hct 28.4 (L) 42.0 - 52.0 %    MCV 89.3 80.0 - 94.0 fL    MCH 28.3 27.0 - 31.0 pg    MCHC 31.7 (L) 33.0 - 36.0 g/dL    RDW 15.9 11.5 - 17.0 %    Platelet 667 (H) 130 - 400 x10(3)/mcL    MPV 10.2 7.4 - 10.4 fL    Neut % 72.7 %    Lymph % 16.5 %    Mono % 7.3 %    Eos % 1.7 %    Basophil % 0.6 %    Lymph #  1.88 0.6 - 4.6 x10(3)/mcL    Neut # 8.30 2.1 - 9.2 x10(3)/mcL    Mono # 0.83 0.1 - 1.3 x10(3)/mcL    Eos # 0.19 0 - 0.9 x10(3)/mcL    Baso # 0.07 <=0.2 x10(3)/mcL    IG# 0.14 (H) 0 - 0.04 x10(3)/mcL    IG% 1.2 %    NRBC% 1.4 %        Psychiatric Mental Status Exam:  General Appearance: appears stated age, well-nourished, dressed in hospital garb, lying in bed, in no acute distress  Arousal: alert  Behavior: polite  Movements and Motor Activity: no abnormal involuntary movements noted; no tics, no tremors, no akathisia, no dystonia, no evidence of tardive dyskinesia; no psychomotor agitation or retardation  Orientation: oriented to person, place, time, and situation  Speech: normal rate, rhythm, volume, tone and pitch  Mood: Anxious  Affect: flat  Thought Process: circumstantial  Associations: no loosening of associations  Thought Content and Perceptions: no suicidal or homicidal ideation, no auditory or visual hallucinations, no paranoid ideation, no ideas of reference, no evidence of delusions or psychosis  Recent and Remote Memory: recent memory intact, remote memory intact; per interview/observation with patient  Attention and Concentration: attentive to conversation; per interview/observation with patient  Fund of Knowledge: aware of current events; based on history, vocabulary, fund of knowledge, syntax, grammar, and content  Insight:  fair ; based on understanding of severity of illness and HPI  Judgment:  fair ; based on patient's behavior and HPI    ASSESSMENT/PLAN:   Problems Addressed/Diagnoses:  Schizophrenia. Unspecified (F20.9)  Suicidal attempt (T14.91)    NO DIAGNOSIS ON AXIS II (Z03.89)     Past Medical History:   Diagnosis Date    Asthma     Depression     Diabetes mellitus     Encounter for blood transfusion     Generalized anxiety disorder     Hypertension     Schizophrenia, unspecified     Sleep apnea     Unspecified glaucoma         Plan:  Continue plan of care  Psych to continue to  follow            Dariana Walker

## 2023-06-19 NOTE — PT/OT/SLP PROGRESS
Occupational Therapy   Treatment    Name: Gera Chavez  MRN: [id number]  Admitting Diagnosis:  Closed olecranon fracture, left, initial encounter  2 Days Post-Op    Recommendations:     Discharge Recommendations:  (Pending, pt with several orthopedic restrictions)  Discharge Equipment Recommendations:   (pending progress with functional mobility-w/c, slide board?)    Assessment:   Gera Chavez is a 37 y.o. male with a medical diagnosis of L pelvic rim fx w/ open book pelvis, L pelvic wall hematoma, L2 burst fx, apical PTX, L 4-11 rib fx, pulmonary contusion, R distal radius fracture.  Pt is s/p ORIF L posterior column of acetabulum, ORIF L distal radius, T11-L4 fusion, repaired CSF leak. Pt now s/p Closed olecranon fracture, left, initial encounter repair.  He presents with posterior elbow splint LUE, wrist and digit movement intact.  Pt much more alert today and orientedx4.  One on one present. Pt now NWB LUE as well. Tachy and hypertensive today therefore session modified.  Pt restless to get EOB and reposition.     Performance deficits affecting function are weakness, impaired endurance, impaired self care skills, impaired functional mobility, impaired balance, gait instability, impaired cognition, decreased upper extremity function, decreased lower extremity function, orthopedic precautions, decreased ROM, pain.     Rehab Prognosis:  Good; patient would benefit from acute skilled OT services to address these deficits and reach maximum level of function.       Plan:     Patient to be seen 5 x/week to address the above listed problems via self-care/home management  Plan of Care Expires: 07/14/23  Plan of Care Reviewed with: patient    Subjective     Pain/Comfort:  Pain Rating 1: 0/10    Objective:     Communicated with: RN prior to session.  Patient found HOB elevated with  (LUE posterior splint, RUE wrist cockup, vital monitoring, heel floats, SCDs) upon OT entry to room.    General Precautions: Standard, fall  (TLSO HOB >30 degrees, NWB R wrist ok for platform walker, TTWB LLE)    Orthopedic Precautions: (NWB LUE with splint, NWB R wrist, ok to pivot transfer RLE, NWB LLE, no hopping/no ambulation)  Braces: TLSO  Respiratory Status: 3L NC  Vital Signs: Blood Pressure: 150/93,    BP increased to 157/127 while seated EOB, returned to supine.  RN aware of hypertension, reports BP parameters 160.    Occupational Performance:     Bed Mobility:    Patient completed Supine to Sit with total assistance   Sit>supine: total assist   TLSO donned while in bed.  OT attempted repositioning of brace, illfitting.    Activities of Daily Living:  Grooming: Min assist    Therapeutic Positioning    OT interventions performed during the course of today's session in an effort to prevent and/or reduce acquired pressure injuries:   Therapeutic positioning completed     Patient Education:  Patient provided with verbal education regarding OT role/goals/POC, safety awareness, Discharge/DME recommendations, and pressure ulcer prevention.  Understanding was verbalized, however additional teaching warranted.      Patient left  cardiac chair position  with  one on one present, lines attached    GOALS:   Multidisciplinary Problems       Occupational Therapy Goals          Problem: Occupational Therapy    Goal Priority Disciplines Outcome Interventions   Occupational Therapy Goal     OT, PT/OT Ongoing, Progressing    Description: Goals to be met by: 7/16/23     Patient will increase functional independence with ADLs by performing:    UE Dressing with Minimal Assistance.  Grooming while seated EOB with Min Assistance.  Toileting Moderate Assistance for hygiene and clothing management, indicating need.  Sitting at edge of bed x15 minutes with Stand-by Assistance.                         Time Tracking:     OT Date of Treatment:    OT Start Time: 1113  OT Stop Time: 1137  OT Total Time (min): 24 min    Billable Minutes:Self Care/Home Management  2    OT/HUBERT: OT     Number of HUBERT visits since last OT visit: 1    6/19/2023

## 2023-06-19 NOTE — PT/OT/SLP PROGRESS
Physical Therapy Treatment    Patient Name:  Gera Chavez   MRN:  [id number]    Recommendations:     Discharge Recommendations: rehabilitation facility, nursing facility, skilled (pending progress and tolerance)  Discharge Equipment Recommendations: to be determined by next level of care  Barriers to discharge:  pain,decreased fxn'l mobility, weakness    Assessment:     Gera Chavez is a 37 y.o. male admitted with a medical diagnosis of Closed olecranon fracture, left, initial encounter.  He presents with the following impairments/functional limitations: weakness, impaired endurance, impaired balance, decreased lower extremity function, decreased upper extremity function, impaired cardiopulmonary response to activity, decreased safety awareness, impaired self care skills, impaired functional mobility, pain, orthopedic precautions . Pt is in need of total assist still with all transitional fxn'l mobility. He was not able to achieve full safe standing due to pt not being compliant with nonwt bearing through LLE.    Rehab Prognosis: Good; patient would benefit from acute skilled PT services to address these deficits and reach maximum level of function.    Recent Surgery: Procedure(s) (LRB):  ORIF, ELBOW (Left) 2 Days Post-Op    Plan:     During this hospitalization, patient to be seen 5 x/week to address the identified rehab impairments via therapeutic activities, therapeutic exercises and progress toward the following goals:    Plan of Care Expires:  07/16/23    Subjective     Chief Complaint: pain   Patient/Family Comments/goals: none  Pain/Comfort:         Objective:     Communicated with nurse prior to session.  Patient found supine with blood pressure cuff, oxygen, pressure relief boots, peripheral IV, telemetry, TLSO, SCD upon PT entry to room.     General Precautions: Standard, fall  Orthopedic Precautions: RUE non weight bearing, LLE non weight bearing (RLE you can stand and pivot ,but no hoping)  Braces:  TLSO  Respiratory Status: Nasal cannula, flow   L/min  Blood Pressure:   Skin Integrity: Visible skin intact      Functional Mobility:  Bed Mobility:     Rolling Left:  total assistance  Rolling Right: total assistance  Scooting: total assistance  Supine to Sit: total assistance  Sit to Supine: total assistance  Transfers:     Sit to Stand:  total assistance with no AD  Pt was not compliant with non wt bearing LLE. And only came up to a partial stand  Therapeutic Activities/Exercises:  Pt performed AAROM in sitting at EOB and in supine 2 sets of 20 reps.    Education:  Patient provided with verbal education regarding wt bearing precautions.  Additional teaching is warranted.     Patient left supine with all lines intact, call button in reach, and one on one caregiver  present..    GOALS:   Multidisciplinary Problems       Physical Therapy Goals          Problem: Physical Therapy    Goal Priority Disciplines Outcome Goal Variances Interventions   Physical Therapy Goal     PT, PT/OT Ongoing, Progressing     Description: Goals to be met by: 23     Patient will increase functional independence with mobility by performin. Supine to sit with Moderate Assistance  2. Sit to supine with Moderate Assistance  3. Sitting at edge of bed x15 minutes with Moderate Assistance  4. Pt to tolerate there ex/ROM to B LE for strengthening and to prevent contractures                          Time Tracking:     PT Received On:    PT Start Time: 1400     PT Stop Time: 1431  PT Total Time (min): 31 min     Billable Minutes: Therapeutic Activity 17 and Therapeutic Exercise 13    Treatment Type: Treatment  PT/PTA: PT     Number of PTA visits since last PT visit: 0     2023

## 2023-06-19 NOTE — PROCEDURES
Speech Language Pathology Department  Modified Barium Swallow (MBS) Study    Patient Name:  Gera Chavez   MRN:  [id number]    Recommendations:     General recommendations:  dysphagia therapy and Speech/Language and Cognitive Evaluation  Repeat MBS study: 10-14 days  Diet recommendations:  Easy to Chew Diet - IDDSI Level 7, Liquid Diet Level: Mildly thick liquids - IDDSI Level 2   Swallow strategies/precautions: small bites/sips, slow rate, alternate solids/liquids, and medications whole in puree  General precautions: Standard, aspiration    History/Reason for Referral:     Gera Chavez is a/n 37 y.o. male transferred from an outside facility for a level 1 trauma activation after reportedly jumping from a bridge.  Pt with multiple orthopedic injuries as well as small areas of extra-axial hemorrhage most conspicuous over the left temporal and parietal lobes per CT report.  Pt was intubated upon arrival on 6/4 and extubated on 6/15.     A MBS Study was completed to assess the efficiency of his swallow function, rule out aspiration and make recommendations regarding safe dietary consistencies, effective compensatory strategies, and safe eating environment.     Home Diet: Regular and thin liquids  Current Method of Nutrition: NPO    Subjective:     Patient awake, alert, and cooperative.    Spiritual/Cultural/Sikh Beliefs/Practices that affect care: no  Pain/Comfort: Pain Rating 1: 0/10    Respiratory Status: nasal cannula    Fluoroscopic Results:     Oral Musculature  Dentition: own teeth  Secretion Management: adequate  Mucosal Quality: good  Facial Movement: WFL  Buccal Strength & Mobility: WFL  Mandibular Strength & Mobility: WFL  Oral Labial Strength & Mobility: WFL  Lingual Strength & Mobility: WFL  Velar Elevation: WFL  Vocal Quality: breathy  Volitional Cough: Weak    Positioning: upright in bed  Visualization  Patient was seen in the lateral view    Oral Phase:   Adequate lip  closure  Prolonged/disorganized bolus formation  Adequate anterior-posterior transport  Prolonged mastication  Adequate bolus cohesion  Loss of bolus control with liquids    Pharyngeal Phase:   Swallow delay with spill to the pyriform sinuses  Reduced base of tongue retraction  Adequate epiglottic deflection  Reduced hyolaryngeal excursion  Poor airway protection  Consistency Laryngeal Penetration Aspiration Residue   Mildly thick liquid by spoon During the swallow  To the vocal folds  Did NOT clear None Mild  Valleculae  Cleared with additional swallow   Puree None None None   Thin liquid by straw During the swallow During the swallow  SILENT  Cued cough NOT productive Mild  Valleculae  Cleared with additional swallow   Mildly thick liquid by straw None None Trace   Chewable solid None None Trace     Cervical Esophageal Phase:   UES appeared to accommodate all bolus types without stasis or retrograde movement visualized    Assessment:     Pt exhibited mild-moderate oropharyngeal dysphagia characterized by the findings noted above.  SILENT aspiration of thin liquid did NOT clear and cued coufgh was ineffective.  Both swallow safety and efficiency are impaired.     Patient appears to be at moderate risk for aspiration related pneumonia when considering fair oral health status, overall health/immune status, poor laryngeal vestibule closure, and severity of dysphagia.  Prognosis for behavioral swallow rehabilitation is fair.    Goals:     Multidisciplinary Problems       SLP Goals          Problem: SLP    Goal Priority Disciplines Outcome   SLP Goal     SLP Ongoing, Progressing   Description:   LTG: Tolerate least restrictive PO diet with no clinical signs/sx aspiration  STGs:  1.  Tolerate thermal stimulation to the anterior faucial pillars with 100% effortful swallow responses and delay less than 2 seconds.  2.  Complete base of tongue and laryngeal strengthening exercises with minimal cues                          Patient Education:     Patient provided with verbal education regarding MBS results and recommendations.  Understanding was verbalized, however additional teaching warranted.    Plan:     SLP Follow-Up:  Yes    Patient to be seen:  5 x/week   Plan of Care expires:  07/03/23  Plan of Care reviewed with:  patient     Time Tracking:     SLP Treatment Date:   06/19/23  Speech Start Time:  1150  Speech Stop Time:  1210     Speech Total Time (min):  20 min    Billable minutes:   Motion Fluoroscopic Evaluation, Video Recording, 20 minutes     06/19/2023

## 2023-06-19 NOTE — PROGRESS NOTES
"Status post ORIF left olecranon fx. POD # 2  No acute events overnight.    Pain controlled.    Resting in bed.       Vital Signs  Temp: (!) 100.6 °F (38.1 °C)  Temp Source: Axillary  Pulse: (!) 131  Heart Rate Source: Monitor, Continuous  Resp: (!) 38  SpO2: (!) 93 %  Pulse Oximetry Type: Continuous  Flow (L/min): 3  Oxygen Concentration (%): 40  Device (Oxygen Therapy): nasal cannula  BP: (!) 154/93  BP Location: Right arm  BP Method: Automatic  Patient Position: Lying  Arousal Level: opens eyes spontaneously  Height and Weight  Height: 5' 10.98" (180.3 cm)  Weight: 121 kg (266 lb 12.1 oz)  BSA (Calculated - sq m): 2.46 sq meters  BMI (Calculated): 37.2  Weight in (lb) to have BMI = 25: 178.8]    Left upper extremity compartment soft and warm  No s/s of DVT or infection  Dressing/splint c/d/i  NVI distally    Recent Lab Results         06/19/23  0120        Albumin/Globulin Ratio 0.6       Albumin 2.3       Alkaline Phosphatase 223       ALT 37       AST 43       Baso # 0.07       Basophil % 0.6       BILIRUBIN TOTAL 2.2       BUN 15.3       Calcium 8.2       Chloride 114       CO2 25       Creatinine 0.66       .30       eGFR >60       Eos # 0.19       Eosinophil % 1.7       Globulin, Total 3.7       Glucose 110       Hematocrit 28.4       Hemoglobin 9.0       Immature Grans (Abs) 0.14       Immature Granulocytes 1.2       Lymph # 1.88       LYMPH % 16.5       MCH 28.3       MCHC 31.7       MCV 89.3       Mono # 0.83       Mono % 7.3       MPV 10.2       Neut # 8.30       Neut % 72.7       nRBC 1.4       Platelets 667       Potassium 3.4       Prealbumin 13.3       PROTEIN TOTAL 6.0       RBC 3.18       RDW 15.9       Sodium 148       WBC 11.41               A/P:    Overall patient doing well.  Nwb to LUE, splint    "

## 2023-06-19 NOTE — PROGRESS NOTES
Inpatient Nutrition Assessment    Admit Date: 6/4/2023   Total duration of encounter: 15 days     Nutrition Recommendation/Prescription     Continue current diet as tolerated.   Will add Boost Very High Calorie (provides 530 kcal, 22 g protein per serving) TID.    Communication of Recommendations: reviewed with nurse    Nutrition Assessment     Malnutrition Assessment/Nutrition-Focused Physical Exam    Malnutrition Context: acute illness or injury  Malnutrition Level: other (see comments) (does not meet criteria)  Energy Intake (Malnutrition): less than or equal to 50% for greater than or equal to 5 days  Weight Loss (Malnutrition): other (see comments) (unable to obtain)  Subcutaneous Fat (Malnutrition): other (see comments) (does not meet criteria)           Muscle Mass (Malnutrition): other (see comments) (does not meet criteria)                          Fluid Accumulation (Malnutrition): other (see comments) (does not meet criteria)        A minimum of two characteristics is recommended for diagnosis of either severe or non-severe malnutrition.    Chart Review    Reason Seen: continuous nutrition monitoring and follow-up    Malnutrition Screening Tool Results   Have you recently lost weight without trying?: No  Have you been eating poorly because of a decreased appetite?: No   MST Score: 0     Diagnosis:  L pelvic rim fx w/ open book pelvis  L pelvic wall hematoma  L2 burst fx  L apical PTX  L 4-11 rib fx  pulm contusion  (Fall from bridge)    Relevant Medical History: none noted    Nutrition-Related Medications: LR @ 75ml/hr, Ca +Vit D  Calorie Containing IV Medications: no significant kcals from medications at this time    Nutrition-Related Labs:  6/5 K 5.3, Cl 115, .9, PAB 24.7  6/8 Glu 107, Phos 2.1  6/12 Cl 110, Glu 111, .9, PAB 9.8  6/15 Cl 111, Glu 150, , PAB 11.5  6/19 Na 148, Cl 114, Glu 110    Diet/PN Order: Diet Easy to Chew (IDDSI Level 7) Mildly Thick Liquids (IDDSI Level  "2)  Oral Supplement Order: none  Tube Feeding Order: none  Appetite/Oral Intake: good/% of meals  Factors Affecting Nutritional Intake: NPO  Food/Mandaen/Cultural Preferences: unable to obtain  Food Allergies: none reported    Skin Integrity: drain/device(s)  Wound(s):   incision noted    Comments    6/5/23: Discussed with RN. Will provide tube feeding recommendations for when appropriate to start tube feeding. Receiving kcal from meds.      6/8/23: Pt self extubated this AM, now reintubated. Discussed starting TF with RN. Receiving kcal from meds.     6/12/23: Noted TF ordered. Not appropriate formula at this time. Pt in OR at time of RD visit. Plans to restart TF today per RN. Pt still having to be flat until post-op Wednesday. Since TF having to be run @ lower rates due to repeated OR visits, will use more concentrated formula. Will update formula and goal rate.     6/15/23: Pt now extubated. Plans for SLP eval. Discussed placing NG with RN if not able to advance diet. Will provide TF recs in case needed.    6/19/23: Pt now on po diet with 100% po intake of meals. Will add ONS for added kcal and protein for healing.    Anthropometrics    Height: 5' 10.98" (180.3 cm)    Last Weight: 121 kg (266 lb 12.1 oz) (06/04/23 1555)    BMI (Calculated): 37.2  BMI Classification: obese grade II (BMI 35-39.9)        Ideal Body Weight (IBW), Male: 171.88 lb     % Ideal Body Weight, Male (lb): 155.09 %                          Usual Weight Provided By: unable to obtain usual weight    Wt Readings from Last 5 Encounters:   06/04/23 121 kg (266 lb 12.1 oz)   05/09/23 90.7 kg (199 lb 15.3 oz)   05/09/23 90.7 kg (200 lb)   02/28/23 90.7 kg (200 lb)   02/07/23 100 kg (220 lb 7.4 oz)     Weight Change(s) Since Admission:  Admit Weight: 121 kg (266 lb 12.1 oz) (06/04/23 1555)  6/12/23: no new  6/15/23: no new    Estimated Needs    Weight Used For Calorie Calculations: 121 kg (266 lb 12.1 oz)  Energy Calorie Requirements (kcal): " 2803kcal (1.3 stress factor)  Energy Need Method: [place]-St Gupta  Weight Used For Protein Calculations: 121 kg (266 lb 12.1 oz)  Protein Requirements: 146-194gm (1.4-1.6g/kg)  Fluid Requirements (mL): 2803ml (1ml/kcal)  Temp (24hrs), Av.2 °F (37.9 °C), Min:99.8 °F (37.7 °C), Max:100.6 °F (38.1 °C)         Enteral Nutrition    Patient not receiving enteral nutrition at this time.    Parenteral Nutrition    Patient not receiving parenteral nutrition support at this time.    Evaluation of Received Nutrient Intake    Calories: meeting estimated needs  Protein: meeting estimated needs    Patient Education    Not applicable.    Nutrition Diagnosis     PES: Inadequate oral intake related to acute illness as evidenced by NPO post extubation. (resolved)    Interventions/Goals     Intervention(s): general/healthful diet, commercial beverage, and collaboration with other providers  Goal: Meet greater than 75% of nutritional needs by follow-up. (goal progressing)    Monitoring & Evaluation     Dietitian will monitor energy intake.  Nutrition Risk/Follow-Up: low (follow-up in 5-7 days)   Please consult if re-assessment needed sooner.

## 2023-06-19 NOTE — PROGRESS NOTES
Trauma Surgery   Progress Note  Admit Date: 6/4/2023  HD#15  POD#2 Days Post-Op    Subjective:   Interval history:  NAEON  Tmax 99.9  Intermittently tachycardic, anxiety driven  -170s  Satting 3L NC  Psych following    Home Meds:   Current Outpatient Medications   Medication Instructions    albuterol (PROVENTIL/VENTOLIN HFA) 90 mcg/actuation inhaler 2 puffs, Inhalation, Every 4 hours PRN    ALBUTEROL INHL 90 mcg, Inhalation, Every 4 hours PRN    amLODIPine (NORVASC) 5 mg, Oral, Daily    amoxicillin-clavulanate 875-125mg (AUGMENTIN) 875-125 mg per tablet 1 tablet, Oral, Every 12 hours (non-standard times)    atorvastatin (LIPITOR) 20 mg, Oral, Daily    azithromycin (Z-CELESTINA) 500 mg, Oral, Daily    clonazePAM (KLONOPIN) 1 mg, Oral, 2 times daily PRN    dextroamphetamine-amphetamine (ADDERALL XR) 25 MG 24 hr capsule 25 mg, Oral, 2 times daily    FLUoxetine 20 MG capsule fluoxetine 20 mg capsule    FLUoxetine 40 mg, Oral, Daily    insulin glargine 100 units/mL SubQ pen Lantus Solostar U-100 Insulin 100 unit/mL (3 mL) subcutaneous pen    ipratropium (ATROVENT) 42 mcg (0.06 %) nasal spray Each Nostril    lamoTRIgine (LAMICTAL) 25 MG tablet lamotrigine 25 mg tablet    LANTUS SOLOSTAR U-100 INSULIN glargine 100 units/mL SubQ pen Subcutaneous    latanoprost 0.005 % ophthalmic solution latanoprost 0.005 % eye drops    lisinopriL (PRINIVIL,ZESTRIL) 40 mg, Oral    mirtazapine (REMERON) 7.5 MG Tab mirtazapine 7.5 mg tablet    potassium chloride (K-TAB) 20 mEq potassium chloride ER 20 mEq tablet,extended release   TAKE 1 TABLET BY MOUTH EVERY DAY    prochlorperazine (COMPAZINE) 10 MG tablet prochlorperazine maleate 10 mg tablet    QUEtiapine (SEROQUEL) 200 mg, Oral, Nightly    testosterone cypionate (DEPOTESTOTERONE CYPIONATE) 200 mg/mL injection SMARTSIG:Milliliter(s) IM      Scheduled Meds:   albuterol-ipratropium  3 mL Nebulization Q6H    calcium-vitamin D3  1 tablet Per NG tube BID    enoxaparin  40 mg Subcutaneous  "Q12H    famotidine (PF)  20 mg Intravenous BID    FLUoxetine  40 mg Oral Daily    gabapentin  300 mg Per NG tube TID    guaiFENesin  600 mg Oral BID    lamoTRIgine  25 mg Oral Daily    levoFLOXacin  750 mg Intravenous Q24H    mirtazapine  15 mg Oral QHS    propranoloL  40 mg Oral TID    QUEtiapine  100 mg Oral QHS    QUEtiapine  50 mg Oral Daily     Continuous Infusions:   dexmedeTOMIDine (Precedex) infusion (titrating) Stopped (06/17/23 1900)    lactated ringers 75 mL/hr at 06/15/23 1127     PRN Meds:sodium chloride, acetaminophen, fentaNYL, hydrALAZINE, midazolam, morphine, oxyCODONE, oxyCODONE     Objective:     VITAL SIGNS: 24 HR MIN & MAX LAST   Temp  Min: 99.8 °F (37.7 °C)  Max: 99.9 °F (37.7 °C)  99.9 °F (37.7 °C)   BP  Min: 127/83  Max: 176/109  (!) 149/100    Pulse  Min: 73  Max: 117  104    Resp  Min: 12  Max: 41  (!) 40    SpO2  Min: 90 %  Max: 98 %  98 %      HT: 5' 10.98" (180.3 cm)  WT: 121 kg (266 lb 12.1 oz)  BMI: 37.2     Intake/output:  Intake/Output - Last 3 Shifts         06/17 0700  06/18 0659 06/18 0700  06/19 0659    I.V. (mL/kg) 4525 (37.4)     NG/     IV Piggyback 1100     Total Intake(mL/kg) 6125 (50.6)     Urine (mL/kg/hr) 2150 (0.7) 1000 (0.3)    Total Output 2150 1000    Net +3975 -1000                  Intake/Output Summary (Last 24 hours) at 6/19/2023 0520  Last data filed at 6/19/2023 0400  Gross per 24 hour   Intake 4525 ml   Output 2050 ml   Net 2475 ml         Lines/drains/airway:       Peripheral IV - Single Lumen 06/17/23 1030 Right Antecubital (Active)   Site Assessment Clean;Dry;Intact 06/19/23 0000   Extremity Assessment Distal to IV No abnormal discoloration;No redness;No swelling;No warmth 06/19/23 0000   Line Status Infusing 06/19/23 0000   Dressing Status Clean;Dry;Intact 06/19/23 0000   Number of days: 1       Physical examination:  Gen: NAD, AAOx3, answering questions appropriately, anxious  HEENT: NCAT  CV: tachycardic, occ PVCs  Resp: NWOB  Abd: S/NT/ND  Msk: " RUE+LUE compartments soft, NV intact, able to wiggle fingers  Neuro: CN II-XII grossly intact    Labs:  Renal:  Recent Labs     06/17/23  0137 06/17/23  1131 06/18/23  0123 06/19/23  0120   BUN 15.4 18.9 15.8 15.3   CREATININE 0.69* 0.72* 0.70* 0.66*     No results for input(s): LACTIC in the last 72 hours.  FEN/GI:  Recent Labs     06/17/23 0137 06/17/23  1131 06/18/23  0123 06/19/23  0120   * 150* 150* 148*   K 3.8 4.4 3.8 3.4*   CO2 24 22 26 25   CALCIUM 7.9* 7.8* 7.7* 8.2*   ALBUMIN 2.1* 2.1* 2.1* 2.3*   BILITOT 3.0* 2.8* 2.3* 2.2*   AST 49* 56* 45* 43*   ALKPHOS 186* 189* 187* 223*   ALT 29 36 36 37     Heme:  Recent Labs     06/17/23  0137 06/17/23  1131 06/18/23  0123 06/19/23  0120   HGB 9.1* 9.8* 8.6* 9.0*   HCT 28.0* 31.9* 26.9* 28.4*   * 534* 584* 667*     ID:  Recent Labs     06/17/23  0137 06/17/23  1131 06/18/23  0123 06/19/23  0120   WBC 12.77* 11.58* 11.77* 11.41     CBG:  Recent Labs     06/17/23  0137 06/17/23  1131 06/18/23  0123 06/19/23  0120   GLUCOSE 100 142* 106* 110*      No results for input(s): POCTGLUCOSE in the last 72 hours.   Cardiovascular:  No results for input(s): TROPONINI, CKTOTAL, CKMB, BNP in the last 168 hours.  I have reviewed all pertinent lab results within the past 24 hours.    Imaging:  X-Ray Chest 1 View   Final Result      Increasing pulmonary opacities may be related to atelectatic change, patient rotation or pneumonitis.         Electronically signed by: iEleen Goodman   Date:    06/18/2023   Time:    08:13      XR Gastric tube check, non-radiologist performed   Final Result      X-Ray Chest 1 View   Final Result      Interval placement of endotracheal tube and NG tube in good position      Otherwise unchanged         Electronically signed by: Anu Grijalva   Date:    06/17/2023   Time:    12:26      SURG FL Surgery Fluoro Usage   Final Result      X-Ray Chest 1 View   Final Result      X-Ray Elbow 2 Views Left   Final Result      Fractures as  above.         Electronically signed by: Tommie Cao   Date:    06/16/2023   Time:    14:53      X-Ray Chest 1 View   Final Result      1. Interval removal of endotracheal and enteric tubes.   2. Increased bilateral airspace opacities.   No significant change from the [facility] interpretation.         Electronically signed by: Delmy Rodriguez   Date:    06/16/2023   Time:    06:17      X-Ray Chest 1 View   Final Result      X-Ray Pelvis Routine AP   Final Result      SURG FL Surgery Fluoro Usage   Final Result      X-Ray Chest 1 View   Final Result      New right apical opacity.         Electronically signed by: Eileen Goodman   Date:    06/14/2023   Time:    06:18      X-Ray Chest 1 View   Final Result      Interval removal of left-sided chest tube with no clear evidence of pneumothorax.      No other change         Electronically signed by: Tommie Cao   Date:    06/13/2023   Time:    09:37      X-Ray Chest 1 View   Final Result      Increase in interstitial and pulmonary vascular markings indicating some degree of pulmonary vascular congestion and cardiac decompensation.      Support catheters as above         Electronically signed by: Tommie Cao   Date:    06/13/2023   Time:    09:36      X-Ray Wrist Complete Right   Final Result      As above.         Electronically signed by: Magnus Couch   Date:    06/12/2023   Time:    13:09      SURG FL Surgery Fluoro Usage   Final Result      X-Ray Chest 1 View   Final Result      No significant change         Electronically signed by: Tommie Cao   Date:    06/12/2023   Time:    09:01      X-Ray Chest 1 View   Final Result      No significant change         Electronically signed by: Tommie Cao   Date:    06/12/2023   Time:    09:00      CT 3D RECON WITH INDEPENDENT WS   Final Result      CT Wrist Without Contrast Right   Final Result      X-Ray Chest 1 View   Final Result      X-Ray Chest 1 View   Final Result      No significant  interval change.         Electronically signed by: Roberto Parks   Date:    06/10/2023   Time:    14:12      X-Ray Chest 1 View   Final Result      No significant interval change.         Electronically signed by: Roberto Parks   Date:    06/10/2023   Time:    07:46      SURG FL Surgery Fluoro Usage   Final Result      X-Ray Chest 1 View   Final Result      Slight oval improvement of right lower lobe opacification.  No adverse interval change.         Electronically signed by: Nikhil Auguste   Date:    06/09/2023   Time:    06:35      X-Ray Chest 1 View   Final Result      Slightly improved aeration left lung.  No other significant change.         Electronically signed by: Magnus Couch   Date:    06/08/2023   Time:    09:55      X-Ray Chest 1 View   Final Result      X-Ray Wrist 2 View Right   Final Result      1. Intra-articular distal radius fracture   2. Fracture at the dorsal aspect of the proximal carpal row.  This is most commonly related to triquetral fracture.         Electronically signed by: Eileen Goodman   Date:    06/07/2023   Time:    12:57      X-Ray Chest 1 View   Final Result      No significant interval change.         Electronically signed by: Roberto Parks   Date:    06/07/2023   Time:    07:18      X-ray Shoulder 2 or More Views Right   Final Result      No acute osseous process appreciated.         Electronically signed by: Harjinder Strong   Date:    06/06/2023   Time:    14:29      X-Ray Forearm Right   Final Result      Distal radial fracture.  It is possible there are additional fractures at the wrist.  If needed, follow-up wrist radiographs can be considered.         Electronically signed by: Harjinder Strong   Date:    06/06/2023   Time:    14:27      X-Ray Chest 1 View for Line/Tube Placement   Final Result      1. Endotracheal tube tip now midthoracic trachea.   2. Increased bilateral opacities.         Electronically signed by: Magnus Couch   Date:    06/06/2023   Time:    10:08      X-Ray  Chest 1 View   Final Result      Interval slight advancement of the endotracheal tube.  No other significant change.         Electronically signed by: Magnus Couch   Date:    06/06/2023   Time:    06:16      X-Ray Chest 1 View   Final Result      Little interval change.         Electronically signed by: Magnus Couch   Date:    06/06/2023   Time:    06:15      X-Ray Chest 1 View   Final Result      Somewhat improved aeration in the lungs bilaterally compared to the prior examination otherwise not significantly changed         Electronically signed by: Anu Grijalva   Date:    06/05/2023   Time:    17:21      SURG FL Surgery Fluoro Usage   Final Result      CT 3D RECON WITHOUT INDEPENDENT WS   Final Result   FINDINGS/   3D reconstructions of the chest were created based on source data from accession number [id number].  Please see that report for details.         Electronically signed by: Magnus Couch   Date:    06/05/2023   Time:    13:14      X-Ray Chest 1 View   Final Result      Unchanged bilateral perihilar alveolar opacities.      Lung volumes are low with associated atelectatic change.         Electronically signed by: Eileen Goodman   Date:    06/05/2023   Time:    10:54      X-Ray Chest 1 View   Final Result      As above.  No adverse interval change.         Electronically signed by: Nikhil Auguste   Date:    06/05/2023   Time:    06:49      CT Thoracic Spine Without Contrast   Final Result   Impression:      1. No acute thoracic spine fracture dislocation or subluxation is identified.      2. Details and other findings as above.      No significant discrepancy with overnight report.         Electronically signed by: Roberto Parks   Date:    06/05/2023   Time:    08:05      CT Lumbar Spine Without Contrast   Final Result   Impression:      1. There is a burst fracture of the L2 vertebral body. There is retropulsion of the fracture fragment (series 7 image 50) causing spinal canal narrowing and compression  upon the thecal sac (series 3 image 50). There is a comminuted displaced fracture of the right L2 transverse process. There is a displaced fracture of the right L3 transverse process. There are comminuted displaced fractures of the bilateral L5 transverse process. There is a comminuted displaced fracture of the left sacral ala described separately.      2. Details and findings as above.      No significant discrepancy with overnight report.         Electronically signed by: Roberto Parks   Date:    06/05/2023   Time:    08:08      CT Cervical Spine Without Contrast   Final Result   Impression:      1. No acute cervical spine fracture dislocation or subluxation is seen.      2. There is a left apical pneumothorax seen. There is opacity seen in the apical segment of the right upper lobe. This may be consistent with contusions or consolidation. Consider additional evaluation with chest CT.      3. Details and findings as noted above.      No significant discrepancy with overnight report.         Electronically signed by: Roberto Parks   Date:    06/05/2023   Time:    08:11      CT Pelvis Without Contrast   Final Result   Abnormal      1. Comminuted pelvic fractures with diastasis and displacement as above.   2. Fracture associated soft tissue hematomas.   3. Perirectal stranding.  Cannot exclude bowel injury.  Discrepancy from preliminary report.   This report was flagged in Epic as abnormal.         Electronically signed by: Eileen Goodman   Date:    06/05/2023   Time:    08:26      CT 3D RECON WITHOUT INDEPENDENT WS   Final Result   Abnormal      1. Comminuted pelvic fractures with diastasis and displacement as above.   2. Fracture associated soft tissue hematomas.   3. Perirectal stranding.  Cannot exclude bowel injury.  Discrepancy from preliminary report.   This report was flagged in Epic as abnormal.         Electronically signed by: Eileen Goodman   Date:    06/05/2023   Time:    08:26      X-Ray Pelvis  Routine AP   Final Result      Multiple fractures.         Electronically signed by: Roberto Parks   Date:    06/04/2023   Time:    17:30      X-Ray Femur 2 View Left   Final Result      No definite acute osseous abnormality identified.         Electronically signed by: Roberto Parks   Date:    06/04/2023   Time:    17:32      X-Ray Chest 1 View   Final Result      1. Endotracheal tube tip near the origin of the right mainstem bronchus and can be retracted slightly.  Additional tubing overlying the thoracic inlet may be an enteric tube.   2. Right upper lung consolidation.  Patchy opacities in the left lower lung may be atelectasis or contusion.         Electronically signed by: Harjinder Strong   Date:    06/04/2023   Time:    16:18      CT Previous   Final Result      CT Previous   Final Result      Xray Previous   Final Result         I have reviewed all pertinent imaging results/findings within the past 24 hours.    Micro/Path/Other:  Microbiology Results (last 7 days)       Procedure Component Value Units Date/Time    Blood Culture [[id number]]  (Normal) Collected: 06/13/23 0912    Order Status: Completed Specimen: Blood from Antecubital, Left Updated: 06/18/23 1100     CULTURE, BLOOD ([facility]) No Growth at 5 days    Blood Culture [[id number]]  (Normal) Collected: 06/13/23 0912    Order Status: Completed Specimen: Blood from Antecubital, Right Updated: 06/18/23 1100     CULTURE, BLOOD ([facility]) No Growth at 5 days    Respiratory Culture [[id number]]  (Abnormal)  (Susceptibility) Collected: 06/13/23 0850    Order Status: Completed Specimen: Respiratory from Endotracheal Aspirate Updated: 06/15/23 0859     Respiratory Culture Many Pseudomonas aeruginosa     Comment: with no normal respiratory polo        GRAM STAIN Quality 2+      Many Gram Negative Rods           Specimen (168h ago, onward)      None             Assessment & Plan:   Gera Chavez is a 37 y.o. male admitted on 6/4/2023 following fall from bridge at 25 ft.  He  reportedly jumped off a bridge and landed on dry land.  He arrived intubated with a left-sided chest tube.  He was found to have a pelvic ring fracture including open book fracture, L2 burst fracture, left pneumothorax, multiple left-sided rib fractures. Pseudomonas VAP.    Consults:   Neurosurgery  Orthopedic surgery  Psychiatry    Therapy:  Physical Therapy  Occupational Therapy  SLP Weight bearing status:   RUE: NWB  LUE: NWB  RLE:  pivot to transfer  LLE: NWB Precautions:  Fall and Standard   Seizure prophylaxis:  Not indicated. VTE prophylaxis:     Prophylactic Lovenox 40mg BID  GI prophylaxis:  H2B   Outpatient follow up:  PCP  Orthopedic surgery  Neurosurgery  Disposition:  Pending progress with therapy  Pending clinical progress  Psych?     - CEC  - EKG (patient is at risk of QT prolongation drug-induced)  - Replace K  - NPO  - Daily labs  - Levaquin until 6/22  - MM pain control  - IS  - Therapy as above  - VTE prevention as above       6/19/2023 5:21 AM     The above findings, diagnostics, and treatment plan were discussed with Dr. Jarret Sauer  who will follow with further assessments and recommendations. Please call with any questions, concerns, or clinical status changes.

## 2023-06-20 LAB
ALBUMIN SERPL-MCNC: 2.4 G/DL (ref 3.5–5)
ALBUMIN/GLOB SERPL: 0.6 RATIO (ref 1.1–2)
ALP SERPL-CCNC: 241 UNIT/L (ref 40–150)
ALT SERPL-CCNC: 40 UNIT/L (ref 0–55)
ANION GAP SERPL CALC-SCNC: 10 MEQ/L
AST SERPL-CCNC: 41 UNIT/L (ref 5–34)
BASOPHILS # BLD AUTO: 0.06 X10(3)/MCL
BASOPHILS NFR BLD AUTO: 0.5 %
BILIRUBIN DIRECT+TOT PNL SERPL-MCNC: 2.3 MG/DL
BUN SERPL-MCNC: 11.3 MG/DL (ref 8.9–20.6)
BUN SERPL-MCNC: 13.4 MG/DL (ref 8.9–20.6)
CALCIUM SERPL-MCNC: 7.8 MG/DL (ref 8.4–10.2)
CALCIUM SERPL-MCNC: 8 MG/DL (ref 8.4–10.2)
CHLORIDE SERPL-SCNC: 118 MMOL/L (ref 98–107)
CHLORIDE SERPL-SCNC: 118 MMOL/L (ref 98–107)
CO2 SERPL-SCNC: 24 MMOL/L (ref 22–29)
CO2 SERPL-SCNC: 24 MMOL/L (ref 22–29)
CREAT SERPL-MCNC: 0.76 MG/DL (ref 0.73–1.18)
CREAT SERPL-MCNC: 0.77 MG/DL (ref 0.73–1.18)
CREAT/UREA NIT SERPL: 15
EOSINOPHIL # BLD AUTO: 0.23 X10(3)/MCL (ref 0–0.9)
EOSINOPHIL NFR BLD AUTO: 2 %
ERYTHROCYTE [DISTWIDTH] IN BLOOD BY AUTOMATED COUNT: 16.6 % (ref 11.5–17)
GFR SERPLBLD CREATININE-BSD FMLA CKD-EPI: >60 MLS/MIN/1.73/M2
GFR SERPLBLD CREATININE-BSD FMLA CKD-EPI: >60 MLS/MIN/1.73/M2
GLOBULIN SER-MCNC: 3.8 GM/DL (ref 2.4–3.5)
GLUCOSE SERPL-MCNC: 118 MG/DL (ref 74–100)
GLUCOSE SERPL-MCNC: 120 MG/DL (ref 74–100)
HCT VFR BLD AUTO: 30.3 % (ref 42–52)
HGB BLD-MCNC: 9.4 G/DL (ref 14–18)
IMM GRANULOCYTES # BLD AUTO: 0.1 X10(3)/MCL (ref 0–0.04)
IMM GRANULOCYTES NFR BLD AUTO: 0.9 %
LYMPHOCYTES # BLD AUTO: 1.91 X10(3)/MCL (ref 0.6–4.6)
LYMPHOCYTES NFR BLD AUTO: 16.9 %
MCH RBC QN AUTO: 28.2 PG (ref 27–31)
MCHC RBC AUTO-ENTMCNC: 31 G/DL (ref 33–36)
MCV RBC AUTO: 91 FL (ref 80–94)
MONOCYTES # BLD AUTO: 0.9 X10(3)/MCL (ref 0.1–1.3)
MONOCYTES NFR BLD AUTO: 7.9 %
NEUTROPHILS # BLD AUTO: 8.13 X10(3)/MCL (ref 2.1–9.2)
NEUTROPHILS NFR BLD AUTO: 71.8 %
NRBC BLD AUTO-RTO: 0.6 %
PLATELET # BLD AUTO: 668 X10(3)/MCL (ref 130–400)
PMV BLD AUTO: 10.2 FL (ref 7.4–10.4)
POTASSIUM SERPL-SCNC: 3.2 MMOL/L (ref 3.5–5.1)
POTASSIUM SERPL-SCNC: 3.8 MMOL/L (ref 3.5–5.1)
PREALB SERPL-MCNC: 13.6 MG/DL (ref 18–45)
PROT SERPL-MCNC: 6.2 GM/DL (ref 6.4–8.3)
RBC # BLD AUTO: 3.33 X10(6)/MCL (ref 4.7–6.1)
SODIUM SERPL-SCNC: 152 MMOL/L (ref 136–145)
SODIUM SERPL-SCNC: 154 MMOL/L (ref 136–145)
WBC # SPEC AUTO: 11.33 X10(3)/MCL (ref 4.5–11.5)

## 2023-06-20 PROCEDURE — 94668 MNPJ CHEST WALL SBSQ: CPT

## 2023-06-20 PROCEDURE — 80053 COMPREHEN METABOLIC PANEL: CPT | Performed by: STUDENT IN AN ORGANIZED HEALTH CARE EDUCATION/TRAINING PROGRAM

## 2023-06-20 PROCEDURE — 25000242 PHARM REV CODE 250 ALT 637 W/ HCPCS: Performed by: HOSPITALIST

## 2023-06-20 PROCEDURE — 94799 UNLISTED PULMONARY SVC/PX: CPT

## 2023-06-20 PROCEDURE — 25000003 PHARM REV CODE 250: Performed by: STUDENT IN AN ORGANIZED HEALTH CARE EDUCATION/TRAINING PROGRAM

## 2023-06-20 PROCEDURE — 63600175 PHARM REV CODE 636 W HCPCS

## 2023-06-20 PROCEDURE — 63600175 PHARM REV CODE 636 W HCPCS: Performed by: SURGERY

## 2023-06-20 PROCEDURE — 97110 THERAPEUTIC EXERCISES: CPT | Mod: CQ

## 2023-06-20 PROCEDURE — 92523 SPEECH SOUND LANG COMPREHEN: CPT

## 2023-06-20 PROCEDURE — 97530 THERAPEUTIC ACTIVITIES: CPT

## 2023-06-20 PROCEDURE — 99900035 HC TECH TIME PER 15 MIN (STAT)

## 2023-06-20 PROCEDURE — 25000003 PHARM REV CODE 250

## 2023-06-20 PROCEDURE — 84134 ASSAY OF PREALBUMIN: CPT | Performed by: STUDENT IN AN ORGANIZED HEALTH CARE EDUCATION/TRAINING PROGRAM

## 2023-06-20 PROCEDURE — 94640 AIRWAY INHALATION TREATMENT: CPT

## 2023-06-20 PROCEDURE — 11000001 HC ACUTE MED/SURG PRIVATE ROOM

## 2023-06-20 PROCEDURE — 27000221 HC OXYGEN, UP TO 24 HOURS

## 2023-06-20 PROCEDURE — 25000003 PHARM REV CODE 250: Performed by: SURGERY

## 2023-06-20 PROCEDURE — 94761 N-INVAS EAR/PLS OXIMETRY MLT: CPT

## 2023-06-20 PROCEDURE — 85025 COMPLETE CBC W/AUTO DIFF WBC: CPT | Performed by: STUDENT IN AN ORGANIZED HEALTH CARE EDUCATION/TRAINING PROGRAM

## 2023-06-20 PROCEDURE — 97535 SELF CARE MNGMENT TRAINING: CPT

## 2023-06-20 PROCEDURE — 97530 THERAPEUTIC ACTIVITIES: CPT | Mod: CQ

## 2023-06-20 RX ORDER — ARIPIPRAZOLE 5 MG/1
5 TABLET ORAL DAILY
Status: DISCONTINUED | OUTPATIENT
Start: 2023-06-21 | End: 2023-06-26

## 2023-06-20 RX ORDER — AMLODIPINE BESYLATE 5 MG/1
5 TABLET ORAL DAILY
Status: DISCONTINUED | OUTPATIENT
Start: 2023-06-20 | End: 2023-06-26 | Stop reason: HOSPADM

## 2023-06-20 RX ORDER — ATORVASTATIN CALCIUM 10 MG/1
20 TABLET, FILM COATED ORAL DAILY
Status: DISCONTINUED | OUTPATIENT
Start: 2023-06-20 | End: 2023-06-26 | Stop reason: HOSPADM

## 2023-06-20 RX ORDER — LISINOPRIL 20 MG/1
40 TABLET ORAL DAILY
Status: DISCONTINUED | OUTPATIENT
Start: 2023-06-20 | End: 2023-06-26 | Stop reason: HOSPADM

## 2023-06-20 RX ADMIN — GUAIFENESIN 600 MG: 600 TABLET, EXTENDED RELEASE ORAL at 09:06

## 2023-06-20 RX ADMIN — MIRTAZAPINE 15 MG: 15 TABLET, FILM COATED ORAL at 09:06

## 2023-06-20 RX ADMIN — IPRATROPIUM BROMIDE AND ALBUTEROL SULFATE 3 ML: 2.5; .5 SOLUTION RESPIRATORY (INHALATION) at 01:06

## 2023-06-20 RX ADMIN — GABAPENTIN 300 MG: 300 CAPSULE ORAL at 09:06

## 2023-06-20 RX ADMIN — GABAPENTIN 300 MG: 300 CAPSULE ORAL at 08:06

## 2023-06-20 RX ADMIN — LISINOPRIL 40 MG: 20 TABLET ORAL at 08:06

## 2023-06-20 RX ADMIN — IPRATROPIUM BROMIDE AND ALBUTEROL SULFATE 3 ML: 2.5; .5 SOLUTION RESPIRATORY (INHALATION) at 07:06

## 2023-06-20 RX ADMIN — GABAPENTIN 300 MG: 300 CAPSULE ORAL at 02:06

## 2023-06-20 RX ADMIN — Medication 1 TABLET: at 08:06

## 2023-06-20 RX ADMIN — ENOXAPARIN SODIUM 40 MG: 40 INJECTION SUBCUTANEOUS at 09:06

## 2023-06-20 RX ADMIN — Medication 1 TABLET: at 09:06

## 2023-06-20 RX ADMIN — PROPRANOLOL HYDROCHLORIDE 40 MG: 10 TABLET ORAL at 08:06

## 2023-06-20 RX ADMIN — MORPHINE SULFATE 4 MG: 4 INJECTION, SOLUTION INTRAMUSCULAR; INTRAVENOUS at 01:06

## 2023-06-20 RX ADMIN — FAMOTIDINE 20 MG: 10 INJECTION, SOLUTION INTRAVENOUS at 08:06

## 2023-06-20 RX ADMIN — LAMOTRIGINE 25 MG: 25 TABLET ORAL at 08:06

## 2023-06-20 RX ADMIN — PROPRANOLOL HYDROCHLORIDE 40 MG: 10 TABLET ORAL at 09:06

## 2023-06-20 RX ADMIN — QUETIAPINE FUMARATE 100 MG: 100 TABLET ORAL at 09:06

## 2023-06-20 RX ADMIN — POTASSIUM BICARBONATE 25 MEQ: 977.5 TABLET, EFFERVESCENT ORAL at 08:06

## 2023-06-20 RX ADMIN — IPRATROPIUM BROMIDE AND ALBUTEROL SULFATE 3 ML: 2.5; .5 SOLUTION RESPIRATORY (INHALATION) at 12:06

## 2023-06-20 RX ADMIN — AMLODIPINE BESYLATE 5 MG: 5 TABLET ORAL at 08:06

## 2023-06-20 RX ADMIN — QUETIAPINE FUMARATE 50 MG: 25 TABLET ORAL at 08:06

## 2023-06-20 RX ADMIN — PROPRANOLOL HYDROCHLORIDE 40 MG: 10 TABLET ORAL at 02:06

## 2023-06-20 RX ADMIN — GUAIFENESIN 600 MG: 600 TABLET, EXTENDED RELEASE ORAL at 08:06

## 2023-06-20 RX ADMIN — OXYCODONE HYDROCHLORIDE 10 MG: 10 TABLET ORAL at 09:06

## 2023-06-20 RX ADMIN — FLUOXETINE 40 MG: 20 CAPSULE ORAL at 08:06

## 2023-06-20 RX ADMIN — ATORVASTATIN CALCIUM 20 MG: 10 TABLET, FILM COATED ORAL at 08:06

## 2023-06-20 RX ADMIN — ENOXAPARIN SODIUM 40 MG: 40 INJECTION SUBCUTANEOUS at 08:06

## 2023-06-20 RX ADMIN — IPRATROPIUM BROMIDE AND ALBUTEROL SULFATE 3 ML: 2.5; .5 SOLUTION RESPIRATORY (INHALATION) at 08:06

## 2023-06-20 RX ADMIN — LEVOFLOXACIN 750 MG: 5 INJECTION, SOLUTION INTRAVENOUS at 08:06

## 2023-06-20 NOTE — NURSING
Nurses Note -- 4 Eyes      6/20/2023   11:20 AM      Skin assessed during: Daily Assessment      [] No Altered Skin Integrity Present    []Prevention Measures Documented      [x] Yes- Altered Skin Integrity Present or Discovered   [] LDA Added if Not in Epic (Describe Wound)   [] New Altered Skin Integrity was Present on Admit and Documented in LDA   [] Wound Image Taken    Wound Care Consulted? Yes    Attending Nurse:  Ariel Luu RN     Second RN/Staff Member:  Tita Mancera RN. No new Altered skin integ. All from admit

## 2023-06-20 NOTE — PT/OT/SLP PROGRESS
Occupational Therapy   Treatment    Name: Gera Chavez  MRN: [id number]  Admitting Diagnosis:  Closed olecranon fracture, left, initial encounter  3 Days Post-Op    Recommendations:     Discharge Recommendations:  (Pending, pt with several orthopedic restrictions)  Discharge Equipment Recommendations:   (pending progress with functional mobility-w/c, slide board?)    Assessment:   Performance deficits affecting function are weakness, impaired self care skills, impaired functional mobility, impaired balance, decreased lower extremity function, orthopedic precautions.     Rehab Prognosis:  Good; patient would benefit from acute skilled OT services to address these deficits and reach maximum level of function.       Plan:     Patient to be seen 5 x/week to address the above listed problems via self-care/home management  Plan of Care Expires: 07/14/23  Plan of Care Reviewed with: patient    Subjective     Pain/Comfort:  Pain Rating 1: 0/10    Objective:     Communicated with: Tita Allen prior to session.  Patient found HOB elevated with  (TLSO, vital monitoring, SCD, heel floats) upon OT entry to room.    General Precautions: Standard, fall (TLSO HOB >30 degrees, NWB R wrist ok for platform walker, TTWB LLE)    Orthopedic Precautions: (NWB LUE with splint, NWB R wrist, ok to pivot transfer RLE, NWB LLE, no hopping/no ambulation)  Braces: N/A  Respiratory Status: Nasal cannula, flow 3 L/min  Vital Signs: Blood Pressure: 136/87      Occupational Performance:     Bed Mobility:    Patient completed Rolling/Turning to Left with  maximal assistance  Patient completed Rolling/Turning to Right with maximal assistance     Functional Mobility/Transfers:  Functional Mobility: Bed>recliner total assist with use of tad lift    Activities of Daily Living:  Feeding:  setup assist RUE  Grooming: oral hygiene RUE    Therapeutic Exercise:  AROM of L hand/wrist; R shoulder/elbow/digits WNL in velcro splint    Therapeutic  Positioning    AMPAC 6 Click ADL: 14    Patient Education:  Patient provided with verbal education regarding OT role/goals/POC, post op precautions, and home exercise program .  Understanding was verbalized, however additional teaching warranted.    OT returned 13:05-13:31 for assist with returning to bed via tad lift.  OT adjusted TLSO again.  OT did not note any breakdown from brace on this date.  Positioned on wedge.   No pressure related skin breakdown on sacrum.  One on one to setup urinal for pt as needed.    Patient left up in chair with all lines intact and one on one present    GOALS:   Multidisciplinary Problems       Occupational Therapy Goals          Problem: Occupational Therapy    Goal Priority Disciplines Outcome Interventions   Occupational Therapy Goal     OT, PT/OT Ongoing, Progressing    Description: Goals to be met by: 7/16/23     Patient will increase functional independence with ADLs by performing:    UE Dressing with Minimal Assistance.  Grooming while seated EOB with Min Assistance.  Toileting Moderate Assistance for hygiene and clothing management, indicating need.  Sitting at edge of bed x15 minutes with Stand-by Assistance.                         Time Tracking:     OT Date of Treatment:    OT Start Time: 1051  OT Stop Time: 1126  OT Total Time (min): 35 min    Billable Minutes:Self Care/Home Management 2    OT/HUBERT: OT     Number of HUBERT visits since last OT visit: 2    6/20/2023

## 2023-06-20 NOTE — PT/OT/SLP PROGRESS
Physical Therapy Treatment    Patient Name:  Gera Chavez   MRN:  [id number]    Recommendations:     Discharge Recommendations: other (see comments) (pending progress; multiple orthopedic pxns)  Discharge Equipment Recommendations: to be determined by next level of care  Barriers to discharge: Ongoing medical needs    Assessment:     Gera Chavez is a 37 y.o. male admitted with a medical diagnosis of L pelvic rim fx w/ open book pelvis s/p ORIF L posterior column of acetabulum, L pelvic wall hematoma, L2 burst fx s/p T11-L4 fusion, apical PTX, L 4-11 rib fx, pulmonary contusion, R distal radius fracture s/p ORIF, and repaired CSF leak.  He presents with the following impairments/functional limitations: weakness, impaired self care skills, impaired functional mobility, orthopedic precautions, impaired endurance, impaired balance, gait instability.    Pt sitting up in bed with 1:1 present. Easily arousable and agreeable to tx session. Per RN, pt recently t/f back to bed from recliner. Tolerated session fairly well. R lateral lean while seated at EOB. Cues to maintain WB status.     Rehab Prognosis: Good; patient would benefit from acute skilled PT services to address these deficits and reach maximum level of function.    Recent Surgery: Procedure(s) (LRB):  ORIF, ELBOW (Left) 3 Days Post-Op    Plan:     During this hospitalization, patient to be seen 5 x/week to address the identified rehab impairments via therapeutic activities, therapeutic exercises and progress toward the following goals:    Plan of Care Expires:  07/16/23    Subjective     Chief Complaint: rib pain  Patient/Family Comments/goals: none reported  Pain/Comfort:  Pain Rating 1: 3/10  Location - Side 1: Left  Location 1: flank  Pain Addressed 1: Reposition      Objective:     Communicated with RN prior to session.  Patient found HOB elevated with blood pressure cuff, telemetry, pulse ox (continuous), Other (comments), SCD, pressure relief boots  (TLSO, splint RUE) upon PT entry to room.     General Precautions: Standard, aspiration  Orthopedic Precautions: spinal precautions (NWB LLE, ok to WB R LE as tolerated for stand pivot transfer only, NO hop or ambulation + NWB R UE, ok for platform walker)  Braces: TLSO (splint RUE)  Respiratory Status: Nasal cannula, flow 3 L/min  Blood Pressure: 129/76, SPO2 100%,   Skin Integrity: Visible skin intact      Functional Mobility:  Bed Mobility:    Supine<->sit with max assist x 2  Balance:   R lateral lean (seated at R side of bed), cues to maintain WB status  Fluctuated between min and mod assist for balance    Therapeutic Activities/Exercises:  DIOGO LAQ and marches x 10 reps    Education:  Patient provided with verbal education regarding POC, pxns and mobility.  Understanding was verbalized, however additional teaching warranted.     Patient left HOB elevated with all lines intact, call button in reach, and RN present..    GOALS:   Multidisciplinary Problems       Physical Therapy Goals          Problem: Physical Therapy    Goal Priority Disciplines Outcome Goal Variances Interventions   Physical Therapy Goal     PT, PT/OT Ongoing, Progressing     Description: Goals to be met by: 23     Patient will increase functional independence with mobility by performin. Supine to sit with Moderate Assistance  2. Sit to supine with Moderate Assistance  3. Sitting at edge of bed x15 minutes with Moderate Assistance  4. Pt to tolerate there ex/ROM to B LE for strengthening and to prevent contractures                          Time Tracking:     PT Received On: 23  PT Start Time: 1409     PT Stop Time: 1435  PT Total Time (min): 26 min     Billable Minutes: Therapeutic Activity 1 unit and Therapeutic Exercise 1 unit    Treatment Type: Treatment  PT/PTA: PTA     Number of PTA visits since last PT visit: 2023

## 2023-06-20 NOTE — PT/OT/SLP EVAL
Speech Language Pathology Department  Cognitive-Communication Evaluation    Patient Name:  Gera Chavez   MRN:  [id number]    Recommendations:     General recommendations:  dysphagia therapy and cognitive-linguistic therapy  Communication strategies:  provide increased time to answer and go to room if call light pushed    Discharge recommendations:  Discharge Facility/Level of Care Needs: rehabilitation facility   Barriers to safe discharge: level of skilled assistance needed    History:     Gera Chavez is a/n 37 y.o. male transferred from an outside facility for a level 1 trauma activation after reportedly jumping from a bridge.  Pt with multiple orthopedic injuries as well as small areas of extra-axial hemorrhage most conspicuous over the left temporal and parietal lobes per CT report.  Pt was intubated upon arrival on 6/4 and extubated on 6/15.    Previous level of Function  Education:high school  Occupation: unemployed, disabled  Lives: alone  Handed: Left  Glasses: no  Hearing Aids: no    Subjective     Patient awake, alert, and cooperative.    Patient goals: to go home     Spiritual/Cultural/Yarsani Beliefs/Practices that affect care: no  Pain/Comfort: Pain Rating 1: 0/10  Respiratory Status: nasal cannula    Objective:     ORAL MUSCULATURE  Dentition: own teeth  Facial Movement: WFL  Buccal Strength & Mobility: WFL  Mandibular Strength & Mobility: WFL  Oral Labial Strength & Mobility: WFL  Lingual Strength & Mobility: WFL  Velar Elevation: unable to assess    SPEECH PRODUCTION  Phoneme Production: adequate  Voice Quality: breathy  Voice Production: phonation breaks  Speech Rate: short rushes of speech  Loudness: reduced  Respiration: reduced for speech  Resonance: adequate  Prosody: equal stress  Speech Intelligibility  Known Context: 75%-90%  Unknown Context: 75%-90%    AUDITORY COMPREHENSION  Following Directions:  1-Step: 100%  Yes/No Questions:  Biographical: 50%  Environmental: 80%  Simple:  90%    VERBAL EXPRESSION  Automatic Speech:  Functional needs: WFL  Confrontation Naming  Objects: 100%  Wh- Questions:  Object name: 100%  Object function: 100%    COGNITION  Orientation:  Person: yes  Place: yes  Time: yes  Situation: inconsistent   Attention:  Focused: WFL  Sustained: WFL  Pragmatics:  Eye contact: WFL  Personal space: WFL  Facial expression: impaired  Communicative Intent: impaired  Memory:  Immediate: WFL  Delayed: WFL  Long Term: WFL  Problem Solving  Functional simple: impaired  Executive Function:  impaired    Assessment:     Pt presents with moderate cognitive-linguistic deficits negatively impacting safe, independent living.    Goals:     Multidisciplinary Problems       SLP Goals          Problem: SLP    Goal Priority Disciplines Outcome   SLP Goal     SLP Ongoing, Progressing   Description:   LTG: Tolerate least restrictive PO diet with no clinical signs/sx aspiration  STGs:  1.  Tolerate thermal stimulation to the anterior faucial pillars with 100% effortful swallow responses and delay less than 2 seconds.  2.  Complete base of tongue and laryngeal strengthening exercises with minimal cues    LTG: Complete basic cognitive tasks with supervision  STGs:  1.  Identify problems with min cues  2.  Solve routine problems with min cues  3.  Complete 3-4 step visual sequencing tasks with min cues                         Patient Education:     Patient provided with verbal education regarding SLP POC.  Understanding was verbalized.    Plan:     SLP Follow-Up:  Yes   Patient to be seen:  5 x/week   Plan of Care expires:  07/03/23  Plan of Care reviewed with:  patient      Time Tracking:     SLP Treatment Date:   06/20/23  Speech Start Time:  1440  Speech Stop Time:  1455     Speech Total Time (min):  15 min    Billable minutes:  Evaluation of Speech Sound Production with Comprehension and Expression, 15 minutes     06/20/2023
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
## 2023-06-20 NOTE — PROGRESS NOTES
"Status post ORIF left olecranon fx. POD # 3  No acute events overnight.    Pain controlled.    Resting in bed.   PT following    Vital Signs  Temp: 98.6 °F (37 °C)  Temp Source: Oral  Pulse: (!) 122  Heart Rate Source: Monitor, Continuous  Resp: (!) 24  SpO2: 98 %  Pulse Oximetry Type: Continuous  Flow (L/min): 3  Oxygen Concentration (%): 40  Device (Oxygen Therapy): nasal cannula  BP: 136/87  BP Location: Right arm  BP Method: Automatic  Patient Position: Lying  Arousal Level: opens eyes spontaneously  Height and Weight  Height: 5' 10.98" (180.3 cm)  Weight: 121 kg (266 lb 12.1 oz)  BSA (Calculated - sq m): 2.46 sq meters  BMI (Calculated): 37.2  Weight in (lb) to have BMI = 25: 178.8]    Left upper extremity compartment soft and warm  No s/s of DVT or infection  Dressing/splint c/d/i  NVI distally    Recent Lab Results         06/20/23  0138        Albumin/Globulin Ratio 0.6       Albumin 2.4       Alkaline Phosphatase 241       ALT 40       AST 41       Baso # 0.06       Basophil % 0.5       BILIRUBIN TOTAL 2.3       BUN 13.4       Calcium 7.8       Chloride 118       CO2 24       Creatinine 0.76       eGFR >60       Eos # 0.23       Eosinophil % 2.0       Globulin, Total 3.8       Glucose 118       Hematocrit 30.3       Hemoglobin 9.4       Immature Grans (Abs) 0.10       Immature Granulocytes 0.9       Lymph # 1.91       LYMPH % 16.9       MCH 28.2       MCHC 31.0       MCV 91.0       Mono # 0.90       Mono % 7.9       MPV 10.2       Neut # 8.13       Neut % 71.8       nRBC 0.6       Platelets 668       Potassium 3.2       Prealbumin 13.6       PROTEIN TOTAL 6.2       RBC 3.33       RDW 16.6       Sodium 154       WBC 11.33               A/P:    Overall patient improving  Nwb to LUE, splint  nw  "

## 2023-06-20 NOTE — PLAN OF CARE
Problem: SLP  Goal: SLP Goal  Description:   LTG: Tolerate least restrictive PO diet with no clinical signs/sx aspiration  STGs:  1.  Tolerate thermal stimulation to the anterior faucial pillars with 100% effortful swallow responses and delay less than 2 seconds.  2.  Complete base of tongue and laryngeal strengthening exercises with minimal cues    LTG: Complete basic cognitive tasks with supervision  STGs:  1.  Identify problems with min cues  2.  Solve routine problems with min cues  3.  Complete 3-4 step visual sequencing tasks with min cues      Outcome: Ongoing, Progressing

## 2023-06-21 LAB
ALBUMIN SERPL-MCNC: 2.4 G/DL (ref 3.5–5)
ALBUMIN/GLOB SERPL: 0.7 RATIO (ref 1.1–2)
ALP SERPL-CCNC: 228 UNIT/L (ref 40–150)
ALT SERPL-CCNC: 36 UNIT/L (ref 0–55)
AST SERPL-CCNC: 35 UNIT/L (ref 5–34)
BASOPHILS # BLD AUTO: 0.07 X10(3)/MCL
BASOPHILS NFR BLD AUTO: 0.6 %
BILIRUBIN DIRECT+TOT PNL SERPL-MCNC: 2 MG/DL
BUN SERPL-MCNC: 10.7 MG/DL (ref 8.9–20.6)
CALCIUM SERPL-MCNC: 7.7 MG/DL (ref 8.4–10.2)
CHLORIDE SERPL-SCNC: 117 MMOL/L (ref 98–107)
CO2 SERPL-SCNC: 25 MMOL/L (ref 22–29)
CREAT SERPL-MCNC: 0.71 MG/DL (ref 0.73–1.18)
EOSINOPHIL # BLD AUTO: 0.5 X10(3)/MCL (ref 0–0.9)
EOSINOPHIL NFR BLD AUTO: 4 %
ERYTHROCYTE [DISTWIDTH] IN BLOOD BY AUTOMATED COUNT: 16.9 % (ref 11.5–17)
GFR SERPLBLD CREATININE-BSD FMLA CKD-EPI: >60 MLS/MIN/1.73/M2
GLOBULIN SER-MCNC: 3.4 GM/DL (ref 2.4–3.5)
GLUCOSE SERPL-MCNC: 109 MG/DL (ref 74–100)
HCT VFR BLD AUTO: 31.3 % (ref 42–52)
HGB BLD-MCNC: 9.4 G/DL (ref 14–18)
IMM GRANULOCYTES # BLD AUTO: 0.08 X10(3)/MCL (ref 0–0.04)
IMM GRANULOCYTES NFR BLD AUTO: 0.6 %
LYMPHOCYTES # BLD AUTO: 2.32 X10(3)/MCL (ref 0.6–4.6)
LYMPHOCYTES NFR BLD AUTO: 18.6 %
MCH RBC QN AUTO: 28.1 PG (ref 27–31)
MCHC RBC AUTO-ENTMCNC: 30 G/DL (ref 33–36)
MCV RBC AUTO: 93.4 FL (ref 80–94)
MONOCYTES # BLD AUTO: 0.98 X10(3)/MCL (ref 0.1–1.3)
MONOCYTES NFR BLD AUTO: 7.8 %
NEUTROPHILS # BLD AUTO: 8.54 X10(3)/MCL (ref 2.1–9.2)
NEUTROPHILS NFR BLD AUTO: 68.4 %
NRBC BLD AUTO-RTO: 0.3 %
PLATELET # BLD AUTO: 644 X10(3)/MCL (ref 130–400)
PMV BLD AUTO: 10.1 FL (ref 7.4–10.4)
POTASSIUM SERPL-SCNC: 4 MMOL/L (ref 3.5–5.1)
PROT SERPL-MCNC: 5.8 GM/DL (ref 6.4–8.3)
RBC # BLD AUTO: 3.35 X10(6)/MCL (ref 4.7–6.1)
SODIUM SERPL-SCNC: 152 MMOL/L (ref 136–145)
WBC # SPEC AUTO: 12.49 X10(3)/MCL (ref 4.5–11.5)

## 2023-06-21 PROCEDURE — 94761 N-INVAS EAR/PLS OXIMETRY MLT: CPT

## 2023-06-21 PROCEDURE — 25000242 PHARM REV CODE 250 ALT 637 W/ HCPCS: Performed by: HOSPITALIST

## 2023-06-21 PROCEDURE — 85025 COMPLETE CBC W/AUTO DIFF WBC: CPT | Performed by: STUDENT IN AN ORGANIZED HEALTH CARE EDUCATION/TRAINING PROGRAM

## 2023-06-21 PROCEDURE — 25000003 PHARM REV CODE 250: Performed by: SURGERY

## 2023-06-21 PROCEDURE — 80053 COMPREHEN METABOLIC PANEL: CPT | Performed by: STUDENT IN AN ORGANIZED HEALTH CARE EDUCATION/TRAINING PROGRAM

## 2023-06-21 PROCEDURE — 99900035 HC TECH TIME PER 15 MIN (STAT)

## 2023-06-21 PROCEDURE — 97530 THERAPEUTIC ACTIVITIES: CPT | Mod: CO

## 2023-06-21 PROCEDURE — 25000003 PHARM REV CODE 250

## 2023-06-21 PROCEDURE — 63600175 PHARM REV CODE 636 W HCPCS

## 2023-06-21 PROCEDURE — 25000003 PHARM REV CODE 250: Performed by: NURSE PRACTITIONER

## 2023-06-21 PROCEDURE — 27000221 HC OXYGEN, UP TO 24 HOURS

## 2023-06-21 PROCEDURE — 99900031 HC PATIENT EDUCATION (STAT)

## 2023-06-21 PROCEDURE — 63600175 PHARM REV CODE 636 W HCPCS: Performed by: SURGERY

## 2023-06-21 PROCEDURE — 94640 AIRWAY INHALATION TREATMENT: CPT

## 2023-06-21 PROCEDURE — 27000646 HC AEROBIKA DEVICE

## 2023-06-21 PROCEDURE — 94664 DEMO&/EVAL PT USE INHALER: CPT

## 2023-06-21 PROCEDURE — 11000001 HC ACUTE MED/SURG PRIVATE ROOM

## 2023-06-21 PROCEDURE — 92526 ORAL FUNCTION THERAPY: CPT

## 2023-06-21 PROCEDURE — 94799 UNLISTED PULMONARY SVC/PX: CPT

## 2023-06-21 PROCEDURE — 97530 THERAPEUTIC ACTIVITIES: CPT | Mod: CQ

## 2023-06-21 RX ORDER — ACETAMINOPHEN 650 MG/20.3ML
650 LIQUID ORAL EVERY 4 HOURS PRN
Status: DISCONTINUED | OUTPATIENT
Start: 2023-06-21 | End: 2023-06-22

## 2023-06-21 RX ORDER — OXYCODONE HYDROCHLORIDE 10 MG/1
10 TABLET ORAL EVERY 6 HOURS PRN
Status: DISCONTINUED | OUTPATIENT
Start: 2023-06-21 | End: 2023-06-26 | Stop reason: HOSPADM

## 2023-06-21 RX ORDER — GABAPENTIN 300 MG/1
300 CAPSULE ORAL 3 TIMES DAILY
Status: DISCONTINUED | OUTPATIENT
Start: 2023-06-21 | End: 2023-06-26 | Stop reason: HOSPADM

## 2023-06-21 RX ORDER — FERROUS SULFATE, DRIED 160(50) MG
1 TABLET, EXTENDED RELEASE ORAL 2 TIMES DAILY
Status: DISCONTINUED | OUTPATIENT
Start: 2023-06-21 | End: 2023-06-26 | Stop reason: HOSPADM

## 2023-06-21 RX ORDER — OXYCODONE HYDROCHLORIDE 5 MG/1
5 TABLET ORAL EVERY 6 HOURS PRN
Status: DISCONTINUED | OUTPATIENT
Start: 2023-06-21 | End: 2023-06-26 | Stop reason: HOSPADM

## 2023-06-21 RX ADMIN — GUAIFENESIN 600 MG: 600 TABLET, EXTENDED RELEASE ORAL at 08:06

## 2023-06-21 RX ADMIN — ATORVASTATIN CALCIUM 20 MG: 10 TABLET, FILM COATED ORAL at 08:06

## 2023-06-21 RX ADMIN — OXYCODONE HYDROCHLORIDE 10 MG: 10 TABLET ORAL at 05:06

## 2023-06-21 RX ADMIN — PROPRANOLOL HYDROCHLORIDE 40 MG: 10 TABLET ORAL at 08:06

## 2023-06-21 RX ADMIN — AMLODIPINE BESYLATE 5 MG: 5 TABLET ORAL at 08:06

## 2023-06-21 RX ADMIN — QUETIAPINE FUMARATE 100 MG: 100 TABLET ORAL at 08:06

## 2023-06-21 RX ADMIN — ENOXAPARIN SODIUM 40 MG: 40 INJECTION SUBCUTANEOUS at 08:06

## 2023-06-21 RX ADMIN — LISINOPRIL 40 MG: 20 TABLET ORAL at 08:06

## 2023-06-21 RX ADMIN — Medication 1 TABLET: at 08:06

## 2023-06-21 RX ADMIN — FLUOXETINE 40 MG: 20 CAPSULE ORAL at 08:06

## 2023-06-21 RX ADMIN — MIRTAZAPINE 15 MG: 15 TABLET, FILM COATED ORAL at 08:06

## 2023-06-21 RX ADMIN — GABAPENTIN 300 MG: 300 CAPSULE ORAL at 08:06

## 2023-06-21 RX ADMIN — IPRATROPIUM BROMIDE AND ALBUTEROL SULFATE 3 ML: 2.5; .5 SOLUTION RESPIRATORY (INHALATION) at 01:06

## 2023-06-21 RX ADMIN — GABAPENTIN 300 MG: 300 CAPSULE ORAL at 03:06

## 2023-06-21 RX ADMIN — LAMOTRIGINE 25 MG: 25 TABLET ORAL at 08:06

## 2023-06-21 RX ADMIN — LEVOFLOXACIN 750 MG: 5 INJECTION, SOLUTION INTRAVENOUS at 07:06

## 2023-06-21 RX ADMIN — QUETIAPINE FUMARATE 50 MG: 25 TABLET ORAL at 08:06

## 2023-06-21 RX ADMIN — MORPHINE SULFATE 4 MG: 4 INJECTION, SOLUTION INTRAMUSCULAR; INTRAVENOUS at 12:06

## 2023-06-21 RX ADMIN — IPRATROPIUM BROMIDE AND ALBUTEROL SULFATE 3 ML: 2.5; .5 SOLUTION RESPIRATORY (INHALATION) at 07:06

## 2023-06-21 RX ADMIN — OXYCODONE HYDROCHLORIDE 10 MG: 10 TABLET ORAL at 08:06

## 2023-06-21 RX ADMIN — PROPRANOLOL HYDROCHLORIDE 40 MG: 10 TABLET ORAL at 03:06

## 2023-06-21 RX ADMIN — ARIPIPRAZOLE 5 MG: 5 TABLET ORAL at 08:06

## 2023-06-21 NOTE — NURSING
Nurses Note -- 4 Eyes      6/21/2023   4:42 PM      Skin assessed during: Daily Assessment      [] No Altered Skin Integrity Present    []Prevention Measures Documented      [x] Yes- Altered Skin Integrity Present or Discovered   [] LDA Added if Not in Epic (Describe Wound)   [x] New Altered Skin Integrity was Present on Admit and Documented in LDA   [x] Wound Image Taken    Wound Care Consulted? Yes    Attending Nurse:  Davide Conway RN     Second RN/Staff Member:  LUPE George

## 2023-06-21 NOTE — PROGRESS NOTES
Trauma Surgery   Progress Note  Admit Date: 6/4/2023  HD#17  POD#4 Days Post-Op    Subjective:   Interval history:  NAEON  AF  Slight bump in white count  HR and BP improving   Currently receiving neb treatment  Sitter at bedside  Good urine output  Having bowel movements    Home Meds:   Current Outpatient Medications   Medication Instructions    albuterol (PROVENTIL/VENTOLIN HFA) 90 mcg/actuation inhaler 2 puffs, Inhalation, Every 4 hours PRN    ALBUTEROL INHL 90 mcg, Inhalation, Every 4 hours PRN    amLODIPine (NORVASC) 5 mg, Oral, Daily    amoxicillin-clavulanate 875-125mg (AUGMENTIN) 875-125 mg per tablet 1 tablet, Oral, Every 12 hours (non-standard times)    atorvastatin (LIPITOR) 20 mg, Oral, Daily    clonazePAM (KLONOPIN) 1 mg, Oral, 2 times daily PRN    FLUoxetine 20 MG capsule fluoxetine 20 mg capsule    FLUoxetine 40 mg, Oral, Daily    insulin glargine 100 units/mL SubQ pen Lantus Solostar U-100 Insulin 100 unit/mL (3 mL) subcutaneous pen    ipratropium (ATROVENT) 42 mcg (0.06 %) nasal spray Each Nostril    lamoTRIgine (LAMICTAL) 25 MG tablet lamotrigine 25 mg tablet    LANTUS SOLOSTAR U-100 INSULIN glargine 100 units/mL SubQ pen Subcutaneous    latanoprost 0.005 % ophthalmic solution latanoprost 0.005 % eye drops    lisinopriL (PRINIVIL,ZESTRIL) 40 mg, Oral    mirtazapine (REMERON) 7.5 MG Tab mirtazapine 7.5 mg tablet    potassium chloride (K-TAB) 20 mEq potassium chloride ER 20 mEq tablet,extended release   TAKE 1 TABLET BY MOUTH EVERY DAY    prochlorperazine (COMPAZINE) 10 MG tablet prochlorperazine maleate 10 mg tablet    QUEtiapine (SEROQUEL) 200 mg, Oral, Nightly    testosterone cypionate (DEPOTESTOTERONE CYPIONATE) 200 mg/mL injection SMARTSIG:Milliliter(s) IM      Scheduled Meds:   albuterol-ipratropium  3 mL Nebulization Q6H    amLODIPine  5 mg Oral Daily    ARIPiprazole  5 mg Oral Daily    atorvastatin  20 mg Oral Daily    calcium-vitamin D3  1 tablet Per NG tube BID    enoxaparin  40 mg  "Subcutaneous Q12H    FLUoxetine  40 mg Oral Daily    gabapentin  300 mg Per NG tube TID    guaiFENesin  600 mg Oral BID    lamoTRIgine  25 mg Oral Daily    levoFLOXacin  750 mg Intravenous Q24H    lisinopriL  40 mg Oral Daily    mirtazapine  15 mg Oral QHS    propranoloL  40 mg Oral TID    QUEtiapine  100 mg Oral QHS    QUEtiapine  50 mg Oral Daily     Continuous Infusions:      PRN Meds:sodium chloride, acetaminophen, hydrALAZINE, midazolam, morphine, oxyCODONE, oxyCODONE     Objective:     VITAL SIGNS: 24 HR MIN & MAX LAST   Temp  Min: 98.6 °F (37 °C)  Max: 99.2 °F (37.3 °C)  99.2 °F (37.3 °C)   BP  Min: 124/65  Max: 164/108  124/65    Pulse  Min: 95  Max: 122  105    Resp  Min: 15  Max: 33  (!) 22    SpO2  Min: 90 %  Max: 100 %  (!) 92 %      HT: 5' 10.98" (180.3 cm)  WT: 121 kg (266 lb 12.1 oz)  BMI: 37.2     Intake/output:  Intake/Output - Last 3 Shifts         06/19 0700  06/20 0659 06/20 0700  06/21 0659    P.O. 720 480    I.V. (mL/kg)  40 (0.3)    IV Piggyback  100    Total Intake(mL/kg) 720 (6) 620 (5.1)    Urine (mL/kg/hr) 1040 (0.4) 1500 (0.5)    Stool 0 0    Total Output 1040 1500    Net -320 -880          Urine Occurrence 1 x 5 x    Stool Occurrence 1 x 2 x            Intake/Output Summary (Last 24 hours) at 6/21/2023 0609  Last data filed at 6/20/2023 1852  Gross per 24 hour   Intake 620 ml   Output 1500 ml   Net -880 ml           Lines/drains/airway:       Peripheral IV - Single Lumen 06/17/23 1030 Right Antecubital (Active)   Site Assessment Clean;Dry;Intact 06/19/23 0000   Extremity Assessment Distal to IV No abnormal discoloration;No redness;No swelling;No warmth 06/19/23 0000   Line Status Infusing 06/19/23 0000   Dressing Status Clean;Dry;Intact 06/19/23 0000   Number of days: 1       Physical examination:  Gen: NAD, AAOx3, answering questions appropriately, anxious  HEENT: NCAT  CV: tachycardic  Resp: NWOB  Abd: S/NT/ND  Msk: RUE+LUE compartments soft, NV intact, able to wiggle fingers  Neuro: " CN II-XII grossly intact    Labs:  Renal:  Recent Labs     06/19/23  0120 06/20/23  0138 06/20/23  1453 06/21/23  0124   BUN 15.3 13.4 11.3 10.7   CREATININE 0.66* 0.76 0.77 0.71*       No results for input(s): LACTIC in the last 72 hours.  FEN/GI:  Recent Labs     06/19/23  0120 06/20/23  0138 06/20/23  1453 06/21/23  0124   * 154* 152* 152*   K 3.4* 3.2* 3.8 4.0   CO2 25 24 24 25   CALCIUM 8.2* 7.8* 8.0* 7.7*   ALBUMIN 2.3* 2.4*  --  2.4*   BILITOT 2.2* 2.3*  --  2.0*   AST 43* 41*  --  35*   ALKPHOS 223* 241*  --  228*   ALT 37 40  --  36       Heme:  Recent Labs     06/19/23  0120 06/20/23  0138 06/21/23  0124   HGB 9.0* 9.4* 9.4*   HCT 28.4* 30.3* 31.3*   * 668* 644*       ID:  Recent Labs     06/19/23  0120 06/20/23  0138 06/21/23  0124   WBC 11.41 11.33 12.49*       CBG:  Recent Labs     06/19/23  0120 06/20/23  0138 06/20/23  1453 06/21/23  0124   GLUCOSE 110* 118* 120* 109*        No results for input(s): POCTGLUCOSE in the last 72 hours.   Cardiovascular:  No results for input(s): TROPONINI, CKTOTAL, CKMB, BNP in the last 168 hours.  I have reviewed all pertinent lab results within the past 24 hours.    Imaging:  Fl Modified Barium Swallow Speech   Final Result      X-Ray Chest 1 View   Final Result      Increasing pulmonary opacities may be related to atelectatic change, patient rotation or pneumonitis.         Electronically signed by: Eileen Goodman   Date:    06/18/2023   Time:    08:13      XR Gastric tube check, non-radiologist performed   Final Result      X-Ray Chest 1 View   Final Result      Interval placement of endotracheal tube and NG tube in good position      Otherwise unchanged         Electronically signed by: Anu Grijalva   Date:    06/17/2023   Time:    12:26      SURG FL Surgery Fluoro Usage   Final Result      X-Ray Chest 1 View   Final Result      X-Ray Elbow 2 Views Left   Final Result      Fractures as above.         Electronically signed by: Tommie Cao    Date:    06/16/2023   Time:    14:53      X-Ray Chest 1 View   Final Result      1. Interval removal of endotracheal and enteric tubes.   2. Increased bilateral airspace opacities.   No significant change from the [facility] interpretation.         Electronically signed by: Delmy Rodriguez   Date:    06/16/2023   Time:    06:17      X-Ray Chest 1 View   Final Result      X-Ray Pelvis Routine AP   Final Result      SURG FL Surgery Fluoro Usage   Final Result      X-Ray Chest 1 View   Final Result      New right apical opacity.         Electronically signed by: Eileen Goodman   Date:    06/14/2023   Time:    06:18      X-Ray Chest 1 View   Final Result      Interval removal of left-sided chest tube with no clear evidence of pneumothorax.      No other change         Electronically signed by: Tommie Cao   Date:    06/13/2023   Time:    09:37      X-Ray Chest 1 View   Final Result      Increase in interstitial and pulmonary vascular markings indicating some degree of pulmonary vascular congestion and cardiac decompensation.      Support catheters as above         Electronically signed by: Tommie Cao   Date:    06/13/2023   Time:    09:36      X-Ray Wrist Complete Right   Final Result      As above.         Electronically signed by: Magnus Couch   Date:    06/12/2023   Time:    13:09      SURG FL Surgery Fluoro Usage   Final Result      X-Ray Chest 1 View   Final Result      No significant change         Electronically signed by: Tommie Cao   Date:    06/12/2023   Time:    09:01      X-Ray Chest 1 View   Final Result      No significant change         Electronically signed by: Tommie Cao   Date:    06/12/2023   Time:    09:00      CT 3D RECON WITH INDEPENDENT WS   Final Result      CT Wrist Without Contrast Right   Final Result      X-Ray Chest 1 View   Final Result      X-Ray Chest 1 View   Final Result      No significant interval change.         Electronically signed by: Roberto Parks    Date:    06/10/2023   Time:    14:12      X-Ray Chest 1 View   Final Result      No significant interval change.         Electronically signed by: Roberto Parks   Date:    06/10/2023   Time:    07:46      SURG FL Surgery Fluoro Usage   Final Result      X-Ray Chest 1 View   Final Result      Slight oval improvement of right lower lobe opacification.  No adverse interval change.         Electronically signed by: Nikhil Auguste   Date:    06/09/2023   Time:    06:35      X-Ray Chest 1 View   Final Result      Slightly improved aeration left lung.  No other significant change.         Electronically signed by: Magnus Couch   Date:    06/08/2023   Time:    09:55      X-Ray Chest 1 View   Final Result      X-Ray Wrist 2 View Right   Final Result      1. Intra-articular distal radius fracture   2. Fracture at the dorsal aspect of the proximal carpal row.  This is most commonly related to triquetral fracture.         Electronically signed by: Eileen Goodman   Date:    06/07/2023   Time:    12:57      X-Ray Chest 1 View   Final Result      No significant interval change.         Electronically signed by: Roberto Parks   Date:    06/07/2023   Time:    07:18      X-ray Shoulder 2 or More Views Right   Final Result      No acute osseous process appreciated.         Electronically signed by: Harjinder Strong   Date:    06/06/2023   Time:    14:29      X-Ray Forearm Right   Final Result      Distal radial fracture.  It is possible there are additional fractures at the wrist.  If needed, follow-up wrist radiographs can be considered.         Electronically signed by: Harjinder Strong   Date:    06/06/2023   Time:    14:27      X-Ray Chest 1 View for Line/Tube Placement   Final Result      1. Endotracheal tube tip now midthoracic trachea.   2. Increased bilateral opacities.         Electronically signed by: Magnus Couch   Date:    06/06/2023   Time:    10:08      X-Ray Chest 1 View   Final Result      Interval slight advancement of  the endotracheal tube.  No other significant change.         Electronically signed by: Magnus Couch   Date:    06/06/2023   Time:    06:16      X-Ray Chest 1 View   Final Result      Little interval change.         Electronically signed by: Magnus Couch   Date:    06/06/2023   Time:    06:15      X-Ray Chest 1 View   Final Result      Somewhat improved aeration in the lungs bilaterally compared to the prior examination otherwise not significantly changed         Electronically signed by: Anu Grijalva   Date:    06/05/2023   Time:    17:21      SURG FL Surgery Fluoro Usage   Final Result      CT 3D RECON WITHOUT INDEPENDENT WS   Final Result   FINDINGS/   3D reconstructions of the chest were created based on source data from accession number [id number].  Please see that report for details.         Electronically signed by: Magnus Couch   Date:    06/05/2023   Time:    13:14      X-Ray Chest 1 View   Final Result      Unchanged bilateral perihilar alveolar opacities.      Lung volumes are low with associated atelectatic change.         Electronically signed by: Eileen Goodman   Date:    06/05/2023   Time:    10:54      X-Ray Chest 1 View   Final Result      As above.  No adverse interval change.         Electronically signed by: Nikhil Auguste   Date:    06/05/2023   Time:    06:49      CT Thoracic Spine Without Contrast   Final Result   Impression:      1. No acute thoracic spine fracture dislocation or subluxation is identified.      2. Details and other findings as above.      No significant discrepancy with overnight report.         Electronically signed by: Roberto Parks   Date:    06/05/2023   Time:    08:05      CT Lumbar Spine Without Contrast   Final Result   Impression:      1. There is a burst fracture of the L2 vertebral body. There is retropulsion of the fracture fragment (series 7 image 50) causing spinal canal narrowing and compression upon the thecal sac (series 3 image 50). There is a comminuted  displaced fracture of the right L2 transverse process. There is a displaced fracture of the right L3 transverse process. There are comminuted displaced fractures of the bilateral L5 transverse process. There is a comminuted displaced fracture of the left sacral ala described separately.      2. Details and findings as above.      No significant discrepancy with overnight report.         Electronically signed by: Roberto Parks   Date:    06/05/2023   Time:    08:08      CT Cervical Spine Without Contrast   Final Result   Impression:      1. No acute cervical spine fracture dislocation or subluxation is seen.      2. There is a left apical pneumothorax seen. There is opacity seen in the apical segment of the right upper lobe. This may be consistent with contusions or consolidation. Consider additional evaluation with chest CT.      3. Details and findings as noted above.      No significant discrepancy with overnight report.         Electronically signed by: Roberto Parks   Date:    06/05/2023   Time:    08:11      CT Pelvis Without Contrast   Final Result   Abnormal      1. Comminuted pelvic fractures with diastasis and displacement as above.   2. Fracture associated soft tissue hematomas.   3. Perirectal stranding.  Cannot exclude bowel injury.  Discrepancy from preliminary report.   This report was flagged in Epic as abnormal.         Electronically signed by: Eileen Goodman   Date:    06/05/2023   Time:    08:26      CT 3D RECON WITHOUT INDEPENDENT WS   Final Result   Abnormal      1. Comminuted pelvic fractures with diastasis and displacement as above.   2. Fracture associated soft tissue hematomas.   3. Perirectal stranding.  Cannot exclude bowel injury.  Discrepancy from preliminary report.   This report was flagged in Epic as abnormal.         Electronically signed by: Eileen Goodman   Date:    06/05/2023   Time:    08:26      X-Ray Pelvis Routine AP   Final Result      Multiple fractures.          Electronically signed by: Roberto Parks   Date:    06/04/2023   Time:    17:30      X-Ray Femur 2 View Left   Final Result      No definite acute osseous abnormality identified.         Electronically signed by: Roberto Parks   Date:    06/04/2023   Time:    17:32      X-Ray Chest 1 View   Final Result      1. Endotracheal tube tip near the origin of the right mainstem bronchus and can be retracted slightly.  Additional tubing overlying the thoracic inlet may be an enteric tube.   2. Right upper lung consolidation.  Patchy opacities in the left lower lung may be atelectasis or contusion.         Electronically signed by: Harjinder Strong   Date:    06/04/2023   Time:    16:18      CT Previous   Final Result      CT Previous   Final Result      Xray Previous   Final Result           I have reviewed all pertinent imaging results/findings within the past 24 hours.    Micro/Path/Other:  Microbiology Results (last 7 days)       Procedure Component Value Units Date/Time    Blood Culture [[id number]]  (Normal) Collected: 06/13/23 0912    Order Status: Completed Specimen: Blood from Antecubital, Left Updated: 06/18/23 1100     CULTURE, BLOOD ([facility]) No Growth at 5 days    Blood Culture [[id number]]  (Normal) Collected: 06/13/23 0912    Order Status: Completed Specimen: Blood from Antecubital, Right Updated: 06/18/23 1100     CULTURE, BLOOD ([facility]) No Growth at 5 days    Respiratory Culture [[id number]]  (Abnormal)  (Susceptibility) Collected: 06/13/23 0850    Order Status: Completed Specimen: Respiratory from Endotracheal Aspirate Updated: 06/15/23 0859     Respiratory Culture Many Pseudomonas aeruginosa     Comment: with no normal respiratory polo        GRAM STAIN Quality 2+      Many Gram Negative Rods           Specimen (168h ago, onward)      None             Assessment & Plan:   Gera Chavez is a 37 y.o. male admitted on 6/4/2023 following fall from bridge at 25 ft.  He reportedly jumped off a bridge and landed on dry land.   He arrived intubated with a left-sided chest tube.  He was found to have a pelvic ring fracture including open book fracture, L2 burst fracture, left pneumothorax, multiple left-sided rib fractures. Pseudomonas VAP.    Consults:   Neurosurgery  Orthopedic surgery  Psychiatry    Therapy:  Physical Therapy  Occupational Therapy  SLP Weight bearing status:   RUE: NWB  LUE: NWB  RLE:  pivot to transfer  LLE: NWB Precautions:  Fall and Standard   Seizure prophylaxis:  Not indicated. VTE prophylaxis:     Prophylactic Lovenox 40mg BID  GI prophylaxis:  H2B   Outpatient follow up:  PCP  Orthopedic surgery  Neurosurgery  Disposition:  Pending progress with therapy  Pending clinical progress  Psych?     - CEC  - Easy to chew diet, add low sodium modifier 2/2 elev sodium on labs  - Daily labs  - Home medications  - Psych meds per psych   - Levaquin until 6/22  - MM pain control  - IS  - Therapy as above  - VTE prevention as above       6/21/2023 5:21 AM     The above findings, diagnostics, and treatment plan were discussed with Dr. Jarret Sauer  who will follow with further assessments and recommendations. Please call with any questions, concerns, or clinical status changes.

## 2023-06-21 NOTE — ANESTHESIA POSTPROCEDURE EVALUATION
Anesthesia Post Evaluation    Patient: Gera Chavez    Procedure(s) Performed: Procedure(s) (LRB):  ORIF,PELVIS (Left)    Final Anesthesia Type: general      Patient location during evaluation: ICU  Patient participation: No - Unable to Participate, Intubation  Level of consciousness: oriented and sedated  Post-procedure vital signs: reviewed and stable  Pain management: adequate  Airway patency: patent    PONV status at discharge: No PONV  Anesthetic complications: yes    Pratibha-operative Events Comments: Pt. Was unable to tolerate prone positioning for surgery.  Cardiovascular status: blood pressure returned to baseline and stable  Respiratory status: spontaneous ventilation, unassisted and ETT  Hydration status: euvolemic  Follow-up needed           Vitals Value Taken Time   /75 06/21/23 1307   Temp 37.2 °C (98.9 °F) 06/21/23 1200   Pulse 104 06/21/23 1330   Resp 4 06/21/23 1330   SpO2 100 % 06/21/23 1330   Vitals shown include unvalidated device data.      No case tracking events are documented in the log.      Pain/Mp Score: Pain Rating Prior to Med Admin: 10 (6/21/2023  8:11 AM)  Pain Rating Post Med Admin: 2 (6/21/2023  9:11 AM)

## 2023-06-21 NOTE — PT/OT/SLP PROGRESS
Speech Language Pathology Department  Dysphagia Therapy Progress Note    Patient Name:  Gera Chavez   MRN:  [id number]  Admitting Diagnosis: trauma    Recommendations:     General recommendations:  dysphagia therapy, cognitive-linguistic therapy, and ENT consult for dysphonia/dysphagia  Diet recommendations:  Easy to Chew Diet - IDDSI Level 7, Liquid Diet Level: Mildly thick liquids - IDDSI Level 2   Aspiration precautions: small bites/sips, slow rate, alternate solids/liquids, and medications whole in puree    Discharge recommendations:  rehabilitation facility   Barriers to safe discharge:  level of skilled assistance needed    Subjective     Patient awake, alert, and cooperative.    Pain/Comfort: Pain Rating 1: 0/10    Spiritual/Cultural/Buddhism Beliefs/Practices that affect care: no    Respiratory Status: nasal cannula    Objective:     Oral Musculature  Dentition: own teeth  Secretion Management: coughing on secretions  Mucosal Quality: good  Vocal Quality: breathy  Volitional Cough: Nonproductive    Therapeutic Activities:  Pt completed base of tongue and laryngeal x60 with minimal cues.  Pt tolerated thermal stimulation to the anterior faucial pillars x10 with 100% swallow responses.  Laryngeal excursion limited.  Delay varied 2-3 seconds.    Assessment:     Pt continues to present with oropharyngeal dysphagia requiring diet modification to improve swallow safety and efficiency.    Goals:     Multidisciplinary Problems       SLP Goals          Problem: SLP    Goal Priority Disciplines Outcome   SLP Goal     SLP Ongoing, Progressing   Description:   LTG: Tolerate least restrictive PO diet with no clinical signs/sx aspiration  STGs:  1.  Tolerate thermal stimulation to the anterior faucial pillars with 100% effortful swallow responses and delay less than 2 seconds.  2.  Complete base of tongue and laryngeal strengthening exercises with minimal cues    LTG: Complete basic cognitive tasks with  supervision  STGs:  1.  Identify problems with min cues  2.  Solve routine problems with min cues  3.  Complete 3-4 step visual sequencing tasks with min cues                         Patient Education:     Patient provided with verbal education regarding swallow function and SLP POC.  Understanding was verbalized.    Plan:     Will continue to follow and tx as appropriate.    SLP Follow-Up:  Yes   Patient to be seen:  5 x/week   Plan of Care expires:  07/03/23  Plan of Care reviewed with:  patient       Time Tracking:     SLP Treatment Date:   06/21/23  Speech Start Time:  1050  Speech Stop Time:  1105     Speech Total Time (min):  15 min    Billable minutes:  Treatment of Swallow Dysfunction, 15 minutes       06/21/2023

## 2023-06-21 NOTE — ADDENDUM NOTE
Addendum  created 06/21/23 1324 by Michael Garcia MD    Attestation recorded in Intraprocedure, Clinical Note Signed, Intraprocedure Attestations filed

## 2023-06-21 NOTE — PT/OT/SLP PROGRESS
Occupational Therapy   Treatment    Name: Gera Chavez  MRN: [id number]  Admitting Diagnosis:  Closed olecranon fracture, left, initial encounter  4 Days Post-Op    Recommendations:     Discharge Recommendations:  (Pending, pt with several orthopedic restrictions)  Discharge Equipment Recommendations:  to be determined by next level of care    Assessment:   Performance deficits affecting function are weakness, gait instability, impaired endurance, impaired balance, decreased lower extremity function, decreased upper extremity function, decreased safety awareness, impaired self care skills, impaired functional mobility, pain, orthopedic precautions.     Rehab Prognosis:  Good; patient would benefit from acute skilled OT services to address these deficits and reach maximum level of function.       Plan:     Patient to be seen 5 x/week to address the above listed problems via self-care/home management, therapeutic activities, therapeutic exercises  Plan of Care Expires: 07/14/23  Plan of Care Reviewed with: patient    Subjective     Pain/Comfort:       Objective:     Communicated with: Tita Allen prior to session.  Patient found HOB elevated with   upon OT entry to room.    General Precautions: Standard, fall (TLSO HOB >30 degrees, NWB R wrist ok for platform walker, TTWB LLE)    Orthopedic Precautions: (NWB LUE with splint, NWB R wrist, ok to pivot transfer RLE, NWB LLE, no hopping/no ambulation)  Braces: N/A  Respiratory Status: Nasal cannula, flow 3 L/min  Vital Signs: Blood Pressure: 136/87      Occupational Performance:     Bed Mobility:    Patient completed Rolling/Turning to Left with  maximal assistance  Patient completed Rolling/Turning to Right with maximal assistance     Functional Mobility/Transfers:  Functional Mobility: Bed>recliner total assist with use of tad lift    Activities of Daily Living:  Upper Body Dressing: maximal assistance readjustment of TLSO    Therapeutic Positioning    [facility]  6 Click ADL:      Patient Education:  Patient provided with verbal education regarding OT role/goals/POC, post op precautions, and home exercise program .  Understanding was verbalized, however additional teaching warranted.        Patient left up in chair with all lines intact and 1:1 present    GOALS:   Multidisciplinary Problems       Occupational Therapy Goals          Problem: Occupational Therapy    Goal Priority Disciplines Outcome Interventions   Occupational Therapy Goal     OT, PT/OT Ongoing, Progressing    Description: Goals to be met by: 7/16/23     Patient will increase functional independence with ADLs by performing:    UE Dressing with Minimal Assistance.  Grooming while seated EOB with Min Assistance.  Toileting Moderate Assistance for hygiene and clothing management, indicating need.  Sitting at edge of bed x15 minutes with Stand-by Assistance.                         Time Tracking:     OT Date of Treatment: 06/21/23  OT Start Time: 1303  OT Stop Time: 1321  OT Total Time (min): 18 min    Billable Minutes:Self Care/Home Management 18    OT/HUBERT: HUBERT     Number of HUBERT visits since last OT visit: 3    6/21/2023

## 2023-06-21 NOTE — PT/OT/SLP PROGRESS
Physical Therapy Treatment    Patient Name:  Gera Chavez   MRN:  [id number]    Recommendations:     Discharge Recommendations: other (see comments) (pending progress; multiple orthopedic pxns)  Discharge Equipment Recommendations: to be determined by next level of care  Barriers to discharge: Ongoing medical needs    Assessment:     Gera Chavez is a 37 y.o. male admitted with a medical diagnosis of L pelvic rim fx w/ open book pelvis s/p ORIF L posterior column of acetabulum, L pelvic wall hematoma, L2 burst fx s/p T11-L4 fusion, apical PTX, L 4-11 rib fx, pulmonary contusion, R distal radius fracture s/p ORIF, and repaired CSF leak.  He presents with the following impairments/functional limitations: weakness, impaired self care skills, impaired functional mobility, orthopedic precautions, impaired endurance, impaired balance, gait instability.    Pt sitting in recliner with 1:1 present. Pt sat up for ~ 1 hour and requested to get back to bed. Tolerated tad lift back to bed well.     Rehab Prognosis: Good; patient would benefit from acute skilled PT services to address these deficits and reach maximum level of function.    Recent Surgery: Procedure(s) (LRB):  ORIF, ELBOW (Left) 4 Days Post-Op    Plan:     During this hospitalization, patient to be seen 5 x/week to address the identified rehab impairments via therapeutic activities, therapeutic exercises and progress toward the following goals:    Plan of Care Expires:  07/16/23    Subjective     Chief Complaint: rib pain  Patient/Family Comments/goals: none reported  Pain/Comfort:  Pain Rating 1: 6/10  Location - Side 1: Left  Location 1: flank  Pain Addressed 1: Reposition      Objective:     Communicated with RN prior to session.  Patient found HOB elevated with blood pressure cuff, telemetry, pulse ox (continuous), (TLSO, splint RUE) upon PT entry to room.     General Precautions: Standard, aspiration  Orthopedic Precautions: spinal precautions (NWB DIOGO  UE's, NWB LLE, WBAT RLE for t/f's only (no hopping))  Braces: TLSO (splint RUE)  Respiratory Status: Nasal cannula, flow 3 L/min  Blood Pressure: 133/75, 103, 97%  Skin Integrity: Visible skin intact      Functional Mobility:  Bed Mobility:    Rolling DIOGO with max assist x 2; soiled 2/2 pure wick leaking. Bandage on lower abdomen soiled, RN notified.     Therapeutic Activities/Exercises:  Tod lifted from recliner to bed.     Education:  Patient provided with verbal education regarding POC, pxns and mobility.  Understanding was verbalized, however additional teaching warranted.     Patient left HOB elevated with all lines intact, call button in reach, and RN present.    GOALS:   Multidisciplinary Problems       Physical Therapy Goals          Problem: Physical Therapy    Goal Priority Disciplines Outcome Goal Variances Interventions   Physical Therapy Goal     PT, PT/OT Ongoing, Progressing     Description: Goals to be met by: 23     Patient will increase functional independence with mobility by performin. Supine to sit with Moderate Assistance  2. Sit to supine with Moderate Assistance  3. Sitting at edge of bed x15 minutes with Moderate Assistance  4. Pt to tolerate there ex/ROM to B LE for strengthening and to prevent contractures                          Time Tracking:     PT Received On: 23  PT Start Time: 1423     PT Stop Time: 1454  PT Total Time (min): 31 min     Billable Minutes: Therapeutic Activity 2 units    Treatment Type: Treatment  PT/PTA: PTA     Number of PTA visits since last PT visit: 2     2023

## 2023-06-21 NOTE — ANESTHESIA POSTPROCEDURE EVALUATION
Anesthesia Post Evaluation    Patient: Gera Chavez    Procedure(s) Performed: Procedure(s) (LRB):  ORIF,PELVIS (Left)    Final Anesthesia Type: general      Patient location during evaluation: ICU  Patient participation: No - Unable to Participate, Intubation  Level of consciousness: awake and alert and oriented  Post-procedure vital signs: reviewed and stable  Pain management: adequate  Airway patency: patent  BIENVENIDO mitigation strategies: Verification of full reversal of neuromuscular block  PONV status at discharge: No PONV  Anesthetic complications: no      Cardiovascular status: blood pressure returned to baseline and stable  Respiratory status: intubated  Hydration status: euvolemic  Follow-up needed   Comments: [facility]          Vitals Value Taken Time   /75 06/21/23 1307   Temp 37.2 °C (98.9 °F) 06/21/23 1200   Pulse 104 06/21/23 1307   Resp 20 06/21/23 1307   SpO2 96 % 06/21/23 1307   Vitals shown include unvalidated device data.      No case tracking events are documented in the log.      Pain/Mp Score: Pain Rating Prior to Med Admin: 10 (6/21/2023  8:11 AM)  Pain Rating Post Med Admin: 2 (6/21/2023  9:11 AM)

## 2023-06-22 LAB
ALBUMIN SERPL-MCNC: 2.7 G/DL (ref 3.5–5)
ALBUMIN/GLOB SERPL: 0.8 RATIO (ref 1.1–2)
ALP SERPL-CCNC: 261 UNIT/L (ref 40–150)
ALT SERPL-CCNC: 34 UNIT/L (ref 0–55)
AST SERPL-CCNC: 33 UNIT/L (ref 5–34)
BASOPHILS # BLD AUTO: 0.08 X10(3)/MCL
BASOPHILS NFR BLD AUTO: 0.7 %
BILIRUBIN DIRECT+TOT PNL SERPL-MCNC: 1.9 MG/DL
BUN SERPL-MCNC: 13.2 MG/DL (ref 8.9–20.6)
CALCIUM SERPL-MCNC: 8.2 MG/DL (ref 8.4–10.2)
CHLORIDE SERPL-SCNC: 115 MMOL/L (ref 98–107)
CO2 SERPL-SCNC: 23 MMOL/L (ref 22–29)
CREAT SERPL-MCNC: 0.73 MG/DL (ref 0.73–1.18)
CRP SERPL-MCNC: 45 MG/L
EOSINOPHIL # BLD AUTO: 0.4 X10(3)/MCL (ref 0–0.9)
EOSINOPHIL NFR BLD AUTO: 3.7 %
ERYTHROCYTE [DISTWIDTH] IN BLOOD BY AUTOMATED COUNT: 16.1 % (ref 11.5–17)
GFR SERPLBLD CREATININE-BSD FMLA CKD-EPI: >60 MLS/MIN/1.73/M2
GLOBULIN SER-MCNC: 3.4 GM/DL (ref 2.4–3.5)
GLUCOSE SERPL-MCNC: 105 MG/DL (ref 74–100)
HCT VFR BLD AUTO: 31.3 % (ref 42–52)
HGB BLD-MCNC: 9.6 G/DL (ref 14–18)
IMM GRANULOCYTES # BLD AUTO: 0.08 X10(3)/MCL (ref 0–0.04)
IMM GRANULOCYTES NFR BLD AUTO: 0.7 %
LYMPHOCYTES # BLD AUTO: 1.97 X10(3)/MCL (ref 0.6–4.6)
LYMPHOCYTES NFR BLD AUTO: 18.2 %
MCH RBC QN AUTO: 28.5 PG (ref 27–31)
MCHC RBC AUTO-ENTMCNC: 30.7 G/DL (ref 33–36)
MCV RBC AUTO: 92.9 FL (ref 80–94)
MONOCYTES # BLD AUTO: 0.84 X10(3)/MCL (ref 0.1–1.3)
MONOCYTES NFR BLD AUTO: 7.8 %
NEUTROPHILS # BLD AUTO: 7.44 X10(3)/MCL (ref 2.1–9.2)
NEUTROPHILS NFR BLD AUTO: 68.9 %
NRBC BLD AUTO-RTO: 0.3 %
PLATELET # BLD AUTO: 593 X10(3)/MCL (ref 130–400)
PMV BLD AUTO: 9.9 FL (ref 7.4–10.4)
POTASSIUM SERPL-SCNC: 3.7 MMOL/L (ref 3.5–5.1)
PROT SERPL-MCNC: 6.1 GM/DL (ref 6.4–8.3)
RBC # BLD AUTO: 3.37 X10(6)/MCL (ref 4.7–6.1)
SODIUM SERPL-SCNC: 148 MMOL/L (ref 136–145)
WBC # SPEC AUTO: 10.81 X10(3)/MCL (ref 4.5–11.5)

## 2023-06-22 PROCEDURE — 25000003 PHARM REV CODE 250: Performed by: NURSE PRACTITIONER

## 2023-06-22 PROCEDURE — 94640 AIRWAY INHALATION TREATMENT: CPT

## 2023-06-22 PROCEDURE — 25000242 PHARM REV CODE 250 ALT 637 W/ HCPCS: Performed by: HOSPITALIST

## 2023-06-22 PROCEDURE — 63600175 PHARM REV CODE 636 W HCPCS

## 2023-06-22 PROCEDURE — 97530 THERAPEUTIC ACTIVITIES: CPT | Mod: CO

## 2023-06-22 PROCEDURE — 25000003 PHARM REV CODE 250: Performed by: SURGERY

## 2023-06-22 PROCEDURE — 11000001 HC ACUTE MED/SURG PRIVATE ROOM

## 2023-06-22 PROCEDURE — 27000221 HC OXYGEN, UP TO 24 HOURS

## 2023-06-22 PROCEDURE — 94664 DEMO&/EVAL PT USE INHALER: CPT

## 2023-06-22 PROCEDURE — 94761 N-INVAS EAR/PLS OXIMETRY MLT: CPT

## 2023-06-22 PROCEDURE — 99900035 HC TECH TIME PER 15 MIN (STAT)

## 2023-06-22 PROCEDURE — 85025 COMPLETE CBC W/AUTO DIFF WBC: CPT | Performed by: STUDENT IN AN ORGANIZED HEALTH CARE EDUCATION/TRAINING PROGRAM

## 2023-06-22 PROCEDURE — 80053 COMPREHEN METABOLIC PANEL: CPT | Performed by: STUDENT IN AN ORGANIZED HEALTH CARE EDUCATION/TRAINING PROGRAM

## 2023-06-22 PROCEDURE — 25000003 PHARM REV CODE 250

## 2023-06-22 PROCEDURE — 86140 C-REACTIVE PROTEIN: CPT | Performed by: STUDENT IN AN ORGANIZED HEALTH CARE EDUCATION/TRAINING PROGRAM

## 2023-06-22 PROCEDURE — 94799 UNLISTED PULMONARY SVC/PX: CPT

## 2023-06-22 PROCEDURE — 92526 ORAL FUNCTION THERAPY: CPT

## 2023-06-22 PROCEDURE — 97530 THERAPEUTIC ACTIVITIES: CPT | Mod: CQ

## 2023-06-22 RX ORDER — ACETAMINOPHEN 325 MG/1
650 TABLET ORAL EVERY 6 HOURS PRN
Status: DISCONTINUED | OUTPATIENT
Start: 2023-06-22 | End: 2023-06-26 | Stop reason: HOSPADM

## 2023-06-22 RX ADMIN — PROPRANOLOL HYDROCHLORIDE 40 MG: 10 TABLET ORAL at 08:06

## 2023-06-22 RX ADMIN — LISINOPRIL 40 MG: 20 TABLET ORAL at 08:06

## 2023-06-22 RX ADMIN — QUETIAPINE FUMARATE 100 MG: 100 TABLET ORAL at 08:06

## 2023-06-22 RX ADMIN — OXYCODONE HYDROCHLORIDE 10 MG: 10 TABLET ORAL at 08:06

## 2023-06-22 RX ADMIN — Medication 1 TABLET: at 08:06

## 2023-06-22 RX ADMIN — AMLODIPINE BESYLATE 5 MG: 5 TABLET ORAL at 08:06

## 2023-06-22 RX ADMIN — ARIPIPRAZOLE 5 MG: 5 TABLET ORAL at 08:06

## 2023-06-22 RX ADMIN — LAMOTRIGINE 25 MG: 25 TABLET ORAL at 08:06

## 2023-06-22 RX ADMIN — PROPRANOLOL HYDROCHLORIDE 40 MG: 10 TABLET ORAL at 04:06

## 2023-06-22 RX ADMIN — IPRATROPIUM BROMIDE AND ALBUTEROL SULFATE 3 ML: 2.5; .5 SOLUTION RESPIRATORY (INHALATION) at 09:06

## 2023-06-22 RX ADMIN — IPRATROPIUM BROMIDE AND ALBUTEROL SULFATE 3 ML: 2.5; .5 SOLUTION RESPIRATORY (INHALATION) at 08:06

## 2023-06-22 RX ADMIN — GABAPENTIN 300 MG: 300 CAPSULE ORAL at 04:06

## 2023-06-22 RX ADMIN — MIRTAZAPINE 15 MG: 15 TABLET, FILM COATED ORAL at 08:06

## 2023-06-22 RX ADMIN — ACETAMINOPHEN 650 MG: 325 TABLET, FILM COATED ORAL at 04:06

## 2023-06-22 RX ADMIN — ENOXAPARIN SODIUM 40 MG: 40 INJECTION SUBCUTANEOUS at 08:06

## 2023-06-22 RX ADMIN — IPRATROPIUM BROMIDE AND ALBUTEROL SULFATE 3 ML: 2.5; .5 SOLUTION RESPIRATORY (INHALATION) at 12:06

## 2023-06-22 RX ADMIN — LEVOFLOXACIN 750 MG: 5 INJECTION, SOLUTION INTRAVENOUS at 08:06

## 2023-06-22 RX ADMIN — GUAIFENESIN 600 MG: 600 TABLET, EXTENDED RELEASE ORAL at 08:06

## 2023-06-22 RX ADMIN — GABAPENTIN 300 MG: 300 CAPSULE ORAL at 08:06

## 2023-06-22 RX ADMIN — FLUOXETINE 40 MG: 20 CAPSULE ORAL at 08:06

## 2023-06-22 RX ADMIN — OXYCODONE HYDROCHLORIDE 10 MG: 10 TABLET ORAL at 12:06

## 2023-06-22 RX ADMIN — ATORVASTATIN CALCIUM 20 MG: 10 TABLET, FILM COATED ORAL at 08:06

## 2023-06-22 RX ADMIN — QUETIAPINE FUMARATE 50 MG: 25 TABLET ORAL at 08:06

## 2023-06-22 NOTE — PT/OT/SLP PROGRESS
Speech Language Pathology Department  Dysphagia Therapy Progress Note    Patient Name:  Gera Chavez   MRN:  [id number]  Admitting Diagnosis: trauma    Recommendations:     General recommendations:  dysphagia therapy  Diet recommendations:  Easy to Chew Diet - IDDSI Level 7, Liquid Diet Level: Mildly thick liquids - IDDSI Level 2   Aspiration precautions: small bites/sips, slow rate, additional swallow per bite/sip, and medications whole in puree    Discharge recommendations:  rehabilitation facility, LTACH (long-term acute care hospital)   Barriers to safe discharge:  level of skilled assistance needed    Subjective     Patient awake, alert, and cooperative.    Pain/Comfort: Pain Rating 1: 0/10    Spiritual/Cultural/Sabianism Beliefs/Practices that affect care: no    Respiratory Status: nasal cannula, increased work of breathing    POC discussed with ENT who reported bilateral VF motion, but poor closure    Objective:     Oral Musculature  Dentition: own teeth  Secretion Management: problems swallowing saliva  Mucosal Quality: good  Vocal Quality: breathy and hoarse  Volitional Cough: Unable to clear secretions  Volitional Swallow: weak    Therapeutic Activities:  Pt completed base of tongue and laryngeal x30 with minimal-moderate cues.  Frequent rest breaks required.  Pt tolerated thermal stimulation to the anterior faucial pillars x10 with 100% swallow responses.  Laryngeal excursion fair.  Delay varied 3-4 seconds.    Assessment:     Pt continues to present with oropharyngeal dysphagia requiring diet modification to improve swallow safety and efficiency.    Goals:     Multidisciplinary Problems       SLP Goals          Problem: SLP    Goal Priority Disciplines Outcome   SLP Goal     SLP Ongoing, Progressing   Description:   LTG: Tolerate least restrictive PO diet with no clinical signs/sx aspiration  STGs:  1.  Tolerate thermal stimulation to the anterior faucial pillars with 100% effortful swallow responses and  delay less than 2 seconds.  2.  Complete base of tongue and laryngeal strengthening exercises with minimal cues    LTG: Complete basic cognitive tasks with supervision  STGs:  1.  Identify problems with min cues  2.  Solve routine problems with min cues  3.  Complete 3-4 step visual sequencing tasks with min cues                         Patient Education:     Patient provided with verbal education regarding SLP POC.  Understanding was verbalized.    Plan:     Will continue to follow and tx as appropriate.    SLP Follow-Up:  Yes   Patient to be seen:  5 x/week   Plan of Care expires:  07/03/23  Plan of Care reviewed with:  patient       Time Tracking:     SLP Treatment Date:   06/22/23  Speech Start Time:  1330  Speech Stop Time:  1345     Speech Total Time (min):  15 min    Billable minutes:  Treatment of Swallow Dysfunction, 15 minutes       06/22/2023

## 2023-06-22 NOTE — PT/OT/SLP PROGRESS
Physical Therapy Treatment    Patient Name:  Gera Chavez   MRN:  [id number]    Recommendations:     Discharge Recommendations: other (see comments) (pending progress; multiple orthopedic pxns)  Discharge Equipment Recommendations: to be determined by next level of care  Barriers to discharge: Ongoing medical needs    Assessment:     Gera Chavez is a 37 y.o. male admitted with a medical diagnosis of L pelvic rim fx w/ open book pelvis s/p ORIF L posterior column of acetabulum, L pelvic wall hematoma, L2 burst fx s/p T11-L4 fusion, apical PTX, L 4-11 rib fx, pulmonary contusion, R distal radius fracture s/p ORIF, and repaired CSF leak. L olecranon fx. He presents with the following impairments/functional limitations: weakness, impaired self care skills, impaired functional mobility, orthopedic precautions, impaired endurance, impaired balance, gait instability.    Pt in L sidelying in bed with 1:1 present. ENT procedure today to assess vocal cords. Pt continues with heavy R lateral lean while seated at EOB. Adjusted TLSO to improve fit.     Rehab Prognosis: Good; patient would benefit from acute skilled PT services to address these deficits and reach maximum level of function.    Recent Surgery: Procedure(s) (LRB):  ORIF, ELBOW (Left) 5 Days Post-Op    Plan:     During this hospitalization, patient to be seen 5 x/week to address the identified rehab impairments via therapeutic activities, therapeutic exercises and progress toward the following goals:    Plan of Care Expires:  07/16/23    Subjective     Chief Complaint: rib pain  Patient/Family Comments/goals: none reported  Pain/Comfort:  Pain Rating 1: 5/10 (L ribs and L thigh/hip)  Location - Side 1: Left  Pain Addressed 1: Reposition, Nurse notified      Objective:     Communicated with RN prior to session.  Patient found HOB elevated with wedge on L and with blood pressure cuff, telemetry, pulse ox (continuous), (TLSO, splint RUE) upon PT entry to room.      General Precautions: Standard, aspiration  Orthopedic Precautions: spinal precautions (NWB DIOGO UE's, NWB LLE, WBAT RLE for t/f's only (no hopping))  Braces: TLSO (splint RUE)  Respiratory Status: Nasal cannula, flow 2 L/min; 100%  Blood Pressure: 108-123, 163/91  Skin Integrity: Visible skin intact      Functional Mobility:  Bed Mobility:    Supine <->sit with max assist x 2 people  Rolling with max assist  Balance:   Heavy R lateral lean with mod to max assist to maintain but progressed to brief periods of min assist      Therapeutic Activities/Exercises:  DIOGO LAQ's (assist for LLE)    Education:  Patient provided with verbal education regarding POC, pxns and mobility.  Understanding was verbalized, however additional teaching warranted.     Patient left HOB elevated with all lines intact, call button in reach, and RN present.    GOALS:   Multidisciplinary Problems       Physical Therapy Goals          Problem: Physical Therapy    Goal Priority Disciplines Outcome Goal Variances Interventions   Physical Therapy Goal     PT, PT/OT Ongoing, Progressing     Description: Goals to be met by: 23     Patient will increase functional independence with mobility by performin. Supine to sit with Moderate Assistance  2. Sit to supine with Moderate Assistance  3. Sitting at edge of bed x15 minutes with Moderate Assistance  4. Pt to tolerate there ex/ROM to B LE for strengthening and to prevent contractures                          Time Tracking:     PT Received On: 23  PT Start Time: 1508     PT Stop Time: 1536  PT Total Time (min): 28 min     Billable Minutes: Therapeutic Activity 2 units    Treatment Type: Treatment  PT/PTA: PTA     Number of PTA visits since last PT visit: 3     2023

## 2023-06-22 NOTE — CONSULTS
[facility]                       [address] [id number]    PATIENT NAME:       ANGIE COBOS   YOB: 1985  CSN:                [id number]   MRN:                [id number]  ADMIT DATE:         06/04/2023 15:32:00  PHYSICIAN:          Joce Borges MD                            CONSULTATION    DATE OF CONSULT:      REASON FOR CONSULTATION:  Hoarseness and aspiration.    HISTORY OF PRESENT ILLNESS:  This is a 37-year-old male, who has been in the   hospital for 16 days.  He apparently jumped off a bridge and attempted suicide   and was transferred here for management.  He self-extubated approximate  since then and he is consulted for evaluation.    PHYSICAL EXAMINATION:  GENERAL:  Very breathy voice.  HEENT:  Head is clear.  Face is lesions.  NECK:  Trachea is midline.  Flexible fiberoptic laryngoscopy is performed.  The   patient is noted to have bilateral vocal cord weakness.  He has good excursion   of the cords; however, he is not closing the cords all the way and has a   significant glottic gap and closure.    ASSESSMENT:  A 37-year-old male with bilateral vocal cord paresis and   aspiration.    PLAN:  This certainly does explain his symptoms of silent aspiration  airway,   we have 3 valves swallowing the epiglottis, the false cords and true cords.    The true cords are the most important in terms of protecting the airway and with   large glottic gap, there is no question to be aspirating.  We would recommend   diet based on modified barium swallow study findings.  We will benefit from   speech therapy to strengthen the cords and no surgical intervention is   indicated.        ______________________________  Joce Borges MD    JJD/AQS  DD:  06/22/2023  Time:  12:05PM  DT:  06/22/2023  Time:  01:12PM  Job #:  [id number]      CONSULTATION

## 2023-06-22 NOTE — PROGRESS NOTES
"6/22/2023  Gera Chavez   1985   [id number]        Psychiatry Progress Note     Chief Complaint: "I am doing ok".    SUBJECTIVE:   Gera Chavez is a 37 y.o. male with past psychiatric history of Schizophrenia, unspecified, currently admitted with Closed olecranon fracture, left, initial encounter.  Psychiatry has been consulted to address medication management. He presents with depression and s/p SI attempt. At time of visit, he was observed he was observed watching tv with 1:1 at bedside. He is becoming more alert. He is oriented x 3. He has less thought blocking. Thought process is linear. He is able to respond without hallucinations and delusional thoughts. He reports he is sleeping well. Appetite is good. Energy is improving. He denies SI/HI. Med compliant with no side effects.        Current Medications:   Scheduled Meds:    albuterol-ipratropium  3 mL Nebulization Q6H    amLODIPine  5 mg Oral Daily    ARIPiprazole  5 mg Oral Daily    atorvastatin  20 mg Oral Daily    calcium-vitamin D3  1 tablet Oral BID    enoxaparin  40 mg Subcutaneous Q12H    FLUoxetine  40 mg Oral Daily    gabapentin  300 mg Oral TID    guaiFENesin  600 mg Oral BID    lamoTRIgine  25 mg Oral Daily    levoFLOXacin  750 mg Intravenous Q24H    lisinopriL  40 mg Oral Daily    mirtazapine  15 mg Oral QHS    propranoloL  40 mg Oral TID    QUEtiapine  100 mg Oral QHS    QUEtiapine  50 mg Oral Daily      PRN Meds: sodium chloride, acetaminophen, hydrALAZINE, midazolam, morphine, oxyCODONE, oxyCODONE   Psychotherapeutics (From admission, onward)      Start     Stop Route Frequency Ordered    06/21/23 0900  ARIPiprazole tablet 5 mg         -- Oral Daily 06/20/23 1812    06/19/23 0900  FLUoxetine capsule 40 mg         -- Oral Daily 06/18/23 1731    06/19/23 0900  QUEtiapine tablet 50 mg         -- Oral Daily 06/18/23 1731    06/18/23 2100  mirtazapine tablet 15 mg         -- Oral Nightly 06/18/23 1731 06/18/23 2100  QUEtiapine tablet 100 mg "         -- Oral Nightly 06/18/23 6101            Allergies:   Review of patient's allergies indicates:   Allergen Reactions    Iodine     Trazodone         OBJECTIVE:   Vitals   Vitals:    06/22/23 0400   BP: (!) 147/85   Pulse: (!) 114   Resp: 15   Temp: 98.2 °F (36.8 °C)        Labs/Imaging/Studies:   Recent Results (from the past 36 hour(s))   Comprehensive metabolic panel    Collection Time: 06/21/23  1:24 AM   Result Value Ref Range    Sodium Level 152 (H) 136 - 145 mmol/L    Potassium Level 4.0 3.5 - 5.1 mmol/L    Chloride 117 (H) 98 - 107 mmol/L    Carbon Dioxide 25 22 - 29 mmol/L    Glucose Level 109 (H) 74 - 100 mg/dL    Blood Urea Nitrogen 10.7 8.9 - 20.6 mg/dL    Creatinine 0.71 (L) 0.73 - 1.18 mg/dL    Calcium Level Total 7.7 (L) 8.4 - 10.2 mg/dL    Protein Total 5.8 (L) 6.4 - 8.3 gm/dL    Albumin Level 2.4 (L) 3.5 - 5.0 g/dL    Globulin 3.4 2.4 - 3.5 gm/dL    Albumin/Globulin Ratio 0.7 (L) 1.1 - 2.0 ratio    Bilirubin Total 2.0 (H) <=1.5 mg/dL    Alkaline Phosphatase 228 (H) 40 - 150 unit/L    Alanine Aminotransferase 36 0 - 55 unit/L    Aspartate Aminotransferase 35 (H) 5 - 34 unit/L    eGFR >60 mls/min/1.73/m2   CBC with Differential    Collection Time: 06/21/23  1:24 AM   Result Value Ref Range    WBC 12.49 (H) 4.50 - 11.50 x10(3)/mcL    RBC 3.35 (L) 4.70 - 6.10 x10(6)/mcL    Hgb 9.4 (L) 14.0 - 18.0 g/dL    Hct 31.3 (L) 42.0 - 52.0 %    MCV 93.4 80.0 - 94.0 fL    MCH 28.1 27.0 - 31.0 pg    MCHC 30.0 (L) 33.0 - 36.0 g/dL    RDW 16.9 11.5 - 17.0 %    Platelet 644 (H) 130 - 400 x10(3)/mcL    MPV 10.1 7.4 - 10.4 fL    Neut % 68.4 %    Lymph % 18.6 %    Mono % 7.8 %    Eos % 4.0 %    Basophil % 0.6 %    Lymph # 2.32 0.6 - 4.6 x10(3)/mcL    Neut # 8.54 2.1 - 9.2 x10(3)/mcL    Mono # 0.98 0.1 - 1.3 x10(3)/mcL    Eos # 0.50 0 - 0.9 x10(3)/mcL    Baso # 0.07 <=0.2 x10(3)/mcL    IG# 0.08 (H) 0 - 0.04 x10(3)/mcL    IG% 0.6 %    NRBC% 0.3 %   Comprehensive metabolic panel    Collection Time: 06/22/23  1:31 AM    Result Value Ref Range    Sodium Level 148 (H) 136 - 145 mmol/L    Potassium Level 3.7 3.5 - 5.1 mmol/L    Chloride 115 (H) 98 - 107 mmol/L    Carbon Dioxide 23 22 - 29 mmol/L    Glucose Level 105 (H) 74 - 100 mg/dL    Blood Urea Nitrogen 13.2 8.9 - 20.6 mg/dL    Creatinine 0.73 0.73 - 1.18 mg/dL    Calcium Level Total 8.2 (L) 8.4 - 10.2 mg/dL    Protein Total 6.1 (L) 6.4 - 8.3 gm/dL    Albumin Level 2.7 (L) 3.5 - 5.0 g/dL    Globulin 3.4 2.4 - 3.5 gm/dL    Albumin/Globulin Ratio 0.8 (L) 1.1 - 2.0 ratio    Bilirubin Total 1.9 (H) <=1.5 mg/dL    Alkaline Phosphatase 261 (H) 40 - 150 unit/L    Alanine Aminotransferase 34 0 - 55 unit/L    Aspartate Aminotransferase 33 5 - 34 unit/L    eGFR >60 mls/min/1.73/m2   C-reactive protein    Collection Time: 06/22/23  1:31 AM   Result Value Ref Range    C-Reactive Protein 45.00 (H) <5.00 mg/L   CBC with Differential    Collection Time: 06/22/23  1:31 AM   Result Value Ref Range    WBC 10.81 4.50 - 11.50 x10(3)/mcL    RBC 3.37 (L) 4.70 - 6.10 x10(6)/mcL    Hgb 9.6 (L) 14.0 - 18.0 g/dL    Hct 31.3 (L) 42.0 - 52.0 %    MCV 92.9 80.0 - 94.0 fL    MCH 28.5 27.0 - 31.0 pg    MCHC 30.7 (L) 33.0 - 36.0 g/dL    RDW 16.1 11.5 - 17.0 %    Platelet 593 (H) 130 - 400 x10(3)/mcL    MPV 9.9 7.4 - 10.4 fL    Neut % 68.9 %    Lymph % 18.2 %    Mono % 7.8 %    Eos % 3.7 %    Basophil % 0.7 %    Lymph # 1.97 0.6 - 4.6 x10(3)/mcL    Neut # 7.44 2.1 - 9.2 x10(3)/mcL    Mono # 0.84 0.1 - 1.3 x10(3)/mcL    Eos # 0.40 0 - 0.9 x10(3)/mcL    Baso # 0.08 <=0.2 x10(3)/mcL    IG# 0.08 (H) 0 - 0.04 x10(3)/mcL    IG% 0.7 %    NRBC% 0.3 %      Psychiatric Mental Status Exam:  General Appearance: appears stated age, dressed in hospital garb, lying in bed, in no acute distress  Arousal: alert  Behavior: cooperative, pleasant, polite  Movements and Motor Activity: no abnormal involuntary movements noted; no tics, no tremors, no akathisia, no dystonia, no evidence of tardive dyskinesia; no psychomotor agitation  "or retardation  Orientation: oriented to person, place, and time  Speech: normal rate, rhythm, volume, tone and pitch  Mood: "Less depressed"  Affect: appropriate to situation and context  Thought Process: linear, less thought blocking  Associations: no loosening of associations  Thought Content and Perceptions: no suicidal or homicidal ideation, no auditory or visual hallucinations, no paranoid ideation, no ideas of reference, no evidence of delusions or psychosis  Recent and Remote Memory: forgetful; per interview/observation with patient  Attention and Concentration: easily distractible; per interview/observation with patient  Fund of Knowledge: aware of current events; based on history, vocabulary, fund of knowledge, syntax, grammar, and content  Insight:  fair ; based on understanding of severity of illness and HPI  Judgment:  fair ; based on patient's behavior and HPI    ASSESSMENT/PLAN:   Problems Addressed/Diagnoses:  Schizophrenia. Unspecified (F20.9)  Suicidal attempt (T14.91)     DIAGNOSIS DEFERRED ON AXIS II (R69)     Past Medical History:   Diagnosis Date    Asthma     Depression     Diabetes mellitus     Encounter for blood transfusion     Generalized anxiety disorder     Hypertension     Schizophrenia, unspecified     Sleep apnea     Unspecified glaucoma         Plan:  Aristada 675mg IM on 6/26/2023  Aristada 882mg IM on 6/26/2023 and then q month  D/C Abilify on 6/26/2023 when he receives Aristada  Continue prozac, remeron, seroquel  Lamictal level in am  Psych continue to follow      Dariana Walker   "

## 2023-06-22 NOTE — PROGRESS NOTES
Trauma Surgery   Progress Note  Admit Date: 6/4/2023  HD#18  POD#5 Days Post-Op    Subjective:   Interval history:  SYDNIE ZAMORANOS  Complains of hoarse voice - will consult ENT    Home Meds:   Current Outpatient Medications   Medication Instructions    albuterol (PROVENTIL/VENTOLIN HFA) 90 mcg/actuation inhaler 2 puffs, Inhalation, Every 4 hours PRN    ALBUTEROL INHL 90 mcg, Inhalation, Every 4 hours PRN    amLODIPine (NORVASC) 5 mg, Oral, Daily    amoxicillin-clavulanate 875-125mg (AUGMENTIN) 875-125 mg per tablet 1 tablet, Oral, Every 12 hours (non-standard times)    atorvastatin (LIPITOR) 20 mg, Oral, Daily    clonazePAM (KLONOPIN) 1 mg, Oral, 2 times daily PRN    FLUoxetine 20 MG capsule fluoxetine 20 mg capsule    FLUoxetine 40 mg, Oral, Daily    insulin glargine 100 units/mL SubQ pen Lantus Solostar U-100 Insulin 100 unit/mL (3 mL) subcutaneous pen    ipratropium (ATROVENT) 42 mcg (0.06 %) nasal spray Each Nostril    lamoTRIgine (LAMICTAL) 25 MG tablet lamotrigine 25 mg tablet    LANTUS SOLOSTAR U-100 INSULIN glargine 100 units/mL SubQ pen Subcutaneous    latanoprost 0.005 % ophthalmic solution latanoprost 0.005 % eye drops    lisinopriL (PRINIVIL,ZESTRIL) 40 mg, Oral    mirtazapine (REMERON) 7.5 MG Tab mirtazapine 7.5 mg tablet    potassium chloride (K-TAB) 20 mEq potassium chloride ER 20 mEq tablet,extended release   TAKE 1 TABLET BY MOUTH EVERY DAY    prochlorperazine (COMPAZINE) 10 MG tablet prochlorperazine maleate 10 mg tablet    QUEtiapine (SEROQUEL) 200 mg, Oral, Nightly    testosterone cypionate (DEPOTESTOTERONE CYPIONATE) 200 mg/mL injection SMARTSIG:Milliliter(s) IM      Scheduled Meds:   albuterol-ipratropium  3 mL Nebulization Q6H    amLODIPine  5 mg Oral Daily    ARIPiprazole  5 mg Oral Daily    atorvastatin  20 mg Oral Daily    calcium-vitamin D3  1 tablet Oral BID    enoxaparin  40 mg Subcutaneous Q12H    FLUoxetine  40 mg Oral Daily    gabapentin  300 mg Oral TID    guaiFENesin  600 mg Oral  "BID    lamoTRIgine  25 mg Oral Daily    levoFLOXacin  750 mg Intravenous Q24H    lisinopriL  40 mg Oral Daily    mirtazapine  15 mg Oral QHS    propranoloL  40 mg Oral TID    QUEtiapine  100 mg Oral QHS    QUEtiapine  50 mg Oral Daily     Continuous Infusions:      PRN Meds:sodium chloride, acetaminophen, hydrALAZINE, midazolam, morphine, oxyCODONE, oxyCODONE     Objective:     VITAL SIGNS: 24 HR MIN & MAX LAST   Temp  Min: 98.2 °F (36.8 °C)  Max: 99.2 °F (37.3 °C)  98.2 °F (36.8 °C)   BP  Min: 110/67  Max: 149/93  (!) 147/85    Pulse  Min: 95  Max: 114  (!) 114    Resp  Min: 10  Max: 32  15    SpO2  Min: 95 %  Max: 99 %  97 %      HT: 5' 10" (177.8 cm)  WT: 121 kg (266 lb 12.1 oz)  BMI: 38.3     Intake/output:  Intake/Output - Last 3 Shifts         06/20 0700  06/21 0659 06/21 0700  06/22 0659    P.O. 480 360    I.V. (mL/kg) 40 (0.3)     IV Piggyback 100 150    Total Intake(mL/kg) 620 (5.1) 510 (4.2)    Urine (mL/kg/hr) 1500 (0.5) 2950 (1)    Stool 0 0    Total Output 1500 2950    Net -880 -2440          Urine Occurrence 5 x 2 x    Stool Occurrence 2 x 3 x            Intake/Output Summary (Last 24 hours) at 6/22/2023 0601  Last data filed at 6/22/2023 0452  Gross per 24 hour   Intake 510 ml   Output 2950 ml   Net -2440 ml           Lines/drains/airway:       Peripheral IV - Single Lumen 06/17/23 1030 Right Antecubital (Active)   Site Assessment Clean;Dry;Intact 06/19/23 0000   Extremity Assessment Distal to IV No abnormal discoloration;No redness;No swelling;No warmth 06/19/23 0000   Line Status Infusing 06/19/23 0000   Dressing Status Clean;Dry;Intact 06/19/23 0000   Number of days: 1       Physical examination:  Gen: NAD, AAOx3, answering questions appropriately, anxious  HEENT: NCAT  CV: tachycardic  Resp: NWOB  Abd: S/NT/ND  Msk: RUE+LUE compartments soft, NV intact, able to wiggle fingers  Neuro: CN II-XII grossly intact    Labs:  Renal:  Recent Labs     06/20/23  0138 06/20/23  1453 06/21/23  0124 " 06/22/23  0131   BUN 13.4 11.3 10.7 13.2   CREATININE 0.76 0.77 0.71* 0.73       No results for input(s): LACTIC in the last 72 hours.  FEN/GI:  Recent Labs     06/20/23  0138 06/20/23  1453 06/21/23  0124 06/22/23  0131   * 152* 152* 148*   K 3.2* 3.8 4.0 3.7   CO2 24 24 25 23   CALCIUM 7.8* 8.0* 7.7* 8.2*   ALBUMIN 2.4*  --  2.4* 2.7*   BILITOT 2.3*  --  2.0* 1.9*   AST 41*  --  35* 33   ALKPHOS 241*  --  228* 261*   ALT 40  --  36 34       Heme:  Recent Labs     06/20/23  0138 06/21/23  0124 06/22/23  0131   HGB 9.4* 9.4* 9.6*   HCT 30.3* 31.3* 31.3*   * 644* 593*       ID:  Recent Labs     06/20/23  0138 06/21/23  0124 06/22/23  0131   WBC 11.33 12.49* 10.81       CBG:  Recent Labs     06/20/23  0138 06/20/23  1453 06/21/23  0124 06/22/23  0131   GLUCOSE 118* 120* 109* 105*        No results for input(s): POCTGLUCOSE in the last 72 hours.   Cardiovascular:  No results for input(s): TROPONINI, CKTOTAL, CKMB, BNP in the last 168 hours.  I have reviewed all pertinent lab results within the past 24 hours.    Imaging:  Fl Modified Barium Swallow Speech   Final Result      X-Ray Chest 1 View   Final Result      Increasing pulmonary opacities may be related to atelectatic change, patient rotation or pneumonitis.         Electronically signed by: Eileen Goodman   Date:    06/18/2023   Time:    08:13      XR Gastric tube check, non-radiologist performed   Final Result      X-Ray Chest 1 View   Final Result      Interval placement of endotracheal tube and NG tube in good position      Otherwise unchanged         Electronically signed by: Anu Grijalva   Date:    06/17/2023   Time:    12:26      SURG FL Surgery Fluoro Usage   Final Result      X-Ray Chest 1 View   Final Result      X-Ray Elbow 2 Views Left   Final Result      Fractures as above.         Electronically signed by: Tommie Cao   Date:    06/16/2023   Time:    14:53      X-Ray Chest 1 View   Final Result      1. Interval removal of  endotracheal and enteric tubes.   2. Increased bilateral airspace opacities.   No significant change from the [facility] interpretation.         Electronically signed by: Delmy Rodriguez   Date:    06/16/2023   Time:    06:17      X-Ray Chest 1 View   Final Result      X-Ray Pelvis Routine AP   Final Result      SURG FL Surgery Fluoro Usage   Final Result      X-Ray Chest 1 View   Final Result      New right apical opacity.         Electronically signed by: Eileen Goodman   Date:    06/14/2023   Time:    06:18      X-Ray Chest 1 View   Final Result      Interval removal of left-sided chest tube with no clear evidence of pneumothorax.      No other change         Electronically signed by: Tommie Cao   Date:    06/13/2023   Time:    09:37      X-Ray Chest 1 View   Final Result      Increase in interstitial and pulmonary vascular markings indicating some degree of pulmonary vascular congestion and cardiac decompensation.      Support catheters as above         Electronically signed by: Tommie Cao   Date:    06/13/2023   Time:    09:36      X-Ray Wrist Complete Right   Final Result      As above.         Electronically signed by: Magnus Couch   Date:    06/12/2023   Time:    13:09      SURG FL Surgery Fluoro Usage   Final Result      X-Ray Chest 1 View   Final Result      No significant change         Electronically signed by: Tommie Cao   Date:    06/12/2023   Time:    09:01      X-Ray Chest 1 View   Final Result      No significant change         Electronically signed by: Tommie Cao   Date:    06/12/2023   Time:    09:00      CT 3D RECON WITH INDEPENDENT WS   Final Result      CT Wrist Without Contrast Right   Final Result      X-Ray Chest 1 View   Final Result      X-Ray Chest 1 View   Final Result      No significant interval change.         Electronically signed by: Roberto Parks   Date:    06/10/2023   Time:    14:12      X-Ray Chest 1 View   Final Result      No significant interval  change.         Electronically signed by: Roberto Parks   Date:    06/10/2023   Time:    07:46      SURG FL Surgery Fluoro Usage   Final Result      X-Ray Chest 1 View   Final Result      Slight oval improvement of right lower lobe opacification.  No adverse interval change.         Electronically signed by: Nikhil Auguste   Date:    06/09/2023   Time:    06:35      X-Ray Chest 1 View   Final Result      Slightly improved aeration left lung.  No other significant change.         Electronically signed by: Magnus Couch   Date:    06/08/2023   Time:    09:55      X-Ray Chest 1 View   Final Result      X-Ray Wrist 2 View Right   Final Result      1. Intra-articular distal radius fracture   2. Fracture at the dorsal aspect of the proximal carpal row.  This is most commonly related to triquetral fracture.         Electronically signed by: Eileen Goodman   Date:    06/07/2023   Time:    12:57      X-Ray Chest 1 View   Final Result      No significant interval change.         Electronically signed by: Roberto Parks   Date:    06/07/2023   Time:    07:18      X-ray Shoulder 2 or More Views Right   Final Result      No acute osseous process appreciated.         Electronically signed by: Harjinder Strong   Date:    06/06/2023   Time:    14:29      X-Ray Forearm Right   Final Result      Distal radial fracture.  It is possible there are additional fractures at the wrist.  If needed, follow-up wrist radiographs can be considered.         Electronically signed by: Harjinder Strong   Date:    06/06/2023   Time:    14:27      X-Ray Chest 1 View for Line/Tube Placement   Final Result      1. Endotracheal tube tip now midthoracic trachea.   2. Increased bilateral opacities.         Electronically signed by: Magnus Couch   Date:    06/06/2023   Time:    10:08      X-Ray Chest 1 View   Final Result      Interval slight advancement of the endotracheal tube.  No other significant change.         Electronically signed by: Magnus Couch    Date:    06/06/2023   Time:    06:16      X-Ray Chest 1 View   Final Result      Little interval change.         Electronically signed by: Magnus Couch   Date:    06/06/2023   Time:    06:15      X-Ray Chest 1 View   Final Result      Somewhat improved aeration in the lungs bilaterally compared to the prior examination otherwise not significantly changed         Electronically signed by: Anu Grijalva   Date:    06/05/2023   Time:    17:21      SURG FL Surgery Fluoro Usage   Final Result      CT 3D RECON WITHOUT INDEPENDENT WS   Final Result   FINDINGS/   3D reconstructions of the chest were created based on source data from accession number [id number].  Please see that report for details.         Electronically signed by: Magnus Couch   Date:    06/05/2023   Time:    13:14      X-Ray Chest 1 View   Final Result      Unchanged bilateral perihilar alveolar opacities.      Lung volumes are low with associated atelectatic change.         Electronically signed by: Eileen Goodman   Date:    06/05/2023   Time:    10:54      X-Ray Chest 1 View   Final Result      As above.  No adverse interval change.         Electronically signed by: Nikhil Auguste   Date:    06/05/2023   Time:    06:49      CT Thoracic Spine Without Contrast   Final Result   Impression:      1. No acute thoracic spine fracture dislocation or subluxation is identified.      2. Details and other findings as above.      No significant discrepancy with overnight report.         Electronically signed by: Roberto Parks   Date:    06/05/2023   Time:    08:05      CT Lumbar Spine Without Contrast   Final Result   Impression:      1. There is a burst fracture of the L2 vertebral body. There is retropulsion of the fracture fragment (series 7 image 50) causing spinal canal narrowing and compression upon the thecal sac (series 3 image 50). There is a comminuted displaced fracture of the right L2 transverse process. There is a displaced fracture of the right L3  transverse process. There are comminuted displaced fractures of the bilateral L5 transverse process. There is a comminuted displaced fracture of the left sacral ala described separately.      2. Details and findings as above.      No significant discrepancy with overnight report.         Electronically signed by: Roberto Parks   Date:    06/05/2023   Time:    08:08      CT Cervical Spine Without Contrast   Final Result   Impression:      1. No acute cervical spine fracture dislocation or subluxation is seen.      2. There is a left apical pneumothorax seen. There is opacity seen in the apical segment of the right upper lobe. This may be consistent with contusions or consolidation. Consider additional evaluation with chest CT.      3. Details and findings as noted above.      No significant discrepancy with overnight report.         Electronically signed by: Roberto Parks   Date:    06/05/2023   Time:    08:11      CT Pelvis Without Contrast   Final Result   Abnormal      1. Comminuted pelvic fractures with diastasis and displacement as above.   2. Fracture associated soft tissue hematomas.   3. Perirectal stranding.  Cannot exclude bowel injury.  Discrepancy from preliminary report.   This report was flagged in Epic as abnormal.         Electronically signed by: Eileen Goodman   Date:    06/05/2023   Time:    08:26      CT 3D RECON WITHOUT INDEPENDENT WS   Final Result   Abnormal      1. Comminuted pelvic fractures with diastasis and displacement as above.   2. Fracture associated soft tissue hematomas.   3. Perirectal stranding.  Cannot exclude bowel injury.  Discrepancy from preliminary report.   This report was flagged in Epic as abnormal.         Electronically signed by: Eileen Goodman   Date:    06/05/2023   Time:    08:26      X-Ray Pelvis Routine AP   Final Result      Multiple fractures.         Electronically signed by: Roberto Parks   Date:    06/04/2023   Time:    17:30      X-Ray Femur 2 View Left    Final Result      No definite acute osseous abnormality identified.         Electronically signed by: Roberto Parks   Date:    06/04/2023   Time:    17:32      X-Ray Chest 1 View   Final Result      1. Endotracheal tube tip near the origin of the right mainstem bronchus and can be retracted slightly.  Additional tubing overlying the thoracic inlet may be an enteric tube.   2. Right upper lung consolidation.  Patchy opacities in the left lower lung may be atelectasis or contusion.         Electronically signed by: Harjinder Strong   Date:    06/04/2023   Time:    16:18      CT Previous   Final Result      CT Previous   Final Result      Xray Previous   Final Result           I have reviewed all pertinent imaging results/findings within the past 24 hours.    Micro/Path/Other:  Microbiology Results (last 7 days)       Procedure Component Value Units Date/Time    Blood Culture [[id number]]  (Normal) Collected: 06/13/23 0912    Order Status: Completed Specimen: Blood from Antecubital, Left Updated: 06/18/23 1100     CULTURE, BLOOD ([facility]) No Growth at 5 days    Blood Culture [[id number]]  (Normal) Collected: 06/13/23 0912    Order Status: Completed Specimen: Blood from Antecubital, Right Updated: 06/18/23 1100     CULTURE, BLOOD ([facility]) No Growth at 5 days    Respiratory Culture [[id number]]  (Abnormal)  (Susceptibility) Collected: 06/13/23 0850    Order Status: Completed Specimen: Respiratory from Endotracheal Aspirate Updated: 06/15/23 0859     Respiratory Culture Many Pseudomonas aeruginosa     Comment: with no normal respiratory polo        GRAM STAIN Quality 2+      Many Gram Negative Rods           Specimen (168h ago, onward)      None             Assessment & Plan:   Gera Chavez is a 37 y.o. male admitted on 6/4/2023 following fall from bridge at 25 ft.  He reportedly jumped off a bridge and landed on dry land.  He arrived intubated with a left-sided chest tube.  He was found to have a pelvic ring fracture including  open book fracture, L2 burst fracture, left pneumothorax, multiple left-sided rib fractures. Pseudomonas VAP.    Consults:   ENT  Neurosurgery  Orthopedic surgery  Psychiatry    Therapy:  Physical Therapy  Occupational Therapy  SLP Weight bearing status:   RUE: NWB  LUE: NWB  RLE:  pivot to transfer  LLE: NWB Precautions:  Fall and Standard   Seizure prophylaxis:  Not indicated. VTE prophylaxis:     Prophylactic Lovenox 40mg BID  GI prophylaxis:  H2B   Outpatient follow up:  PCP  Orthopedic surgery  Neurosurgery  Disposition:  Pending progress with therapy  Pending clinical progress  Psych?     - CEC  - Easy to chew diet, add low sodium modifier 2/2 elev sodium on labs  - Daily labs  - Home medications  - Psych meds per psych   - Levaquin until 6/22  - MM pain control  - Follow up ENT recs  - IS  - Therapy as above  - VTE prevention as above       6/22/2023 5:21 AM     The above findings, diagnostics, and treatment plan were discussed with Dr. Jarret Sauer  who will follow with further assessments and recommendations. Please call with any questions, concerns, or clinical status changes.

## 2023-06-22 NOTE — PT/OT/SLP PROGRESS
Occupational Therapy   Treatment    Name: Gera Chavez  MRN: [id number]  Admitting Diagnosis:  Closed olecranon fracture, left, initial encounter  5 Days Post-Op    Recommendations:     Discharge Recommendations: rehabilitation facility, LTACH (long-term acute care hospital)  Discharge Equipment Recommendations:  to be determined by next level of care  Barriers to discharge:  None    Assessment:     Gera Chavez is a 37 y.o. male with a medical diagnosis of Closed olecranon fracture, left, initial encounter.  He presents with decreased mobility. Performance deficits affecting function are weakness, decreased upper extremity function, decreased lower extremity function, impaired balance, impaired endurance, decreased safety awareness, impaired self care skills, impaired functional mobility.     Rehab Prognosis:  Good; patient would benefit from acute skilled OT services to address these deficits and reach maximum level of function.       Plan:     Patient to be seen 5 x/week to address the above listed problems via self-care/home management, therapeutic activities, therapeutic exercises  Plan of Care Expires: 07/14/23  Plan of Care Reviewed with: patient    Subjective     Pain/Comfort:      Objective:     Communicated with: nurse prior to session.  Patient found HOB elevated with pulse ox (continuous), blood pressure cuff, telemetry, pressure relief boots upon OT entry to room.    General Precautions: Standard, aspiration    Orthopedic Precautions:spinal precautions  Braces: TLSO  Respiratory Status: Nasal cannula, flow 2 L/min  Vital Signs: Blood Pressure: 146/81     Occupational Performance:     Bed Mobility:    Log roll to right side with Max A, side lying to sit and back with Total A, maintain sitting edge of bed with Min/Mod A. Maintain sitting edge of bed approximately 20 minutes.     Therapeutic Positioning    OT interventions performed during the course of today's session in an effort to prevent and/or  reduce acquired pressure injuries:   Therapeutic positioning completed     Skin assessment: visible skin assessed   Findings: no redness or breakdown noted    [facility] 6 Click ADL: 14    Patient Education:  Patient provided with verbal and demonstration education regarding fall prevention and safety awareness.  Understanding was verbalized, however additional teaching warranted.      Patient left HOB elevated with all lines intact, call button in reach, and 1:1 CNA present    GOALS:   Multidisciplinary Problems       Occupational Therapy Goals          Problem: Occupational Therapy    Goal Priority Disciplines Outcome Interventions   Occupational Therapy Goal     OT, PT/OT Ongoing, Progressing    Description: Goals to be met by: 7/16/23     Patient will increase functional independence with ADLs by performing:    UE Dressing with Minimal Assistance.  Grooming while seated EOB with Min Assistance.  Toileting Moderate Assistance for hygiene and clothing management, indicating need.  Sitting at edge of bed x15 minutes with Stand-by Assistance.                         Time Tracking:     OT Date of Treatment: 06/22/23  OT Start Time: 1015  OT Stop Time: 1045  OT Total Time (min): 30 min    Billable Minutes:Therapeutic Activity 30    OT/HUBERT: HUBERT     Number of HUBERT visits since last OT visit: 4    6/22/2023

## 2023-06-22 NOTE — PROGRESS NOTES
"6/21/2023  Gera Chavez   1985   [id number]        Psychiatry Progress Note     Chief Complaint: "I feel good".    SUBJECTIVE:   Gera Chavez is a 37 y.o. male  with past psychiatric history of Schizophrenia, unspecified, currently admitted with Closed olecranon fracture, left, initial encounter.  Psychiatry has been consulted to address medication management. He presents with depression and s/p SI attempt. He was observed in bed with eyes closed. He was easy to arouse. He has concrete thought process. He indicates his mood is "good". He reports he is sleeping better. He reports his thought process is becoming clear. He indicates AH are decreasing. He reports the voices come and go. He reports when the voces are present he can hear multiple voices. He reports the voices tell him crazy things. He denies VH and paranoia. Discussed triggers for exacerbation: insomnia and non-compliance with meds. Discussed mother is supportive of LOVETT (long acting injection). He indicates he is supportive as well. Med compliant with no side effects.        Current Medications:   Scheduled Meds:    albuterol-ipratropium  3 mL Nebulization Q6H    amLODIPine  5 mg Oral Daily    ARIPiprazole  5 mg Oral Daily    atorvastatin  20 mg Oral Daily    calcium-vitamin D3  1 tablet Oral BID    enoxaparin  40 mg Subcutaneous Q12H    FLUoxetine  40 mg Oral Daily    gabapentin  300 mg Oral TID    guaiFENesin  600 mg Oral BID    lamoTRIgine  25 mg Oral Daily    levoFLOXacin  750 mg Intravenous Q24H    lisinopriL  40 mg Oral Daily    mirtazapine  15 mg Oral QHS    propranoloL  40 mg Oral TID    QUEtiapine  100 mg Oral QHS    QUEtiapine  50 mg Oral Daily      PRN Meds: sodium chloride, acetaminophen, hydrALAZINE, midazolam, morphine, oxyCODONE, oxyCODONE   Psychotherapeutics (From admission, onward)      Start     Stop Route Frequency Ordered    06/21/23 0900  ARIPiprazole tablet 5 mg         -- Oral Daily 06/20/23 1812    06/19/23 0900  FLUoxetine " capsule 40 mg         -- Oral Daily 06/18/23 1731 06/19/23 0900  QUEtiapine tablet 50 mg         -- Oral Daily 06/18/23 1731 06/18/23 2100  mirtazapine tablet 15 mg         -- Oral Nightly 06/18/23 1731 06/18/23 2100  QUEtiapine tablet 100 mg         -- Oral Nightly 06/18/23 1731            Allergies:   Review of patient's allergies indicates:   Allergen Reactions    Iodine     Trazodone         OBJECTIVE:   Vitals   Vitals:    06/21/23 1940   BP:    Pulse: 104   Resp: (!) 30   Temp:         Labs/Imaging/Studies:   Recent Results (from the past 36 hour(s))   Basic Metabolic Panel    Collection Time: 06/20/23  2:53 PM   Result Value Ref Range    Sodium Level 152 (H) 136 - 145 mmol/L    Potassium Level 3.8 3.5 - 5.1 mmol/L    Chloride 118 (H) 98 - 107 mmol/L    Carbon Dioxide 24 22 - 29 mmol/L    Glucose Level 120 (H) 74 - 100 mg/dL    Blood Urea Nitrogen 11.3 8.9 - 20.6 mg/dL    Creatinine 0.77 0.73 - 1.18 mg/dL    BUN/Creatinine Ratio 15     Calcium Level Total 8.0 (L) 8.4 - 10.2 mg/dL    Anion Gap 10.0 mEq/L    eGFR >60 mls/min/1.73/m2   Comprehensive metabolic panel    Collection Time: 06/21/23  1:24 AM   Result Value Ref Range    Sodium Level 152 (H) 136 - 145 mmol/L    Potassium Level 4.0 3.5 - 5.1 mmol/L    Chloride 117 (H) 98 - 107 mmol/L    Carbon Dioxide 25 22 - 29 mmol/L    Glucose Level 109 (H) 74 - 100 mg/dL    Blood Urea Nitrogen 10.7 8.9 - 20.6 mg/dL    Creatinine 0.71 (L) 0.73 - 1.18 mg/dL    Calcium Level Total 7.7 (L) 8.4 - 10.2 mg/dL    Protein Total 5.8 (L) 6.4 - 8.3 gm/dL    Albumin Level 2.4 (L) 3.5 - 5.0 g/dL    Globulin 3.4 2.4 - 3.5 gm/dL    Albumin/Globulin Ratio 0.7 (L) 1.1 - 2.0 ratio    Bilirubin Total 2.0 (H) <=1.5 mg/dL    Alkaline Phosphatase 228 (H) 40 - 150 unit/L    Alanine Aminotransferase 36 0 - 55 unit/L    Aspartate Aminotransferase 35 (H) 5 - 34 unit/L    eGFR >60 mls/min/1.73/m2   CBC with Differential    Collection Time: 06/21/23  1:24 AM   Result Value Ref Range     WBC 12.49 (H) 4.50 - 11.50 x10(3)/mcL    RBC 3.35 (L) 4.70 - 6.10 x10(6)/mcL    Hgb 9.4 (L) 14.0 - 18.0 g/dL    Hct 31.3 (L) 42.0 - 52.0 %    MCV 93.4 80.0 - 94.0 fL    MCH 28.1 27.0 - 31.0 pg    MCHC 30.0 (L) 33.0 - 36.0 g/dL    RDW 16.9 11.5 - 17.0 %    Platelet 644 (H) 130 - 400 x10(3)/mcL    MPV 10.1 7.4 - 10.4 fL    Neut % 68.4 %    Lymph % 18.6 %    Mono % 7.8 %    Eos % 4.0 %    Basophil % 0.6 %    Lymph # 2.32 0.6 - 4.6 x10(3)/mcL    Neut # 8.54 2.1 - 9.2 x10(3)/mcL    Mono # 0.98 0.1 - 1.3 x10(3)/mcL    Eos # 0.50 0 - 0.9 x10(3)/mcL    Baso # 0.07 <=0.2 x10(3)/mcL    IG# 0.08 (H) 0 - 0.04 x10(3)/mcL    IG% 0.6 %    NRBC% 0.3 %      Psychiatric Mental Status Exam:  General Appearance: appears stated age, dressed in hospital garb, lying in bed  Arousal: alert  Behavior:  less withdrawn  Movements and Motor Activity: no abnormal involuntary movements noted  Orientation: oriented to person, place, time  Speech: slowed  Mood: Less depressed  Affect: flat  Thought Process: concrete  Associations: Less loosening of associations  Thought Content and Perceptions:  denies SI, decreased auditory hallucinations  Recent and Remote Memory: impaired; per interview/observation with patient  Attention and Concentration: inattentive to conversation, easily distractible; per interview/observation with patient  Fund of Knowledge: impaired; based on history, vocabulary, fund of knowledge, syntax, grammar, and content  Insight: poor; based on understanding of severity of illness and HPI  Judgment: poor; based on patient's behavior and HPI     ASSESSMENT/PLAN:   Problems Addressed/Diagnoses:  Schizophrenia. Unspecified (F20.9)  Suicidal attempt (T14.91)     DIAGNOSIS DEFERRED ON AXIS II (R69)        Past Medical History:   Diagnosis Date    Asthma     Depression     Diabetes mellitus     Encounter for blood transfusion     Generalized anxiety disorder     Hypertension     Schizophrenia, unspecified     Sleep apnea     Unspecified  glaucoma         Plan:  Continue Abilify, Prozac, Remeron, Seroquel, Lamictal  Psych continue to follow      Dariana Walker

## 2023-06-23 LAB
ALBUMIN SERPL-MCNC: 2.9 G/DL (ref 3.5–5)
ALBUMIN/GLOB SERPL: 0.9 RATIO (ref 1.1–2)
ALP SERPL-CCNC: 300 UNIT/L (ref 40–150)
ALT SERPL-CCNC: 33 UNIT/L (ref 0–55)
AST SERPL-CCNC: 29 UNIT/L (ref 5–34)
BASOPHILS # BLD AUTO: 0.05 X10(3)/MCL
BASOPHILS NFR BLD AUTO: 0.5 %
BILIRUBIN DIRECT+TOT PNL SERPL-MCNC: 1.9 MG/DL
BUN SERPL-MCNC: 10.9 MG/DL (ref 8.9–20.6)
CALCIUM SERPL-MCNC: 8.1 MG/DL (ref 8.4–10.2)
CHLORIDE SERPL-SCNC: 111 MMOL/L (ref 98–107)
CO2 SERPL-SCNC: 24 MMOL/L (ref 22–29)
CREAT SERPL-MCNC: 0.71 MG/DL (ref 0.73–1.18)
EOSINOPHIL # BLD AUTO: 0.28 X10(3)/MCL (ref 0–0.9)
EOSINOPHIL NFR BLD AUTO: 2.8 %
ERYTHROCYTE [DISTWIDTH] IN BLOOD BY AUTOMATED COUNT: 15.9 % (ref 11.5–17)
GFR SERPLBLD CREATININE-BSD FMLA CKD-EPI: >60 MLS/MIN/1.73/M2
GLOBULIN SER-MCNC: 3.4 GM/DL (ref 2.4–3.5)
GLUCOSE SERPL-MCNC: 106 MG/DL (ref 74–100)
HCT VFR BLD AUTO: 32.6 % (ref 42–52)
HGB BLD-MCNC: 10.2 G/DL (ref 14–18)
IMM GRANULOCYTES # BLD AUTO: 0.07 X10(3)/MCL (ref 0–0.04)
IMM GRANULOCYTES NFR BLD AUTO: 0.7 %
LYMPHOCYTES # BLD AUTO: 2.34 X10(3)/MCL (ref 0.6–4.6)
LYMPHOCYTES NFR BLD AUTO: 23.3 %
MCH RBC QN AUTO: 28.3 PG (ref 27–31)
MCHC RBC AUTO-ENTMCNC: 31.3 G/DL (ref 33–36)
MCV RBC AUTO: 90.6 FL (ref 80–94)
MONOCYTES # BLD AUTO: 0.76 X10(3)/MCL (ref 0.1–1.3)
MONOCYTES NFR BLD AUTO: 7.6 %
NEUTROPHILS # BLD AUTO: 6.54 X10(3)/MCL (ref 2.1–9.2)
NEUTROPHILS NFR BLD AUTO: 65.1 %
NRBC BLD AUTO-RTO: 0.3 %
PLATELET # BLD AUTO: 581 X10(3)/MCL (ref 130–400)
PMV BLD AUTO: 10.2 FL (ref 7.4–10.4)
POTASSIUM SERPL-SCNC: 3.5 MMOL/L (ref 3.5–5.1)
PROT SERPL-MCNC: 6.3 GM/DL (ref 6.4–8.3)
RBC # BLD AUTO: 3.6 X10(6)/MCL (ref 4.7–6.1)
SODIUM SERPL-SCNC: 144 MMOL/L (ref 136–145)
WBC # SPEC AUTO: 10.04 X10(3)/MCL (ref 4.5–11.5)

## 2023-06-23 PROCEDURE — 25000242 PHARM REV CODE 250 ALT 637 W/ HCPCS: Performed by: HOSPITALIST

## 2023-06-23 PROCEDURE — 85025 COMPLETE CBC W/AUTO DIFF WBC: CPT | Performed by: STUDENT IN AN ORGANIZED HEALTH CARE EDUCATION/TRAINING PROGRAM

## 2023-06-23 PROCEDURE — 80175 DRUG SCREEN QUAN LAMOTRIGINE: CPT | Performed by: NURSE PRACTITIONER

## 2023-06-23 PROCEDURE — 94640 AIRWAY INHALATION TREATMENT: CPT

## 2023-06-23 PROCEDURE — 97168 OT RE-EVAL EST PLAN CARE: CPT

## 2023-06-23 PROCEDURE — 99900031 HC PATIENT EDUCATION (STAT)

## 2023-06-23 PROCEDURE — 27000221 HC OXYGEN, UP TO 24 HOURS

## 2023-06-23 PROCEDURE — 80053 COMPREHEN METABOLIC PANEL: CPT | Performed by: STUDENT IN AN ORGANIZED HEALTH CARE EDUCATION/TRAINING PROGRAM

## 2023-06-23 PROCEDURE — 25000003 PHARM REV CODE 250

## 2023-06-23 PROCEDURE — 94761 N-INVAS EAR/PLS OXIMETRY MLT: CPT

## 2023-06-23 PROCEDURE — 63600175 PHARM REV CODE 636 W HCPCS: Mod: JG | Performed by: NURSE PRACTITIONER

## 2023-06-23 PROCEDURE — 63600175 PHARM REV CODE 636 W HCPCS

## 2023-06-23 PROCEDURE — 25000003 PHARM REV CODE 250: Performed by: SURGERY

## 2023-06-23 PROCEDURE — 25000003 PHARM REV CODE 250: Performed by: NURSE PRACTITIONER

## 2023-06-23 PROCEDURE — 11000001 HC ACUTE MED/SURG PRIVATE ROOM

## 2023-06-23 PROCEDURE — 97530 THERAPEUTIC ACTIVITIES: CPT | Mod: CQ

## 2023-06-23 PROCEDURE — 92526 ORAL FUNCTION THERAPY: CPT

## 2023-06-23 RX ORDER — MORPHINE SULFATE 4 MG/ML
2 INJECTION, SOLUTION INTRAMUSCULAR; INTRAVENOUS EVERY 6 HOURS PRN
Status: DISCONTINUED | OUTPATIENT
Start: 2023-06-23 | End: 2023-06-26 | Stop reason: HOSPADM

## 2023-06-23 RX ADMIN — ARIPIPRAZOLE 5 MG: 5 TABLET ORAL at 08:06

## 2023-06-23 RX ADMIN — PROPRANOLOL HYDROCHLORIDE 40 MG: 10 TABLET ORAL at 05:06

## 2023-06-23 RX ADMIN — GABAPENTIN 300 MG: 300 CAPSULE ORAL at 05:06

## 2023-06-23 RX ADMIN — ENOXAPARIN SODIUM 40 MG: 40 INJECTION SUBCUTANEOUS at 08:06

## 2023-06-23 RX ADMIN — ARIPIPRAZOLE LAUROXIL 675 MG: 675 INJECTION, SUSPENSION, EXTENDED RELEASE INTRAMUSCULAR at 09:06

## 2023-06-23 RX ADMIN — OXYCODONE HYDROCHLORIDE 10 MG: 10 TABLET ORAL at 06:06

## 2023-06-23 RX ADMIN — GABAPENTIN 300 MG: 300 CAPSULE ORAL at 08:06

## 2023-06-23 RX ADMIN — PROPRANOLOL HYDROCHLORIDE 40 MG: 10 TABLET ORAL at 08:06

## 2023-06-23 RX ADMIN — LAMOTRIGINE 25 MG: 25 TABLET ORAL at 08:06

## 2023-06-23 RX ADMIN — QUETIAPINE FUMARATE 50 MG: 25 TABLET ORAL at 08:06

## 2023-06-23 RX ADMIN — IPRATROPIUM BROMIDE AND ALBUTEROL SULFATE 3 ML: 2.5; .5 SOLUTION RESPIRATORY (INHALATION) at 08:06

## 2023-06-23 RX ADMIN — ATORVASTATIN CALCIUM 20 MG: 10 TABLET, FILM COATED ORAL at 08:06

## 2023-06-23 RX ADMIN — IPRATROPIUM BROMIDE AND ALBUTEROL SULFATE 3 ML: 2.5; .5 SOLUTION RESPIRATORY (INHALATION) at 01:06

## 2023-06-23 RX ADMIN — QUETIAPINE FUMARATE 100 MG: 100 TABLET ORAL at 08:06

## 2023-06-23 RX ADMIN — Medication 1 TABLET: at 09:06

## 2023-06-23 RX ADMIN — MIRTAZAPINE 15 MG: 15 TABLET, FILM COATED ORAL at 08:06

## 2023-06-23 RX ADMIN — OXYCODONE HYDROCHLORIDE 10 MG: 10 TABLET ORAL at 10:06

## 2023-06-23 RX ADMIN — LISINOPRIL 40 MG: 20 TABLET ORAL at 08:06

## 2023-06-23 RX ADMIN — FLUOXETINE 40 MG: 20 CAPSULE ORAL at 08:06

## 2023-06-23 RX ADMIN — Medication 1 TABLET: at 08:06

## 2023-06-23 RX ADMIN — AMLODIPINE BESYLATE 5 MG: 5 TABLET ORAL at 08:06

## 2023-06-23 RX ADMIN — OXYCODONE HYDROCHLORIDE 5 MG: 5 TABLET ORAL at 06:06

## 2023-06-23 RX ADMIN — GUAIFENESIN 600 MG: 600 TABLET, EXTENDED RELEASE ORAL at 08:06

## 2023-06-23 RX ADMIN — GUAIFENESIN 600 MG: 600 TABLET, EXTENDED RELEASE ORAL at 09:06

## 2023-06-23 NOTE — PT/OT/SLP PROGRESS
Speech Language Pathology Department  Dysphagia Therapy Progress Note    Patient Name:  Gera Chavez   MRN:  [id number]  Admitting Diagnosis: trauma    Recommendations:     General recommendations:  dysphagia therapy  Diet recommendations:  Easy to Chew Diet - IDDSI Level 7, Liquid Diet Level: Mildly thick liquids - IDDSI Level 2   Aspiration precautions: small bites/sips, slow rate, additional swallow per bite/sip, and medications whole in puree    Discharge recommendations:  rehabilitation facility, [facility]ACH (long-term acute care hospital)   Barriers to safe discharge:  level of skilled assistance needed    Subjective     Patient awake, alert, and cooperative.    Pain/Comfort: none    Spiritual/Cultural/Judaism Beliefs/Practices that affect care: no    Respiratory Status: nasal cannula, increased work of breathing    POC discussed with ENT who reported bilateral VF motion, but poor closure    Objective:     Oral Musculature  Dentition: own teeth  Secretion Management: problems swallowing saliva  Mucosal Quality: good  Vocal Quality: breathy and hoarse  Volitional Cough: Unable to clear secretions  Volitional Swallow: weak    Therapeutic Activities:  Pt completed base of tongue and laryngeal x40 with minimal-moderate cues.  Frequent rest breaks required.  Pt tolerated thermal stimulation to the anterior faucial pillars x10 with 100% swallow responses.  Laryngeal excursion fair.  Delay varied 3-4 seconds.    Assessment:     Pt continues to present with oropharyngeal dysphagia requiring diet modification to improve swallow safety and efficiency.    Goals:     Multidisciplinary Problems       SLP Goals          Problem: SLP    Goal Priority Disciplines Outcome   SLP Goal     SLP Ongoing, Progressing   Description:   LTG: Tolerate least restrictive PO diet with no clinical signs/sx aspiration  STGs:  1.  Tolerate thermal stimulation to the anterior faucial pillars with 100% effortful swallow responses and delay less  than 2 seconds.  2.  Complete base of tongue and laryngeal strengthening exercises with minimal cues    LTG: Complete basic cognitive tasks with supervision  STGs:  1.  Identify problems with min cues  2.  Solve routine problems with min cues  3.  Complete 3-4 step visual sequencing tasks with min cues                         Patient Education:     Patient provided with verbal education regarding SLP POC.  Understanding was verbalized.    Plan:     Will continue to follow and tx as appropriate.    SLP Follow-Up:  Yes   Patient to be seen:  5 x/week   Plan of Care expires:  07/03/23  Plan of Care reviewed with:  patient       Time Tracking:     SLP Treatment Date:   6/23/23  Speech Start Time:  1140  Speech Stop Time:  1145     Speech Total Time (min):  15    Billable minutes:  Treatment of Swallow Dysfunction, 15 minutes       06/23/2023

## 2023-06-23 NOTE — PROGRESS NOTES
Trauma Surgery   Progress Note  Admit Date: 6/4/2023  HD#19  POD#6 Days Post-Op    Subjective:   Interval history:  SYDNIE ALEXANDRA  No acute complaints  Pulling a lot more on his IS, close to 2000ml    Home Meds:   Current Outpatient Medications   Medication Instructions    albuterol (PROVENTIL/VENTOLIN HFA) 90 mcg/actuation inhaler 2 puffs, Inhalation, Every 4 hours PRN    ALBUTEROL INHL 90 mcg, Inhalation, Every 4 hours PRN    amLODIPine (NORVASC) 5 mg, Oral, Daily    amoxicillin-clavulanate 875-125mg (AUGMENTIN) 875-125 mg per tablet 1 tablet, Oral, Every 12 hours (non-standard times)    atorvastatin (LIPITOR) 20 mg, Oral, Daily    clonazePAM (KLONOPIN) 1 mg, Oral, 2 times daily PRN    FLUoxetine 20 MG capsule fluoxetine 20 mg capsule    FLUoxetine 40 mg, Oral, Daily    insulin glargine 100 units/mL SubQ pen Lantus Solostar U-100 Insulin 100 unit/mL (3 mL) subcutaneous pen    ipratropium (ATROVENT) 42 mcg (0.06 %) nasal spray Each Nostril    lamoTRIgine (LAMICTAL) 25 MG tablet lamotrigine 25 mg tablet    LANTUS SOLOSTAR U-100 INSULIN glargine 100 units/mL SubQ pen Subcutaneous    latanoprost 0.005 % ophthalmic solution latanoprost 0.005 % eye drops    lisinopriL (PRINIVIL,ZESTRIL) 40 mg, Oral    mirtazapine (REMERON) 7.5 MG Tab mirtazapine 7.5 mg tablet    potassium chloride (K-TAB) 20 mEq potassium chloride ER 20 mEq tablet,extended release   TAKE 1 TABLET BY MOUTH EVERY DAY    prochlorperazine (COMPAZINE) 10 MG tablet prochlorperazine maleate 10 mg tablet    QUEtiapine (SEROQUEL) 200 mg, Oral, Nightly    testosterone cypionate (DEPOTESTOTERONE CYPIONATE) 200 mg/mL injection SMARTSIG:Milliliter(s) IM      Scheduled Meds:   albuterol-ipratropium  3 mL Nebulization Q6H    amLODIPine  5 mg Oral Daily    [START ON 6/27/2023] ARIPiprazole lauroxil  882 mg Intramuscular Once    ARIPiprazole lauroxil,submicr.  675 mg Intramuscular Once    ARIPiprazole  5 mg Oral Daily    atorvastatin  20 mg Oral Daily     "calcium-vitamin D3  1 tablet Oral BID    enoxaparin  40 mg Subcutaneous Q12H    FLUoxetine  40 mg Oral Daily    gabapentin  300 mg Oral TID    guaiFENesin  600 mg Oral BID    lamoTRIgine  25 mg Oral Daily    lisinopriL  40 mg Oral Daily    mirtazapine  15 mg Oral QHS    propranoloL  40 mg Oral TID    QUEtiapine  100 mg Oral QHS    QUEtiapine  50 mg Oral Daily     Continuous Infusions:      PRN Meds:acetaminophen, hydrALAZINE, morphine, oxyCODONE, oxyCODONE     Objective:     VITAL SIGNS: 24 HR MIN & MAX LAST   Temp  Min: 98.2 °F (36.8 °C)  Max: 99.5 °F (37.5 °C)  99 °F (37.2 °C)   BP  Min: 137/82  Max: 163/91  137/82    Pulse  Min: 81  Max: 113  104    Resp  Min: 17  Max: 30  17    SpO2  Min: 90 %  Max: 99 %  99 %      HT: 5' 10" (177.8 cm)  WT: 121 kg (266 lb 12.1 oz)  BMI: 38.3     Intake/output:  Intake/Output - Last 3 Shifts         06/21 0700  06/22 0659 06/22 0700  06/23 0659    P.O. 360 240    IV Piggyback 150     Total Intake(mL/kg) 510 (4.2) 240 (2)    Urine (mL/kg/hr) 2950 (1) 1250 (0.4)    Stool 0 0    Total Output 2950 1250    Net -2440 -1010          Urine Occurrence 2 x 2 x    Stool Occurrence 3 x 6 x            Intake/Output Summary (Last 24 hours) at 6/23/2023 0614  Last data filed at 6/23/2023 0315  Gross per 24 hour   Intake 240 ml   Output 1250 ml   Net -1010 ml           Lines/drains/airway:       Peripheral IV - Single Lumen 06/17/23 1030 Right Antecubital (Active)   Site Assessment Clean;Dry;Intact 06/19/23 0000   Extremity Assessment Distal to IV No abnormal discoloration;No redness;No swelling;No warmth 06/19/23 0000   Line Status Infusing 06/19/23 0000   Dressing Status Clean;Dry;Intact 06/19/23 0000   Number of days: 1       Physical examination:  Gen: NAD, AAOx3, answering questions appropriately, anxious  HEENT: NCAT  CV: tachycardic  Resp: NWOB  Abd: S/NT/ND  Msk: RUE+LUE compartments soft, NV intact, able to wiggle fingers  Neuro: CN II-XII grossly intact    Labs:  Renal:  Recent Labs "     06/20/23  1453 06/21/23  0124 06/22/23  0131 06/23/23  0237   BUN 11.3 10.7 13.2 10.9   CREATININE 0.77 0.71* 0.73 0.71*       No results for input(s): LACTIC in the last 72 hours.  FEN/GI:  Recent Labs     06/20/23  1453 06/21/23  0124 06/22/23  0131 06/23/23  0237   * 152* 148* 144   K 3.8 4.0 3.7 3.5   CO2 24 25 23 24   CALCIUM 8.0* 7.7* 8.2* 8.1*   ALBUMIN  --  2.4* 2.7* 2.9*   BILITOT  --  2.0* 1.9* 1.9*   AST  --  35* 33 29   ALKPHOS  --  228* 261* 300*   ALT  --  36 34 33       Heme:  Recent Labs     06/21/23  0124 06/22/23  0131 06/23/23  0237   HGB 9.4* 9.6* 10.2*   HCT 31.3* 31.3* 32.6*   * 593* 581*       ID:  Recent Labs     06/21/23  0124 06/22/23  0131 06/23/23  0237   WBC 12.49* 10.81 10.04       CBG:  Recent Labs     06/20/23  1453 06/21/23  0124 06/22/23  0131 06/23/23  0237   GLUCOSE 120* 109* 105* 106*        No results for input(s): POCTGLUCOSE in the last 72 hours.   Cardiovascular:  No results for input(s): TROPONINI, CKTOTAL, CKMB, BNP in the last 168 hours.  I have reviewed all pertinent lab results within the past 24 hours.    Imaging:  Fl Modified Barium Swallow Speech   Final Result      X-Ray Chest 1 View   Final Result      Increasing pulmonary opacities may be related to atelectatic change, patient rotation or pneumonitis.         Electronically signed by: Eileen Goodman   Date:    06/18/2023   Time:    08:13      XR Gastric tube check, non-radiologist performed   Final Result      X-Ray Chest 1 View   Final Result      Interval placement of endotracheal tube and NG tube in good position      Otherwise unchanged         Electronically signed by: Anu Grijalva   Date:    06/17/2023   Time:    12:26      SURG FL Surgery Fluoro Usage   Final Result      X-Ray Chest 1 View   Final Result      X-Ray Elbow 2 Views Left   Final Result      Fractures as above.         Electronically signed by: Tommie Cao   Date:    06/16/2023   Time:    14:53      X-Ray Chest 1  View   Final Result      1. Interval removal of endotracheal and enteric tubes.   2. Increased bilateral airspace opacities.   No significant change from the [facility] interpretation.         Electronically signed by: Delmy Rodriguez   Date:    06/16/2023   Time:    06:17      X-Ray Chest 1 View   Final Result      X-Ray Pelvis Routine AP   Final Result      SURG FL Surgery Fluoro Usage   Final Result      X-Ray Chest 1 View   Final Result      New right apical opacity.         Electronically signed by: Eileen Goodman   Date:    06/14/2023   Time:    06:18      X-Ray Chest 1 View   Final Result      Interval removal of left-sided chest tube with no clear evidence of pneumothorax.      No other change         Electronically signed by: Tommie Cao   Date:    06/13/2023   Time:    09:37      X-Ray Chest 1 View   Final Result      Increase in interstitial and pulmonary vascular markings indicating some degree of pulmonary vascular congestion and cardiac decompensation.      Support catheters as above         Electronically signed by: Tommie Cao   Date:    06/13/2023   Time:    09:36      X-Ray Wrist Complete Right   Final Result      As above.         Electronically signed by: Magnus Couch   Date:    06/12/2023   Time:    13:09      SURG FL Surgery Fluoro Usage   Final Result      X-Ray Chest 1 View   Final Result      No significant change         Electronically signed by: Tommie Cao   Date:    06/12/2023   Time:    09:01      X-Ray Chest 1 View   Final Result      No significant change         Electronically signed by: Tommie Cao   Date:    06/12/2023   Time:    09:00      CT 3D RECON WITH INDEPENDENT WS   Final Result      CT Wrist Without Contrast Right   Final Result      X-Ray Chest 1 View   Final Result      X-Ray Chest 1 View   Final Result      No significant interval change.         Electronically signed by: Roberto Parks   Date:    06/10/2023   Time:    14:12      X-Ray Chest 1  View   Final Result      No significant interval change.         Electronically signed by: Roberto Parks   Date:    06/10/2023   Time:    07:46      SURG FL Surgery Fluoro Usage   Final Result      X-Ray Chest 1 View   Final Result      Slight oval improvement of right lower lobe opacification.  No adverse interval change.         Electronically signed by: Nikhil Auguste   Date:    06/09/2023   Time:    06:35      X-Ray Chest 1 View   Final Result      Slightly improved aeration left lung.  No other significant change.         Electronically signed by: Magnus Couch   Date:    06/08/2023   Time:    09:55      X-Ray Chest 1 View   Final Result      X-Ray Wrist 2 View Right   Final Result      1. Intra-articular distal radius fracture   2. Fracture at the dorsal aspect of the proximal carpal row.  This is most commonly related to triquetral fracture.         Electronically signed by: Eileen Goodman   Date:    06/07/2023   Time:    12:57      X-Ray Chest 1 View   Final Result      No significant interval change.         Electronically signed by: Roberto Parks   Date:    06/07/2023   Time:    07:18      X-ray Shoulder 2 or More Views Right   Final Result      No acute osseous process appreciated.         Electronically signed by: Harjinder Strong   Date:    06/06/2023   Time:    14:29      X-Ray Forearm Right   Final Result      Distal radial fracture.  It is possible there are additional fractures at the wrist.  If needed, follow-up wrist radiographs can be considered.         Electronically signed by: Harjinder Strong   Date:    06/06/2023   Time:    14:27      X-Ray Chest 1 View for Line/Tube Placement   Final Result      1. Endotracheal tube tip now midthoracic trachea.   2. Increased bilateral opacities.         Electronically signed by: Magnus Couch   Date:    06/06/2023   Time:    10:08      X-Ray Chest 1 View   Final Result      Interval slight advancement of the endotracheal tube.  No other significant change.          Electronically signed by: Magnus Couch   Date:    06/06/2023   Time:    06:16      X-Ray Chest 1 View   Final Result      Little interval change.         Electronically signed by: Magnus Couch   Date:    06/06/2023   Time:    06:15      X-Ray Chest 1 View   Final Result      Somewhat improved aeration in the lungs bilaterally compared to the prior examination otherwise not significantly changed         Electronically signed by: Anu Grijalva   Date:    06/05/2023   Time:    17:21      SURG FL Surgery Fluoro Usage   Final Result      CT 3D RECON WITHOUT INDEPENDENT WS   Final Result   FINDINGS/   3D reconstructions of the chest were created based on source data from accession number [id number].  Please see that report for details.         Electronically signed by: Magnus Couch   Date:    06/05/2023   Time:    13:14      X-Ray Chest 1 View   Final Result      Unchanged bilateral perihilar alveolar opacities.      Lung volumes are low with associated atelectatic change.         Electronically signed by: Eileen Goodman   Date:    06/05/2023   Time:    10:54      X-Ray Chest 1 View   Final Result      As above.  No adverse interval change.         Electronically signed by: Nikhil Auguste   Date:    06/05/2023   Time:    06:49      CT Thoracic Spine Without Contrast   Final Result   Impression:      1. No acute thoracic spine fracture dislocation or subluxation is identified.      2. Details and other findings as above.      No significant discrepancy with overnight report.         Electronically signed by: Roberto Parks   Date:    06/05/2023   Time:    08:05      CT Lumbar Spine Without Contrast   Final Result   Impression:      1. There is a burst fracture of the L2 vertebral body. There is retropulsion of the fracture fragment (series 7 image 50) causing spinal canal narrowing and compression upon the thecal sac (series 3 image 50). There is a comminuted displaced fracture of the right L2 transverse process.  There is a displaced fracture of the right L3 transverse process. There are comminuted displaced fractures of the bilateral L5 transverse process. There is a comminuted displaced fracture of the left sacral ala described separately.      2. Details and findings as above.      No significant discrepancy with overnight report.         Electronically signed by: Roberto Parks   Date:    06/05/2023   Time:    08:08      CT Cervical Spine Without Contrast   Final Result   Impression:      1. No acute cervical spine fracture dislocation or subluxation is seen.      2. There is a left apical pneumothorax seen. There is opacity seen in the apical segment of the right upper lobe. This may be consistent with contusions or consolidation. Consider additional evaluation with chest CT.      3. Details and findings as noted above.      No significant discrepancy with overnight report.         Electronically signed by: Roberto Parks   Date:    06/05/2023   Time:    08:11      CT Pelvis Without Contrast   Final Result   Abnormal      1. Comminuted pelvic fractures with diastasis and displacement as above.   2. Fracture associated soft tissue hematomas.   3. Perirectal stranding.  Cannot exclude bowel injury.  Discrepancy from preliminary report.   This report was flagged in Epic as abnormal.         Electronically signed by: Eileen Goodman   Date:    06/05/2023   Time:    08:26      CT 3D RECON WITHOUT INDEPENDENT WS   Final Result   Abnormal      1. Comminuted pelvic fractures with diastasis and displacement as above.   2. Fracture associated soft tissue hematomas.   3. Perirectal stranding.  Cannot exclude bowel injury.  Discrepancy from preliminary report.   This report was flagged in Epic as abnormal.         Electronically signed by: Eileen Goodman   Date:    06/05/2023   Time:    08:26      X-Ray Pelvis Routine AP   Final Result      Multiple fractures.         Electronically signed by: Roberto Parks   Date:    06/04/2023    Time:    17:30      X-Ray Femur 2 View Left   Final Result      No definite acute osseous abnormality identified.         Electronically signed by: Roberto Parks   Date:    06/04/2023   Time:    17:32      X-Ray Chest 1 View   Final Result      1. Endotracheal tube tip near the origin of the right mainstem bronchus and can be retracted slightly.  Additional tubing overlying the thoracic inlet may be an enteric tube.   2. Right upper lung consolidation.  Patchy opacities in the left lower lung may be atelectasis or contusion.         Electronically signed by: Harjinder Strong   Date:    06/04/2023   Time:    16:18      CT Previous   Final Result      CT Previous   Final Result      Xray Previous   Final Result           I have reviewed all pertinent imaging results/findings within the past 24 hours.    Micro/Path/Other:  Microbiology Results (last 7 days)       Procedure Component Value Units Date/Time    Blood Culture [[id number]]  (Normal) Collected: 06/13/23 0912    Order Status: Completed Specimen: Blood from Antecubital, Left Updated: 06/18/23 1100     CULTURE, BLOOD ([facility]) No Growth at 5 days    Blood Culture [[id number]]  (Normal) Collected: 06/13/23 0912    Order Status: Completed Specimen: Blood from Antecubital, Right Updated: 06/18/23 1100     CULTURE, BLOOD ([facility]) No Growth at 5 days           Specimen (168h ago, onward)      None             Assessment & Plan:   Gera Chavez is a 37 y.o. male admitted on 6/4/2023 following fall from bridge at 25 ft.  He reportedly jumped off a bridge and landed on dry land.  He arrived intubated with a left-sided chest tube.  He was found to have a pelvic ring fracture including open book fracture, L2 burst fracture, left pneumothorax, multiple left-sided rib fractures. Pseudomonas VAP. Bilateral vocal cord paresis.     Consults:   ENT  Neurosurgery  Orthopedic surgery  Psychiatry    Therapy:  Physical Therapy  Occupational Therapy  SLP Weight bearing status:   RUE: ROGELIO adrian  for platform walker  LUE: NWB  RLE:  pivot to transfer  LLE: NWB Precautions:  Fall and Standard   Seizure prophylaxis:  Not indicated. VTE prophylaxis:     Prophylactic Lovenox 40mg BID  GI prophylaxis:  H2B   Outpatient follow up:  PCP  Orthopedic surgery  Neurosurgery  Disposition:  Pending progress with therapy  Psych?     - [facility]  - Easy to chew diet, add low sodium modifier 2/2 elev sodium on labs  - Daily labs  - Home medications  - Psych meds per psych   - Appreciate ENT recommendations   - MM pain control  - second operation in 10-12 weeks (sept 01, 2023 - sept 15, 2023) for removal of dorsal spanning plate  - IS  - Therapy as above  - VTE prevention as above       6/23/2023 5:21 AM     The above findings, diagnostics, and treatment plan were discussed with Dr. Jarret Sauer  who will follow with further assessments and recommendations. Please call with any questions, concerns, or clinical status changes.

## 2023-06-23 NOTE — PT/OT/SLP RE-EVAL
"Occupational Therapy   Re-evaluation    Name: Gera Chavez  MRN: [id number]  Admitting Diagnosis:  Closed olecranon fracture, left, initial encounter  Recent Surgery: Procedure(s) (LRB):  ORIF, ELBOW (Left) 6 Days Post-Op    Recommendations:     Discharge Recommendations: rehabilitation facility  Discharge Equipment Recommendations: to be determined by next level of care  Barriers to discharge:   significant ortho restrictions.      Assessment:     Gera Chavez is a 37 y.o. male with a medical diagnosis of pelvic ring fx including open book fx, L2 burst fx, L pneumothorax, multiple L sided rib fx, pseudomonas VAP, B vocal cord paresis.  He presents alert, awake.  R lateral lean while seated EOB, appears to be attempting to offload L hip.    Improved today, able to stand with total assist, RLE blocked and LLE held off of ground.  Performance deficits affecting function are weakness, impaired endurance, impaired self care skills, impaired functional mobility, gait instability, impaired balance, pain.      Rehab Prognosis:  GOOD; patient would benefit from acute skilled OT services to address these deficits and reach maximum level of function.       Plan:     Patient to be seen 5 x/week to address the above listed problems via self-care/home management  Plan of Care Expires: 07/21/23  Plan of Care Reviewed with: patient    Subjective     Chief Complaint: no complaints   Patient/Family stated goals: "I want to get better"  Communicated with: LUPE Felix prior to session, during session  Pain/Comfort:  Pain Rating 1:  (LLE, repositioned and RN to administer meds)    Objective:     Communicated with: RN prior to session.  Patient found HOB elevated with: blood pressure cuff, Condom Catheter, pulse ox (continuous), SCD, TLSO upon OT entry to room.  RN reached out to [facility] for TLSO extension/adjustment.    General Precautions: Standard, aspiration  Orthopedic Precautions: spinal precautions  Braces: TLSO  Respiratory Status: " Room air  Vital Signs: Blood Pressure: 137/82  O2 saturation 90%, brief drop to 88% when sleeping, on RA      Occupational Performance:    Bed Mobility:    Patient completed Supine to Sit with maximal assistance x2    Functional Mobility/Transfers:  Patient completed Sit <> Stand Transfer with total assistance with LLE held off of floor, RLE blocked, support on gait belt and R elbow  Functional Mobility: Max x2 stand pivot transfer    Activities of Daily Living:  Feeding:  stand by assistance RUE  Grooming: stand by assistance RUE  Lower Body Dressing: maximal assistance socks  Toileting: maximal assistance      Cognitive/Visual Perceptual:  Follows commands; chart indicates pt still has auditory hallucinations, OT did not observe.  Tracks, participates in ADLs and transfers.    Physical Exam:  R wrist splinted, L elbow splinted, moving digits on L UE, moving R digits      Therapeutic Positioning  Risk for acquired pressure injuries is increased due to impaired mobility.    OT interventions performed during the course of today's session in an effort to prevent and/or reduce acquired pressure injuries:  Education on Pressure Ulcer Prevention provided    Skin assessment: OT to continue to assess sacrum, limited by transfers and time on day of re-eval       OT recommendations for therapeutic positioning throughout hospitalization:   Follow [facility] Pressure Injury Prevention Protocol      Education:  Patient provided with verbal education regarding OT role/goals/POC, post op precautions, and fall prevention.  Understanding was verbalized, however additional teaching warranted.         Patient left up in chair with all lines intact, call button in reach, and RN and one on one present    GOALS:   Multidisciplinary Problems       Occupational Therapy Goals          Problem: Occupational Therapy    Goal Priority Disciplines Outcome Interventions   Occupational Therapy Goal     OT, PT/OT Ongoing, Progressing     Description: Goals to be met by: 7/16/23     Patient will increase functional independence with ADLs by performing:    UE Dressing with Minimal Assistance.  Grooming while seated EOB with Min Assistance.  Toileting Moderate Assistance for hygiene and clothing management, indicating need.  Sitting at edge of bed x15 minutes with Stand-by Assistance.                         History:     Past Medical History:   Diagnosis Date    Asthma     Depression     Diabetes mellitus     Encounter for blood transfusion     Generalized anxiety disorder     Hypertension     Schizophrenia, unspecified     Sleep apnea     Unspecified glaucoma          Past Surgical History:   Procedure Laterality Date    INTRAMEDULLARY RODDING OF FEMUR Left 02/05/2023    Procedure: INSERTION, INTRAMEDULLARY NOEL, FEMUR;  Surgeon: Tuan Zepeda DO;  Location: [facility];  Service: Orthopedics;  Laterality: Left;    OPEN REDUCTION AND INTERNAL FIXATION (ORIF) OF FRACTURE OF ACETABULUM Left 6/14/2023    Procedure: ORIF, FRACTURE, ACETABULUM, POSTERIOR;  Surgeon: Compa Bernal MD;  Location: [facility];  Service: Orthopedics;  Laterality: Left;  Lateral, Julio C table, de la rosa bag  Ho Solis  1st case in second room    OPEN REDUCTION AND INTERNAL FIXATION (ORIF) OF FRACTURE OF DISTAL RADIUS Right 6/12/2023    Procedure: ORIF, FRACTURE, RADIUS, DISTAL;  Surgeon: Compa Bernal MD;  Location: [facility] OR;  Service: Orthopedics;  Laterality: Right;  supine hand table skeleta dyamics c arm to folow    OPEN REDUCTION AND INTERNAL FIXATION (ORIF) OF INJURY OF ELBOW Left 6/17/2023    Procedure: ORIF, ELBOW;  Surgeon: Eder Stein MD;  Location: [facility] OR;  Service: Orthopedics;  Laterality: Left;    OPEN REDUCTION AND INTERNAL FIXATION (ORIF) OF INJURY OF HIP Left 6/5/2023    Procedure: ORIF,PELVIS;  Surgeon: Tuan Zepeda DO;  Location: [facility];  Service: Orthopedics;  Laterality: Left;  supine julio c traction LLE CELL SAVER, ho    POSTERIOR LUMBAR  INTERBODY FUSION (PLIF) WITH COMPUTER-ASSISTED NAVIGATION N/A 6/9/2023    Procedure: FUSION, SPINE, LUMBAR, PLIF, USING COMPUTER-ASSISTED NAVIGATION;  Surgeon: Angelika Matos MD;  Location: [facility];  Service: Neurosurgery;  Laterality: N/A;  T11-L4 posterolateral fusion, L1-2 laminectomies with O-arm, [facility] // NTI // TIVA SETUP // [employer]    REMOVAL OF HARDWARE FROM LOWER EXTREMITY Left 5/9/2023    Procedure: REMOVAL, HARDWARE, LOWER EXTREMITY;  Surgeon: Tuan Zepeda DO;  Location: [facility];  Service: Orthopedics;  Laterality: Left;    TRACH TUBE EXCHANGER  6/5/2023    UVULOPALATOPHARYNGOPLASTY         Time Tracking:     OT Date of Treatment:    OT Start Time: 1028  OT Stop Time: 1056  OT Total Time (min): 28 min    Billable Minutes:Re-eval 1    6/23/2023

## 2023-06-23 NOTE — PT/OT/SLP PROGRESS
Physical Therapy Treatment    Patient Name:  Gera Chavez   MRN:  [id number]    Recommendations:     Discharge Recommendations: other (see comments) (pending progress; multiple orthopedic pxns)  Discharge Equipment Recommendations: to be determined by next level of care  Barriers to discharge: Ongoing medical needs    Assessment:     Gera Chavez is a 37 y.o. male admitted with a medical diagnosis of L pelvic rim fx w/ open book pelvis s/p ORIF L posterior column of acetabulum, L pelvic wall hematoma, L2 burst fx s/p T11-L4 fusion, apical PTX, L 4-11 rib fx, pulmonary contusion, R distal radius fracture s/p ORIF, and repaired CSF leak. L olecranon fx. He presents with the following impairments/functional limitations: weakness, impaired self care skills, impaired functional mobility, orthopedic precautions, impaired endurance, impaired balance, gait instability.     consult noted to improve fit of TLSO. Increased time spent to adjust TLSO and don it with a gown underneath 2/2 straps digging into skin under arms (TLSO against skin). Pt in good spirits and tolerated t/f back to bed well via tad lift.     Rehab Prognosis: Good; patient would benefit from acute skilled PT services to address these deficits and reach maximum level of function.    Recent Surgery: Procedure(s) (LRB):  ORIF, ELBOW (Left) 6 Days Post-Op    Plan:     During this hospitalization, patient to be seen 5 x/week to address the identified rehab impairments via therapeutic activities, therapeutic exercises and progress toward the following goals:    Plan of Care Expires:  07/16/23    Subjective     Chief Complaint: L leg pain  Patient/Family Comments/goals: none reported  Pain/Comfort:  Pain Rating 1: 7/10  Location - Side 1: Left  Location 1: leg  Pain Addressed 1: Reposition      Objective:     Communicated with RN prior to session.  Patient found up in chair with left leg hanging off recliner, reclined back and asleep with blood pressure  cuff, telemetry, pulse ox (continuous), (TLSO, splint RUE) upon PT entry to room. 1:1 present.     General Precautions: Standard, aspiration  Orthopedic Precautions: spinal precautions (NWB DIOGO UE's, NWB LLE, WBAT RLE for t/f's only (no hopping))  Braces: TLSO (splint RUE)  Respiratory Status: Nasal cannula, flow 2 L/min; 96%  Blood Pressure:  Skin Integrity: Visible skin intact      Functional Mobility:  Bed Mobility:    Rolling DIOGO x 2 trials to remove tod pad and adjust TLSO to improve comfort  Rolling with max assist  Changed gown and chucks 2/2 condom catheter leaking  Transfers:   Tod lift recliner to bed      Therapeutic Activities/Exercises:      Education:  Patient provided with verbal education regarding POC, pxns and mobility.  Understanding was verbalized, however additional teaching warranted.     Patient left HOB elevated with all lines intact, call button in reach, and RN present. TLSO donned with HOB elevated to eat.     GOALS:   Multidisciplinary Problems       Physical Therapy Goals          Problem: Physical Therapy    Goal Priority Disciplines Outcome Goal Variances Interventions   Physical Therapy Goal     PT, PT/OT Ongoing, Progressing     Description: Goals to be met by: 23     Patient will increase functional independence with mobility by performin. Supine to sit with Moderate Assistance  2. Sit to supine with Moderate Assistance  3. Sitting at edge of bed x15 minutes with Moderate Assistance  4. Pt to tolerate there ex/ROM to B LE for strengthening and to prevent contractures                          Time Tracking:     PT Received On: 23  PT Start Time: 1356     PT Stop Time: 1440  PT Total Time (min): 44 min     Billable Minutes: Therapeutic Activity 3 units    Treatment Type: Treatment  PT/PTA: PTA     Number of PTA visits since last PT visit: 4     2023

## 2023-06-23 NOTE — PROGRESS NOTES
Addendum Note:  The pt.is s/p ORIF of the Pelvis from 06/05/23 and was brought to the OR 06/06/23 for spine Fusion procedure. Both CRNA and Myself, MD Harper were present at induction. The pt. Tolerated induction well, however, when the pt. Was transferred from bed to OR table (Prone), he was unable to tolerate from both Pulmonary/Respiratory view as well as Cardiovascular point. He would be unable to remain prone for any length of time for the surgical procedure to be performed.   Discussed with neurosurgeon and agreement made to cancel the case and return the pt.to ICU, with plans to return to OR later in the week with the pt.in better condition to tolerate procedure.    STEVAN Garcia MD   Table restraints applied according to hospital policy.

## 2023-06-24 LAB
ALBUMIN SERPL-MCNC: 2.9 G/DL (ref 3.5–5)
ALBUMIN/GLOB SERPL: 0.9 RATIO (ref 1.1–2)
ALP SERPL-CCNC: 312 UNIT/L (ref 40–150)
ALT SERPL-CCNC: 32 UNIT/L (ref 0–55)
AST SERPL-CCNC: 32 UNIT/L (ref 5–34)
BASOPHILS # BLD AUTO: 0.05 X10(3)/MCL
BASOPHILS NFR BLD AUTO: 0.4 %
BILIRUBIN DIRECT+TOT PNL SERPL-MCNC: 1.7 MG/DL
BUN SERPL-MCNC: 9.7 MG/DL (ref 8.9–20.6)
CALCIUM SERPL-MCNC: 8.2 MG/DL (ref 8.4–10.2)
CHLORIDE SERPL-SCNC: 106 MMOL/L (ref 98–107)
CO2 SERPL-SCNC: 22 MMOL/L (ref 22–29)
CREAT SERPL-MCNC: 0.72 MG/DL (ref 0.73–1.18)
EOSINOPHIL # BLD AUTO: 0.23 X10(3)/MCL (ref 0–0.9)
EOSINOPHIL NFR BLD AUTO: 2 %
ERYTHROCYTE [DISTWIDTH] IN BLOOD BY AUTOMATED COUNT: 15.7 % (ref 11.5–17)
GFR SERPLBLD CREATININE-BSD FMLA CKD-EPI: >60 MLS/MIN/1.73/M2
GLOBULIN SER-MCNC: 3.4 GM/DL (ref 2.4–3.5)
GLUCOSE SERPL-MCNC: 108 MG/DL (ref 74–100)
HCT VFR BLD AUTO: 32.9 % (ref 42–52)
HGB BLD-MCNC: 10.3 G/DL (ref 14–18)
IMM GRANULOCYTES # BLD AUTO: 0.08 X10(3)/MCL (ref 0–0.04)
IMM GRANULOCYTES NFR BLD AUTO: 0.7 %
LAMOTRIGINE SERPL-MCNC: 0.5 MCG/ML (ref 3–15)
LYMPHOCYTES # BLD AUTO: 2.11 X10(3)/MCL (ref 0.6–4.6)
LYMPHOCYTES NFR BLD AUTO: 18.5 %
MCH RBC QN AUTO: 28.1 PG (ref 27–31)
MCHC RBC AUTO-ENTMCNC: 31.3 G/DL (ref 33–36)
MCV RBC AUTO: 89.9 FL (ref 80–94)
MONOCYTES # BLD AUTO: 0.84 X10(3)/MCL (ref 0.1–1.3)
MONOCYTES NFR BLD AUTO: 7.4 %
NEUTROPHILS # BLD AUTO: 8.08 X10(3)/MCL (ref 2.1–9.2)
NEUTROPHILS NFR BLD AUTO: 71 %
NRBC BLD AUTO-RTO: 0.4 %
PLATELET # BLD AUTO: 533 X10(3)/MCL (ref 130–400)
PMV BLD AUTO: 10.3 FL (ref 7.4–10.4)
POTASSIUM SERPL-SCNC: 3.9 MMOL/L (ref 3.5–5.1)
PROT SERPL-MCNC: 6.3 GM/DL (ref 6.4–8.3)
RBC # BLD AUTO: 3.66 X10(6)/MCL (ref 4.7–6.1)
SODIUM SERPL-SCNC: 141 MMOL/L (ref 136–145)
WBC # SPEC AUTO: 11.39 X10(3)/MCL (ref 4.5–11.5)

## 2023-06-24 PROCEDURE — 85025 COMPLETE CBC W/AUTO DIFF WBC: CPT | Performed by: STUDENT IN AN ORGANIZED HEALTH CARE EDUCATION/TRAINING PROGRAM

## 2023-06-24 PROCEDURE — 25000242 PHARM REV CODE 250 ALT 637 W/ HCPCS: Performed by: HOSPITALIST

## 2023-06-24 PROCEDURE — 25000003 PHARM REV CODE 250

## 2023-06-24 PROCEDURE — 25000003 PHARM REV CODE 250: Performed by: NURSE PRACTITIONER

## 2023-06-24 PROCEDURE — 11000001 HC ACUTE MED/SURG PRIVATE ROOM

## 2023-06-24 PROCEDURE — 94640 AIRWAY INHALATION TREATMENT: CPT

## 2023-06-24 PROCEDURE — 63600175 PHARM REV CODE 636 W HCPCS

## 2023-06-24 PROCEDURE — 94761 N-INVAS EAR/PLS OXIMETRY MLT: CPT

## 2023-06-24 PROCEDURE — 99900031 HC PATIENT EDUCATION (STAT)

## 2023-06-24 PROCEDURE — 27000221 HC OXYGEN, UP TO 24 HOURS

## 2023-06-24 PROCEDURE — 25000003 PHARM REV CODE 250: Performed by: SURGERY

## 2023-06-24 PROCEDURE — 80053 COMPREHEN METABOLIC PANEL: CPT | Performed by: STUDENT IN AN ORGANIZED HEALTH CARE EDUCATION/TRAINING PROGRAM

## 2023-06-24 RX ADMIN — QUETIAPINE FUMARATE 50 MG: 25 TABLET ORAL at 08:06

## 2023-06-24 RX ADMIN — GUAIFENESIN 600 MG: 600 TABLET, EXTENDED RELEASE ORAL at 08:06

## 2023-06-24 RX ADMIN — GABAPENTIN 300 MG: 300 CAPSULE ORAL at 02:06

## 2023-06-24 RX ADMIN — QUETIAPINE FUMARATE 100 MG: 100 TABLET ORAL at 08:06

## 2023-06-24 RX ADMIN — PROPRANOLOL HYDROCHLORIDE 40 MG: 10 TABLET ORAL at 08:06

## 2023-06-24 RX ADMIN — Medication 1 TABLET: at 08:06

## 2023-06-24 RX ADMIN — GABAPENTIN 300 MG: 300 CAPSULE ORAL at 08:06

## 2023-06-24 RX ADMIN — IPRATROPIUM BROMIDE AND ALBUTEROL SULFATE 3 ML: 2.5; .5 SOLUTION RESPIRATORY (INHALATION) at 08:06

## 2023-06-24 RX ADMIN — ARIPIPRAZOLE 5 MG: 5 TABLET ORAL at 08:06

## 2023-06-24 RX ADMIN — MIRTAZAPINE 15 MG: 15 TABLET, FILM COATED ORAL at 08:06

## 2023-06-24 RX ADMIN — IPRATROPIUM BROMIDE AND ALBUTEROL SULFATE 3 ML: 2.5; .5 SOLUTION RESPIRATORY (INHALATION) at 02:06

## 2023-06-24 RX ADMIN — ATORVASTATIN CALCIUM 20 MG: 10 TABLET, FILM COATED ORAL at 08:06

## 2023-06-24 RX ADMIN — ENOXAPARIN SODIUM 40 MG: 40 INJECTION SUBCUTANEOUS at 08:06

## 2023-06-24 RX ADMIN — LAMOTRIGINE 25 MG: 25 TABLET ORAL at 08:06

## 2023-06-24 RX ADMIN — OXYCODONE HYDROCHLORIDE 10 MG: 10 TABLET ORAL at 03:06

## 2023-06-24 RX ADMIN — LISINOPRIL 40 MG: 20 TABLET ORAL at 08:06

## 2023-06-24 RX ADMIN — PROPRANOLOL HYDROCHLORIDE 40 MG: 10 TABLET ORAL at 02:06

## 2023-06-24 RX ADMIN — OXYCODONE HYDROCHLORIDE 10 MG: 10 TABLET ORAL at 08:06

## 2023-06-24 RX ADMIN — AMLODIPINE BESYLATE 5 MG: 5 TABLET ORAL at 08:06

## 2023-06-24 RX ADMIN — FLUOXETINE 40 MG: 20 CAPSULE ORAL at 08:06

## 2023-06-24 NOTE — PLAN OF CARE
Problem: Adult Inpatient Plan of Care  Goal: Plan of Care Review  Outcome: Ongoing, Progressing  Goal: Patient-Specific Goal (Individualized)  Outcome: Ongoing, Progressing  Goal: Absence of Hospital-Acquired Illness or Injury  Outcome: Ongoing, Progressing  Goal: Optimal Comfort and Wellbeing  Outcome: Ongoing, Progressing  Goal: Readiness for Transition of Care  Outcome: Ongoing, Progressing     Problem: Infection  Goal: Absence of Infection Signs and Symptoms  Outcome: Ongoing, Progressing     Problem: Communication Impairment (Mechanical Ventilation, Invasive)  Goal: Effective Communication  Outcome: Ongoing, Progressing     Problem: Device-Related Complication Risk (Mechanical Ventilation, Invasive)  Goal: Optimal Device Function  Outcome: Ongoing, Progressing     Problem: Inability to Wean (Mechanical Ventilation, Invasive)  Goal: Mechanical Ventilation Liberation  Outcome: Ongoing, Progressing

## 2023-06-24 NOTE — NURSING
Nurses Note -- 4 Eyes      6/24/2023   6:28 AM      Skin assessed during: Q Shift Change      [] No Altered Skin Integrity Present    []Prevention Measures Documented      [x] Yes- Altered Skin Integrity Present or Discovered   [] LDA Added if Not in Epic (Describe Wound)   [x] New Altered Skin Integrity was Present on Admit and Documented in LDA   [] Wound Image Taken    Wound Care Consulted? Yes    Attending Nurse:  Tuan Neil RN     Second RN/Staff Member: Alex Bancroft

## 2023-06-24 NOTE — PROGRESS NOTES
".6/24/2023 2:47 PM   Gera Chavez   1985   [id number]        Psychiatry Progress Note     SUBJECTIVE:   Gera Chavez is a 37 y.o. male with past psychiatric history of Schizophrenia, currently admitted with a left closed olecranon fracture secondary to a suicide attempt by jumping off a bridge, falling 25 feet, and landing on dry land. Psychiatry has been consulted to address psychotropic medication management as he intially presented with increased depression.     At time of visit, he was observed watching tv with 1:1 sitter at bedside. He is AAO X4 today, very cooperative and pleasant with this provider. He reports that his mood is "okay". He is compliant with his oral medication regimen and denies any adverse effects. No thought blocking, linear TP. He denies and AVH or delusional TC. He also denies SI and HI. He reports he is sleeping well. Appetite is "so-so". Energy is improving.      Current Medications:   Scheduled Meds:    albuterol-ipratropium  3 mL Nebulization Q6H    amLODIPine  5 mg Oral Daily    [START ON 6/27/2023] ARIPiprazole lauroxil  882 mg Intramuscular Once    ARIPiprazole  5 mg Oral Daily    atorvastatin  20 mg Oral Daily    calcium-vitamin D3  1 tablet Oral BID    enoxaparin  40 mg Subcutaneous Q12H    FLUoxetine  40 mg Oral Daily    gabapentin  300 mg Oral TID    guaiFENesin  600 mg Oral BID    lamoTRIgine  25 mg Oral Daily    lisinopriL  40 mg Oral Daily    mirtazapine  15 mg Oral QHS    propranoloL  40 mg Oral TID    QUEtiapine  100 mg Oral QHS    QUEtiapine  50 mg Oral Daily      PRN Meds: acetaminophen, hydrALAZINE, morphine, oxyCODONE, oxyCODONE   Psychotherapeutics (From admission, onward)      Start     Stop Route Frequency Ordered    06/27/23 0900  ARIPiprazole lauroxil 882 mg/3.2 mL injection 882 mg        Question Answer Comment   Brand Name: Aripiprazole    Generic name: Abilify    Form: Solution    Length of Therapy: Other, see comments to be given on 6/26/2023 and then q " month   Reason for Non-Formulary: Patient failed therapy with alternative medications    How soon needed? (if approved, may take up to 5 days to procure): 72+ hrs    Requestor's Contact Information: Dariana Walker        -- IM Once 06/22/23 0819    06/21/23 0900  ARIPiprazole tablet 5 mg         -- Oral Daily 06/20/23 1812 06/19/23 0900  FLUoxetine capsule 40 mg         -- Oral Daily 06/18/23 1731 06/19/23 0900  QUEtiapine tablet 50 mg         -- Oral Daily 06/18/23 1731 06/18/23 2100  mirtazapine tablet 15 mg         -- Oral Nightly 06/18/23 1731 06/18/23 2100  QUEtiapine tablet 100 mg         -- Oral Nightly 06/18/23 1731            Allergies:   Review of patient's allergies indicates:   Allergen Reactions    Iodine     Trazodone         OBJECTIVE:   Vitals   Vitals:    06/24/23 1401   BP:    Pulse: 102   Resp: 20   Temp:         Labs/Imaging/Studies:   Recent Results (from the past 36 hour(s))   Comprehensive metabolic panel    Collection Time: 06/24/23  1:25 AM   Result Value Ref Range    Sodium Level 141 136 - 145 mmol/L    Potassium Level 3.9 3.5 - 5.1 mmol/L    Chloride 106 98 - 107 mmol/L    Carbon Dioxide 22 22 - 29 mmol/L    Glucose Level 108 (H) 74 - 100 mg/dL    Blood Urea Nitrogen 9.7 8.9 - 20.6 mg/dL    Creatinine 0.72 (L) 0.73 - 1.18 mg/dL    Calcium Level Total 8.2 (L) 8.4 - 10.2 mg/dL    Protein Total 6.3 (L) 6.4 - 8.3 gm/dL    Albumin Level 2.9 (L) 3.5 - 5.0 g/dL    Globulin 3.4 2.4 - 3.5 gm/dL    Albumin/Globulin Ratio 0.9 (L) 1.1 - 2.0 ratio    Bilirubin Total 1.7 (H) <=1.5 mg/dL    Alkaline Phosphatase 312 (H) 40 - 150 unit/L    Alanine Aminotransferase 32 0 - 55 unit/L    Aspartate Aminotransferase 32 5 - 34 unit/L    eGFR >60 mls/min/1.73/m2   CBC with Differential    Collection Time: 06/24/23  1:25 AM   Result Value Ref Range    WBC 11.39 4.50 - 11.50 x10(3)/mcL    RBC 3.66 (L) 4.70 - 6.10 x10(6)/mcL    Hgb 10.3 (L) 14.0 - 18.0 g/dL    Hct 32.9 (L) 42.0 - 52.0 %    MCV 89.9  "80.0 - 94.0 fL    MCH 28.1 27.0 - 31.0 pg    MCHC 31.3 (L) 33.0 - 36.0 g/dL    RDW 15.7 11.5 - 17.0 %    Platelet 533 (H) 130 - 400 x10(3)/mcL    MPV 10.3 7.4 - 10.4 fL    Neut % 71.0 %    Lymph % 18.5 %    Mono % 7.4 %    Eos % 2.0 %    Basophil % 0.4 %    Lymph # 2.11 0.6 - 4.6 x10(3)/mcL    Neut # 8.08 2.1 - 9.2 x10(3)/mcL    Mono # 0.84 0.1 - 1.3 x10(3)/mcL    Eos # 0.23 0 - 0.9 x10(3)/mcL    Baso # 0.05 <=0.2 x10(3)/mcL    IG# 0.08 (H) 0 - 0.04 x10(3)/mcL    IG% 0.7 %    NRBC% 0.4 %        Psychiatric Mental Status Exam:  General Appearance: appears stated age, dressed in hospital garb, lying in bed, in no acute distress  Arousal: alert  Behavior: cooperative, pleasant, polite  Movements and Motor Activity: no abnormal involuntary movements noted; no tics, no tremors, no akathisia, no dystonia, no evidence of tardive dyskinesia; no psychomotor agitation or retardation  Orientation: oriented to person, place, and time  Speech: normal rate, rhythm, volume, tone and pitch  Mood: "Okay"  Affect: mood-congruent  Thought Process: linear  Associations: no loosening of associations  Thought Content and Perceptions: no suicidal or homicidal ideation, no auditory or visual hallucinations, no paranoid ideation, no ideas of reference, no evidence of delusions or psychosis  Recent and Remote Memory: forgetful; per interview/observation with patient  Attention and Concentration: easily distractible; per interview/observation with patient  Fund of Knowledge: aware of current events; based on history, vocabulary, fund of knowledge, syntax, grammar, and content  Insight:  fair ; based on understanding of severity of illness and HPI  Judgment:  fair ; based on patient's behavior and HPI    ASSESSMENT/PLAN:   Diagnosis:  Schizophrenia. Unspecified (F20.9)  Suicidal attempt (T14.91)    Past Medical History:   Diagnosis Date    Asthma     Depression     Diabetes mellitus     Encounter for blood transfusion     Generalized anxiety " disorder     Hypertension     Schizophrenia, unspecified     Sleep apnea     Unspecified glaucoma        Plan:  Continue current POC  Continue CEC/1:1 sitter  Psych will continue to follow      DOMO Fragoso-BC  6/24/2023

## 2023-06-24 NOTE — PSYCH
[facility] - [facility]  Consult Note  Psychiatry        Referring Physician: Salvador Sauer MD    Examiner: Catalina Ruggiero NP  Date: 6/23/2023  Patient Status: Inpt  patient seen individually, chart reviewed, and case discussed with staff    Sitter at BS, SOB with O2 per mask. Some low energy today, has been napping off and on during the day per pt and sitter. Good sleep last night. Appetite good, no complaints at present. Cooperative with prompting, able to make needs known, less disorganized thought content.     Mental Status Exam:     Eye Contact: fair    Attitude toward examiner: cooperative    Motor Activity: restless    Speech: slow and pressured    Cognitions: oriented x 2    PERCEPTIONS: denies hallucinations and questionable    THOUGHT PROCESSES: distractibility, circumstantial, and disorganized    THOUGHT CONTENT: DENIES suicidal ideation and DENIES homicidal ideation    AFFECT/MOOD: depressed and anxious    INSIGHT: poor    JUDGEMENT: poor    Recommendations:   1.) cont meds as rx  2.) cont sitter at BS  3.) psych cont to follow

## 2023-06-24 NOTE — PROGRESS NOTES
Trauma Surgery   Progress Note  Admit Date: 6/4/2023  HD#20  POD#7 Days Post-Op    Subjective:   Interval history:  NAEON  AFVSS  No acute complaints      Home Meds:   Current Outpatient Medications   Medication Instructions    albuterol (PROVENTIL/VENTOLIN HFA) 90 mcg/actuation inhaler 2 puffs, Inhalation, Every 4 hours PRN    ALBUTEROL INHL 90 mcg, Inhalation, Every 4 hours PRN    amLODIPine (NORVASC) 5 mg, Oral, Daily    amoxicillin-clavulanate 875-125mg (AUGMENTIN) 875-125 mg per tablet 1 tablet, Oral, Every 12 hours (non-standard times)    atorvastatin (LIPITOR) 20 mg, Oral, Daily    clonazePAM (KLONOPIN) 1 mg, Oral, 2 times daily PRN    FLUoxetine 20 MG capsule fluoxetine 20 mg capsule    FLUoxetine 40 mg, Oral, Daily    insulin glargine 100 units/mL SubQ pen Lantus Solostar U-100 Insulin 100 unit/mL (3 mL) subcutaneous pen    ipratropium (ATROVENT) 42 mcg (0.06 %) nasal spray Each Nostril    lamoTRIgine (LAMICTAL) 25 MG tablet lamotrigine 25 mg tablet    LANTUS SOLOSTAR U-100 INSULIN glargine 100 units/mL SubQ pen Subcutaneous    latanoprost 0.005 % ophthalmic solution latanoprost 0.005 % eye drops    lisinopriL (PRINIVIL,ZESTRIL) 40 mg, Oral    mirtazapine (REMERON) 7.5 MG Tab mirtazapine 7.5 mg tablet    potassium chloride (K-TAB) 20 mEq potassium chloride ER 20 mEq tablet,extended release   TAKE 1 TABLET BY MOUTH EVERY DAY    prochlorperazine (COMPAZINE) 10 MG tablet prochlorperazine maleate 10 mg tablet    QUEtiapine (SEROQUEL) 200 mg, Oral, Nightly    testosterone cypionate (DEPOTESTOTERONE CYPIONATE) 200 mg/mL injection SMARTSIG:Milliliter(s) IM      Scheduled Meds:   albuterol-ipratropium  3 mL Nebulization Q6H    amLODIPine  5 mg Oral Daily    [START ON 6/27/2023] ARIPiprazole lauroxil  882 mg Intramuscular Once    ARIPiprazole  5 mg Oral Daily    atorvastatin  20 mg Oral Daily    calcium-vitamin D3  1 tablet Oral BID    enoxaparin  40 mg Subcutaneous Q12H    FLUoxetine  40 mg Oral Daily     "gabapentin  300 mg Oral TID    guaiFENesin  600 mg Oral BID    lamoTRIgine  25 mg Oral Daily    lisinopriL  40 mg Oral Daily    mirtazapine  15 mg Oral QHS    propranoloL  40 mg Oral TID    QUEtiapine  100 mg Oral QHS    QUEtiapine  50 mg Oral Daily     Continuous Infusions:      PRN Meds:acetaminophen, hydrALAZINE, morphine, oxyCODONE, oxyCODONE     Objective:     VITAL SIGNS: 24 HR MIN & MAX LAST   Temp  Min: 98.8 °F (37.1 °C)  Max: 99.7 °F (37.6 °C)  99.7 °F (37.6 °C)   BP  Min: 137/82  Max: 169/98  (!) 169/98    Pulse  Min: 85  Max: 116  108    Resp  Min: 13  Max: 31  19    SpO2  Min: 94 %  Max: 100 %  (!) 94 %      HT: 5' 10" (177.8 cm)  WT: 121 kg (266 lb 12.1 oz)  BMI: 38.3     Intake/output:  Intake/Output - Last 3 Shifts         06/22 0700 06/23 0659 06/23 0700 06/24 0659 06/24 0700  06/25 0659    P.O. 240 1260     IV Piggyback       Total Intake(mL/kg) 240 (2) 1260 (10.4)     Urine (mL/kg/hr) 1250 (0.4) 1325 (0.5)     Stool 0 0     Total Output 1250 1325     Net -1010 -65            Urine Occurrence 2 x      Stool Occurrence 6 x 4 x             Intake/Output Summary (Last 24 hours) at 6/24/2023 0723  Last data filed at 6/24/2023 0000  Gross per 24 hour   Intake 1260 ml   Output 1325 ml   Net -65 ml           Lines/drains/airway:       Peripheral IV - Single Lumen 06/17/23 1030 Right Antecubital (Active)   Site Assessment Clean;Dry;Intact 06/19/23 0000   Extremity Assessment Distal to IV No abnormal discoloration;No redness;No swelling;No warmth 06/19/23 0000   Line Status Infusing 06/19/23 0000   Dressing Status Clean;Dry;Intact 06/19/23 0000   Number of days: 1       Physical examination:  Gen: NAD, AAOx3, answering questions appropriately, anxious  HEENT: NCAT  CV: tachycardic  Resp: NWOB  Abd: S/NT/ND  Msk: RUE+LUE compartments soft, NV intact, able to wiggle fingers  Neuro: CN II-XII grossly intact    Labs:  Renal:  Recent Labs     06/22/23  0131 06/23/23  0237 06/24/23  0125   BUN 13.2 10.9 9.7 "   CREATININE 0.73 0.71* 0.72*       No results for input(s): LACTIC in the last 72 hours.  FEN/GI:  Recent Labs     06/22/23  0131 06/23/23  0237 06/24/23  0125   * 144 141   K 3.7 3.5 3.9   CO2 23 24 22   CALCIUM 8.2* 8.1* 8.2*   ALBUMIN 2.7* 2.9* 2.9*   BILITOT 1.9* 1.9* 1.7*   AST 33 29 32   ALKPHOS 261* 300* 312*   ALT 34 33 32       Heme:  Recent Labs     06/22/23  0131 06/23/23  0237 06/24/23  0125   HGB 9.6* 10.2* 10.3*   HCT 31.3* 32.6* 32.9*   * 581* 533*       ID:  Recent Labs     06/22/23  0131 06/23/23  0237 06/24/23  0125   WBC 10.81 10.04 11.39       CBG:  Recent Labs     06/22/23 0131 06/23/23 0237 06/24/23  0125   GLUCOSE 105* 106* 108*        No results for input(s): POCTGLUCOSE in the last 72 hours.   Cardiovascular:  No results for input(s): TROPONINI, CKTOTAL, CKMB, BNP in the last 168 hours.  I have reviewed all pertinent lab results within the past 24 hours.    Imaging:  Fl Modified Barium Swallow Speech   Final Result      X-Ray Chest 1 View   Final Result      Increasing pulmonary opacities may be related to atelectatic change, patient rotation or pneumonitis.         Electronically signed by: Eileen Goodman   Date:    06/18/2023   Time:    08:13      XR Gastric tube check, non-radiologist performed   Final Result      X-Ray Chest 1 View   Final Result      Interval placement of endotracheal tube and NG tube in good position      Otherwise unchanged         Electronically signed by: Anu Grijalva   Date:    06/17/2023   Time:    12:26      SURG FL Surgery Fluoro Usage   Final Result      X-Ray Chest 1 View   Final Result      X-Ray Elbow 2 Views Left   Final Result      Fractures as above.         Electronically signed by: Tommie Cao   Date:    06/16/2023   Time:    14:53      X-Ray Chest 1 View   Final Result      1. Interval removal of endotracheal and enteric tubes.   2. Increased bilateral airspace opacities.   No significant change from the [facility]  interpretation.         Electronically signed by: Delmy Rodriguez   Date:    06/16/2023   Time:    06:17      X-Ray Chest 1 View   Final Result      X-Ray Pelvis Routine AP   Final Result      SURG FL Surgery Fluoro Usage   Final Result      X-Ray Chest 1 View   Final Result      New right apical opacity.         Electronically signed by: Eileen Goodman   Date:    06/14/2023   Time:    06:18      X-Ray Chest 1 View   Final Result      Interval removal of left-sided chest tube with no clear evidence of pneumothorax.      No other change         Electronically signed by: Tommie Cao   Date:    06/13/2023   Time:    09:37      X-Ray Chest 1 View   Final Result      Increase in interstitial and pulmonary vascular markings indicating some degree of pulmonary vascular congestion and cardiac decompensation.      Support catheters as above         Electronically signed by: Tommie Cao   Date:    06/13/2023   Time:    09:36      X-Ray Wrist Complete Right   Final Result      As above.         Electronically signed by: Magnus Couch   Date:    06/12/2023   Time:    13:09      SURG FL Surgery Fluoro Usage   Final Result      X-Ray Chest 1 View   Final Result      No significant change         Electronically signed by: Tommie Cao   Date:    06/12/2023   Time:    09:01      X-Ray Chest 1 View   Final Result      No significant change         Electronically signed by: Tommie Cao   Date:    06/12/2023   Time:    09:00      CT 3D RECON WITH INDEPENDENT WS   Final Result      CT Wrist Without Contrast Right   Final Result      X-Ray Chest 1 View   Final Result      X-Ray Chest 1 View   Final Result      No significant interval change.         Electronically signed by: Roberto Parks   Date:    06/10/2023   Time:    14:12      X-Ray Chest 1 View   Final Result      No significant interval change.         Electronically signed by: Roberto Parks   Date:    06/10/2023   Time:    07:46      SURG FL Surgery Fluoro  Usage   Final Result      X-Ray Chest 1 View   Final Result      Slight oval improvement of right lower lobe opacification.  No adverse interval change.         Electronically signed by: Nikhil Auguste   Date:    06/09/2023   Time:    06:35      X-Ray Chest 1 View   Final Result      Slightly improved aeration left lung.  No other significant change.         Electronically signed by: Magnus Couch   Date:    06/08/2023   Time:    09:55      X-Ray Chest 1 View   Final Result      X-Ray Wrist 2 View Right   Final Result      1. Intra-articular distal radius fracture   2. Fracture at the dorsal aspect of the proximal carpal row.  This is most commonly related to triquetral fracture.         Electronically signed by: Eileen Goodman   Date:    06/07/2023   Time:    12:57      X-Ray Chest 1 View   Final Result      No significant interval change.         Electronically signed by: Roberto Parks   Date:    06/07/2023   Time:    07:18      X-ray Shoulder 2 or More Views Right   Final Result      No acute osseous process appreciated.         Electronically signed by: Harjinder Strong   Date:    06/06/2023   Time:    14:29      X-Ray Forearm Right   Final Result      Distal radial fracture.  It is possible there are additional fractures at the wrist.  If needed, follow-up wrist radiographs can be considered.         Electronically signed by: Harjinder Strong   Date:    06/06/2023   Time:    14:27      X-Ray Chest 1 View for Line/Tube Placement   Final Result      1. Endotracheal tube tip now midthoracic trachea.   2. Increased bilateral opacities.         Electronically signed by: Magnus Couch   Date:    06/06/2023   Time:    10:08      X-Ray Chest 1 View   Final Result      Interval slight advancement of the endotracheal tube.  No other significant change.         Electronically signed by: Magnus Couch   Date:    06/06/2023   Time:    06:16      X-Ray Chest 1 View   Final Result      Little interval change.         Electronically  signed by: Magnus Couch   Date:    06/06/2023   Time:    06:15      X-Ray Chest 1 View   Final Result      Somewhat improved aeration in the lungs bilaterally compared to the prior examination otherwise not significantly changed         Electronically signed by: Anu Grijalva   Date:    06/05/2023   Time:    17:21      SURG FL Surgery Fluoro Usage   Final Result      CT 3D RECON WITHOUT INDEPENDENT WS   Final Result   FINDINGS/   3D reconstructions of the chest were created based on source data from accession number [id number].  Please see that report for details.         Electronically signed by: Magnus Couch   Date:    06/05/2023   Time:    13:14      X-Ray Chest 1 View   Final Result      Unchanged bilateral perihilar alveolar opacities.      Lung volumes are low with associated atelectatic change.         Electronically signed by: Eileen Goodman   Date:    06/05/2023   Time:    10:54      X-Ray Chest 1 View   Final Result      As above.  No adverse interval change.         Electronically signed by: Nikhil Auguste   Date:    06/05/2023   Time:    06:49      CT Thoracic Spine Without Contrast   Final Result   Impression:      1. No acute thoracic spine fracture dislocation or subluxation is identified.      2. Details and other findings as above.      No significant discrepancy with overnight report.         Electronically signed by: Roberto Parks   Date:    06/05/2023   Time:    08:05      CT Lumbar Spine Without Contrast   Final Result   Impression:      1. There is a burst fracture of the L2 vertebral body. There is retropulsion of the fracture fragment (series 7 image 50) causing spinal canal narrowing and compression upon the thecal sac (series 3 image 50). There is a comminuted displaced fracture of the right L2 transverse process. There is a displaced fracture of the right L3 transverse process. There are comminuted displaced fractures of the bilateral L5 transverse process. There is a comminuted  displaced fracture of the left sacral ala described separately.      2. Details and findings as above.      No significant discrepancy with overnight report.         Electronically signed by: Roberto Parks   Date:    06/05/2023   Time:    08:08      CT Cervical Spine Without Contrast   Final Result   Impression:      1. No acute cervical spine fracture dislocation or subluxation is seen.      2. There is a left apical pneumothorax seen. There is opacity seen in the apical segment of the right upper lobe. This may be consistent with contusions or consolidation. Consider additional evaluation with chest CT.      3. Details and findings as noted above.      No significant discrepancy with overnight report.         Electronically signed by: Roberto Parks   Date:    06/05/2023   Time:    08:11      CT Pelvis Without Contrast   Final Result   Abnormal      1. Comminuted pelvic fractures with diastasis and displacement as above.   2. Fracture associated soft tissue hematomas.   3. Perirectal stranding.  Cannot exclude bowel injury.  Discrepancy from preliminary report.   This report was flagged in Epic as abnormal.         Electronically signed by: Eileen Goodman   Date:    06/05/2023   Time:    08:26      CT 3D RECON WITHOUT INDEPENDENT WS   Final Result   Abnormal      1. Comminuted pelvic fractures with diastasis and displacement as above.   2. Fracture associated soft tissue hematomas.   3. Perirectal stranding.  Cannot exclude bowel injury.  Discrepancy from preliminary report.   This report was flagged in Epic as abnormal.         Electronically signed by: Eileen Goodman   Date:    06/05/2023   Time:    08:26      X-Ray Pelvis Routine AP   Final Result      Multiple fractures.         Electronically signed by: Roberto Parks   Date:    06/04/2023   Time:    17:30      X-Ray Femur 2 View Left   Final Result      No definite acute osseous abnormality identified.         Electronically signed by: Roberto Parks    Date:    06/04/2023   Time:    17:32      X-Ray Chest 1 View   Final Result      1. Endotracheal tube tip near the origin of the right mainstem bronchus and can be retracted slightly.  Additional tubing overlying the thoracic inlet may be an enteric tube.   2. Right upper lung consolidation.  Patchy opacities in the left lower lung may be atelectasis or contusion.         Electronically signed by: Harjinder Strong   Date:    06/04/2023   Time:    16:18      CT Previous   Final Result      CT Previous   Final Result      Xray Previous   Final Result           I have reviewed all pertinent imaging results/findings within the past 24 hours.    Micro/Path/Other:  Microbiology Results (last 7 days)       Procedure Component Value Units Date/Time    Blood Culture [[id number]]  (Normal) Collected: 06/13/23 0912    Order Status: Completed Specimen: Blood from Antecubital, Left Updated: 06/18/23 1100     CULTURE, BLOOD ([facility]) No Growth at 5 days    Blood Culture [[id number]]  (Normal) Collected: 06/13/23 0912    Order Status: Completed Specimen: Blood from Antecubital, Right Updated: 06/18/23 1100     CULTURE, BLOOD ([facility]) No Growth at 5 days           Specimen (168h ago, onward)      None             Assessment & Plan:   Gera Chavez is a 37 y.o. male admitted on 6/4/2023 following fall from bridge at 25 ft.  He reportedly jumped off a bridge and landed on dry land.  He arrived intubated with a left-sided chest tube.  He was found to have a pelvic ring fracture including open book fracture, L2 burst fracture, left pneumothorax, multiple left-sided rib fractures. Pseudomonas VAP. Bilateral vocal cord paresis.     Consults:   ENT  Neurosurgery  Orthopedic surgery  Psychiatry    Therapy:  Physical Therapy  Occupational Therapy  SLP Weight bearing status:   RUE: NWB ok for platform walker  LUE: NWB  RLE:  pivot to transfer  LLE: NWB Precautions:  Fall and Standard   Seizure prophylaxis:  Not indicated. VTE prophylaxis:      Prophylactic Lovenox 40mg BID  GI prophylaxis:  H2B   Outpatient follow up:  PCP  Orthopedic surgery  Neurosurgery  Disposition:  Pending progress with therapy  Psych?     - C[facility]  - Easy to chew diet, add low sodium modifier 2/2 elev sodium on labs  - Daily labs  - Home medications  - Psych meds per psych   - Appreciate ENT recommendations   - MM pain control  - second operation in 10-12 weeks (sept 01, 2023 - sept 15, 2023) for removal of dorsal spanning plate  - IS  - Therapy as above  - VTE prevention as above   - awaiting placement    Elva Haley MD  [employer] General Surgery PGY4

## 2023-06-24 NOTE — NURSING
Nurses Note -- 4 Eyes      6/24/2023   4:44 PM      Skin assessed during: Daily Assessment      [] No Altered Skin Integrity Present    []Prevention Measures Documented      [x] Yes- Altered Skin Integrity Present or Discovered   [] LDA Added if Not in Epic (Describe Wound)   [] New Altered Skin Integrity was Present on Admit and Documented in LDA   [] Wound Image Taken    Wound Care Consulted? Yes    Attending Nurse:  Toño Garzon RN     Second RN/Staff Member:  Krys QUISPE RN

## 2023-06-25 LAB
ALBUMIN SERPL-MCNC: 3.1 G/DL (ref 3.5–5)
ALBUMIN/GLOB SERPL: 0.9 RATIO (ref 1.1–2)
ALP SERPL-CCNC: 333 UNIT/L (ref 40–150)
ALT SERPL-CCNC: 32 UNIT/L (ref 0–55)
AST SERPL-CCNC: 30 UNIT/L (ref 5–34)
BASOPHILS # BLD AUTO: 0.05 X10(3)/MCL
BASOPHILS NFR BLD AUTO: 0.5 %
BILIRUBIN DIRECT+TOT PNL SERPL-MCNC: 1.8 MG/DL
BUN SERPL-MCNC: 10.8 MG/DL (ref 8.9–20.6)
CALCIUM SERPL-MCNC: 8.5 MG/DL (ref 8.4–10.2)
CHLORIDE SERPL-SCNC: 106 MMOL/L (ref 98–107)
CO2 SERPL-SCNC: 24 MMOL/L (ref 22–29)
CREAT SERPL-MCNC: 0.7 MG/DL (ref 0.73–1.18)
EOSINOPHIL # BLD AUTO: 0.23 X10(3)/MCL (ref 0–0.9)
EOSINOPHIL NFR BLD AUTO: 2.2 %
ERYTHROCYTE [DISTWIDTH] IN BLOOD BY AUTOMATED COUNT: 16.3 % (ref 11.5–17)
GFR SERPLBLD CREATININE-BSD FMLA CKD-EPI: >60 MLS/MIN/1.73/M2
GLOBULIN SER-MCNC: 3.6 GM/DL (ref 2.4–3.5)
GLUCOSE SERPL-MCNC: 98 MG/DL (ref 74–100)
HCT VFR BLD AUTO: 33.9 % (ref 42–52)
HGB BLD-MCNC: 10.8 G/DL (ref 14–18)
IMM GRANULOCYTES # BLD AUTO: 0.07 X10(3)/MCL (ref 0–0.04)
IMM GRANULOCYTES NFR BLD AUTO: 0.7 %
LYMPHOCYTES # BLD AUTO: 2.5 X10(3)/MCL (ref 0.6–4.6)
LYMPHOCYTES NFR BLD AUTO: 23.9 %
MCH RBC QN AUTO: 28.6 PG (ref 27–31)
MCHC RBC AUTO-ENTMCNC: 31.9 G/DL (ref 33–36)
MCV RBC AUTO: 89.7 FL (ref 80–94)
MONOCYTES # BLD AUTO: 0.9 X10(3)/MCL (ref 0.1–1.3)
MONOCYTES NFR BLD AUTO: 8.6 %
NEUTROPHILS # BLD AUTO: 6.69 X10(3)/MCL (ref 2.1–9.2)
NEUTROPHILS NFR BLD AUTO: 64.1 %
NRBC BLD AUTO-RTO: 0.8 %
PLATELET # BLD AUTO: 503 X10(3)/MCL (ref 130–400)
PMV BLD AUTO: 10.6 FL (ref 7.4–10.4)
POTASSIUM SERPL-SCNC: 4.4 MMOL/L (ref 3.5–5.1)
PROT SERPL-MCNC: 6.7 GM/DL (ref 6.4–8.3)
RBC # BLD AUTO: 3.78 X10(6)/MCL (ref 4.7–6.1)
SODIUM SERPL-SCNC: 142 MMOL/L (ref 136–145)
WBC # SPEC AUTO: 10.44 X10(3)/MCL (ref 4.5–11.5)

## 2023-06-25 PROCEDURE — 25000003 PHARM REV CODE 250

## 2023-06-25 PROCEDURE — 25000242 PHARM REV CODE 250 ALT 637 W/ HCPCS: Performed by: HOSPITALIST

## 2023-06-25 PROCEDURE — 25000003 PHARM REV CODE 250: Performed by: NURSE PRACTITIONER

## 2023-06-25 PROCEDURE — 85025 COMPLETE CBC W/AUTO DIFF WBC: CPT | Performed by: STUDENT IN AN ORGANIZED HEALTH CARE EDUCATION/TRAINING PROGRAM

## 2023-06-25 PROCEDURE — 11000001 HC ACUTE MED/SURG PRIVATE ROOM

## 2023-06-25 PROCEDURE — 94761 N-INVAS EAR/PLS OXIMETRY MLT: CPT

## 2023-06-25 PROCEDURE — 63600175 PHARM REV CODE 636 W HCPCS: Performed by: STUDENT IN AN ORGANIZED HEALTH CARE EDUCATION/TRAINING PROGRAM

## 2023-06-25 PROCEDURE — 80053 COMPREHEN METABOLIC PANEL: CPT | Performed by: STUDENT IN AN ORGANIZED HEALTH CARE EDUCATION/TRAINING PROGRAM

## 2023-06-25 PROCEDURE — 63600175 PHARM REV CODE 636 W HCPCS

## 2023-06-25 PROCEDURE — 99900031 HC PATIENT EDUCATION (STAT)

## 2023-06-25 PROCEDURE — 25000003 PHARM REV CODE 250: Performed by: SURGERY

## 2023-06-25 PROCEDURE — 94640 AIRWAY INHALATION TREATMENT: CPT

## 2023-06-25 RX ADMIN — ATORVASTATIN CALCIUM 20 MG: 10 TABLET, FILM COATED ORAL at 08:06

## 2023-06-25 RX ADMIN — IPRATROPIUM BROMIDE AND ALBUTEROL SULFATE 3 ML: 2.5; .5 SOLUTION RESPIRATORY (INHALATION) at 08:06

## 2023-06-25 RX ADMIN — LAMOTRIGINE 25 MG: 25 TABLET ORAL at 08:06

## 2023-06-25 RX ADMIN — ENOXAPARIN SODIUM 40 MG: 40 INJECTION SUBCUTANEOUS at 08:06

## 2023-06-25 RX ADMIN — IPRATROPIUM BROMIDE AND ALBUTEROL SULFATE 3 ML: 2.5; .5 SOLUTION RESPIRATORY (INHALATION) at 02:06

## 2023-06-25 RX ADMIN — OXYCODONE HYDROCHLORIDE 10 MG: 10 TABLET ORAL at 04:06

## 2023-06-25 RX ADMIN — Medication 1 TABLET: at 08:06

## 2023-06-25 RX ADMIN — PROPRANOLOL HYDROCHLORIDE 40 MG: 10 TABLET ORAL at 08:06

## 2023-06-25 RX ADMIN — GABAPENTIN 300 MG: 300 CAPSULE ORAL at 02:06

## 2023-06-25 RX ADMIN — LISINOPRIL 40 MG: 20 TABLET ORAL at 08:06

## 2023-06-25 RX ADMIN — ARIPIPRAZOLE 5 MG: 5 TABLET ORAL at 08:06

## 2023-06-25 RX ADMIN — QUETIAPINE FUMARATE 100 MG: 100 TABLET ORAL at 08:06

## 2023-06-25 RX ADMIN — FLUOXETINE 40 MG: 20 CAPSULE ORAL at 08:06

## 2023-06-25 RX ADMIN — OXYCODONE HYDROCHLORIDE 10 MG: 10 TABLET ORAL at 08:06

## 2023-06-25 RX ADMIN — GUAIFENESIN 600 MG: 600 TABLET, EXTENDED RELEASE ORAL at 08:06

## 2023-06-25 RX ADMIN — QUETIAPINE FUMARATE 50 MG: 25 TABLET ORAL at 08:06

## 2023-06-25 RX ADMIN — PROPRANOLOL HYDROCHLORIDE 40 MG: 10 TABLET ORAL at 02:06

## 2023-06-25 RX ADMIN — HYDRALAZINE HYDROCHLORIDE 10 MG: 20 INJECTION INTRAMUSCULAR; INTRAVENOUS at 04:06

## 2023-06-25 RX ADMIN — AMLODIPINE BESYLATE 5 MG: 5 TABLET ORAL at 08:06

## 2023-06-25 RX ADMIN — MIRTAZAPINE 15 MG: 15 TABLET, FILM COATED ORAL at 08:06

## 2023-06-25 RX ADMIN — GABAPENTIN 300 MG: 300 CAPSULE ORAL at 08:06

## 2023-06-25 RX ADMIN — ENOXAPARIN SODIUM 40 MG: 40 INJECTION SUBCUTANEOUS at 09:06

## 2023-06-25 NOTE — NURSING
Nurses Note -- 4 Eyes      6/25/2023   4:08 PM      Skin assessed during: Daily Assessment      [] No Altered Skin Integrity Present    [x]Prevention Measures Documented      [x] Yes- Altered Skin Integrity Present or Discovered   [] LDA Added if Not in Epic (Describe Wound)   [] New Altered Skin Integrity was Present on Admit and Documented in LDA   [] Wound Image Taken    Wound Care Consulted? Yes    Attending Nurse:  Toño Garzon RN     Second RN/Staff Member:  Slava Belle RN

## 2023-06-25 NOTE — PROGRESS NOTES
".6/25/2023 9:10 AM   Gera Chavez   1985   [id number]        Psychiatry Progress Note     SUBJECTIVE:   Gera Chavez is a 37 y.o. male with past psychiatric history of Schizophrenia, currently admitted with a left closed olecranon fracture secondary to a suicide attempt by jumping off a bridge, falling 25 feet, and landing on dry land. Psychiatry has been consulted to address psychotropic medication management as he intially presented with increased depression.      At time of visit, he was observed watching tv with 1:1 sitter at bedside. He is AAO X4 today, very cooperative and pleasant with this provider. He reports that his mood is "good". He is compliant with his oral medication regimen and denies any adverse effects. No thought blocking, linear TP. He denies and AVH or delusional TC. He also denies SI and HI. He reports he is sleeping well. Appetite is "good". More SOB today, complains of pain. However, he is requesting to get out of bed and ambulate.       Lamictal level - 0.5     Current Medications:   Scheduled Meds:    albuterol-ipratropium  3 mL Nebulization Q6H    amLODIPine  5 mg Oral Daily    [START ON 6/27/2023] ARIPiprazole lauroxil  882 mg Intramuscular Once    ARIPiprazole  5 mg Oral Daily    atorvastatin  20 mg Oral Daily    calcium-vitamin D3  1 tablet Oral BID    enoxaparin  40 mg Subcutaneous Q12H    FLUoxetine  40 mg Oral Daily    gabapentin  300 mg Oral TID    guaiFENesin  600 mg Oral BID    lamoTRIgine  25 mg Oral Daily    lisinopriL  40 mg Oral Daily    mirtazapine  15 mg Oral QHS    propranoloL  40 mg Oral TID    QUEtiapine  100 mg Oral QHS    QUEtiapine  50 mg Oral Daily      PRN Meds: acetaminophen, hydrALAZINE, morphine, oxyCODONE, oxyCODONE   Psychotherapeutics (From admission, onward)      Start     Stop Route Frequency Ordered    06/27/23 0900  ARIPiprazole lauroxil 882 mg/3.2 mL injection 882 mg        Question Answer Comment   Brand Name: Aripiprazole    Generic name: Abilify "    Form: Solution    Length of Therapy: Other, see comments to be given on 6/26/2023 and then q month   Reason for Non-Formulary: Patient failed therapy with alternative medications    How soon needed? (if approved, may take up to 5 days to procure): 72+ hrs    Requestor's Contact Information: Dariana Walker        -- IM Once 06/22/23 0819    06/21/23 0900  ARIPiprazole tablet 5 mg         -- Oral Daily 06/20/23 1812 06/19/23 0900  FLUoxetine capsule 40 mg         -- Oral Daily 06/18/23 1731 06/19/23 0900  QUEtiapine tablet 50 mg         -- Oral Daily 06/18/23 1731 06/18/23 2100  mirtazapine tablet 15 mg         -- Oral Nightly 06/18/23 1731 06/18/23 2100  QUEtiapine tablet 100 mg         -- Oral Nightly 06/18/23 1731            Allergies:   Review of patient's allergies indicates:   Allergen Reactions    Iodine     Trazodone         OBJECTIVE:   Vitals   Vitals:    06/25/23 0900   BP:    Pulse: (!) 131   Resp: (!) 22   Temp:         Labs/Imaging/Studies:   Recent Results (from the past 36 hour(s))   Comprehensive metabolic panel    Collection Time: 06/24/23  1:25 AM   Result Value Ref Range    Sodium Level 141 136 - 145 mmol/L    Potassium Level 3.9 3.5 - 5.1 mmol/L    Chloride 106 98 - 107 mmol/L    Carbon Dioxide 22 22 - 29 mmol/L    Glucose Level 108 (H) 74 - 100 mg/dL    Blood Urea Nitrogen 9.7 8.9 - 20.6 mg/dL    Creatinine 0.72 (L) 0.73 - 1.18 mg/dL    Calcium Level Total 8.2 (L) 8.4 - 10.2 mg/dL    Protein Total 6.3 (L) 6.4 - 8.3 gm/dL    Albumin Level 2.9 (L) 3.5 - 5.0 g/dL    Globulin 3.4 2.4 - 3.5 gm/dL    Albumin/Globulin Ratio 0.9 (L) 1.1 - 2.0 ratio    Bilirubin Total 1.7 (H) <=1.5 mg/dL    Alkaline Phosphatase 312 (H) 40 - 150 unit/L    Alanine Aminotransferase 32 0 - 55 unit/L    Aspartate Aminotransferase 32 5 - 34 unit/L    eGFR >60 mls/min/1.73/m2   CBC with Differential    Collection Time: 06/24/23  1:25 AM   Result Value Ref Range    WBC 11.39 4.50 - 11.50 x10(3)/mcL    RBC 3.66  (L) 4.70 - 6.10 x10(6)/mcL    Hgb 10.3 (L) 14.0 - 18.0 g/dL    Hct 32.9 (L) 42.0 - 52.0 %    MCV 89.9 80.0 - 94.0 fL    MCH 28.1 27.0 - 31.0 pg    MCHC 31.3 (L) 33.0 - 36.0 g/dL    RDW 15.7 11.5 - 17.0 %    Platelet 533 (H) 130 - 400 x10(3)/mcL    MPV 10.3 7.4 - 10.4 fL    Neut % 71.0 %    Lymph % 18.5 %    Mono % 7.4 %    Eos % 2.0 %    Basophil % 0.4 %    Lymph # 2.11 0.6 - 4.6 x10(3)/mcL    Neut # 8.08 2.1 - 9.2 x10(3)/mcL    Mono # 0.84 0.1 - 1.3 x10(3)/mcL    Eos # 0.23 0 - 0.9 x10(3)/mcL    Baso # 0.05 <=0.2 x10(3)/mcL    IG# 0.08 (H) 0 - 0.04 x10(3)/mcL    IG% 0.7 %    NRBC% 0.4 %   Comprehensive metabolic panel    Collection Time: 06/25/23  1:22 AM   Result Value Ref Range    Sodium Level 142 136 - 145 mmol/L    Potassium Level 4.4 3.5 - 5.1 mmol/L    Chloride 106 98 - 107 mmol/L    Carbon Dioxide 24 22 - 29 mmol/L    Glucose Level 98 74 - 100 mg/dL    Blood Urea Nitrogen 10.8 8.9 - 20.6 mg/dL    Creatinine 0.70 (L) 0.73 - 1.18 mg/dL    Calcium Level Total 8.5 8.4 - 10.2 mg/dL    Protein Total 6.7 6.4 - 8.3 gm/dL    Albumin Level 3.1 (L) 3.5 - 5.0 g/dL    Globulin 3.6 (H) 2.4 - 3.5 gm/dL    Albumin/Globulin Ratio 0.9 (L) 1.1 - 2.0 ratio    Bilirubin Total 1.8 (H) <=1.5 mg/dL    Alkaline Phosphatase 333 (H) 40 - 150 unit/L    Alanine Aminotransferase 32 0 - 55 unit/L    Aspartate Aminotransferase 30 5 - 34 unit/L    eGFR >60 mls/min/1.73/m2   CBC with Differential    Collection Time: 06/25/23  1:22 AM   Result Value Ref Range    WBC 10.44 4.50 - 11.50 x10(3)/mcL    RBC 3.78 (L) 4.70 - 6.10 x10(6)/mcL    Hgb 10.8 (L) 14.0 - 18.0 g/dL    Hct 33.9 (L) 42.0 - 52.0 %    MCV 89.7 80.0 - 94.0 fL    MCH 28.6 27.0 - 31.0 pg    MCHC 31.9 (L) 33.0 - 36.0 g/dL    RDW 16.3 11.5 - 17.0 %    Platelet 503 (H) 130 - 400 x10(3)/mcL    MPV 10.6 (H) 7.4 - 10.4 fL    Neut % 64.1 %    Lymph % 23.9 %    Mono % 8.6 %    Eos % 2.2 %    Basophil % 0.5 %    Lymph # 2.50 0.6 - 4.6 x10(3)/mcL    Neut # 6.69 2.1 - 9.2 x10(3)/mcL    Mono #  "0.90 0.1 - 1.3 x10(3)/mcL    Eos # 0.23 0 - 0.9 x10(3)/mcL    Baso # 0.05 <=0.2 x10(3)/mcL    IG# 0.07 (H) 0 - 0.04 x10(3)/mcL    IG% 0.7 %    NRBC% 0.8 %        Psychiatric Mental Status Exam:  General Appearance: appears stated age, dressed in hospital garb, lying in bed, in no acute distress  Arousal: alert  Behavior: cooperative, pleasant, polite  Movements and Motor Activity: no abnormal involuntary movements noted; no tics, no tremors, no akathisia, no dystonia, no evidence of tardive dyskinesia; no psychomotor agitation or retardation  Orientation: oriented to person, place, and time  Speech: normal rate, rhythm, volume, tone and pitch  Mood: "Good"  Affect: mood-congruent  Thought Process: linear  Associations: no loosening of associations  Thought Content and Perceptions: no suicidal or homicidal ideation, no auditory or visual hallucinations, no paranoid ideation, no ideas of reference, no evidence of delusions or psychosis  Recent and Remote Memory: forgetful; per interview/observation with patient  Attention and Concentration: easily distractible; per interview/observation with patient  Fund of Knowledge: aware of current events; based on history, vocabulary, fund of knowledge, syntax, grammar, and content  Insight:  fair ; based on understanding of severity of illness and HPI  Judgment:  fair ; based on patient's behavior and HPI    ASSESSMENT/PLAN:   Diagnosis:  Schizophrenia. Unspecified (F20.9)  Suicidal attempt (T14.91)    Past Medical History:   Diagnosis Date    Asthma     Depression     Diabetes mellitus     Encounter for blood transfusion     Generalized anxiety disorder     Hypertension     Schizophrenia, unspecified     Sleep apnea     Unspecified glaucoma         Plan:  Continue current POC  Continue CEC/1:1 sitter  Psych will continue to follow      DOMO Fragoso-BC  6/25/2023   "

## 2023-06-25 NOTE — PROGRESS NOTES
Trauma Surgery   Progress Note  Admit Date: 6/4/2023  HD#21  POD#8 Days Post-Op    Subjective:   Interval history:  NAEON  AFVSS  No acute complaints      Home Meds:   Current Outpatient Medications   Medication Instructions    albuterol (PROVENTIL/VENTOLIN HFA) 90 mcg/actuation inhaler 2 puffs, Inhalation, Every 4 hours PRN    ALBUTEROL INHL 90 mcg, Inhalation, Every 4 hours PRN    amLODIPine (NORVASC) 5 mg, Oral, Daily    amoxicillin-clavulanate 875-125mg (AUGMENTIN) 875-125 mg per tablet 1 tablet, Oral, Every 12 hours (non-standard times)    atorvastatin (LIPITOR) 20 mg, Oral, Daily    clonazePAM (KLONOPIN) 1 mg, Oral, 2 times daily PRN    FLUoxetine 20 MG capsule fluoxetine 20 mg capsule    FLUoxetine 40 mg, Oral, Daily    insulin glargine 100 units/mL SubQ pen Lantus Solostar U-100 Insulin 100 unit/mL (3 mL) subcutaneous pen    ipratropium (ATROVENT) 42 mcg (0.06 %) nasal spray Each Nostril    lamoTRIgine (LAMICTAL) 25 MG tablet lamotrigine 25 mg tablet    LANTUS SOLOSTAR U-100 INSULIN glargine 100 units/mL SubQ pen Subcutaneous    latanoprost 0.005 % ophthalmic solution latanoprost 0.005 % eye drops    lisinopriL (PRINIVIL,ZESTRIL) 40 mg, Oral    mirtazapine (REMERON) 7.5 MG Tab mirtazapine 7.5 mg tablet    potassium chloride (K-TAB) 20 mEq potassium chloride ER 20 mEq tablet,extended release   TAKE 1 TABLET BY MOUTH EVERY DAY    prochlorperazine (COMPAZINE) 10 MG tablet prochlorperazine maleate 10 mg tablet    QUEtiapine (SEROQUEL) 200 mg, Oral, Nightly    testosterone cypionate (DEPOTESTOTERONE CYPIONATE) 200 mg/mL injection SMARTSIG:Milliliter(s) IM      Scheduled Meds:   albuterol-ipratropium  3 mL Nebulization Q6H    amLODIPine  5 mg Oral Daily    [START ON 6/27/2023] ARIPiprazole lauroxil  882 mg Intramuscular Once    ARIPiprazole  5 mg Oral Daily    atorvastatin  20 mg Oral Daily    calcium-vitamin D3  1 tablet Oral BID    enoxaparin  40 mg Subcutaneous Q12H    FLUoxetine  40 mg Oral Daily     "gabapentin  300 mg Oral TID    guaiFENesin  600 mg Oral BID    lamoTRIgine  25 mg Oral Daily    lisinopriL  40 mg Oral Daily    mirtazapine  15 mg Oral QHS    propranoloL  40 mg Oral TID    QUEtiapine  100 mg Oral QHS    QUEtiapine  50 mg Oral Daily     Continuous Infusions:      PRN Meds:acetaminophen, hydrALAZINE, morphine, oxyCODONE, oxyCODONE     Objective:     VITAL SIGNS: 24 HR MIN & MAX LAST   Temp  Min: 97.3 °F (36.3 °C)  Max: 99.5 °F (37.5 °C)  99.5 °F (37.5 °C)   BP  Min: 147/93  Max: 163/107  (!) 154/97    Pulse  Min: 74  Max: 121  109    Resp  Min: 14  Max: 30  14    SpO2  Min: 92 %  Max: 100 %  97 %      HT: 5' 10" (177.8 cm)  WT: 121 kg (266 lb 12.1 oz)  BMI: 38.3     Intake/output:  Intake/Output - Last 3 Shifts         06/23 0700  06/24 0659 06/24 0700  06/25 0659    P.O. 1260 780    Total Intake(mL/kg) 1260 (10.4) 780 (6.4)    Urine (mL/kg/hr) 1325 (0.5) 4950 (1.7)    Stool 0 0    Total Output 1325 4950    Net -65 -4170          Stool Occurrence 4 x 2 x            Intake/Output Summary (Last 24 hours) at 6/25/2023 0659  Last data filed at 6/25/2023 0425  Gross per 24 hour   Intake 780 ml   Output 4950 ml   Net -4170 ml           Lines/drains/airway:       Peripheral IV - Single Lumen 06/17/23 1030 Right Antecubital (Active)   Site Assessment Clean;Dry;Intact 06/19/23 0000   Extremity Assessment Distal to IV No abnormal discoloration;No redness;No swelling;No warmth 06/19/23 0000   Line Status Infusing 06/19/23 0000   Dressing Status Clean;Dry;Intact 06/19/23 0000   Number of days: 1       Physical examination:  Gen: NAD, AAOx3, answering questions appropriately, anxious  HEENT: NCAT  CV: tachycardic  Resp: NWOB  Abd: S/NT/ND  Msk: RUE+LUE compartments soft, NV intact, able to wiggle fingers  Neuro: CN II-XII grossly intact    Labs:  Renal:  Recent Labs     06/23/23  0237 06/24/23  0125 06/25/23  0122   BUN 10.9 9.7 10.8   CREATININE 0.71* 0.72* 0.70*       No results for input(s): LACTIC in the last " 72 hours.  FEN/GI:  Recent Labs     06/23/23 0237 06/24/23 0125 06/25/23  0122    141 142   K 3.5 3.9 4.4   CO2 24 22 24   CALCIUM 8.1* 8.2* 8.5   ALBUMIN 2.9* 2.9* 3.1*   BILITOT 1.9* 1.7* 1.8*   AST 29 32 30   ALKPHOS 300* 312* 333*   ALT 33 32 32       Heme:  Recent Labs     06/23/23 0237 06/24/23 0125 06/25/23 0122   HGB 10.2* 10.3* 10.8*   HCT 32.6* 32.9* 33.9*   * 533* 503*       ID:  Recent Labs     06/23/23 0237 06/24/23 0125 06/25/23 0122   WBC 10.04 11.39 10.44       CBG:  Recent Labs     06/23/23 0237 06/24/23 0125 06/25/23 0122   GLUCOSE 106* 108* 98        No results for input(s): POCTGLUCOSE in the last 72 hours.   Cardiovascular:  No results for input(s): TROPONINI, CKTOTAL, CKMB, BNP in the last 168 hours.  I have reviewed all pertinent lab results within the past 24 hours.    Imaging:  Fl Modified Barium Swallow Speech   Final Result      X-Ray Chest 1 View   Final Result      Increasing pulmonary opacities may be related to atelectatic change, patient rotation or pneumonitis.         Electronically signed by: Eileen Goodman   Date:    06/18/2023   Time:    08:13      XR Gastric tube check, non-radiologist performed   Final Result      X-Ray Chest 1 View   Final Result      Interval placement of endotracheal tube and NG tube in good position      Otherwise unchanged         Electronically signed by: Anu Grijalva   Date:    06/17/2023   Time:    12:26      SURG FL Surgery Fluoro Usage   Final Result      X-Ray Chest 1 View   Final Result      X-Ray Elbow 2 Views Left   Final Result      Fractures as above.         Electronically signed by: Tommie Cao   Date:    06/16/2023   Time:    14:53      X-Ray Chest 1 View   Final Result      1. Interval removal of endotracheal and enteric tubes.   2. Increased bilateral airspace opacities.   No significant change from the [facility] interpretation.         Electronically signed by: Delmy Rodriguez   Date:    06/16/2023    Time:    06:17      X-Ray Chest 1 View   Final Result      X-Ray Pelvis Routine AP   Final Result      SURG FL Surgery Fluoro Usage   Final Result      X-Ray Chest 1 View   Final Result      New right apical opacity.         Electronically signed by: Eileen Goodman   Date:    06/14/2023   Time:    06:18      X-Ray Chest 1 View   Final Result      Interval removal of left-sided chest tube with no clear evidence of pneumothorax.      No other change         Electronically signed by: Tommie Cao   Date:    06/13/2023   Time:    09:37      X-Ray Chest 1 View   Final Result      Increase in interstitial and pulmonary vascular markings indicating some degree of pulmonary vascular congestion and cardiac decompensation.      Support catheters as above         Electronically signed by: Tommie Cao   Date:    06/13/2023   Time:    09:36      X-Ray Wrist Complete Right   Final Result      As above.         Electronically signed by: Magnus Couch   Date:    06/12/2023   Time:    13:09      SURG FL Surgery Fluoro Usage   Final Result      X-Ray Chest 1 View   Final Result      No significant change         Electronically signed by: Tommie Cao   Date:    06/12/2023   Time:    09:01      X-Ray Chest 1 View   Final Result      No significant change         Electronically signed by: Tommie Cao   Date:    06/12/2023   Time:    09:00      CT 3D RECON WITH INDEPENDENT WS   Final Result      CT Wrist Without Contrast Right   Final Result      X-Ray Chest 1 View   Final Result      X-Ray Chest 1 View   Final Result      No significant interval change.         Electronically signed by: Roberto Parks   Date:    06/10/2023   Time:    14:12      X-Ray Chest 1 View   Final Result      No significant interval change.         Electronically signed by: Roberto Parks   Date:    06/10/2023   Time:    07:46      SURG FL Surgery Fluoro Usage   Final Result      X-Ray Chest 1 View   Final Result      Slight oval improvement  of right lower lobe opacification.  No adverse interval change.         Electronically signed by: Nikhil Auguste   Date:    06/09/2023   Time:    06:35      X-Ray Chest 1 View   Final Result      Slightly improved aeration left lung.  No other significant change.         Electronically signed by: Magnus Couch   Date:    06/08/2023   Time:    09:55      X-Ray Chest 1 View   Final Result      X-Ray Wrist 2 View Right   Final Result      1. Intra-articular distal radius fracture   2. Fracture at the dorsal aspect of the proximal carpal row.  This is most commonly related to triquetral fracture.         Electronically signed by: Eileen Goodman   Date:    06/07/2023   Time:    12:57      X-Ray Chest 1 View   Final Result      No significant interval change.         Electronically signed by: Roberto Parks   Date:    06/07/2023   Time:    07:18      X-ray Shoulder 2 or More Views Right   Final Result      No acute osseous process appreciated.         Electronically signed by: Harjinder Strong   Date:    06/06/2023   Time:    14:29      X-Ray Forearm Right   Final Result      Distal radial fracture.  It is possible there are additional fractures at the wrist.  If needed, follow-up wrist radiographs can be considered.         Electronically signed by: Harjinder Strong   Date:    06/06/2023   Time:    14:27      X-Ray Chest 1 View for Line/Tube Placement   Final Result      1. Endotracheal tube tip now midthoracic trachea.   2. Increased bilateral opacities.         Electronically signed by: Magnus Couch   Date:    06/06/2023   Time:    10:08      X-Ray Chest 1 View   Final Result      Interval slight advancement of the endotracheal tube.  No other significant change.         Electronically signed by: Magnus Couch   Date:    06/06/2023   Time:    06:16      X-Ray Chest 1 View   Final Result      Little interval change.         Electronically signed by: Magnus Couch   Date:    06/06/2023   Time:    06:15      X-Ray Chest 1 View    Final Result      Somewhat improved aeration in the lungs bilaterally compared to the prior examination otherwise not significantly changed         Electronically signed by: Anu Grijalva   Date:    06/05/2023   Time:    17:21      SURG FL Surgery Fluoro Usage   Final Result      CT 3D RECON WITHOUT INDEPENDENT WS   Final Result   FINDINGS/   3D reconstructions of the chest were created based on source data from accession number [id number].  Please see that report for details.         Electronically signed by: Magnus Couch   Date:    06/05/2023   Time:    13:14      X-Ray Chest 1 View   Final Result      Unchanged bilateral perihilar alveolar opacities.      Lung volumes are low with associated atelectatic change.         Electronically signed by: Eileen Goodman   Date:    06/05/2023   Time:    10:54      X-Ray Chest 1 View   Final Result      As above.  No adverse interval change.         Electronically signed by: Nikhil Auguste   Date:    06/05/2023   Time:    06:49      CT Thoracic Spine Without Contrast   Final Result   Impression:      1. No acute thoracic spine fracture dislocation or subluxation is identified.      2. Details and other findings as above.      No significant discrepancy with overnight report.         Electronically signed by: Roberto Parks   Date:    06/05/2023   Time:    08:05      CT Lumbar Spine Without Contrast   Final Result   Impression:      1. There is a burst fracture of the L2 vertebral body. There is retropulsion of the fracture fragment (series 7 image 50) causing spinal canal narrowing and compression upon the thecal sac (series 3 image 50). There is a comminuted displaced fracture of the right L2 transverse process. There is a displaced fracture of the right L3 transverse process. There are comminuted displaced fractures of the bilateral L5 transverse process. There is a comminuted displaced fracture of the left sacral ala described separately.      2. Details and findings  as above.      No significant discrepancy with overnight report.         Electronically signed by: Roberto Parks   Date:    06/05/2023   Time:    08:08      CT Cervical Spine Without Contrast   Final Result   Impression:      1. No acute cervical spine fracture dislocation or subluxation is seen.      2. There is a left apical pneumothorax seen. There is opacity seen in the apical segment of the right upper lobe. This may be consistent with contusions or consolidation. Consider additional evaluation with chest CT.      3. Details and findings as noted above.      No significant discrepancy with overnight report.         Electronically signed by: Roberto Parks   Date:    06/05/2023   Time:    08:11      CT Pelvis Without Contrast   Final Result   Abnormal      1. Comminuted pelvic fractures with diastasis and displacement as above.   2. Fracture associated soft tissue hematomas.   3. Perirectal stranding.  Cannot exclude bowel injury.  Discrepancy from preliminary report.   This report was flagged in Epic as abnormal.         Electronically signed by: Eileen Goodman   Date:    06/05/2023   Time:    08:26      CT 3D RECON WITHOUT INDEPENDENT WS   Final Result   Abnormal      1. Comminuted pelvic fractures with diastasis and displacement as above.   2. Fracture associated soft tissue hematomas.   3. Perirectal stranding.  Cannot exclude bowel injury.  Discrepancy from preliminary report.   This report was flagged in Epic as abnormal.         Electronically signed by: Eileen Goodman   Date:    06/05/2023   Time:    08:26      X-Ray Pelvis Routine AP   Final Result      Multiple fractures.         Electronically signed by: Roberto Parks   Date:    06/04/2023   Time:    17:30      X-Ray Femur 2 View Left   Final Result      No definite acute osseous abnormality identified.         Electronically signed by: Roberto Parks   Date:    06/04/2023   Time:    17:32      X-Ray Chest 1 View   Final Result      1. Endotracheal tube  tip near the origin of the right mainstem bronchus and can be retracted slightly.  Additional tubing overlying the thoracic inlet may be an enteric tube.   2. Right upper lung consolidation.  Patchy opacities in the left lower lung may be atelectasis or contusion.         Electronically signed by: Harjinder Strong   Date:    06/04/2023   Time:    16:18      CT Previous   Final Result      CT Previous   Final Result      Xray Previous   Final Result           I have reviewed all pertinent imaging results/findings within the past 24 hours.    Micro/Path/Other:  Microbiology Results (last 7 days)       Procedure Component Value Units Date/Time    Blood Culture [[id number]]  (Normal) Collected: 06/13/23 0912    Order Status: Completed Specimen: Blood from Antecubital, Left Updated: 06/18/23 1100     CULTURE, BLOOD ([facility]) No Growth at 5 days    Blood Culture [[id number]]  (Normal) Collected: 06/13/23 0912    Order Status: Completed Specimen: Blood from Antecubital, Right Updated: 06/18/23 1100     CULTURE, BLOOD ([facility]) No Growth at 5 days           Specimen (168h ago, onward)      None             Assessment & Plan:   Gera Chavez is a 37 y.o. male admitted on 6/4/2023 following fall from bridge at 25 ft.  He reportedly jumped off a bridge and landed on dry land.  He arrived intubated with a left-sided chest tube.  He was found to have a pelvic ring fracture including open book fracture, L2 burst fracture, left pneumothorax, multiple left-sided rib fractures. Pseudomonas VAP. Bilateral vocal cord paresis.     Consults:   ENT  Neurosurgery  Orthopedic surgery  Psychiatry    Therapy:  Physical Therapy  Occupational Therapy  SLP Weight bearing status:   RUE: NWB ok for platform walker  LUE: NWB  RLE:  pivot to transfer  LLE: NWB Precautions:  Fall and Standard   Seizure prophylaxis:  Not indicated. VTE prophylaxis:     Prophylactic Lovenox 40mg BID  GI prophylaxis:  H2B   Outpatient follow up:  PCP  Orthopedic  surgery  Neurosurgery  Disposition:  Pending progress with therapy  Psych?     - CEC  - Easy to chew diet, add low sodium modifier 2/2 elev sodium on labs  - Daily labs  - Home medications  - Psych meds per psych   - Appreciate ENT recommendations   - MM pain control  - second operation in 10-12 weeks (sept 01, 2023 - sept 15, 2023) for removal of dorsal spanning plate  - IS  - Therapy as above  - VTE prevention as above   - awaiting placement    Elva Haley MD  [employer] General Surgery PGY4

## 2023-06-26 VITALS
HEIGHT: 70 IN | BODY MASS INDEX: 38.19 KG/M2 | DIASTOLIC BLOOD PRESSURE: 70 MMHG | TEMPERATURE: 99 F | HEART RATE: 117 BPM | SYSTOLIC BLOOD PRESSURE: 123 MMHG | OXYGEN SATURATION: 96 % | WEIGHT: 266.75 LBS | RESPIRATION RATE: 26 BRPM

## 2023-06-26 LAB
ALBUMIN SERPL-MCNC: 3.3 G/DL (ref 3.5–5)
ALBUMIN/GLOB SERPL: 0.9 RATIO (ref 1.1–2)
ALP SERPL-CCNC: 346 UNIT/L (ref 40–150)
ALT SERPL-CCNC: 35 UNIT/L (ref 0–55)
AST SERPL-CCNC: 43 UNIT/L (ref 5–34)
BASOPHILS # BLD AUTO: 0.05 X10(3)/MCL
BASOPHILS NFR BLD AUTO: 0.5 %
BILIRUBIN DIRECT+TOT PNL SERPL-MCNC: 1.5 MG/DL
BUN SERPL-MCNC: 12.4 MG/DL (ref 8.9–20.6)
CALCIUM SERPL-MCNC: 8.6 MG/DL (ref 8.4–10.2)
CHLORIDE SERPL-SCNC: 104 MMOL/L (ref 98–107)
CO2 SERPL-SCNC: 22 MMOL/L (ref 22–29)
CREAT SERPL-MCNC: 0.72 MG/DL (ref 0.73–1.18)
CRP SERPL-MCNC: 13 MG/L
EOSINOPHIL # BLD AUTO: 0.29 X10(3)/MCL (ref 0–0.9)
EOSINOPHIL NFR BLD AUTO: 2.7 %
ERYTHROCYTE [DISTWIDTH] IN BLOOD BY AUTOMATED COUNT: 17 % (ref 11.5–17)
GFR SERPLBLD CREATININE-BSD FMLA CKD-EPI: >60 MLS/MIN/1.73/M2
GLOBULIN SER-MCNC: 3.7 GM/DL (ref 2.4–3.5)
GLUCOSE SERPL-MCNC: 101 MG/DL (ref 74–100)
HCT VFR BLD AUTO: 36.8 % (ref 42–52)
HGB BLD-MCNC: 11.8 G/DL (ref 14–18)
IMM GRANULOCYTES # BLD AUTO: 0.07 X10(3)/MCL (ref 0–0.04)
IMM GRANULOCYTES NFR BLD AUTO: 0.7 %
LYMPHOCYTES # BLD AUTO: 2.53 X10(3)/MCL (ref 0.6–4.6)
LYMPHOCYTES NFR BLD AUTO: 23.8 %
MCH RBC QN AUTO: 28.9 PG (ref 27–31)
MCHC RBC AUTO-ENTMCNC: 32.1 G/DL (ref 33–36)
MCV RBC AUTO: 90.2 FL (ref 80–94)
MONOCYTES # BLD AUTO: 0.84 X10(3)/MCL (ref 0.1–1.3)
MONOCYTES NFR BLD AUTO: 7.9 %
NEUTROPHILS # BLD AUTO: 6.86 X10(3)/MCL (ref 2.1–9.2)
NEUTROPHILS NFR BLD AUTO: 64.4 %
NRBC BLD AUTO-RTO: 0.3 %
PLATELET # BLD AUTO: 463 X10(3)/MCL (ref 130–400)
PMV BLD AUTO: 11.2 FL (ref 7.4–10.4)
POTASSIUM SERPL-SCNC: 4.6 MMOL/L (ref 3.5–5.1)
PREALB SERPL-MCNC: 26.7 MG/DL (ref 18–45)
PROT SERPL-MCNC: 7 GM/DL (ref 6.4–8.3)
RBC # BLD AUTO: 4.08 X10(6)/MCL (ref 4.7–6.1)
SODIUM SERPL-SCNC: 139 MMOL/L (ref 136–145)
WBC # SPEC AUTO: 10.64 X10(3)/MCL (ref 4.5–11.5)

## 2023-06-26 PROCEDURE — 86140 C-REACTIVE PROTEIN: CPT | Performed by: STUDENT IN AN ORGANIZED HEALTH CARE EDUCATION/TRAINING PROGRAM

## 2023-06-26 PROCEDURE — 97530 THERAPEUTIC ACTIVITIES: CPT | Mod: CQ

## 2023-06-26 PROCEDURE — 94664 DEMO&/EVAL PT USE INHALER: CPT

## 2023-06-26 PROCEDURE — 94761 N-INVAS EAR/PLS OXIMETRY MLT: CPT

## 2023-06-26 PROCEDURE — 99900035 HC TECH TIME PER 15 MIN (STAT)

## 2023-06-26 PROCEDURE — 25000003 PHARM REV CODE 250: Performed by: SURGERY

## 2023-06-26 PROCEDURE — 27000221 HC OXYGEN, UP TO 24 HOURS

## 2023-06-26 PROCEDURE — 25000242 PHARM REV CODE 250 ALT 637 W/ HCPCS: Performed by: HOSPITALIST

## 2023-06-26 PROCEDURE — 25000003 PHARM REV CODE 250

## 2023-06-26 PROCEDURE — 94640 AIRWAY INHALATION TREATMENT: CPT

## 2023-06-26 PROCEDURE — 85025 COMPLETE CBC W/AUTO DIFF WBC: CPT | Performed by: STUDENT IN AN ORGANIZED HEALTH CARE EDUCATION/TRAINING PROGRAM

## 2023-06-26 PROCEDURE — 99900031 HC PATIENT EDUCATION (STAT)

## 2023-06-26 PROCEDURE — 84134 ASSAY OF PREALBUMIN: CPT | Performed by: STUDENT IN AN ORGANIZED HEALTH CARE EDUCATION/TRAINING PROGRAM

## 2023-06-26 PROCEDURE — 25000003 PHARM REV CODE 250: Performed by: NURSE PRACTITIONER

## 2023-06-26 PROCEDURE — 63600175 PHARM REV CODE 636 W HCPCS

## 2023-06-26 PROCEDURE — 97530 THERAPEUTIC ACTIVITIES: CPT | Mod: CO

## 2023-06-26 PROCEDURE — 97535 SELF CARE MNGMENT TRAINING: CPT | Mod: CO

## 2023-06-26 PROCEDURE — 27000646 HC AEROBIKA DEVICE

## 2023-06-26 PROCEDURE — 80053 COMPREHEN METABOLIC PANEL: CPT | Performed by: STUDENT IN AN ORGANIZED HEALTH CARE EDUCATION/TRAINING PROGRAM

## 2023-06-26 RX ORDER — GABAPENTIN 300 MG/1
300 CAPSULE ORAL 3 TIMES DAILY
Qty: 90 CAPSULE | Refills: 11
Start: 2023-06-26 | End: 2024-01-29

## 2023-06-26 RX ORDER — FERROUS SULFATE, DRIED 160(50) MG
1 TABLET, EXTENDED RELEASE ORAL 2 TIMES DAILY
Qty: 60 TABLET | Refills: 11
Start: 2023-06-26 | End: 2023-09-14

## 2023-06-26 RX ORDER — IPRATROPIUM BROMIDE AND ALBUTEROL SULFATE 2.5; .5 MG/3ML; MG/3ML
3 SOLUTION RESPIRATORY (INHALATION) EVERY 6 HOURS
Qty: 75 ML | Refills: 0
Start: 2023-06-26 | End: 2023-08-14

## 2023-06-26 RX ORDER — ENOXAPARIN SODIUM 100 MG/ML
40 INJECTION SUBCUTANEOUS EVERY 12 HOURS
Start: 2023-06-26 | End: 2023-08-14

## 2023-06-26 RX ORDER — OXYCODONE HYDROCHLORIDE 5 MG/1
5 TABLET ORAL EVERY 6 HOURS PRN
Refills: 0
Start: 2023-06-26 | End: 2023-07-31

## 2023-06-26 RX ORDER — GUAIFENESIN 600 MG/1
600 TABLET, EXTENDED RELEASE ORAL 2 TIMES DAILY
Qty: 20 TABLET | Refills: 0
Start: 2023-06-26 | End: 2023-07-06

## 2023-06-26 RX ORDER — FLUOXETINE HYDROCHLORIDE 40 MG/1
40 CAPSULE ORAL DAILY
Qty: 30 CAPSULE | Refills: 11 | Status: SHIPPED | OUTPATIENT
Start: 2023-06-27 | End: 2023-08-14

## 2023-06-26 RX ORDER — PROPRANOLOL HYDROCHLORIDE 40 MG/1
40 TABLET ORAL 3 TIMES DAILY
Qty: 90 TABLET | Refills: 11
Start: 2023-06-26 | End: 2023-09-14

## 2023-06-26 RX ADMIN — PROPRANOLOL HYDROCHLORIDE 40 MG: 10 TABLET ORAL at 02:06

## 2023-06-26 RX ADMIN — ENOXAPARIN SODIUM 40 MG: 40 INJECTION SUBCUTANEOUS at 08:06

## 2023-06-26 RX ADMIN — LAMOTRIGINE 25 MG: 25 TABLET ORAL at 08:06

## 2023-06-26 RX ADMIN — LISINOPRIL 40 MG: 20 TABLET ORAL at 08:06

## 2023-06-26 RX ADMIN — ATORVASTATIN CALCIUM 20 MG: 10 TABLET, FILM COATED ORAL at 08:06

## 2023-06-26 RX ADMIN — IPRATROPIUM BROMIDE AND ALBUTEROL SULFATE 3 ML: 2.5; .5 SOLUTION RESPIRATORY (INHALATION) at 02:06

## 2023-06-26 RX ADMIN — FLUOXETINE 40 MG: 20 CAPSULE ORAL at 08:06

## 2023-06-26 RX ADMIN — PROPRANOLOL HYDROCHLORIDE 40 MG: 10 TABLET ORAL at 08:06

## 2023-06-26 RX ADMIN — GUAIFENESIN 600 MG: 600 TABLET, EXTENDED RELEASE ORAL at 08:06

## 2023-06-26 RX ADMIN — OXYCODONE HYDROCHLORIDE 5 MG: 5 TABLET ORAL at 08:06

## 2023-06-26 RX ADMIN — Medication 1 TABLET: at 08:06

## 2023-06-26 RX ADMIN — OXYCODONE HYDROCHLORIDE 10 MG: 10 TABLET ORAL at 06:06

## 2023-06-26 RX ADMIN — AMLODIPINE BESYLATE 5 MG: 5 TABLET ORAL at 08:06

## 2023-06-26 RX ADMIN — GABAPENTIN 300 MG: 300 CAPSULE ORAL at 02:06

## 2023-06-26 RX ADMIN — QUETIAPINE FUMARATE 50 MG: 25 TABLET ORAL at 08:06

## 2023-06-26 RX ADMIN — ARIPIPRAZOLE 5 MG: 5 TABLET ORAL at 08:06

## 2023-06-26 RX ADMIN — GABAPENTIN 300 MG: 300 CAPSULE ORAL at 08:06

## 2023-06-26 RX ADMIN — IPRATROPIUM BROMIDE AND ALBUTEROL SULFATE 3 ML: 2.5; .5 SOLUTION RESPIRATORY (INHALATION) at 08:06

## 2023-06-26 NOTE — NURSING
Called report to [facility] in [place], [place].     Case management, Beatriz Mixon RN, calling [facility] for transport to accepting Facility.         Deni Garrido LPN

## 2023-06-26 NOTE — HOSPITAL COURSE
37-year-old male presents to the hospital after jumping from a bridge and an apparent suicide attempt.  He was taken to an outside hospital where he was transferred here for trauma evaluation.  He was found to have a right radius fracture that underwent open reduction internal fixation remains nonweightbearing to that arm.  He was had a left pelvic rim fracture with an open book pelvis that underwent open reduction internal fixation as well as percutaneous pinning and an acetabular fracture that also had an open reduction internal fixation.  Associated with these injuries which was a left pelvic wall hematoma, L2 burst fracture, left apical pneumothorax, left 4th through 11th rib fractures, pulmonary contusions, left ulna fracture that underwent open reduction internal fixation is currently nonweightbearing.  He did suffer a Pseudomonas ventilator associated pneumonia during his course.  He was also treated by Neurosurgery with a T11-L4 posterior fusion with L1 and L2 laminectomy and dural repair.  Patient also had some vocal cord paresis and was evaluated by ENT.  He was evaluated multiple times by psychiatry remains under the [facility] emergency certificate at this time.  Medically the patient was stable and beginning to improve some with therapy but is still limited due to his multiple orthopedic injuries.  He needs psychiatric inpatient care and would benefit from some outpatient therapy or perhaps inpatient rehab once cleared from psychiatric standpoint.  He was still on some oxygen although this is more for comfort and can be weaned or turned off.  He will need to stay on Lovenox until cleared by Orthopedics and more mobile.

## 2023-06-26 NOTE — PT/OT/SLP PROGRESS
Physical Therapy Treatment    Patient Name:  Gera Chavez   MRN:  [id number]    Recommendations:     Discharge Recommendations: other (see comments) (pending progress; multiple orthopedic pxns)  Discharge Equipment Recommendations: to be determined by next level of care  Barriers to discharge:  placement    Assessment:     Gera Chavez is a 37 y.o. male admitted with a medical diagnosis of Closed olecranon fracture, left, initial encounter.  He presents with the following impairments/functional limitations: weakness, impaired endurance, impaired self care skills, impaired functional mobility, impaired balance .    Rehab Prognosis: Good; patient would benefit from acute skilled PT services to address these deficits and reach maximum level of function.    Recent Surgery: Procedure(s) (LRB):  ORIF, ELBOW (Left) 9 Days Post-Op    Plan:     During this hospitalization, patient to be seen 5 x/week to address the identified rehab impairments via therapeutic activities, therapeutic exercises and progress toward the following goals:    Plan of Care Expires:  07/16/23    Subjective     Chief Complaint: pain  Patient/Family Comments/goals:   Pain/Comfort:         Objective:     Communicated with RN prior to session.  Patient found up in chair with Condom Catheter upon PT entry to room.     General Precautions: Standard, aspiration  Orthopedic Precautions: spinal precautions (NWB DIOGO UE's, NWB LLE, WBAT RLE for t/f's only (no hopping))  Braces: TLSO (splint RUE)  Respiratory Status: Room air  Blood Pressure: 121/82  Skin Integrity: Visible skin intact      Functional Mobility:  Bed Mobility:     Rolling Left:  total assistance  Rolling Right: total assistance  Transfers:     Bed to Chair: dependence with  tad lift  using  tad lift    Therapeutic Activities/Exercises:  Pt sat EOB 5 min SBA with TSLO donned. While EOB pt performed Knee ext 10x1.    Education:  Patient provided with verbal education regarding POC.   Understanding was verbalized, however additional teaching warranted.     Patient left HOB elevated with all lines intact, call button in reach, 1:1 present, and Wedge donned ..    GOALS:   Multidisciplinary Problems       Physical Therapy Goals          Problem: Physical Therapy    Goal Priority Disciplines Outcome Goal Variances Interventions   Physical Therapy Goal     PT, PT/OT Ongoing, Progressing     Description: Goals to be met by: 23     Patient will increase functional independence with mobility by performin. Supine to sit with Moderate Assistance  2. Sit to supine with Moderate Assistance  3. Sitting at edge of bed x15 minutes with Moderate Assistance  4. Pt to tolerate there ex/ROM to B LE for strengthening and to prevent contractures                          Time Tracking:     PT Received On: 23  PT Start Time: 1130     PT Stop Time: 1156  PT Total Time (min): 26 min     Billable Minutes: Therapeutic Activity 26    Treatment Type: Treatment  PT/PTA: PTA     Number of PTA visits since last PT visit: 5     2023

## 2023-06-26 NOTE — PLAN OF CARE
Ginger with [facility] tells me they have located an appropriate bed for pt. [facility] [id number] [phone]  Phone for report    Dr Fuller accepting         Deni had called report  Pt is in ambulance will call  Kelly has uploaded PEC and C[facility]    1543 Pt is now out of will call status and [facility] ambulance anticipates picking pt up in 1-2 hours.

## 2023-06-26 NOTE — PLAN OF CARE
Spoke with Huma  who update pt is non wt bearing tad lift.   Spoke with Xi with [facility] Liaison  about Seths eval on 6/25.   Spoke with Connor with trauma who confirms pt is medically ready for inpt psych  Pt is medically ready for inpt psych Xi updated   Huma's delema as the  is that it is unlikely a psych unit can accept due to tad and non wt bearing. And since he is PEC no rehab unit will consider.   Will task Daniel with finding an inpt  psych unit.

## 2023-06-26 NOTE — PROGRESS NOTES
Inpatient Nutrition Assessment    Admit Date: 6/4/2023   Total duration of encounter: 22 days     Nutrition Recommendation/Prescription     Continue current diet as tolerated.   Will add Boost Very High Calorie (provides 530 kcal, 22 g protein per serving) TID.    Communication of Recommendations: reviewed with nurse    Nutrition Assessment     Malnutrition Assessment/Nutrition-Focused Physical Exam    Malnutrition Context: acute illness or injury  Malnutrition Level: other (see comments) (does not meet criteria)  Energy Intake (Malnutrition): less than or equal to 50% for greater than or equal to 5 days  Weight Loss (Malnutrition): other (see comments) (unable to obtain)  Subcutaneous Fat (Malnutrition): other (see comments) (does not meet criteria)           Muscle Mass (Malnutrition): other (see comments) (does not meet criteria)                          Fluid Accumulation (Malnutrition): other (see comments) (does not meet criteria)        A minimum of two characteristics is recommended for diagnosis of either severe or non-severe malnutrition.    Chart Review    Reason Seen: continuous nutrition monitoring and follow-up    Malnutrition Screening Tool Results   Have you recently lost weight without trying?: No  Have you been eating poorly because of a decreased appetite?: No   MST Score: 0     Diagnosis:  L pelvic rim fx w/ open book pelvis  L pelvic wall hematoma  L2 burst fx  L apical PTX  L 4-11 rib fx  pulm contusion  (Fall from bridge)    Relevant Medical History: none noted    Nutrition-Related Medications: LR @ 75ml/hr, Ca +Vit D  Calorie Containing IV Medications: no significant kcals from medications at this time    Nutrition-Related Labs:  6/5 K 5.3, Cl 115, .9, PAB 24.7  6/8 Glu 107, Phos 2.1  6/12 Cl 110, Glu 111, .9, PAB 9.8  6/15 Cl 111, Glu 150, , PAB 11.5  6/19 Na 148, Cl 114, Glu 110  6/26: Crea 0.72, Glu 101, AST 43, PAB 26.7 CRP 13    Diet/PN Order: Diet Easy to Chew  "(IDDSI Level 7) Low Sodium; Mildly Thick Liquids (IDDSI Level 2)  Oral Supplement Order: Boost VHC  Tube Feeding Order: none  Appetite/Oral Intake: good/% of meals  Factors Affecting Nutritional Intake: none identified  Food/Temple/Cultural Preferences: unable to obtain  Food Allergies: none reported    Skin Integrity: incision  Wound(s):   incision noted    Comments    6/5/23: Discussed with RN. Will provide tube feeding recommendations for when appropriate to start tube feeding. Receiving kcal from meds.      6/8/23: Pt self extubated this AM, now reintubated. Discussed starting TF with RN. Receiving kcal from meds.     6/12/23: Noted TF ordered. Not appropriate formula at this time. Pt in OR at time of RD visit. Plans to restart TF today per RN. Pt still having to be flat until post-op Wednesday. Since TF having to be run @ lower rates due to repeated OR visits, will use more concentrated formula. Will update formula and goal rate.     6/15/23: Pt now extubated. Plans for SLP eval. Discussed placing NG with RN if not able to advance diet. Will provide TF recs in case needed.    6/19/23: Pt now on po diet with 100% po intake of meals. Will add ONS for added kcal and protein for healing.    6/26 pt tolerating oral diet, 100% intake per nursing    Anthropometrics    Height: 5' 10" (177.8 cm)    Last Weight: 121 kg (266 lb 12.1 oz) (06/21/23 0900) Weight Method: Bed Scale  BMI (Calculated): 38.3  BMI Classification: obese grade II (BMI 35-39.9)        Ideal Body Weight (IBW), Male: 166 lb     % Ideal Body Weight, Male (lb): 160.7 %                          Usual Weight Provided By: unable to obtain usual weight    Wt Readings from Last 5 Encounters:   06/21/23 121 kg (266 lb 12.1 oz)   05/09/23 90.7 kg (199 lb 15.3 oz)   05/09/23 90.7 kg (200 lb)   02/28/23 90.7 kg (200 lb)   02/07/23 100 kg (220 lb 7.4 oz)     Weight Change(s) Since Admission:  Admit Weight: 121 kg (266 lb 12.1 oz) (06/04/23 1555)  6/12/23: " no new  6/15/23: no new  : 121kg    Estimated Needs    Weight Used For Calorie Calculations: 121 kg (266 lb 12.1 oz)  Energy Calorie Requirements (kcal): 2803kcal (1.3 stress factor)  Energy Need Method: Purdin-St Jeor  Weight Used For Protein Calculations: 121 kg (266 lb 12.1 oz)  Protein Requirements: 146-194gm (1.4-1.6g/kg)  Fluid Requirements (mL): 2803ml (1ml/kcal)  Temp (24hrs), Av.9 °F (36.6 °C), Min:97.5 °F (36.4 °C), Max:98.8 °F (37.1 °C)         Enteral Nutrition    Patient not receiving enteral nutrition at this time.    Parenteral Nutrition    Patient not receiving parenteral nutrition support at this time.    Evaluation of Received Nutrient Intake    Calories: meeting estimated needs  Protein: meeting estimated needs    Patient Education    Not applicable.    Nutrition Diagnosis     PES: Inadequate oral intake related to acute illness as evidenced by NPO post extubation. (resolved)    Interventions/Goals     Intervention(s): general/healthful diet, commercial beverage, and collaboration with other providers  Goal: Meet greater than 75% of nutritional needs by follow-up. (goal met)    Monitoring & Evaluation     Dietitian will monitor energy intake.  Nutrition Risk/Follow-Up: low (follow-up in 5-7 days)   Please consult if re-assessment needed sooner.

## 2023-06-26 NOTE — PT/OT/SLP PROGRESS
Occupational Therapy   Treatment    Name: Gera Chavez  MRN: [id number]  Admitting Diagnosis:  Closed olecranon fracture, left, initial encounter  9 Days Post-Op    Recommendations:     Discharge Recommendations: rehabilitation facility, other (see comments) (swing bed)  Discharge Equipment Recommendations:  to be determined by next level of care  Barriers to discharge:  None    Assessment:     Gera Chavez is a 37 y.o. male with a medical diagnosis of Closed olecranon fracture, left, initial encounter.  He presents with good spirits and motivated to sit UIC. Performance deficits affecting function are weakness, gait instability, decreased lower extremity function, decreased upper extremity function, impaired functional mobility, impaired self care skills, pain, orthopedic precautions.     Rehab Prognosis:  Good; patient would benefit from acute skilled OT services to address these deficits and reach maximum level of function.       Plan:     Patient to be seen 5 x/week to address the above listed problems via self-care/home management, therapeutic activities, therapeutic exercises  Plan of Care Expires: 07/21/23  Plan of Care Reviewed with: patient    Subjective     Pain/Comfort:  Location - Side 1: Left  Location 1: leg  Pain Addressed 1: Reposition    Objective:     Communicated with: Patient found HOB elevated with Condom Catheter, blood pressure cuff, pulse ox (continuous), telemetry upon OT entry to room.    General Precautions: Standard, aspiration    Orthopedic Precautions:spinal precautions  Braces: TLSO >30*  Respiratory Status: Room air  Vital Signs: Respiratory Status: on room air     Occupational Performance:     Bed Mobility:    Patient completed Rolling/Turning to Left with  maximal assistance  Patient completed Rolling/Turning to Right with maximal assistance     Functional Mobility/Transfers:  Tod bed>BS recliner    Activities of Daily Living:  Upper Body Dressing: maximal assistance TLSO while  supine.     Therapeutic Positioning  Splint skin assessment: no redness/irritation from area of splint    OT interventions performed during the course of today's session in an effort to prevent and/or reduce acquired pressure injuries:   Therapeutic positioning completed     Skin assessment: all bony prominences were assessed    Findings: no redness or breakdown noted    [facility] 6 Click ADL:      Patient Education:  Patient provided with verbal education regarding OT role/goals/POC.  Understanding was verbalized.      Patient left up in chair with all lines intact and call button in reach    GOALS:   Multidisciplinary Problems       Occupational Therapy Goals          Problem: Occupational Therapy    Goal Priority Disciplines Outcome Interventions   Occupational Therapy Goal     OT, PT/OT Ongoing, Progressing    Description: Goals to be met by: 7/16/23     Patient will increase functional independence with ADLs by performing:    UE Dressing with Minimal Assistance.  Grooming while seated EOB with Min Assistance.  Toileting Moderate Assistance for hygiene and clothing management, indicating need.  Sitting at edge of bed x15 minutes with Stand-by Assistance.                         Time Tracking:     OT Date of Treatment: 06/26/23  OT Start Time: 0953  OT Stop Time: 1016  OT Total Time (min): 23 min    Billable Minutes:Self Care/Home Management 15  Therapeutic Activity 8    OT/HUBERT: HUBERT     Number of HUBERT visits since last OT visit: 5    6/26/2023

## 2023-06-26 NOTE — NURSING
Nurses Note -- 4 Eyes      6/26/2023   2:58 PM      Skin assessed during: Admit      [x] No Altered Skin Integrity Present    []Prevention Measures Documented      [] Yes- Altered Skin Integrity Present or Discovered   [] LDA Added if Not in Epic (Describe Wound)   [] New Altered Skin Integrity was Present on Admit and Documented in LDA   [] Wound Image Taken    Wound Care Consulted? No    Attending Nurse:  Deni Garrido LPN     Second RN/Staff Member:  Gris Victoria RN

## 2023-06-27 ENCOUNTER — PATIENT OUTREACH (OUTPATIENT)
Dept: ADMINISTRATIVE | Facility: CLINIC | Age: 38
End: 2023-06-27
Payer: MEDICARE

## 2023-07-05 ENCOUNTER — TELEPHONE (OUTPATIENT)
Dept: NEUROSURGERY | Facility: CLINIC | Age: 38
End: 2023-07-05
Payer: MEDICARE

## 2023-07-05 NOTE — TELEPHONE ENCOUNTER
[facility]o [facility] in [place] called. Patient still has lumbar sutures. She reports that incision looks good. I gave order to dc sutures. He also does not have his TLSO brace, never got one before transfer [facility]. She will have him fitted asap. He is also in the process of being transferred to an inpatient rehab facility.

## 2023-07-31 RX ORDER — HYDROCODONE BITARTRATE AND ACETAMINOPHEN 5; 325 MG/1; MG/1
1 TABLET ORAL EVERY 6 HOURS PRN
Qty: 28 TABLET | Refills: 0 | Status: SHIPPED | OUTPATIENT
Start: 2023-07-31 | End: 2023-08-07

## 2023-08-02 ENCOUNTER — TELEPHONE (OUTPATIENT)
Dept: NEUROSURGERY | Facility: CLINIC | Age: 38
End: 2023-08-02
Payer: MEDICARE

## 2023-08-02 DIAGNOSIS — S32.442A: Primary | ICD-10-CM

## 2023-08-02 DIAGNOSIS — S52.022A CLOSED OLECRANON FRACTURE, LEFT, INITIAL ENCOUNTER: ICD-10-CM

## 2023-08-02 NOTE — TELEPHONE ENCOUNTER
Patient is scheduled for his post op appointment with Jeanie on August 14, 2023. The xray order was faxed to [facility] in [place]. Patient is aware to bring a CD of the imaging.

## 2023-08-08 ENCOUNTER — TELEPHONE (OUTPATIENT)
Dept: ORTHOPEDICS | Facility: CLINIC | Age: 38
End: 2023-08-08

## 2023-08-08 NOTE — TELEPHONE ENCOUNTER
Patient left voicemail stating he had to reschedule his appointment from 8/8/23 to 8/10/23 due to transportation issue. He also would like stronger pain medication at this time.     After speaking with provider, I informed patient he will have to come in for an appointment to discuss changes to medications. Patient voiced a clear understanding.

## 2023-08-10 ENCOUNTER — OFFICE VISIT (OUTPATIENT)
Dept: ORTHOPEDICS | Facility: CLINIC | Age: 38
End: 2023-08-10
Payer: MEDICARE

## 2023-08-10 ENCOUNTER — HOSPITAL ENCOUNTER (OUTPATIENT)
Dept: RADIOLOGY | Facility: CLINIC | Age: 38
Discharge: HOME | End: 2023-08-10
Attending: ORTHOPAEDIC SURGERY
Payer: MEDICARE

## 2023-08-10 ENCOUNTER — LAB VISIT (OUTPATIENT)
Dept: LAB | Facility: HOSPITAL | Age: 38
End: 2023-08-10
Attending: ORTHOPAEDIC SURGERY
Payer: MEDICARE

## 2023-08-10 VITALS
SYSTOLIC BLOOD PRESSURE: 122 MMHG | HEIGHT: 70 IN | DIASTOLIC BLOOD PRESSURE: 54 MMHG | HEART RATE: 95 BPM | BODY MASS INDEX: 38.28 KG/M2

## 2023-08-10 DIAGNOSIS — S72.002E: ICD-10-CM

## 2023-08-10 DIAGNOSIS — S52.022A CLOSED OLECRANON FRACTURE, LEFT, INITIAL ENCOUNTER: ICD-10-CM

## 2023-08-10 DIAGNOSIS — S52.501D CLOSED FRACTURE OF DISTAL END OF RIGHT RADIUS WITH ROUTINE HEALING, UNSPECIFIED FRACTURE MORPHOLOGY, SUBSEQUENT ENCOUNTER: ICD-10-CM

## 2023-08-10 DIAGNOSIS — Z01.818 PRE-OP EVALUATION: ICD-10-CM

## 2023-08-10 DIAGNOSIS — S52.501D CLOSED FRACTURE OF DISTAL END OF RIGHT RADIUS WITH ROUTINE HEALING, UNSPECIFIED FRACTURE MORPHOLOGY, SUBSEQUENT ENCOUNTER: Primary | ICD-10-CM

## 2023-08-10 LAB
ALBUMIN SERPL-MCNC: 4 G/DL (ref 3.5–5)
ALBUMIN/GLOB SERPL: 1 RATIO (ref 1.1–2)
ALP SERPL-CCNC: 249 UNIT/L (ref 40–150)
ALT SERPL-CCNC: 30 UNIT/L (ref 0–55)
AST SERPL-CCNC: 16 UNIT/L (ref 5–34)
BASOPHILS # BLD AUTO: 0.03 X10(3)/MCL
BASOPHILS NFR BLD AUTO: 0.5 %
BILIRUB SERPL-MCNC: 0.4 MG/DL
BUN SERPL-MCNC: 8.1 MG/DL (ref 8.9–20.6)
CALCIUM SERPL-MCNC: 10.1 MG/DL (ref 8.4–10.2)
CHLORIDE SERPL-SCNC: 107 MMOL/L (ref 98–107)
CO2 SERPL-SCNC: 25 MMOL/L (ref 22–29)
CREAT SERPL-MCNC: 0.71 MG/DL (ref 0.73–1.18)
EOSINOPHIL # BLD AUTO: 0.13 X10(3)/MCL (ref 0–0.9)
EOSINOPHIL NFR BLD AUTO: 2 %
ERYTHROCYTE [DISTWIDTH] IN BLOOD BY AUTOMATED COUNT: 13.7 % (ref 11.5–17)
GFR SERPLBLD CREATININE-BSD FMLA CKD-EPI: >60 MLS/MIN/1.73/M2
GLOBULIN SER-MCNC: 4 GM/DL (ref 2.4–3.5)
GLUCOSE SERPL-MCNC: 102 MG/DL (ref 74–100)
HCT VFR BLD AUTO: 45.1 % (ref 42–52)
HGB BLD-MCNC: 15 G/DL (ref 14–18)
IMM GRANULOCYTES # BLD AUTO: 0.01 X10(3)/MCL (ref 0–0.04)
IMM GRANULOCYTES NFR BLD AUTO: 0.2 %
LYMPHOCYTES # BLD AUTO: 2.37 X10(3)/MCL (ref 0.6–4.6)
LYMPHOCYTES NFR BLD AUTO: 36.8 %
MCH RBC QN AUTO: 29.8 PG (ref 27–31)
MCHC RBC AUTO-ENTMCNC: 33.3 G/DL (ref 33–36)
MCV RBC AUTO: 89.7 FL (ref 80–94)
MONOCYTES # BLD AUTO: 0.41 X10(3)/MCL (ref 0.1–1.3)
MONOCYTES NFR BLD AUTO: 6.4 %
NEUTROPHILS # BLD AUTO: 3.49 X10(3)/MCL (ref 2.1–9.2)
NEUTROPHILS NFR BLD AUTO: 54.1 %
NRBC BLD AUTO-RTO: 0 %
PLATELET # BLD AUTO: 306 X10(3)/MCL (ref 130–400)
PMV BLD AUTO: 9.7 FL (ref 7.4–10.4)
POTASSIUM SERPL-SCNC: 4.5 MMOL/L (ref 3.5–5.1)
PROT SERPL-MCNC: 8 GM/DL (ref 6.4–8.3)
RBC # BLD AUTO: 5.03 X10(6)/MCL (ref 4.7–6.1)
SODIUM SERPL-SCNC: 145 MMOL/L (ref 136–145)
WBC # SPEC AUTO: 6.44 X10(3)/MCL (ref 4.5–11.5)

## 2023-08-10 PROCEDURE — 80053 COMPREHEN METABOLIC PANEL: CPT

## 2023-08-10 PROCEDURE — 36415 COLL VENOUS BLD VENIPUNCTURE: CPT

## 2023-08-10 PROCEDURE — 72190 XR PELVIS COMPLETE MIN 3 VIEWS: ICD-10-PCS | Mod: ,,, | Performed by: ORTHOPAEDIC SURGERY

## 2023-08-10 PROCEDURE — 73080 XR ELBOW COMPLETE 3 VIEW LEFT: ICD-10-PCS | Mod: LT,,, | Performed by: ORTHOPAEDIC SURGERY

## 2023-08-10 PROCEDURE — 3078F PR MOST RECENT DIASTOLIC BLOOD PRESSURE < 80 MM HG: ICD-10-PCS | Mod: CPTII,,, | Performed by: NURSE PRACTITIONER

## 2023-08-10 PROCEDURE — 73110 X-RAY EXAM OF WRIST: CPT | Mod: RT,,, | Performed by: ORTHOPAEDIC SURGERY

## 2023-08-10 PROCEDURE — 3078F DIAST BP <80 MM HG: CPT | Mod: CPTII,,, | Performed by: NURSE PRACTITIONER

## 2023-08-10 PROCEDURE — 1159F PR MEDICATION LIST DOCUMENTED IN MEDICAL RECORD: ICD-10-PCS | Mod: CPTII,,, | Performed by: NURSE PRACTITIONER

## 2023-08-10 PROCEDURE — 3008F BODY MASS INDEX DOCD: CPT | Mod: CPTII,,, | Performed by: NURSE PRACTITIONER

## 2023-08-10 PROCEDURE — 72190 X-RAY EXAM OF PELVIS: CPT | Mod: ,,, | Performed by: ORTHOPAEDIC SURGERY

## 2023-08-10 PROCEDURE — 99024 POSTOP FOLLOW-UP VISIT: CPT | Mod: ,,, | Performed by: NURSE PRACTITIONER

## 2023-08-10 PROCEDURE — 3074F PR MOST RECENT SYSTOLIC BLOOD PRESSURE < 130 MM HG: ICD-10-PCS | Mod: CPTII,,, | Performed by: NURSE PRACTITIONER

## 2023-08-10 PROCEDURE — 4010F PR ACE/ARB THEARPY RXD/TAKEN: ICD-10-PCS | Mod: CPTII,,, | Performed by: NURSE PRACTITIONER

## 2023-08-10 PROCEDURE — 73080 X-RAY EXAM OF ELBOW: CPT | Mod: LT,,, | Performed by: ORTHOPAEDIC SURGERY

## 2023-08-10 PROCEDURE — 3008F PR BODY MASS INDEX (BMI) DOCUMENTED: ICD-10-PCS | Mod: CPTII,,, | Performed by: NURSE PRACTITIONER

## 2023-08-10 PROCEDURE — 1159F MED LIST DOCD IN RCRD: CPT | Mod: CPTII,,, | Performed by: NURSE PRACTITIONER

## 2023-08-10 PROCEDURE — 99024 PR POST-OP FOLLOW-UP VISIT: ICD-10-PCS | Mod: ,,, | Performed by: NURSE PRACTITIONER

## 2023-08-10 PROCEDURE — 3074F SYST BP LT 130 MM HG: CPT | Mod: CPTII,,, | Performed by: NURSE PRACTITIONER

## 2023-08-10 PROCEDURE — 4010F ACE/ARB THERAPY RXD/TAKEN: CPT | Mod: CPTII,,, | Performed by: NURSE PRACTITIONER

## 2023-08-10 PROCEDURE — 73110 XR WRIST COMPLETE 3 VIEWS RIGHT: ICD-10-PCS | Mod: RT,,, | Performed by: ORTHOPAEDIC SURGERY

## 2023-08-10 PROCEDURE — 85025 COMPLETE CBC W/AUTO DIFF WBC: CPT

## 2023-08-10 NOTE — H&P (VIEW-ONLY)
Subjective:       Patient ID: Gera Chavez is a 37 y.o. male.    Chief Complaint   Patient presents with    Post-op Evaluation     Lt ORIF acetabulum Fx. Sx. 6/14/23-GL 9/12/23.        Patient is here today for follow up evaluation 2 months out from ORIF pelvic ring with Dr. Zepeda; ORIF L acetabulum with Dr. Bernal; ORIF right distal radius with Dr. Bernal; and ORIF left olecranon with Dr. Stein. He is doing fairly well today. He has not had any redness drainage to any of his incisions. He has minimal discomfort to his right wrist. He states he is able to move his digits well but unable to flex his wrist as expected. He has some soreness and stiffness to his left elbow. He complains of pain in his left hip. It is well controlled with medication. He states that he received a 1 week prescription of roxicodone 5 mg from his PCP yesterday which is working well for him. He states that he has been walking with a walker and bearing weight to his left leg. He is tolerating this well. No other issues were reported today.         Review of Systems   Constitutional: Negative for chills and fever.   HENT:  Negative for congestion and hearing loss.    Eyes:  Negative for visual disturbance.   Cardiovascular:  Negative for chest pain and syncope.   Respiratory:  Negative for cough and shortness of breath.    Hematologic/Lymphatic: Does not bruise/bleed easily.   Skin:  Negative for color change and rash.   Gastrointestinal:  Negative for abdominal pain, nausea and vomiting.   Genitourinary:  Negative for dysuria and hematuria.   Neurological:  Negative for numbness, sensory change and weakness.   Psychiatric/Behavioral:  Negative for altered mental status.         Current Outpatient Medications on File Prior to Visit   Medication Sig Dispense Refill    amLODIPine (NORVASC) 5 MG tablet Take 5 mg by mouth once daily.      atorvastatin (LIPITOR) 20 MG tablet Take 20 mg by mouth once daily.      calcium-vitamin D3 (OS-NANDO 500 +  D3) 500 mg-5 mcg (200 unit) per tablet Take 1 tablet by mouth 2 (two) times daily. 60 tablet 11    FLUoxetine 20 MG capsule fluoxetine 20 mg capsule      gabapentin (NEURONTIN) 300 MG capsule Take 1 capsule (300 mg total) by mouth 3 (three) times daily. 90 capsule 11    insulin glargine 100 units/mL SubQ pen Lantus Solostar U-100 Insulin 100 unit/mL (3 mL) subcutaneous pen      LANTUS SOLOSTAR U-100 INSULIN glargine 100 units/mL SubQ pen Inject into the skin.      latanoprost 0.005 % ophthalmic solution latanoprost 0.005 % eye drops      potassium chloride (K-TAB) 20 mEq potassium chloride ER 20 mEq tablet,extended release   TAKE 1 TABLET BY MOUTH EVERY DAY      propranoloL (INDERAL) 40 MG tablet Take 1 tablet (40 mg total) by mouth 3 (three) times daily. 90 tablet 11    QUEtiapine (SEROQUEL) 200 MG Tab Take 200 mg by mouth every evening.      albuterol (PROVENTIL/VENTOLIN HFA) 90 mcg/actuation inhaler Inhale 2 puffs into the lungs every 4 (four) hours as needed.      ALBUTEROL INHL Inhale 90 mcg into the lungs every 4 (four) hours as needed.      albuterol-ipratropium (DUO-NEB) 2.5 mg-0.5 mg/3 mL nebulizer solution Take 3 mLs by nebulization every 6 (six) hours. Rescue (Patient not taking: Reported on 8/10/2023) 75 mL 0    enoxaparin (LOVENOX) 40 mg/0.4 mL Syrg Inject 0.4 mLs (40 mg total) into the skin every 12 (twelve) hours.      FLUoxetine 40 MG capsule Take 40 mg by mouth once daily.      FLUoxetine 40 MG capsule Take 1 capsule (40 mg total) by mouth once daily. 30 capsule 11    ipratropium (ATROVENT) 42 mcg (0.06 %) nasal spray by Each Nostril route.      lamoTRIgine (LAMICTAL) 25 MG tablet lamotrigine 25 mg tablet      lisinopriL (PRINIVIL,ZESTRIL) 40 MG tablet Take 40 mg by mouth.      mirtazapine (REMERON) 7.5 MG Tab mirtazapine 7.5 mg tablet      testosterone cypionate (DEPOTESTOTERONE CYPIONATE) 200 mg/mL injection SMARTSIG:Milliliter(s) IM       No current facility-administered medications on file prior  "to visit.          Objective:      BP (!) 122/54   Pulse 95   Ht 5' 10" (1.778 m)   BMI 38.28 kg/m²   Physical Exam  Constitutional:       General: He is not in acute distress.     Appearance: Normal appearance.   HENT:      Head: Normocephalic and atraumatic.      Mouth/Throat:      Mouth: Mucous membranes are moist.   Eyes:      Extraocular Movements: Extraocular movements intact.   Cardiovascular:      Rate and Rhythm: Normal rate.      Pulses: Normal pulses.   Pulmonary:      Effort: Pulmonary effort is normal. No respiratory distress.   Abdominal:      General: There is no distension.      Palpations: Abdomen is soft.      Tenderness: There is no abdominal tenderness.   Musculoskeletal:      Cervical back: Normal range of motion and neck supple.      Comments: Right wrist:  Surgical incisions are well healed with no signs of infection.  No painful or prominent hardware noted.  No obvious swelling and compartments are soft and compressible.  He has good pronation and supination.  No dorsiflexion and volar flexion attempted.  Palpable radial pulse.  He can make a full fist and fully extend his digits actively.  Brisk capillary refill distally.  Sensation to light touch intact distally.      Left elbow:  Surgical incision is well-healed with no signs of infection.  No painful or prominent hardware.  Mild swelling to the elbow.  Elbow range of motion  degrees with soreness at the terminal ends of motion. Compartments soft and compressible  Radial pulse palpable  AIN/PIN/ulnar nerves motor intact  Flexes and extends digits  Brisk capillary refill distally  Sensation to light touch intact distally    Pelvis:   Incisions well healed, no signs of infection    Right lower extremity:   Compartments soft and compressible  No calf tenderness  Actively moving ankle and digits well  BCR distally    Left lower extremity: good passive ROM of hip without pain or crepitus  Compartments soft and compressible  No calf " tenderness  Actively moving ankle and digits well  BCR distally   Neurological:      Mental Status: He is alert and oriented to person, place, and time. Mental status is at baseline.   Psychiatric:         Mood and Affect: Mood normal.         Behavior: Behavior normal.         Thought Content: Thought content normal.         Judgment: Judgment normal.        Body mass index is 38.28 kg/m².    Radiology:   3 view xray right wrist: hardware intact with no failure or loosening; alignment unchanged; good interval bone healing noted    Left elbow 3 views: hardware intact with no failure or loosening; alignment unchanged; good interval bone healing noted    5 view pelvis: hardware intact with no failure or loosening; alignment unchanged; good interval bone healing noted; concentric hip reduction      Assessment:         1. Closed fracture of distal end of right radius with routine healing, unspecified fracture morphology, subsequent encounter  X-Ray Wrist Complete Right    Place in Outpatient    Full code    Vital signs    Insert peripheral IV    Clip and Prep Other (please specifiy) (Operative site)    Cleanse with Chlorhexidine (CHG)    Diet NPO    Place RA hose    Place sequential compression device    CBC auto differential    Comprehensive metabolic panel    Case Request Operating Room: REMOVAL,ORTHOPEDIC HARDWARE,UPPER EXTREMITY      2. Type I or II open fracture of left hip with routine healing, subsequent encounter  X-Ray Pelvis Complete min 3 views      3. Closed olecranon fracture, left, initial encounter  X-Ray Elbow Complete Left      4. Pre-op evaluation  CBC auto differential              Plan:     Patient is doing fairly well today.  For his left elbow, he needs aggressive range-of-motion exercises.  He may advance weight-bearing as tolerated to the left upper extremity.  We will repeat x-rays in 6 weeks.    For his pelvis and left acetabulum fracture, has advanced his own weight-bearing and is ambulatory  with a walker at this time.  He is tolerating this well without any significant hip pain.  He may continue to weightbear as tolerated with his walker for support.  Continue full range of motion to the lower extremities.  We will repeat x-rays of his pelvis in 6 weeks.      For his right wrist, we will plan to take him back to the operating room for removal of his dorsal spanning plate on August 23rd.  He may advance weight-bearing to the right wrist.  Once his plate is out, he will be able to begin range-of-motion exercises. The proposed procedure and associated risks and benefits were discussed with the patient and family. Risks associated with surgery include but are not limited to pain, bleeding, infection, neurovascular injury, loss of function, need for future surgery, scarring, malunion, nonunion, hardware failure, loss of limb, and loss of life.    He received a 1 week prescription of oxycodone from his primary care physician yesterday.  We have discussed that he is not to receive pain medications from multiple physicians.  We are happy to take over on his pain medication.  He will call us when he needs a refill.  We will plan to wean down over the next month and have him off of narcotic pain medication by 3 months postop.    Tata ANDRE NP, have scribed this note in the presence of Dr. Compa Bernal who has personally examined the patient and initiated the plan of care.       No follow-ups on file.    Closed fracture of distal end of right radius with routine healing, unspecified fracture morphology, subsequent encounter  -     X-Ray Wrist Complete Right; Future; Expected date: 08/10/2023  -     Place in Outpatient; Standing  -     Full code; Standing  -     Vital signs; Standing  -     Insert peripheral IV; Standing  -     Clip and Prep Other (please specifiy) (Operative site); Standing  -     Cleanse with Chlorhexidine (CHG); Standing  -     Diet NPO; Standing  -     Place RA hose; Standing  -      Place sequential compression device; Standing  -     CBC auto differential; Future; Expected date: 08/10/2023  -     Comprehensive metabolic panel; Future; Expected date: 08/10/2023  -     Case Request Operating Room: REMOVAL,ORTHOPEDIC HARDWARE,UPPER EXTREMITY    Type I or II open fracture of left hip with routine healing, subsequent encounter  -     X-Ray Pelvis Complete min 3 views; Future; Expected date: 08/10/2023    Closed olecranon fracture, left, initial encounter  -     X-Ray Elbow Complete Left; Future; Expected date: 08/10/2023    Pre-op evaluation  -     CBC auto differential; Future; Expected date: 08/10/2023    Other orders  -     IP VTE LOW RISK PATIENT; Standing  -     ceFAZolin (ANCEF) 2 g in dextrose 5 % (D5W) 50 mL IVPB              Orders Placed This Encounter   Procedures    X-Ray Pelvis Complete min 3 views     Standing Status:   Future     Number of Occurrences:   1     Standing Expiration Date:   8/9/2024     Order Specific Question:   May the Radiologist modify the order per protocol to meet the clinical needs of the patient?     Answer:   Yes     Order Specific Question:   Release to patient     Answer:   Immediate    X-Ray Elbow Complete Left     Standing Status:   Future     Number of Occurrences:   1     Standing Expiration Date:   8/10/2024     Order Specific Question:   May the Radiologist modify the order per protocol to meet the clinical needs of the patient?     Answer:   Yes     Order Specific Question:   Release to patient     Answer:   Immediate    X-Ray Wrist Complete Right     Standing Status:   Future     Number of Occurrences:   1     Standing Expiration Date:   8/10/2024     Order Specific Question:   May the Radiologist modify the order per protocol to meet the clinical needs of the patient?     Answer:   Yes     Order Specific Question:   Release to patient     Answer:   Immediate    CBC auto differential     Standing Status:   Future     Standing Expiration Date:    10/8/2024    Comprehensive metabolic panel     Standing Status:   Future     Standing Expiration Date:   10/8/2024    Case Request Operating Room: REMOVAL,ORTHOPEDIC HARDWARE,UPPER EXTREMITY     Order Specific Question:   Medical Necessity:     Answer:   Medically Non-Urgent [100]     Order Specific Question:   CPT Code:     Answer:   [place] REMOVAL DEEP IMPLANT [56893]     Order Specific Question:   Post-Procedure Disposition:     Answer:   Home [30]     Order Specific Question:   Is an on-site pathologist required for this procedure?     Answer:   N/A     Order Specific Question:   Is the patient currently on anticoagulants and/or antiplatelets?     Answer:   Yes (please see anticoagulant/antiplatelet management document on left side of order)       Future Appointments   Date Time Provider Department Center   8/14/2023  1:30 PM Jeanie Trejo FNP [facility] SHIREEN Zhu

## 2023-08-10 NOTE — PROGRESS NOTES
Subjective:       Patient ID: Gera Chavez is a 37 y.o. male.    Chief Complaint   Patient presents with    Post-op Evaluation     Lt ORIF acetabulum Fx. Sx. 6/14/23-GL 9/12/23.        Patient is here today for follow up evaluation 2 months out from ORIF pelvic ring with Dr. Zepeda; ORIF L acetabulum with Dr. Bernal; ORIF right distal radius with Dr. Bernal; and ORIF left olecranon with Dr. Stein. He is doing fairly well today. He has not had any redness drainage to any of his incisions. He has minimal discomfort to his right wrist. He states he is able to move his digits well but unable to flex his wrist as expected. He has some soreness and stiffness to his left elbow. He complains of pain in his left hip. It is well controlled with medication. He states that he received a 1 week prescription of roxicodone 5 mg from his PCP yesterday which is working well for him. He states that he has been walking with a walker and bearing weight to his left leg. He is tolerating this well. No other issues were reported today.         Review of Systems   Constitutional: Negative for chills and fever.   HENT:  Negative for congestion and hearing loss.    Eyes:  Negative for visual disturbance.   Cardiovascular:  Negative for chest pain and syncope.   Respiratory:  Negative for cough and shortness of breath.    Hematologic/Lymphatic: Does not bruise/bleed easily.   Skin:  Negative for color change and rash.   Gastrointestinal:  Negative for abdominal pain, nausea and vomiting.   Genitourinary:  Negative for dysuria and hematuria.   Neurological:  Negative for numbness, sensory change and weakness.   Psychiatric/Behavioral:  Negative for altered mental status.         Current Outpatient Medications on File Prior to Visit   Medication Sig Dispense Refill    amLODIPine (NORVASC) 5 MG tablet Take 5 mg by mouth once daily.      atorvastatin (LIPITOR) 20 MG tablet Take 20 mg by mouth once daily.      calcium-vitamin D3 (OS-NANDO 500 +  D3) 500 mg-5 mcg (200 unit) per tablet Take 1 tablet by mouth 2 (two) times daily. 60 tablet 11    FLUoxetine 20 MG capsule fluoxetine 20 mg capsule      gabapentin (NEURONTIN) 300 MG capsule Take 1 capsule (300 mg total) by mouth 3 (three) times daily. 90 capsule 11    insulin glargine 100 units/mL SubQ pen Lantus Solostar U-100 Insulin 100 unit/mL (3 mL) subcutaneous pen      LANTUS SOLOSTAR U-100 INSULIN glargine 100 units/mL SubQ pen Inject into the skin.      latanoprost 0.005 % ophthalmic solution latanoprost 0.005 % eye drops      potassium chloride (K-TAB) 20 mEq potassium chloride ER 20 mEq tablet,extended release   TAKE 1 TABLET BY MOUTH EVERY DAY      propranoloL (INDERAL) 40 MG tablet Take 1 tablet (40 mg total) by mouth 3 (three) times daily. 90 tablet 11    QUEtiapine (SEROQUEL) 200 MG Tab Take 200 mg by mouth every evening.      albuterol (PROVENTIL/VENTOLIN HFA) 90 mcg/actuation inhaler Inhale 2 puffs into the lungs every 4 (four) hours as needed.      ALBUTEROL INHL Inhale 90 mcg into the lungs every 4 (four) hours as needed.      albuterol-ipratropium (DUO-NEB) 2.5 mg-0.5 mg/3 mL nebulizer solution Take 3 mLs by nebulization every 6 (six) hours. Rescue (Patient not taking: Reported on 8/10/2023) 75 mL 0    enoxaparin (LOVENOX) 40 mg/0.4 mL Syrg Inject 0.4 mLs (40 mg total) into the skin every 12 (twelve) hours.      FLUoxetine 40 MG capsule Take 40 mg by mouth once daily.      FLUoxetine 40 MG capsule Take 1 capsule (40 mg total) by mouth once daily. 30 capsule 11    ipratropium (ATROVENT) 42 mcg (0.06 %) nasal spray by Each Nostril route.      lamoTRIgine (LAMICTAL) 25 MG tablet lamotrigine 25 mg tablet      lisinopriL (PRINIVIL,ZESTRIL) 40 MG tablet Take 40 mg by mouth.      mirtazapine (REMERON) 7.5 MG Tab mirtazapine 7.5 mg tablet      testosterone cypionate (DEPOTESTOTERONE CYPIONATE) 200 mg/mL injection SMARTSIG:Milliliter(s) IM       No current facility-administered medications on file prior  "to visit.          Objective:      BP (!) 122/54   Pulse 95   Ht 5' 10" (1.778 m)   BMI 38.28 kg/m²   Physical Exam  Constitutional:       General: He is not in acute distress.     Appearance: Normal appearance.   HENT:      Head: Normocephalic and atraumatic.      Mouth/Throat:      Mouth: Mucous membranes are moist.   Eyes:      Extraocular Movements: Extraocular movements intact.   Cardiovascular:      Rate and Rhythm: Normal rate.      Pulses: Normal pulses.   Pulmonary:      Effort: Pulmonary effort is normal. No respiratory distress.   Abdominal:      General: There is no distension.      Palpations: Abdomen is soft.      Tenderness: There is no abdominal tenderness.   Musculoskeletal:      Cervical back: Normal range of motion and neck supple.      Comments: Right wrist:  Surgical incisions are well healed with no signs of infection.  No painful or prominent hardware noted.  No obvious swelling and compartments are soft and compressible.  He has good pronation and supination.  No dorsiflexion and volar flexion attempted.  Palpable radial pulse.  He can make a full fist and fully extend his digits actively.  Brisk capillary refill distally.  Sensation to light touch intact distally.      Left elbow:  Surgical incision is well-healed with no signs of infection.  No painful or prominent hardware.  Mild swelling to the elbow.  Elbow range of motion  degrees with soreness at the terminal ends of motion. Compartments soft and compressible  Radial pulse palpable  AIN/PIN/ulnar nerves motor intact  Flexes and extends digits  Brisk capillary refill distally  Sensation to light touch intact distally    Pelvis:   Incisions well healed, no signs of infection    Right lower extremity:   Compartments soft and compressible  No calf tenderness  Actively moving ankle and digits well  BCR distally    Left lower extremity: good passive ROM of hip without pain or crepitus  Compartments soft and compressible  No calf " tenderness  Actively moving ankle and digits well  BCR distally   Neurological:      Mental Status: He is alert and oriented to person, place, and time. Mental status is at baseline.   Psychiatric:         Mood and Affect: Mood normal.         Behavior: Behavior normal.         Thought Content: Thought content normal.         Judgment: Judgment normal.        Body mass index is 38.28 kg/m².    Radiology:   3 view xray right wrist: hardware intact with no failure or loosening; alignment unchanged; good interval bone healing noted    Left elbow 3 views: hardware intact with no failure or loosening; alignment unchanged; good interval bone healing noted    5 view pelvis: hardware intact with no failure or loosening; alignment unchanged; good interval bone healing noted; concentric hip reduction      Assessment:         1. Closed fracture of distal end of right radius with routine healing, unspecified fracture morphology, subsequent encounter  X-Ray Wrist Complete Right    Place in Outpatient    Full code    Vital signs    Insert peripheral IV    Clip and Prep Other (please specifiy) (Operative site)    Cleanse with Chlorhexidine (CHG)    Diet NPO    Place RA hose    Place sequential compression device    CBC auto differential    Comprehensive metabolic panel    Case Request Operating Room: REMOVAL,ORTHOPEDIC HARDWARE,UPPER EXTREMITY      2. Type I or II open fracture of left hip with routine healing, subsequent encounter  X-Ray Pelvis Complete min 3 views      3. Closed olecranon fracture, left, initial encounter  X-Ray Elbow Complete Left      4. Pre-op evaluation  CBC auto differential              Plan:     Patient is doing fairly well today.  For his left elbow, he needs aggressive range-of-motion exercises.  He may advance weight-bearing as tolerated to the left upper extremity.  We will repeat x-rays in 6 weeks.    For his pelvis and left acetabulum fracture, has advanced his own weight-bearing and is ambulatory  with a walker at this time.  He is tolerating this well without any significant hip pain.  He may continue to weightbear as tolerated with his walker for support.  Continue full range of motion to the lower extremities.  We will repeat x-rays of his pelvis in 6 weeks.      For his right wrist, we will plan to take him back to the operating room for removal of his dorsal spanning plate on August 23rd.  He may advance weight-bearing to the right wrist.  Once his plate is out, he will be able to begin range-of-motion exercises. The proposed procedure and associated risks and benefits were discussed with the patient and family. Risks associated with surgery include but are not limited to pain, bleeding, infection, neurovascular injury, loss of function, need for future surgery, scarring, malunion, nonunion, hardware failure, loss of limb, and loss of life.    He received a 1 week prescription of oxycodone from his primary care physician yesterday.  We have discussed that he is not to receive pain medications from multiple physicians.  We are happy to take over on his pain medication.  He will call us when he needs a refill.  We will plan to wean down over the next month and have him off of narcotic pain medication by 3 months postop.    Tata ANDRE NP, have scribed this note in the presence of Dr. Compa Bernal who has personally examined the patient and initiated the plan of care.       No follow-ups on file.    Closed fracture of distal end of right radius with routine healing, unspecified fracture morphology, subsequent encounter  -     X-Ray Wrist Complete Right; Future; Expected date: 08/10/2023  -     Place in Outpatient; Standing  -     Full code; Standing  -     Vital signs; Standing  -     Insert peripheral IV; Standing  -     Clip and Prep Other (please specifiy) (Operative site); Standing  -     Cleanse with Chlorhexidine (CHG); Standing  -     Diet NPO; Standing  -     Place RA hose; Standing  -      Place sequential compression device; Standing  -     CBC auto differential; Future; Expected date: 08/10/2023  -     Comprehensive metabolic panel; Future; Expected date: 08/10/2023  -     Case Request Operating Room: REMOVAL,ORTHOPEDIC HARDWARE,UPPER EXTREMITY    Type I or II open fracture of left hip with routine healing, subsequent encounter  -     X-Ray Pelvis Complete min 3 views; Future; Expected date: 08/10/2023    Closed olecranon fracture, left, initial encounter  -     X-Ray Elbow Complete Left; Future; Expected date: 08/10/2023    Pre-op evaluation  -     CBC auto differential; Future; Expected date: 08/10/2023    Other orders  -     IP VTE LOW RISK PATIENT; Standing  -     ceFAZolin (ANCEF) 2 g in dextrose 5 % (D5W) 50 mL IVPB              Orders Placed This Encounter   Procedures    X-Ray Pelvis Complete min 3 views     Standing Status:   Future     Number of Occurrences:   1     Standing Expiration Date:   8/9/2024     Order Specific Question:   May the Radiologist modify the order per protocol to meet the clinical needs of the patient?     Answer:   Yes     Order Specific Question:   Release to patient     Answer:   Immediate    X-Ray Elbow Complete Left     Standing Status:   Future     Number of Occurrences:   1     Standing Expiration Date:   8/10/2024     Order Specific Question:   May the Radiologist modify the order per protocol to meet the clinical needs of the patient?     Answer:   Yes     Order Specific Question:   Release to patient     Answer:   Immediate    X-Ray Wrist Complete Right     Standing Status:   Future     Number of Occurrences:   1     Standing Expiration Date:   8/10/2024     Order Specific Question:   May the Radiologist modify the order per protocol to meet the clinical needs of the patient?     Answer:   Yes     Order Specific Question:   Release to patient     Answer:   Immediate    CBC auto differential     Standing Status:   Future     Standing Expiration Date:    10/8/2024    Comprehensive metabolic panel     Standing Status:   Future     Standing Expiration Date:   10/8/2024    Case Request Operating Room: REMOVAL,ORTHOPEDIC HARDWARE,UPPER EXTREMITY     Order Specific Question:   Medical Necessity:     Answer:   Medically Non-Urgent [100]     Order Specific Question:   CPT Code:     Answer:   PA REMOVAL DEEP IMPLANT [66751]     Order Specific Question:   Post-Procedure Disposition:     Answer:   Home [30]     Order Specific Question:   Is an on-site pathologist required for this procedure?     Answer:   N/A     Order Specific Question:   Is the patient currently on anticoagulants and/or antiplatelets?     Answer:   Yes (please see anticoagulant/antiplatelet management document on left side of order)       Future Appointments   Date Time Provider Department Center   8/14/2023  1:30 PM Jeanie Trejo FNP [facility] SHIREEN Zhu
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
## 2023-08-14 ENCOUNTER — OFFICE VISIT (OUTPATIENT)
Dept: NEUROSURGERY | Facility: CLINIC | Age: 38
End: 2023-08-14
Payer: MEDICARE

## 2023-08-14 VITALS
RESPIRATION RATE: 16 BRPM | HEART RATE: 105 BPM | DIASTOLIC BLOOD PRESSURE: 98 MMHG | WEIGHT: 187 LBS | BODY MASS INDEX: 26.77 KG/M2 | SYSTOLIC BLOOD PRESSURE: 143 MMHG | HEIGHT: 70 IN

## 2023-08-14 DIAGNOSIS — S32.001D CLOSED BURST FRACTURE OF LUMBAR VERTEBRA WITH ROUTINE HEALING, SUBSEQUENT ENCOUNTER: ICD-10-CM

## 2023-08-14 DIAGNOSIS — Z98.1 STATUS POST LUMBAR SPINAL FUSION: Primary | ICD-10-CM

## 2023-08-14 DIAGNOSIS — S72.002E: Primary | ICD-10-CM

## 2023-08-14 PROCEDURE — 99024 PR POST-OP FOLLOW-UP VISIT: ICD-10-PCS | Mod: ,,, | Performed by: NURSE PRACTITIONER

## 2023-08-14 PROCEDURE — 1159F MED LIST DOCD IN RCRD: CPT | Mod: CPTII,,, | Performed by: NURSE PRACTITIONER

## 2023-08-14 PROCEDURE — 1159F PR MEDICATION LIST DOCUMENTED IN MEDICAL RECORD: ICD-10-PCS | Mod: CPTII,,, | Performed by: NURSE PRACTITIONER

## 2023-08-14 PROCEDURE — 3008F BODY MASS INDEX DOCD: CPT | Mod: CPTII,,, | Performed by: NURSE PRACTITIONER

## 2023-08-14 PROCEDURE — 3080F PR MOST RECENT DIASTOLIC BLOOD PRESSURE >= 90 MM HG: ICD-10-PCS | Mod: CPTII,,, | Performed by: NURSE PRACTITIONER

## 2023-08-14 PROCEDURE — 99024 POSTOP FOLLOW-UP VISIT: CPT | Mod: ,,, | Performed by: NURSE PRACTITIONER

## 2023-08-14 PROCEDURE — 3077F SYST BP >= 140 MM HG: CPT | Mod: CPTII,,, | Performed by: NURSE PRACTITIONER

## 2023-08-14 PROCEDURE — 3008F PR BODY MASS INDEX (BMI) DOCUMENTED: ICD-10-PCS | Mod: CPTII,,, | Performed by: NURSE PRACTITIONER

## 2023-08-14 PROCEDURE — 3080F DIAST BP >= 90 MM HG: CPT | Mod: CPTII,,, | Performed by: NURSE PRACTITIONER

## 2023-08-14 PROCEDURE — 4010F ACE/ARB THERAPY RXD/TAKEN: CPT | Mod: CPTII,,, | Performed by: NURSE PRACTITIONER

## 2023-08-14 PROCEDURE — 3077F PR MOST RECENT SYSTOLIC BLOOD PRESSURE >= 140 MM HG: ICD-10-PCS | Mod: CPTII,,, | Performed by: NURSE PRACTITIONER

## 2023-08-14 PROCEDURE — 1160F RVW MEDS BY RX/DR IN RCRD: CPT | Mod: CPTII,,, | Performed by: NURSE PRACTITIONER

## 2023-08-14 PROCEDURE — 4010F PR ACE/ARB THEARPY RXD/TAKEN: ICD-10-PCS | Mod: CPTII,,, | Performed by: NURSE PRACTITIONER

## 2023-08-14 PROCEDURE — 1160F PR REVIEW ALL MEDS BY PRESCRIBER/CLIN PHARMACIST DOCUMENTED: ICD-10-PCS | Mod: CPTII,,, | Performed by: NURSE PRACTITIONER

## 2023-08-14 RX ORDER — TIMOLOL MALEATE 5 MG/ML
SOLUTION/ DROPS OPHTHALMIC
COMMUNITY
Start: 2023-04-12 | End: 2023-08-14

## 2023-08-14 RX ORDER — KETOROLAC TROMETHAMINE 10 MG/1
1 TABLET, FILM COATED ORAL EVERY 8 HOURS PRN
COMMUNITY
End: 2023-08-14

## 2023-08-14 RX ORDER — LOPERAMIDE HYDROCHLORIDE 2 MG/1
CAPSULE ORAL EVERY 4 HOURS PRN
COMMUNITY
Start: 2023-02-22 | End: 2023-08-14

## 2023-08-14 RX ORDER — OXYCODONE HYDROCHLORIDE 15 MG/1
TABLET ORAL
COMMUNITY
Start: 2023-04-11 | End: 2023-08-14

## 2023-08-14 RX ORDER — MIRTAZAPINE 7.5 MG/1
1 TABLET, FILM COATED ORAL NIGHTLY
COMMUNITY
End: 2023-08-14

## 2023-08-14 RX ORDER — DEXTROAMPHETAMINE SACCHARATE, AMPHETAMINE ASPARTATE MONOHYDRATE, DEXTROAMPHETAMINE SULFATE AND AMPHETAMINE SULFATE 7.5; 7.5; 7.5; 7.5 MG/1; MG/1; MG/1; MG/1
1 CAPSULE, EXTENDED RELEASE ORAL DAILY
COMMUNITY
End: 2023-08-14

## 2023-08-14 RX ORDER — INDOMETHACIN 50 MG/1
CAPSULE ORAL
COMMUNITY
End: 2023-08-14

## 2023-08-14 RX ORDER — DEXTROAMPHETAMINE SACCHARATE, AMPHETAMINE ASPARTATE MONOHYDRATE, DEXTROAMPHETAMINE SULFATE AND AMPHETAMINE SULFATE 5; 5; 5; 5 MG/1; MG/1; MG/1; MG/1
CAPSULE, EXTENDED RELEASE ORAL
COMMUNITY
End: 2023-08-14

## 2023-08-14 RX ORDER — DOXYCYCLINE 100 MG/1
CAPSULE ORAL
COMMUNITY
End: 2023-08-14

## 2023-08-14 RX ORDER — TRAMADOL HYDROCHLORIDE 50 MG/1
1 TABLET ORAL EVERY 6 HOURS PRN
COMMUNITY
End: 2023-08-14

## 2023-08-14 RX ORDER — QUETIAPINE FUMARATE 50 MG/1
TABLET, FILM COATED ORAL
COMMUNITY
End: 2023-08-14

## 2023-08-14 RX ORDER — PROMETHAZINE HYDROCHLORIDE AND DEXTROMETHORPHAN HYDROBROMIDE 6.25; 15 MG/5ML; MG/5ML
SYRUP ORAL
COMMUNITY
End: 2023-08-14

## 2023-08-14 RX ORDER — METHOCARBAMOL 500 MG/1
1000 TABLET, FILM COATED ORAL 4 TIMES DAILY
COMMUNITY
Start: 2023-02-22 | End: 2024-01-29

## 2023-08-14 RX ORDER — HYDROCODONE BITARTRATE AND ACETAMINOPHEN 7.5; 325 MG/1; MG/1
TABLET ORAL
COMMUNITY
End: 2023-08-14

## 2023-08-14 RX ORDER — OFLOXACIN 3 MG/ML
SOLUTION AURICULAR (OTIC)
COMMUNITY
End: 2023-08-14

## 2023-08-14 RX ORDER — INSULIN GLARGINE 300 U/ML
10 INJECTION, SOLUTION SUBCUTANEOUS NIGHTLY
COMMUNITY
Start: 2023-02-22 | End: 2024-01-29

## 2023-08-14 RX ORDER — MIRTAZAPINE 15 MG/1
TABLET, FILM COATED ORAL
COMMUNITY
End: 2023-08-14

## 2023-08-14 RX ORDER — PEN NEEDLE, DIABETIC 31 GX5/16"
NEEDLE, DISPOSABLE MISCELLANEOUS
COMMUNITY
Start: 2023-04-12 | End: 2024-01-29

## 2023-08-14 RX ORDER — CLONAZEPAM 1 MG/1
1 TABLET ORAL 2 TIMES DAILY
COMMUNITY
End: 2023-08-14

## 2023-08-14 RX ORDER — OXYCODONE HYDROCHLORIDE 5 MG/1
5 TABLET ORAL EVERY 8 HOURS PRN
Qty: 21 TABLET | Refills: 0 | Status: SHIPPED | OUTPATIENT
Start: 2023-08-14 | End: 2023-08-14

## 2023-08-14 RX ORDER — HYDROCODONE BITARTRATE AND ACETAMINOPHEN 5; 325 MG/1; MG/1
TABLET ORAL
COMMUNITY
Start: 2023-04-23 | End: 2023-08-14

## 2023-08-14 RX ORDER — CHLORZOXAZONE 500 MG/1
1 TABLET ORAL 2 TIMES DAILY PRN
COMMUNITY
End: 2023-08-14

## 2023-08-14 RX ORDER — LISINOPRIL 40 MG/1
40 TABLET ORAL
COMMUNITY
Start: 2023-02-22 | End: 2023-08-14

## 2023-08-14 RX ORDER — BACLOFEN 20 MG/1
TABLET ORAL
COMMUNITY
End: 2023-09-14

## 2023-08-14 RX ORDER — CHLORDIAZEPOXIDE HYDROCHLORIDE 25 MG/1
CAPSULE, GELATIN COATED ORAL
COMMUNITY
End: 2023-08-14

## 2023-08-14 RX ORDER — PREDNISONE 10 MG/1
TABLET ORAL
COMMUNITY
End: 2023-08-14

## 2023-08-14 RX ORDER — METOPROLOL TARTRATE 25 MG/1
25 TABLET, FILM COATED ORAL 2 TIMES DAILY
COMMUNITY
Start: 2023-02-22 | End: 2023-09-14

## 2023-08-14 RX ORDER — DEXTROAMPHETAMINE SACCHARATE, AMPHETAMINE ASPARTATE MONOHYDRATE, DEXTROAMPHETAMINE SULFATE AND AMPHETAMINE SULFATE 6.25; 6.25; 6.25; 6.25 MG/1; MG/1; MG/1; MG/1
CAPSULE, EXTENDED RELEASE ORAL
COMMUNITY
End: 2023-08-14

## 2023-08-14 RX ORDER — LAMOTRIGINE 100 MG/1
TABLET ORAL
COMMUNITY
Start: 2023-02-02 | End: 2023-08-14

## 2023-08-14 RX ORDER — ESZOPICLONE 3 MG/1
TABLET, FILM COATED ORAL
COMMUNITY
End: 2023-08-14

## 2023-08-14 RX ORDER — SILDENAFIL 100 MG/1
1 TABLET, FILM COATED ORAL DAILY
COMMUNITY
End: 2023-08-14

## 2023-08-16 ENCOUNTER — ANESTHESIA EVENT (OUTPATIENT)
Dept: SURGERY | Facility: HOSPITAL | Age: 38
End: 2023-08-16
Payer: MEDICARE

## 2023-08-16 ENCOUNTER — TELEPHONE (OUTPATIENT)
Dept: NEUROSURGERY | Facility: CLINIC | Age: 38
End: 2023-08-16
Payer: MEDICARE

## 2023-08-16 NOTE — TELEPHONE ENCOUNTER
I had advised the patient to reach out to Dr. Bernal as I can see in their visit note they stated they were willing to refill his pain medication if needed.  I also advised the patient to discuss possible referral to pain management with his primary care provider as he is not from this area and I feel this provider would be more equipped to refer him to someone near his home. Thanks

## 2023-08-16 NOTE — TELEPHONE ENCOUNTER
Patient called the clinic and spoke with Leonarda. He stated that Jeanie was supposed to find him a pain management doctor but I am not seeing anything regarding this. Please advise

## 2023-08-16 NOTE — TELEPHONE ENCOUNTER
I spoke with patient. I advised him of Jeanie's recommendations. He stated they do not have anyone near him that takes his insurance. I advised him to get with his PCP and maybe they can find somewhere that does accept his insurance so that it can be closer to home for him. He verbalized understanding.

## 2023-08-17 DIAGNOSIS — S72.002E: ICD-10-CM

## 2023-08-18 RX ORDER — OXYCODONE HYDROCHLORIDE 5 MG/1
5 TABLET ORAL EVERY 8 HOURS PRN
Qty: 21 TABLET | Refills: 0 | Status: ON HOLD | OUTPATIENT
Start: 2023-08-18 | End: 2023-08-23 | Stop reason: SDUPTHER

## 2023-08-22 ENCOUNTER — TELEPHONE (OUTPATIENT)
Dept: NEUROSURGERY | Facility: CLINIC | Age: 38
End: 2023-08-22
Payer: MEDICARE

## 2023-08-23 ENCOUNTER — ANESTHESIA (OUTPATIENT)
Dept: SURGERY | Facility: HOSPITAL | Age: 38
End: 2023-08-23
Payer: MEDICARE

## 2023-08-23 ENCOUNTER — HOSPITAL ENCOUNTER (OUTPATIENT)
Facility: HOSPITAL | Age: 38
Discharge: HOME | End: 2023-08-23
Attending: ORTHOPAEDIC SURGERY | Admitting: ORTHOPAEDIC SURGERY
Payer: MEDICARE

## 2023-08-23 VITALS
HEIGHT: 71 IN | RESPIRATION RATE: 18 BRPM | WEIGHT: 210.75 LBS | HEART RATE: 107 BPM | SYSTOLIC BLOOD PRESSURE: 153 MMHG | BODY MASS INDEX: 29.5 KG/M2 | OXYGEN SATURATION: 96 % | DIASTOLIC BLOOD PRESSURE: 97 MMHG | TEMPERATURE: 98 F

## 2023-08-23 DIAGNOSIS — S52.501D CLOSED FRACTURE OF DISTAL END OF RIGHT RADIUS WITH ROUTINE HEALING, UNSPECIFIED FRACTURE MORPHOLOGY, SUBSEQUENT ENCOUNTER: Primary | ICD-10-CM

## 2023-08-23 DIAGNOSIS — S72.002E: ICD-10-CM

## 2023-08-23 LAB — POCT GLUCOSE: 89 MG/DL (ref 70–110)

## 2023-08-23 PROCEDURE — D9220A PRA ANESTHESIA: Mod: CRNA,,,

## 2023-08-23 PROCEDURE — 71000033 HC RECOVERY, INTIAL HOUR: Performed by: ORTHOPAEDIC SURGERY

## 2023-08-23 PROCEDURE — 25000003 PHARM REV CODE 250: Performed by: ORTHOPAEDIC SURGERY

## 2023-08-23 PROCEDURE — 20680 REMOVAL OF IMPLANT DEEP: CPT | Mod: 78,,, | Performed by: ORTHOPAEDIC SURGERY

## 2023-08-23 PROCEDURE — 20680 PR REMOVAL DEEP IMPLANT: ICD-10-PCS | Mod: 78,,, | Performed by: ORTHOPAEDIC SURGERY

## 2023-08-23 PROCEDURE — 71000016 HC POSTOP RECOV ADDL HR: Performed by: ORTHOPAEDIC SURGERY

## 2023-08-23 PROCEDURE — 36000706: Performed by: ORTHOPAEDIC SURGERY

## 2023-08-23 PROCEDURE — 99499 UNLISTED E&M SERVICE: CPT | Mod: ,,, | Performed by: NURSE PRACTITIONER

## 2023-08-23 PROCEDURE — 71000015 HC POSTOP RECOV 1ST HR: Performed by: ORTHOPAEDIC SURGERY

## 2023-08-23 PROCEDURE — 36000707: Performed by: ORTHOPAEDIC SURGERY

## 2023-08-23 PROCEDURE — 25000003 PHARM REV CODE 250

## 2023-08-23 PROCEDURE — 99499 NO LOS: ICD-10-PCS | Mod: ,,, | Performed by: NURSE PRACTITIONER

## 2023-08-23 PROCEDURE — 37000008 HC ANESTHESIA 1ST 15 MINUTES: Performed by: ORTHOPAEDIC SURGERY

## 2023-08-23 PROCEDURE — 63600175 PHARM REV CODE 636 W HCPCS: Performed by: ANESTHESIOLOGY

## 2023-08-23 PROCEDURE — D9220A PRA ANESTHESIA: Mod: ANES,,, | Performed by: ANESTHESIOLOGY

## 2023-08-23 PROCEDURE — D9220A PRA ANESTHESIA: ICD-10-PCS | Mod: ANES,,, | Performed by: ANESTHESIOLOGY

## 2023-08-23 PROCEDURE — 63600175 PHARM REV CODE 636 W HCPCS

## 2023-08-23 PROCEDURE — 37000009 HC ANESTHESIA EA ADD 15 MINS: Performed by: ORTHOPAEDIC SURGERY

## 2023-08-23 PROCEDURE — 25000003 PHARM REV CODE 250: Performed by: ANESTHESIOLOGY

## 2023-08-23 PROCEDURE — D9220A PRA ANESTHESIA: ICD-10-PCS | Mod: CRNA,,,

## 2023-08-23 PROCEDURE — 63600175 PHARM REV CODE 636 W HCPCS: Performed by: ORTHOPAEDIC SURGERY

## 2023-08-23 RX ORDER — METHOCARBAMOL 100 MG/ML
INJECTION, SOLUTION INTRAMUSCULAR; INTRAVENOUS
Status: COMPLETED
Start: 2023-08-23 | End: 2023-08-23

## 2023-08-23 RX ORDER — ONDANSETRON 2 MG/ML
INJECTION INTRAMUSCULAR; INTRAVENOUS
Status: DISCONTINUED | OUTPATIENT
Start: 2023-08-23 | End: 2023-08-23

## 2023-08-23 RX ORDER — LABETALOL HYDROCHLORIDE 5 MG/ML
10 INJECTION, SOLUTION INTRAVENOUS ONCE
Status: DISCONTINUED | OUTPATIENT
Start: 2023-08-23 | End: 2023-08-23 | Stop reason: HOSPADM

## 2023-08-23 RX ORDER — LABETALOL HYDROCHLORIDE 5 MG/ML
INJECTION, SOLUTION INTRAVENOUS
Status: COMPLETED
Start: 2023-08-23 | End: 2023-08-23

## 2023-08-23 RX ORDER — DIPHENHYDRAMINE HYDROCHLORIDE 50 MG/ML
25 INJECTION INTRAMUSCULAR; INTRAVENOUS EVERY 6 HOURS PRN
Status: DISCONTINUED | OUTPATIENT
Start: 2023-08-23 | End: 2023-08-23 | Stop reason: HOSPADM

## 2023-08-23 RX ORDER — DIPHENHYDRAMINE HYDROCHLORIDE 50 MG/ML
INJECTION INTRAMUSCULAR; INTRAVENOUS
Status: COMPLETED
Start: 2023-08-23 | End: 2023-08-23

## 2023-08-23 RX ORDER — LIDOCAINE HYDROCHLORIDE 20 MG/ML
INJECTION INTRAVENOUS
Status: DISCONTINUED | OUTPATIENT
Start: 2023-08-23 | End: 2023-08-23

## 2023-08-23 RX ORDER — SODIUM CHLORIDE, SODIUM LACTATE, POTASSIUM CHLORIDE, CALCIUM CHLORIDE 600; 310; 30; 20 MG/100ML; MG/100ML; MG/100ML; MG/100ML
INJECTION, SOLUTION INTRAVENOUS CONTINUOUS
Status: DISCONTINUED | OUTPATIENT
Start: 2023-08-23 | End: 2023-08-23 | Stop reason: HOSPADM

## 2023-08-23 RX ORDER — OXYCODONE HYDROCHLORIDE 5 MG/1
5 TABLET ORAL EVERY 8 HOURS PRN
Qty: 21 TABLET | Refills: 0 | Status: SHIPPED | OUTPATIENT
Start: 2023-08-23 | End: 2023-09-05 | Stop reason: SDUPTHER

## 2023-08-23 RX ORDER — ONDANSETRON 2 MG/ML
4 INJECTION INTRAMUSCULAR; INTRAVENOUS
Status: COMPLETED | OUTPATIENT
Start: 2023-08-23 | End: 2023-08-23

## 2023-08-23 RX ORDER — FENTANYL CITRATE 50 UG/ML
INJECTION, SOLUTION INTRAMUSCULAR; INTRAVENOUS
Status: DISCONTINUED | OUTPATIENT
Start: 2023-08-23 | End: 2023-08-23

## 2023-08-23 RX ORDER — CEFAZOLIN SODIUM 1 G/3ML
INJECTION, POWDER, FOR SOLUTION INTRAMUSCULAR; INTRAVENOUS
Status: DISCONTINUED | OUTPATIENT
Start: 2023-08-23 | End: 2023-08-23

## 2023-08-23 RX ORDER — MIDAZOLAM HYDROCHLORIDE 1 MG/ML
INJECTION INTRAMUSCULAR; INTRAVENOUS
Status: DISCONTINUED | OUTPATIENT
Start: 2023-08-23 | End: 2023-08-23

## 2023-08-23 RX ORDER — PROPOFOL 10 MG/ML
INJECTION, EMULSION INTRAVENOUS
Status: DISCONTINUED | OUTPATIENT
Start: 2023-08-23 | End: 2023-08-23

## 2023-08-23 RX ORDER — LIDOCAINE HYDROCHLORIDE 10 MG/ML
1 INJECTION, SOLUTION EPIDURAL; INFILTRATION; INTRACAUDAL; PERINEURAL ONCE
Status: DISCONTINUED | OUTPATIENT
Start: 2023-08-23 | End: 2023-08-23 | Stop reason: HOSPADM

## 2023-08-23 RX ORDER — PROCHLORPERAZINE EDISYLATE 5 MG/ML
5 INJECTION INTRAMUSCULAR; INTRAVENOUS EVERY 30 MIN PRN
Status: DISCONTINUED | OUTPATIENT
Start: 2023-08-23 | End: 2023-08-23 | Stop reason: HOSPADM

## 2023-08-23 RX ORDER — KETOROLAC TROMETHAMINE 10 MG/1
10 TABLET, FILM COATED ORAL EVERY 6 HOURS
Qty: 20 TABLET | Refills: 0 | Status: SHIPPED | OUTPATIENT
Start: 2023-08-23 | End: 2023-08-28

## 2023-08-23 RX ORDER — OXYCODONE AND ACETAMINOPHEN 5; 325 MG/1; MG/1
1 TABLET ORAL EVERY 4 HOURS PRN
Status: DISCONTINUED | OUTPATIENT
Start: 2023-08-23 | End: 2023-08-23 | Stop reason: HOSPADM

## 2023-08-23 RX ORDER — HYDROMORPHONE HYDROCHLORIDE 2 MG/ML
0.4 INJECTION, SOLUTION INTRAMUSCULAR; INTRAVENOUS; SUBCUTANEOUS EVERY 5 MIN PRN
Status: DISCONTINUED | OUTPATIENT
Start: 2023-08-23 | End: 2023-08-23 | Stop reason: HOSPADM

## 2023-08-23 RX ORDER — CELECOXIB 200 MG/1
200 CAPSULE ORAL
Status: COMPLETED | OUTPATIENT
Start: 2023-08-23 | End: 2023-08-23

## 2023-08-23 RX ORDER — CEFAZOLIN SODIUM 2 G/50ML
2 SOLUTION INTRAVENOUS
Status: DISCONTINUED | OUTPATIENT
Start: 2023-08-23 | End: 2023-08-23 | Stop reason: HOSPADM

## 2023-08-23 RX ORDER — METHOCARBAMOL 100 MG/ML
1000 INJECTION, SOLUTION INTRAMUSCULAR; INTRAVENOUS ONCE
Status: COMPLETED | OUTPATIENT
Start: 2023-08-23 | End: 2023-08-23

## 2023-08-23 RX ORDER — DEXAMETHASONE SODIUM PHOSPHATE 4 MG/ML
INJECTION, SOLUTION INTRA-ARTICULAR; INTRALESIONAL; INTRAMUSCULAR; INTRAVENOUS; SOFT TISSUE
Status: DISCONTINUED | OUTPATIENT
Start: 2023-08-23 | End: 2023-08-23

## 2023-08-23 RX ORDER — ACETAMINOPHEN 500 MG
1000 TABLET ORAL
Status: COMPLETED | OUTPATIENT
Start: 2023-08-23 | End: 2023-08-23

## 2023-08-23 RX ORDER — ONDANSETRON 2 MG/ML
4 INJECTION INTRAMUSCULAR; INTRAVENOUS ONCE AS NEEDED
Status: DISCONTINUED | OUTPATIENT
Start: 2023-08-23 | End: 2023-08-23 | Stop reason: HOSPADM

## 2023-08-23 RX ORDER — GABAPENTIN 300 MG/1
300 CAPSULE ORAL
Status: COMPLETED | OUTPATIENT
Start: 2023-08-23 | End: 2023-08-23

## 2023-08-23 RX ADMIN — LIDOCAINE HYDROCHLORIDE 80 MG: 20 INJECTION INTRAVENOUS at 07:08

## 2023-08-23 RX ADMIN — FENTANYL CITRATE 50 MCG: 50 INJECTION, SOLUTION INTRAMUSCULAR; INTRAVENOUS at 08:08

## 2023-08-23 RX ADMIN — GABAPENTIN 300 MG: 300 CAPSULE ORAL at 06:08

## 2023-08-23 RX ADMIN — PROPOFOL 200 MG: 10 INJECTION, EMULSION INTRAVENOUS at 07:08

## 2023-08-23 RX ADMIN — ACETAMINOPHEN 1000 MG: 500 TABLET, FILM COATED ORAL at 06:08

## 2023-08-23 RX ADMIN — CELECOXIB 200 MG: 200 CAPSULE ORAL at 06:08

## 2023-08-23 RX ADMIN — CEFAZOLIN 2 G: 330 INJECTION, POWDER, FOR SOLUTION INTRAMUSCULAR; INTRAVENOUS at 07:08

## 2023-08-23 RX ADMIN — SODIUM CHLORIDE, SODIUM GLUCONATE, SODIUM ACETATE, POTASSIUM CHLORIDE AND MAGNESIUM CHLORIDE: 526; 502; 368; 37; 30 INJECTION, SOLUTION INTRAVENOUS at 07:08

## 2023-08-23 RX ADMIN — HYDROMORPHONE HYDROCHLORIDE 0.4 MG: 2 INJECTION, SOLUTION INTRAMUSCULAR; INTRAVENOUS; SUBCUTANEOUS at 09:08

## 2023-08-23 RX ADMIN — ONDANSETRON 4 MG: 2 INJECTION INTRAMUSCULAR; INTRAVENOUS at 06:08

## 2023-08-23 RX ADMIN — HYDROMORPHONE HYDROCHLORIDE 0.4 MG: 2 INJECTION, SOLUTION INTRAMUSCULAR; INTRAVENOUS; SUBCUTANEOUS at 08:08

## 2023-08-23 RX ADMIN — DIPHENHYDRAMINE HYDROCHLORIDE 25 MG: 50 INJECTION INTRAMUSCULAR; INTRAVENOUS at 08:08

## 2023-08-23 RX ADMIN — METHOCARBAMOL 1000 MG: 100 INJECTION, SOLUTION INTRAMUSCULAR; INTRAVENOUS at 08:08

## 2023-08-23 RX ADMIN — METHOCARBAMOL 1000 MG: 100 INJECTION INTRAMUSCULAR; INTRAVENOUS at 08:08

## 2023-08-23 RX ADMIN — LABETALOL HYDROCHLORIDE 10 MG: 5 INJECTION, SOLUTION INTRAVENOUS at 07:08

## 2023-08-23 RX ADMIN — DEXAMETHASONE SODIUM PHOSPHATE 4 MG: 4 INJECTION, SOLUTION INTRA-ARTICULAR; INTRALESIONAL; INTRAMUSCULAR; INTRAVENOUS; SOFT TISSUE at 07:08

## 2023-08-23 RX ADMIN — MIDAZOLAM HYDROCHLORIDE 2 MG: 1 INJECTION, SOLUTION INTRAMUSCULAR; INTRAVENOUS at 07:08

## 2023-08-23 RX ADMIN — ONDANSETRON 4 MG: 2 INJECTION INTRAMUSCULAR; INTRAVENOUS at 08:08

## 2023-08-23 NOTE — ANESTHESIA PROCEDURE NOTES
Intubation    Date/Time: 8/23/2023 7:49 AM    Performed by: Janene Cardenas CRNA  Authorized by: Luis Lezama MD    Intubation:     Induction:  Intravenous    Intubated:  Postinduction    Mask Ventilation:  Easy mask    Attempts:  1    Attempted By:  CRNA    Difficult Airway Encountered?: No      Complications:  None    Airway Device:  Supraglottic airway/LMA    Airway Device Size:  5.0

## 2023-08-23 NOTE — INTERVAL H&P NOTE
The patient has been examined and the H&P has been reviewed:    I concur with the findings and no changes have occurred since H&P was written.    Surgery risks, benefits and alternative options discussed and understood by patient/family.          There are no hospital problems to display for this patient.    Compa Bernal MD  Orthopedic Trauma  [facility]

## 2023-08-23 NOTE — DISCHARGE INSTRUCTIONS
-NO driving and NO alcohol consumption for 24 hours and while taking narcotic pain medication.    -Wound care:  Begin dressing changes in 2 days    -Monitor for signs of INFECTION: redness, swelling, drainage/pus/foul odor, fever, chills. Also, monitor extremity for good circulation (pink, warm, etc)    - Weight bearing status: Weight bearing as tolerated to right upper extremity.     -Apply ICE and ELEVATE extremity as needed to aid in pain and inflammation.    -Report to your nearest ER/Notify your doctor if you experience any SUDDEN/SEVERE chest pain/abdominal pain, weakness, trouble breathing, uncontrolled pain.    BLEEDING: If surgical site begins to bleed , contact your doctor or go to ER    NAUSEA: due to the anesthesia, you may experience nausea for up to 24 hours. If nausea and vomiting last longer, contact your doctor.     INFECTION:  watch for any signs or symptoms of infection such as chills, fever, redness or drainage at surgical site. Notify your doctor     PAIN : take your pain medications as directed. If the pain medications are not helping, notify your doctor.

## 2023-08-23 NOTE — OP NOTE
[facility]                       [address] [place]                      [place] [address]    PATIENT NAME:      ANGIE COBOS   YOB: 1985  CSN:               [id number]  MRN:               [id number]  ADMIT DATE:        08/23/2023 05:15:00  PHYSICIAN:         Compa Bernal MD                          OPERATIVE REPORT      DATE OF SURGERY:    08/23/2023 00:00:00    SURGEON:  Compa Bernal MD    PREOPERATIVE DIAGNOSIS:  Painful prominent hardware, right wrist.    POSTOPERATIVE DIAGNOSIS:  Painful prominent hardware, right wrist.    PROCEDURE:  Planned implant removal, deep right wrist and forearm.    SURGEON:  Compa Bernal MD    ANESTHESIA:  General.    ESTIMATED BLOOD LOSS:  10 mL.    TOURNIQUET TIME:  10 minutes.    COMPLICATIONS:  None.    COUNTS:  All counts correct x2 at the end of the case.    INDICATIONS FOR PROCEDURE:  The patient is a 37-year-old male who sustained a   complex intra-articular distal radius fracture.  He underwent open reduction and   internal fixation of his distal radius with application of a dorsal spanning   plate.  He is now just under 3 months out from his injury.  His distal radius is   healed.  He has a retained plate that stops him from being able to perform   dorsiflexion and volar flexion of the wrist and we are planning to remove this   today to begin range of motion exercises.    PROCEDURE IN DETAIL:  After informed consent was obtained, the patient was met   in the preoperative holding area.  Site was marked.  He was taken to the   operating room.  He was placed supine on the operating table.  General   anesthesia was induced.  All bony prominences were well padded and preoperative   antibiotics were given.  His right upper extremity was prepped and draped in a   standard sterile fashion.  A time-out was done indicating correct operative limb   and procedure.  The limb was exsanguinated and  tourniquet was raised.    Incisions were made over the second metacarpal and proximally in the forearm   through the previously placed incision was carried down to the level of the   hardware.  The screws were removed in their entirety.  The plate was slid out   distally and his wrist was manipulated under fluoroscopy.  He was found to be   stable and good healed fracture.  All the hardware was removed and a rongeur was   used to bite back the overgrowth between the screw holes.  The tourniquet was   released.  Hemostasis was obtained.  The wound was irrigated and closed using a   2-0 Monocryl, 3-0 nylon.  Xeroform, 4 x 4's, cast padding, and a volar resting   splint were applied.  The patient was awakened, extubated, and taken to recovery   in stable condition.    POSTOPERATIVE PLAN:  He will be discharged home today on weightbear as tolerated   to right upper extremity and perform range of motion out of the brace as   tolerated and follow up in 2 weeks for removal of sutures.        ______________________________  MD CHERELLE Owen/AQS  DD:  08/23/2023  Time:  08:24AM  DT:  08/23/2023  Time:  09:01AM  Job #:  [id number]      OPERATIVE REPORT

## 2023-08-23 NOTE — BRIEF OP NOTE
[facility] - Periop Services  Brief Operative Note    Surgery Date: 8/23/2023     Surgeon(s) and Role:     * Compa Bernal MD - Primary     * Tata Rodas FNP - Assisting        Pre-op Diagnosis:  Closed fracture of distal end of right radius with routine healing, unspecified fracture morphology, subsequent encounter [S52.501D]    Post-op Diagnosis:  Post-Op Diagnosis Codes:     * Closed fracture of distal end of right radius with routine healing, unspecified fracture morphology, subsequent encounter [S52.501D]    Procedure(s) (LRB):  REMOVAL,ORTHOPEDIC HARDWARE,UPPER EXTREMITY (Right)    Anesthesia: Choice    Operative Findings: see op report    Estimated Blood Loss:  10mL         Specimens:   Specimen (24h ago, onward)      None              Discharge Note    OUTCOME: Patient tolerated treatment/procedure well without complication and is now ready for discharge.    DISPOSITION: Home or Self Care    FINAL DIAGNOSIS:  see op report    FOLLOWUP: In clinic    DISCHARGE INSTRUCTIONS:    -D/C home  -WBAT to RUE  -Begin dressing changes in 2 days  -F/U in 2 weeks      Compa Bernal MD  Orthopedic Trauma  [facility]

## 2023-08-23 NOTE — ANESTHESIA POSTPROCEDURE EVALUATION
Anesthesia Post Evaluation    Patient: Gera Chavez    Procedure(s) Performed: Procedure(s) (LRB):  REMOVAL,ORTHOPEDIC HARDWARE,UPPER EXTREMITY (Right)    Final Anesthesia Type: general      Patient location during evaluation: PACU  Patient participation: Yes- Able to Participate  Level of consciousness: oriented and awake  Post-procedure vital signs: reviewed and stable  Pain management: adequate  Airway patency: patent    PONV status at discharge: No PONV  Anesthetic complications: no      Cardiovascular status: hemodynamically stable  Respiratory status: spontaneous ventilation and unassisted  Hydration status: euvolemic  Follow-up not needed.  Comments: [facility]          Vitals Value Taken Time   /105 08/23/23 0923   Temp ** 08/23/23 0943   Pulse 111 08/23/23 0923   Resp 16 08/23/23 0923   SpO2 93 % 08/23/23 0923         No case tracking events are documented in the log.      Pain/Mp Score: Pain Rating Prior to Med Admin: 6 (8/23/2023  9:05 AM)  Mp Score: 10 (8/23/2023  9:23 AM)

## 2023-08-23 NOTE — ANESTHESIA PREPROCEDURE EVALUATION
08/22/2023  Gera Chavez is a 37 y.o., male , who presents for the following:    Procedure: REMOVAL,ORTHOPEDIC HARDWARE,UPPER EXTREMITY (Right) - removal of dorsal spanning plate right wrist; skeletal dynamics; supine, hand table, tourniquette, c arm.   Anesthesia type: Choice   Diagnosis: Closed fracture of distal end of right radius with routine healing, unspecified fracture morphology, subsequent encounter [S52.501D]   Pre-op diagnosis: Closed fracture of distal end of right radius with routine healing, unspecified fracture morphology, subsequent encounter [S52.501D]   Location: [facility] OR 09 / [facility] OR   Surgeons: Compa Bernal MD     HPI:   Gera Chavez is a 37 y.o. male with no known PMH, who was transferred (6/4/23) from [facility] for level 1 trauma activation, after patient reportedly jumped off a bridge.  Sustained rib fractures x4 left-sided, pelvic ring fracture, acetabulum fracture, L2 burst fracture, and left pneumothorax with chest tube in place.  Jump witnessed by bystander who called 911      Past Medical History:   Diagnosis Date    Asthma      Depression      Diabetes mellitus      Encounter for blood transfusion      Generalized anxiety disorder      Hypertension      Schizophrenia, unspecified      Sleep apnea      Unspecified glaucoma      LAB:  06/26 0039 07/08 2014 08/10 1228    HGB 11.8 Low      --     15.0       WBC 10.64     --     6.44       Platelets 463 High      --     306            137     145       K+ 4.6     --     4.5       Creatinine 0.72 Low      0.65 Low      0.71 Low        Glucose 101 High      103 High      102 High                Pre-op Assessment    I have reviewed the Patient Summary Reports.     I have reviewed the Nursing Notes. I have reviewed the NPO Status.   I have reviewed the Medications.     Review of Systems  Anesthesia  Hx:  No problems with previous Anesthesia  Denies Family Hx of Anesthesia complications.   Denies Personal Hx of Anesthesia complications.   Social:  Smoker    Cardiovascular:   Hypertension    Pulmonary:   Asthma Sleep Apnea    Endocrine:   Diabetes    Psych:   anxiety depression Schizophrenia         Physical Exam  General: Alert and Oriented    Airway:  Mallampati: II   Mouth Opening: Normal  TM Distance: Normal  Tongue: Large  Neck ROM: Normal ROM    Dental:  Intact    Chest/Lungs:  Normal Respiratory Rate    Heart:  Rate: Normal  Rhythm: Regular Rhythm        Anesthesia Plan  Type of Anesthesia, risks & benefits discussed:    Anesthesia Type: Gen ETT, Gen Supraglottic Airway  Intra-op Monitoring Plan: Standard ASA Monitors  Post Op Pain Control Plan: IV/PO Opioids PRN and multimodal analgesia  Induction:  IV  Airway Plan: Direct, Post-Induction  Informed Consent: Informed consent signed with the Patient and all parties understand the risks and agree with anesthesia plan.  All questions answered. Patient consented to blood products? Yes  ASA Score: 3  Day of Surgery Review of History & Physical: H&P Update referred to the surgeon/provider.  Anesthesia Plan Notes: ERAS, (After discussion of GA vs. Ax Blk, Pt. prefers GA  over Regional Anes)  Pre-op Beta Blocker ( Pt did not take his BP Meds today)    Ready For Surgery From Anesthesia Perspective.     .

## 2023-08-23 NOTE — TRANSFER OF CARE
"Anesthesia Transfer of Care Note    Patient: Gera Chavez    Procedure(s) Performed: Procedure(s) (LRB):  REMOVAL,ORTHOPEDIC HARDWARE,UPPER EXTREMITY (Right)    Patient location: PACU    Anesthesia Type: general    Transport from OR: Transported from OR on room air with adequate spontaneous ventilation    Post pain: adequate analgesia    Post assessment: no apparent anesthetic complications and tolerated procedure well    Post vital signs: stable    Level of consciousness: responds to stimulation    Nausea/Vomiting: no nausea/vomiting    Complications: none    Transfer of care protocol was followed      Last vitals:   Visit Vitals  BP (!) 163/114   Pulse 97   Temp 36.7 °C (98 °F) (Oral)   Resp 18   Ht 5' 11" (1.803 m)   Wt 95.6 kg (210 lb 12.2 oz)   SpO2 97%   BMI 29.40 kg/m²     "

## 2023-08-29 ENCOUNTER — TELEPHONE (OUTPATIENT)
Dept: NEUROSURGERY | Facility: CLINIC | Age: 38
End: 2023-08-29
Payer: MEDICARE

## 2023-08-29 DIAGNOSIS — Z98.1 STATUS POST LUMBAR SPINAL FUSION: Primary | ICD-10-CM

## 2023-08-29 DIAGNOSIS — S32.001D CLOSED BURST FRACTURE OF LUMBAR VERTEBRA WITH ROUTINE HEALING, SUBSEQUENT ENCOUNTER: ICD-10-CM

## 2023-08-29 NOTE — TELEPHONE ENCOUNTER
Patient is calling stating that he went to [facility] on Sunday because he was out of pain medication. He stated that they did a lumbar xray and they advised him that he has a fracture at L1 - L2. He stated that they advised him that he needs to follow up with his Neurosurgeon regarding the fracture. ED records, and imaging report in the chart. Please advise.

## 2023-08-29 NOTE — TELEPHONE ENCOUNTER
I am requesting Jeanie to contact Mr. Chavez.  The patient is 2 1/2 months s/p a posterior lateral fusion with instrumentation from T11 to L4 for a L2 burst fracture on 6/9/2023.     I reviewed the patient's most recent thoracic and lumbar spine x-ray images that were completed on 08/14/2023.  There are postoperative changes s/p a T11-L4 posterolateral fusion with hardware in position.  There is normal alignment with an L2 burst fracture that is unchanged when compared to a preoperative CT lumbar spine without contrast on 06/04/2023.      With these updated radiographic results, my recommendations are as follows:    1.  Neurosurgery does not prescribe pain medications except for the first 30 days after surgery.      2.  I would like to confirm that he is continuing to wear his Aspen TLSO brace whenever his head of bed is greater than 30°.      3.  The patient is encouraged to discontinue smoking, as nicotine inhibits bony healing.    4.  If he is not already established with physical therapy, I would like a referral to be submitted.      5.  In addition, Mr. Chavez will benefit from a referral to a pain management specialist near [place], [place].  I would like Jeanie to order this consult if the patient is not yet established in a pain clinic.      6.  Arrangements have been made for the patient to follow up in the neurosurgery clinic for a telemedicine visit with DAVIDE Ware on Monday, 09/11/2023 with updated thoracic as well as lumbar spine x-rays.

## 2023-08-30 NOTE — TELEPHONE ENCOUNTER
"Patient called again requesting an update. I spoke with Catalina Tello who advised me that I could speak with the patient and let him know of Dr. Matos's recommendations since Jeanie is out of the office. Advised the patient of all the above findings, and Dr. Matos's recommendations. He stated that he is not in any type of physical therapy, and would like a referral sent somewhere in [place]. He asked that we send the pain management referral to someone in [place], as he stated that "they do not have any pain management doctors in [place]". Please advise who you would like for me to send the referral to?   "

## 2023-08-30 NOTE — TELEPHONE ENCOUNTER
I have also submitted a referral for Mr. Chavez to be evaluated by pain management specialist Dr. Camejo, who is located in [place].       Orders Placed This Encounter   Procedures    Ambulatory referral/consult to Physical/Occupational Therapy    Ambulatory referral/consult to Pain Clinic

## 2023-08-30 NOTE — TELEPHONE ENCOUNTER
"I discussed medications with the patient at his last OV. Per Tata Rodas's OV note on 8/10/23 "He received a 1 week prescription of oxycodone from his primary care physician yesterday.  We have discussed that he is not to receive pain medications from multiple physicians.  We are happy to take over on his pain medication.  He will call us when he needs a refill.  We will plan to wean down over the next month and have him off of narcotic pain medication by 3 months postop".     I advised him at his OV on 8/14/23 that he will need to discuss medications with Dr. Bernal's office. I have put in an order for outpatient PT to be sent to [facility]. Please ask the patient which facility he would like to go to. If he is unable to find transportation for outpatient treatment please let me know. Thanks  "

## 2023-09-05 ENCOUNTER — TELEPHONE (OUTPATIENT)
Dept: NEUROSURGERY | Facility: CLINIC | Age: 38
End: 2023-09-05
Payer: MEDICARE

## 2023-09-05 DIAGNOSIS — S72.002E: ICD-10-CM

## 2023-09-05 DIAGNOSIS — Z98.1 STATUS POST LUMBAR SPINAL FUSION: Primary | ICD-10-CM

## 2023-09-05 DIAGNOSIS — S32.001D CLOSED BURST FRACTURE OF LUMBAR VERTEBRA WITH ROUTINE HEALING, SUBSEQUENT ENCOUNTER: ICD-10-CM

## 2023-09-05 RX ORDER — OXYCODONE HYDROCHLORIDE 5 MG/1
5 TABLET ORAL EVERY 12 HOURS PRN
Qty: 14 TABLET | Refills: 0 | Status: SHIPPED | OUTPATIENT
Start: 2023-09-05 | End: 2023-09-12

## 2023-09-05 NOTE — TELEPHONE ENCOUNTER
Patient requesting refill of pain meds, confirmed pharmacy, he will check with pharmacy after lunch.

## 2023-09-05 NOTE — TELEPHONE ENCOUNTER
It would be fine for him to obtain a brace from someone in [place] as long as it is in fact an O brace. Thanks

## 2023-09-05 NOTE — TELEPHONE ENCOUNTER
Advised the patient that the pain management referral is being reviewed, and that I faxed the physical therapy referral to [facility]. I gave him the number to the  in [place] and advised him to give them a call regarding the new back brace. Patient verbally understood.

## 2023-09-06 ENCOUNTER — TELEPHONE (OUTPATIENT)
Dept: NEUROSURGERY | Facility: CLINIC | Age: 38
End: 2023-09-06
Payer: MEDICARE

## 2023-09-06 NOTE — TELEPHONE ENCOUNTER
I spoke with patient regarding the XR's that were supposed to be done BY 9/1/23. He stated he was unaware he had to do some more. I advised him I gave the orders to him at the appointment to have done and mailed out before his appointment on 9/11. He stated he will get the XR's completed and will mail it out. I did advise if we do not receive the disc in time, we may have to r/s the appointment. He verbalized understanding.

## 2023-09-07 NOTE — TELEPHONE ENCOUNTER
I called patient to inquire about his PT appointment that we referred him for. He stated [facility] did not accept his insurance so he is now going to  Physical Therapy in [place]. He stated he is going for his evaluation on Monday, 9/11. He also asked me if he could over night the disc. I told him most definitely. It will most likely come to me. He stated he will do that.

## 2023-09-08 NOTE — TELEPHONE ENCOUNTER
I spoke with patient to inquire about the disc he was supposed to mail out. He stated he did not mail it yet. He is going to go do it today and will over night it to us. I advised him if I do not get it by Monday, I will get brad Ware to see what she would like to do. He verbalized understanding. He did inquire about the referral to Dr. Camejo. He stated he has not heard anything from them yet. I gave him their office number to check the status of the referral.

## 2023-09-11 NOTE — TELEPHONE ENCOUNTER
I spoke with patient in regards to the XR he was supposed to mail. He stated he did over night it and it said it would be coming in today. He stated if we do receive it today, the latest should be tomorrow. I advised him that the NP would like to r/s until we do get the disc so we r/s his appointment for 9/14/23 at 3:00. He was compliant w date and time.

## 2023-09-12 NOTE — TELEPHONE ENCOUNTER
Received disc, imported into Epic. We do not have reports so I spoke with Leonarda at [facility] regarding this. She stated she is faxing reports now.

## 2023-09-13 ENCOUNTER — HOSPITAL ENCOUNTER (OUTPATIENT)
Dept: RADIOLOGY | Facility: CLINIC | Age: 38
Discharge: HOME | End: 2023-09-13
Attending: ORTHOPAEDIC SURGERY
Payer: MEDICARE

## 2023-09-13 ENCOUNTER — OFFICE VISIT (OUTPATIENT)
Dept: ORTHOPEDICS | Facility: CLINIC | Age: 38
End: 2023-09-13
Payer: MEDICARE

## 2023-09-13 VITALS
HEART RATE: 104 BPM | WEIGHT: 210 LBS | HEIGHT: 71 IN | DIASTOLIC BLOOD PRESSURE: 104 MMHG | SYSTOLIC BLOOD PRESSURE: 149 MMHG | BODY MASS INDEX: 29.4 KG/M2

## 2023-09-13 DIAGNOSIS — S72.002E: Primary | ICD-10-CM

## 2023-09-13 DIAGNOSIS — S72.002E: ICD-10-CM

## 2023-09-13 PROCEDURE — 4010F ACE/ARB THERAPY RXD/TAKEN: CPT | Mod: CPTII,,, | Performed by: ORTHOPAEDIC SURGERY

## 2023-09-13 PROCEDURE — 99024 POSTOP FOLLOW-UP VISIT: CPT | Mod: ,,, | Performed by: ORTHOPAEDIC SURGERY

## 2023-09-13 PROCEDURE — 3077F PR MOST RECENT SYSTOLIC BLOOD PRESSURE >= 140 MM HG: ICD-10-PCS | Mod: CPTII,,, | Performed by: ORTHOPAEDIC SURGERY

## 2023-09-13 PROCEDURE — 1159F PR MEDICATION LIST DOCUMENTED IN MEDICAL RECORD: ICD-10-PCS | Mod: CPTII,,, | Performed by: ORTHOPAEDIC SURGERY

## 2023-09-13 PROCEDURE — 72190 X-RAY EXAM OF PELVIS: CPT | Mod: ,,, | Performed by: ORTHOPAEDIC SURGERY

## 2023-09-13 PROCEDURE — 1160F RVW MEDS BY RX/DR IN RCRD: CPT | Mod: CPTII,,, | Performed by: ORTHOPAEDIC SURGERY

## 2023-09-13 PROCEDURE — 3077F SYST BP >= 140 MM HG: CPT | Mod: CPTII,,, | Performed by: ORTHOPAEDIC SURGERY

## 2023-09-13 PROCEDURE — 72190 XR PELVIS COMPLETE MIN 3 VIEWS: ICD-10-PCS | Mod: ,,, | Performed by: ORTHOPAEDIC SURGERY

## 2023-09-13 PROCEDURE — 3008F PR BODY MASS INDEX (BMI) DOCUMENTED: ICD-10-PCS | Mod: CPTII,,, | Performed by: ORTHOPAEDIC SURGERY

## 2023-09-13 PROCEDURE — 3008F BODY MASS INDEX DOCD: CPT | Mod: CPTII,,, | Performed by: ORTHOPAEDIC SURGERY

## 2023-09-13 PROCEDURE — 99024 PR POST-OP FOLLOW-UP VISIT: ICD-10-PCS | Mod: ,,, | Performed by: ORTHOPAEDIC SURGERY

## 2023-09-13 PROCEDURE — 4010F PR ACE/ARB THEARPY RXD/TAKEN: ICD-10-PCS | Mod: CPTII,,, | Performed by: ORTHOPAEDIC SURGERY

## 2023-09-13 PROCEDURE — 3080F DIAST BP >= 90 MM HG: CPT | Mod: CPTII,,, | Performed by: ORTHOPAEDIC SURGERY

## 2023-09-13 PROCEDURE — 3080F PR MOST RECENT DIASTOLIC BLOOD PRESSURE >= 90 MM HG: ICD-10-PCS | Mod: CPTII,,, | Performed by: ORTHOPAEDIC SURGERY

## 2023-09-13 PROCEDURE — 1159F MED LIST DOCD IN RCRD: CPT | Mod: CPTII,,, | Performed by: ORTHOPAEDIC SURGERY

## 2023-09-13 PROCEDURE — 1160F PR REVIEW ALL MEDS BY PRESCRIBER/CLIN PHARMACIST DOCUMENTED: ICD-10-PCS | Mod: CPTII,,, | Performed by: ORTHOPAEDIC SURGERY

## 2023-09-13 NOTE — PROGRESS NOTES
"Subjective:       Patient ID: Gera Chavez is a 37 y.o. male.    Chief Complaint   Patient presents with    Right Wrist - Post-op Evaluation     3 week f/u from right wrist hw removal. 13 week f/u from ORIF acetabulum fx. Present on stretcher. No complaints of wrist pain. Reports sharp and aching pain to LLE.         Patient is here today for follow-up evaluation 3 weeks status post removal of hardware from right wrist as well as 3 months status post open reduction internal fixation of left posterior wall/posterior column acetabulum fracture.  He reports soreness in the pelvis and left hip.  He states that the range motion in his wrist is improving.  He is had no problems with his incisions.        Review of Systems   Constitutional: Negative for chills, fever and malaise/fatigue.   HENT:  Negative for congestion and hearing loss.    Eyes:  Negative for visual disturbance.   Cardiovascular:  Negative for chest pain and syncope.   Respiratory:  Negative for cough and shortness of breath.    Hematologic/Lymphatic: Does not bruise/bleed easily.   Skin:  Negative for color change and suspicious lesions.   Musculoskeletal:  Negative for falls and neck pain.   Gastrointestinal:  Negative for abdominal pain, nausea and vomiting.   Genitourinary:  Negative for dysuria and hematuria.   Neurological:  Negative for numbness and sensory change.   Psychiatric/Behavioral:  Negative for altered mental status. The patient is not nervous/anxious.         Current Outpatient Medications on File Prior to Visit   Medication Sig Dispense Refill    amLODIPine (NORVASC) 5 MG tablet Take 5 mg by mouth once daily.      atorvastatin (LIPITOR) 20 MG tablet Take 20 mg by mouth once daily.      baclofen (LIORESAL) 20 MG tablet TAKE 1 TABLET BY MOUTH THREE TIMES DAILY AS DIRECTED FOR PAIN OR SPASM      BD ULTRA-FINE MINI PEN NEEDLE 31 gauge x 3/16" Ndle Inject into the skin.      calcium-vitamin D3 (OS-NANDO 500 + D3) 500 mg-5 mcg (200 unit) per " "tablet Take 1 tablet by mouth 2 (two) times daily. (Patient taking differently: Take 1 tablet by mouth Daily.) 60 tablet 11    cariprazine HCl (VRAYLAR ORAL) Take by mouth Daily.      FLUoxetine 40 MG capsule Take 40 mg by mouth once daily.      gabapentin (NEURONTIN) 300 MG capsule Take 1 capsule (300 mg total) by mouth 3 (three) times daily. (Patient not taking: Reported on 2023) 90 capsule 11    insulin glargine 100 units/mL SubQ pen Lantus Solostar U-100 Insulin 100 unit/mL (3 mL) subcutaneous pen      insulin glargine, TOUJEO, (TOUJEO SOLOSTAR U-300 INSULIN) 300 unit/mL (1.5 mL) InPn pen Inject 10 Units into the skin every evening.      LANTUS SOLOSTAR U-100 INSULIN glargine 100 units/mL SubQ pen Inject into the skin.      latanoprost 0.005 % ophthalmic solution Place 1 drop into both eyes once daily.      methocarbamoL (ROBAXIN) 500 MG Tab Take 1,000 mg by mouth 4 (four) times daily.      metoprolol tartrate (LOPRESSOR) 25 MG tablet Take 25 mg by mouth 2 (two) times daily.      [] oxyCODONE (ROXICODONE) 5 MG immediate release tablet Take 1 tablet (5 mg total) by mouth every 12 (twelve) hours as needed for Pain. 14 tablet 0    propranoloL (INDERAL) 40 MG tablet Take 1 tablet (40 mg total) by mouth 3 (three) times daily. (Patient not taking: Reported on 2023) 90 tablet 11    QUEtiapine (SEROQUEL) 200 MG Tab Take 200 mg by mouth every evening.       No current facility-administered medications on file prior to visit.          Objective:      BP (!) 149/104   Pulse 104   Ht 5' 11" (1.803 m)   Wt 95.3 kg (210 lb)   BMI 29.29 kg/m²   Physical Exam  Constitutional:       General: He is not in acute distress.     Appearance: Normal appearance. He is not ill-appearing.   HENT:      Head: Normocephalic and atraumatic.      Nose: No congestion.   Eyes:      Extraocular Movements: Extraocular movements intact.   Cardiovascular:      Rate and Rhythm: Normal rate and regular rhythm.      Pulses: Normal " pulses.   Pulmonary:      Effort: Pulmonary effort is normal.      Breath sounds: Normal breath sounds.   Abdominal:      General: There is no distension.      Palpations: Abdomen is soft.      Tenderness: There is no abdominal tenderness.   Musculoskeletal:      Comments: Left lower extremity:  Surgical incisions are all well healed.  He has some stiffness with range motion of the hip though no firm mechanical block to motion is noted with internal external rotation or abduction of the hip.  Good range motion of the knee ankle and digits.  No pain with palpation over the anterior pelvic ring.    Right wrist:  Surgical incisions from his previous hardware removal are clean dry, sutures removed today.  Intact EPL/FPL, EDC/FDP and interossei.  Stiffness with dorsiflexion and volar flexion of the wrist, near full range of motion with supination pronation.   Skin:     General: Skin is warm and dry.   Neurological:      Mental Status: He is alert and oriented to person, place, and time. Mental status is at baseline.   Psychiatric:         Mood and Affect: Mood normal.         Behavior: Behavior normal.         Thought Content: Thought content normal.         Judgment: Judgment normal.        Body mass index is 29.29 kg/m².    Radiology:     Pelvis 5 views:  Hardware intact.  Alignment unchanged compared to previous films.  Evidence of further callus formation noted in the pelvic ring fractures.  He does have some heterotopic bone formation noted about the hip however does not appear to be any bridging bone        Assessment:         1. Type I or II open fracture of left hip with routine healing, subsequent encounter  X-Ray Pelvis Complete min 3 views    CANCELED: X-Ray Pelvis [place] Views    CANCELED: X-Ray Pelvis AP [place]              Plan:       His wrist is doing well I am very happy with his progress.  He can continue full activities without restrictions to the right upper extremity.  His acetabulum is  healing as well, he does have some heterotopic bone formation in the pelvis area however it does not appear to be bridging and he has no mechanical block to motion with attempted range motion of the hip today on examination.  He can pull weight-bearing to the left lower extremity.  Continue to work on range motion exercises and strengthening.  He is asked for refill narcotic pain medication however I have discussed we will not be providing him with any further narcotics at this point he can rely on over-the-counter analgesics from this point forward.  Plan to have him return in 3 months for repeat evaluation of his pelvis/acetabulum and left femur injuries.  All questions and concerns were addressed today and he understands and agrees with our plan    This note/OR report was created with the assistance of  voice recognition software or phone  dictation.  There may be transcription errors as a result of using this technology however minimal. Effort has been made to assure accuracy of transcription but any obvious errors or omissions should be clarified with the author of the document.       Compa Bernal MD  Orthopedic Trauma  [facility]      Follow up in about 3 months (around 12/13/2023) for pos-op visit with Dr. Zepeda.    Type I or II open fracture of left hip with routine healing, subsequent encounter  -     Cancel: X-Ray Pelvis Judet Views; Future; Expected date: 09/13/2023  -     Cancel: X-Ray Pelvis [place]; Future; Expected date: 09/13/2023  -     X-Ray Pelvis Complete min 3 views; Future; Expected date: 09/13/2023              Orders Placed This Encounter   Procedures    X-Ray Pelvis Complete min 3 views     Standing Status:   Future     Number of Occurrences:   1     Standing Expiration Date:   9/13/2024     Order Specific Question:   May the Radiologist modify the order per protocol to meet the clinical needs of the patient?     Answer:   Yes     Order Specific Question:    Release to patient     Answer:   Immediate       Future Appointments   Date Time Provider Department Center   9/14/2023  3:00 PM Jeanie Trejo FNP [facility]   12/13/2023 10:15 AM Tuan Zepeda DO [facility] [place]

## 2023-09-14 ENCOUNTER — OFFICE VISIT (OUTPATIENT)
Dept: NEUROSURGERY | Facility: CLINIC | Age: 38
End: 2023-09-14
Payer: MEDICARE

## 2023-09-14 VITALS
RESPIRATION RATE: 16 BRPM | SYSTOLIC BLOOD PRESSURE: 149 MMHG | WEIGHT: 213 LBS | HEIGHT: 71 IN | HEART RATE: 104 BPM | BODY MASS INDEX: 29.82 KG/M2 | DIASTOLIC BLOOD PRESSURE: 104 MMHG

## 2023-09-14 DIAGNOSIS — Z98.1 STATUS POST LUMBAR SPINAL FUSION: Primary | ICD-10-CM

## 2023-09-14 DIAGNOSIS — S32.001K: ICD-10-CM

## 2023-09-14 PROCEDURE — 3077F PR MOST RECENT SYSTOLIC BLOOD PRESSURE >= 140 MM HG: ICD-10-PCS | Mod: CPTII,95,, | Performed by: NURSE PRACTITIONER

## 2023-09-14 PROCEDURE — 1160F RVW MEDS BY RX/DR IN RCRD: CPT | Mod: CPTII,95,, | Performed by: NURSE PRACTITIONER

## 2023-09-14 PROCEDURE — 99441 PR PHYSICIAN TELEPHONE EVALUATION 5-10 MIN: ICD-10-PCS | Mod: 95,,, | Performed by: NURSE PRACTITIONER

## 2023-09-14 PROCEDURE — 3008F PR BODY MASS INDEX (BMI) DOCUMENTED: ICD-10-PCS | Mod: CPTII,95,, | Performed by: NURSE PRACTITIONER

## 2023-09-14 PROCEDURE — 99441 PR PHYSICIAN TELEPHONE EVALUATION 5-10 MIN: CPT | Mod: 95,,, | Performed by: NURSE PRACTITIONER

## 2023-09-14 PROCEDURE — 3008F BODY MASS INDEX DOCD: CPT | Mod: CPTII,95,, | Performed by: NURSE PRACTITIONER

## 2023-09-14 PROCEDURE — 4010F PR ACE/ARB THEARPY RXD/TAKEN: ICD-10-PCS | Mod: CPTII,95,, | Performed by: NURSE PRACTITIONER

## 2023-09-14 PROCEDURE — 1160F PR REVIEW ALL MEDS BY PRESCRIBER/CLIN PHARMACIST DOCUMENTED: ICD-10-PCS | Mod: CPTII,95,, | Performed by: NURSE PRACTITIONER

## 2023-09-14 PROCEDURE — 3080F DIAST BP >= 90 MM HG: CPT | Mod: CPTII,95,, | Performed by: NURSE PRACTITIONER

## 2023-09-14 PROCEDURE — 1159F PR MEDICATION LIST DOCUMENTED IN MEDICAL RECORD: ICD-10-PCS | Mod: CPTII,95,, | Performed by: NURSE PRACTITIONER

## 2023-09-14 PROCEDURE — 4010F ACE/ARB THERAPY RXD/TAKEN: CPT | Mod: CPTII,95,, | Performed by: NURSE PRACTITIONER

## 2023-09-14 PROCEDURE — 1159F MED LIST DOCD IN RCRD: CPT | Mod: CPTII,95,, | Performed by: NURSE PRACTITIONER

## 2023-09-14 PROCEDURE — 3080F PR MOST RECENT DIASTOLIC BLOOD PRESSURE >= 90 MM HG: ICD-10-PCS | Mod: CPTII,95,, | Performed by: NURSE PRACTITIONER

## 2023-09-14 PROCEDURE — 3077F SYST BP >= 140 MM HG: CPT | Mod: CPTII,95,, | Performed by: NURSE PRACTITIONER

## 2023-09-14 RX ORDER — PROCHLORPERAZINE MALEATE 10 MG
TABLET ORAL
COMMUNITY
End: 2023-09-14

## 2023-09-14 RX ORDER — HYDROCODONE BITARTRATE AND ACETAMINOPHEN 7.5; 325 MG/1; MG/1
1 TABLET ORAL EVERY 6 HOURS PRN
COMMUNITY
Start: 2023-09-11 | End: 2023-09-14

## 2023-09-14 RX ORDER — METHOCARBAMOL 750 MG/1
750 TABLET, FILM COATED ORAL 4 TIMES DAILY
COMMUNITY
Start: 2023-09-09 | End: 2023-09-14

## 2023-09-14 RX ORDER — BUSPIRONE HYDROCHLORIDE 7.5 MG/1
7.5 TABLET ORAL 2 TIMES DAILY
COMMUNITY
Start: 2023-08-29 | End: 2024-01-29
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
## 2023-09-21 ENCOUNTER — TELEPHONE (OUTPATIENT)
Dept: NEUROSURGERY | Facility: CLINIC | Age: 38
End: 2023-09-21
Payer: MEDICARE

## 2023-09-21 NOTE — TELEPHONE ENCOUNTER
Patient called wanting to know if he has any type of restrictions regarding his back. Can he bend over? Can he lift things? Please advise.

## 2023-09-21 NOTE — TELEPHONE ENCOUNTER
He is advised against any bending or extensive twisting. We recommend he bend with his legs not his waist. He can begin to gradually lift more weight as tolerated.

## 2023-10-12 NOTE — ADDENDUM NOTE
Addendum  created 10/12/23 1209 by Michael Garcia MD    Attestation recorded in Intraprocedure, Clinical Note Signed, Intraprocedure Attestations filed

## 2023-10-12 NOTE — ANESTHESIA POSTPROCEDURE EVALUATION
Anesthesia Post Evaluation    Patient: Gera Chavez    Procedure(s) Performed: Procedure(s) (LRB):  FUSION, SPINE, LUMBAR, PLIF, USING COMPUTER-ASSISTED NAVIGATION (N/A)    Final Anesthesia Type: general      Patient location during evaluation: PACU  Patient participation: Yes- Able to Participate  Level of consciousness: awake and alert and oriented  Post-procedure vital signs: reviewed and stable  Pain management: adequate  Airway patency: patent  BIENVENIDO mitigation strategies: Verification of full reversal of neuromuscular block  PONV status at discharge: No PONV  Anesthetic complications: no      Cardiovascular status: blood pressure returned to baseline and stable  Respiratory status: spontaneous ventilation and unassisted  Hydration status: euvolemic  Follow-up not needed.  Comments: [facility]          Vitals Value Taken Time   /104 09/14/23 1156   Temp 36.6 °C (97.9 °F) 08/23/23 0840   Pulse 104 09/14/23 1156   Resp 16 09/14/23 1156   SpO2 96 % 08/23/23 0953         No case tracking events are documented in the log.      Pain/Mp Score: No data recorded

## 2023-10-17 ENCOUNTER — OFFICE VISIT (OUTPATIENT)
Dept: PAIN MEDICINE | Facility: CLINIC | Age: 38
End: 2023-10-17
Payer: MEDICARE

## 2023-10-17 VITALS
HEIGHT: 71 IN | BODY MASS INDEX: 29.82 KG/M2 | HEART RATE: 95 BPM | WEIGHT: 213 LBS | DIASTOLIC BLOOD PRESSURE: 119 MMHG | TEMPERATURE: 99 F | SYSTOLIC BLOOD PRESSURE: 157 MMHG

## 2023-10-17 DIAGNOSIS — S32.001D CLOSED BURST FRACTURE OF LUMBAR VERTEBRA WITH ROUTINE HEALING, SUBSEQUENT ENCOUNTER: ICD-10-CM

## 2023-10-17 DIAGNOSIS — Z98.1 STATUS POST LUMBAR SPINAL FUSION: ICD-10-CM

## 2023-10-17 PROCEDURE — 1160F PR REVIEW ALL MEDS BY PRESCRIBER/CLIN PHARMACIST DOCUMENTED: ICD-10-PCS | Mod: CPTII,,, | Performed by: ANESTHESIOLOGY

## 2023-10-17 PROCEDURE — 4010F PR ACE/ARB THEARPY RXD/TAKEN: ICD-10-PCS | Mod: CPTII,,, | Performed by: ANESTHESIOLOGY

## 2023-10-17 PROCEDURE — 3008F PR BODY MASS INDEX (BMI) DOCUMENTED: ICD-10-PCS | Mod: CPTII,,, | Performed by: ANESTHESIOLOGY

## 2023-10-17 PROCEDURE — 3080F DIAST BP >= 90 MM HG: CPT | Mod: CPTII,,, | Performed by: ANESTHESIOLOGY

## 2023-10-17 PROCEDURE — 3080F PR MOST RECENT DIASTOLIC BLOOD PRESSURE >= 90 MM HG: ICD-10-PCS | Mod: CPTII,,, | Performed by: ANESTHESIOLOGY

## 2023-10-17 PROCEDURE — 1159F MED LIST DOCD IN RCRD: CPT | Mod: CPTII,,, | Performed by: ANESTHESIOLOGY

## 2023-10-17 PROCEDURE — 3008F BODY MASS INDEX DOCD: CPT | Mod: CPTII,,, | Performed by: ANESTHESIOLOGY

## 2023-10-17 PROCEDURE — 3077F SYST BP >= 140 MM HG: CPT | Mod: CPTII,,, | Performed by: ANESTHESIOLOGY

## 2023-10-17 PROCEDURE — 4010F ACE/ARB THERAPY RXD/TAKEN: CPT | Mod: CPTII,,, | Performed by: ANESTHESIOLOGY

## 2023-10-17 PROCEDURE — 99205 PR OFFICE/OUTPT VISIT, NEW, LEVL V, 60-74 MIN: ICD-10-PCS | Mod: ,,, | Performed by: ANESTHESIOLOGY

## 2023-10-17 PROCEDURE — 1160F RVW MEDS BY RX/DR IN RCRD: CPT | Mod: CPTII,,, | Performed by: ANESTHESIOLOGY

## 2023-10-17 PROCEDURE — 1159F PR MEDICATION LIST DOCUMENTED IN MEDICAL RECORD: ICD-10-PCS | Mod: CPTII,,, | Performed by: ANESTHESIOLOGY

## 2023-10-17 PROCEDURE — 3077F PR MOST RECENT SYSTOLIC BLOOD PRESSURE >= 140 MM HG: ICD-10-PCS | Mod: CPTII,,, | Performed by: ANESTHESIOLOGY

## 2023-10-17 PROCEDURE — 99205 OFFICE O/P NEW HI 60 MIN: CPT | Mod: ,,, | Performed by: ANESTHESIOLOGY

## 2023-10-17 RX ORDER — LISINOPRIL 40 MG/1
40 TABLET ORAL
COMMUNITY
Start: 2023-09-29 | End: 2023-12-28

## 2023-10-17 RX ORDER — BACLOFEN 10 MG/1
1 TABLET ORAL 3 TIMES DAILY
COMMUNITY
Start: 2023-09-25 | End: 2023-12-24

## 2023-10-17 RX ORDER — GABAPENTIN 800 MG/1
800 TABLET ORAL 3 TIMES DAILY
COMMUNITY
Start: 2023-09-29 | End: 2023-10-29
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
## 2023-11-21 ENCOUNTER — TELEPHONE (OUTPATIENT)
Dept: PAIN MEDICINE | Facility: CLINIC | Age: 38
End: 2023-11-21
Payer: MEDICARE

## 2023-12-12 ENCOUNTER — TELEPHONE (OUTPATIENT)
Dept: ORTHOPEDICS | Facility: CLINIC | Age: 38
End: 2023-12-12
Payer: MEDICARE

## 2023-12-12 NOTE — TELEPHONE ENCOUNTER
Spoke to patient today regarding scheduling conflict. Patient agreed to be rescheduled to next week on 12/19/23. Patient voiced a clear understanding of new appointment date and time.

## 2023-12-20 ENCOUNTER — HOSPITAL ENCOUNTER (OUTPATIENT)
Dept: RADIOLOGY | Facility: CLINIC | Age: 38
Discharge: HOME | End: 2023-12-20
Attending: ORTHOPAEDIC SURGERY
Payer: MEDICARE

## 2023-12-20 ENCOUNTER — OFFICE VISIT (OUTPATIENT)
Dept: ORTHOPEDICS | Facility: CLINIC | Age: 38
End: 2023-12-20
Payer: MEDICARE

## 2023-12-20 VITALS
DIASTOLIC BLOOD PRESSURE: 93 MMHG | HEART RATE: 99 BPM | BODY MASS INDEX: 29.82 KG/M2 | SYSTOLIC BLOOD PRESSURE: 132 MMHG | WEIGHT: 213 LBS | HEIGHT: 71 IN

## 2023-12-20 DIAGNOSIS — S32.810D CLOSED PELVIC RING FRACTURE WITH ROUTINE HEALING, SUBSEQUENT ENCOUNTER: ICD-10-CM

## 2023-12-20 DIAGNOSIS — S72.002E: ICD-10-CM

## 2023-12-20 DIAGNOSIS — S72.002E: Primary | ICD-10-CM

## 2023-12-20 DIAGNOSIS — M21.752 ACQUIRED UNEQUAL LIMB LENGTH OF FEMUR, LEFT: ICD-10-CM

## 2023-12-20 PROCEDURE — 3080F DIAST BP >= 90 MM HG: CPT | Mod: CPTII,,, | Performed by: ORTHOPAEDIC SURGERY

## 2023-12-20 PROCEDURE — 4010F PR ACE/ARB THEARPY RXD/TAKEN: ICD-10-PCS | Mod: CPTII,,, | Performed by: ORTHOPAEDIC SURGERY

## 2023-12-20 PROCEDURE — 1159F PR MEDICATION LIST DOCUMENTED IN MEDICAL RECORD: ICD-10-PCS | Mod: CPTII,,, | Performed by: ORTHOPAEDIC SURGERY

## 2023-12-20 PROCEDURE — 1159F MED LIST DOCD IN RCRD: CPT | Mod: CPTII,,, | Performed by: ORTHOPAEDIC SURGERY

## 2023-12-20 PROCEDURE — 73552 XR FEMUR 2 VIEW LEFT: ICD-10-PCS | Mod: LT,,, | Performed by: ORTHOPAEDIC SURGERY

## 2023-12-20 PROCEDURE — 3008F PR BODY MASS INDEX (BMI) DOCUMENTED: ICD-10-PCS | Mod: CPTII,,, | Performed by: ORTHOPAEDIC SURGERY

## 2023-12-20 PROCEDURE — 99213 OFFICE O/P EST LOW 20 MIN: CPT | Mod: ,,, | Performed by: ORTHOPAEDIC SURGERY

## 2023-12-20 PROCEDURE — 72190 XR PELVIS AP INLET AND OUTLET: ICD-10-PCS | Mod: ,,, | Performed by: ORTHOPAEDIC SURGERY

## 2023-12-20 PROCEDURE — 3075F SYST BP GE 130 - 139MM HG: CPT | Mod: CPTII,,, | Performed by: ORTHOPAEDIC SURGERY

## 2023-12-20 PROCEDURE — 3080F PR MOST RECENT DIASTOLIC BLOOD PRESSURE >= 90 MM HG: ICD-10-PCS | Mod: CPTII,,, | Performed by: ORTHOPAEDIC SURGERY

## 2023-12-20 PROCEDURE — 73552 X-RAY EXAM OF FEMUR 2/>: CPT | Mod: LT,,, | Performed by: ORTHOPAEDIC SURGERY

## 2023-12-20 PROCEDURE — 72190 X-RAY EXAM OF PELVIS: CPT | Mod: ,,, | Performed by: ORTHOPAEDIC SURGERY

## 2023-12-20 PROCEDURE — 4010F ACE/ARB THERAPY RXD/TAKEN: CPT | Mod: CPTII,,, | Performed by: ORTHOPAEDIC SURGERY

## 2023-12-20 PROCEDURE — 3008F BODY MASS INDEX DOCD: CPT | Mod: CPTII,,, | Performed by: ORTHOPAEDIC SURGERY

## 2023-12-20 PROCEDURE — 3075F PR MOST RECENT SYSTOLIC BLOOD PRESS GE 130-139MM HG: ICD-10-PCS | Mod: CPTII,,, | Performed by: ORTHOPAEDIC SURGERY

## 2023-12-20 PROCEDURE — 99213 PR OFFICE/OUTPT VISIT, EST, LEVL III, 20-29 MIN: ICD-10-PCS | Mod: ,,, | Performed by: ORTHOPAEDIC SURGERY
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
## 2024-01-16 ENCOUNTER — TELEPHONE (OUTPATIENT)
Dept: NEUROSURGERY | Facility: CLINIC | Age: 39
End: 2024-01-16
Payer: MEDICAID

## 2024-01-16 DIAGNOSIS — Z98.1 STATUS POST LUMBAR SPINAL FUSION: Primary | ICD-10-CM

## 2024-01-16 NOTE — TELEPHONE ENCOUNTER
I would recommend updated x-rays of the lumbar spine with AP/lateral/flexion and extension view at this time.  We can plan to schedule a virtual visit for x-ray results.  Thanks

## 2024-01-16 NOTE — TELEPHONE ENCOUNTER
I spoke to the patient.  He asked that I fax the XR order to [facility].  Once he completes them he will give us a call and he is aware that he needs to mail us a CD.  Please schedule the patient for a virtual appointment.  SALVADOR
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
## 2024-01-29 ENCOUNTER — OFFICE VISIT (OUTPATIENT)
Dept: NEUROSURGERY | Facility: CLINIC | Age: 39
End: 2024-01-29
Payer: MEDICARE

## 2024-01-29 VITALS — WEIGHT: 225 LBS | HEIGHT: 71 IN | BODY MASS INDEX: 31.5 KG/M2

## 2024-01-29 DIAGNOSIS — Z98.1 STATUS POST LUMBAR SPINAL FUSION: Primary | ICD-10-CM

## 2024-01-29 DIAGNOSIS — S32.001D CLOSED BURST FRACTURE OF LUMBAR VERTEBRA WITH ROUTINE HEALING, SUBSEQUENT ENCOUNTER: ICD-10-CM

## 2024-01-29 PROCEDURE — 99441 PR PHYSICIAN TELEPHONE EVALUATION 5-10 MIN: CPT | Mod: 95,,, | Performed by: NURSE PRACTITIONER

## 2024-01-29 RX ORDER — OXYCODONE AND ACETAMINOPHEN 5; 325 MG/1; MG/1
1 TABLET ORAL 2 TIMES DAILY PRN
COMMUNITY
Start: 2023-11-30

## 2024-01-29 RX ORDER — LISINOPRIL 5 MG/1
5 TABLET ORAL DAILY
COMMUNITY

## 2024-01-29 RX ORDER — GABAPENTIN 800 MG/1
800 TABLET ORAL 4 TIMES DAILY
COMMUNITY
Start: 2023-11-28

## 2024-01-29 RX ORDER — METHOCARBAMOL 750 MG/1
750 TABLET, FILM COATED ORAL 4 TIMES DAILY
COMMUNITY

## 2024-01-29 RX ORDER — CLONAZEPAM 0.5 MG/1
0.5 TABLET ORAL DAILY
COMMUNITY
Start: 2023-12-14

## 2025-06-13 ENCOUNTER — TELEPHONE (OUTPATIENT)
Dept: NEUROSURGERY | Facility: CLINIC | Age: 40
End: 2025-06-13
Payer: MEDICARE

## 2025-06-13 DIAGNOSIS — Z98.1 STATUS POST LUMBAR SPINAL FUSION: Primary | ICD-10-CM
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
## 2025-06-13 NOTE — LETTER
[facility]  [address] [place] [address] , [address]  [place] [address]  Phone: [phone]         AUTHORIZATION FOR RELEASE OF   CONFIDENTIAL INFORMATION    Dear staff,    The patient Gera Chavez, with the date of birth 1985, is scheduled to see Angelika Matos MD.  In order to help keep the patients chart updated, we are needing the following records:      (  )  OFFICE NOTES                                  (  )  LAB RESULTS      (  )  PROCEDURE NOTES                        (  )  INJECTION NOTES                 (  )  RADIOLOGY REPORTS                     (  )  ENTIRE CHART                        (  )  RADIOLOGY FILMS ON CD               ( X )  OTHER    ER notes from 6/12/25                                          Please fax records to our office at  [phone].   If you have any questions, please do not hesitate to contact our office at [phone].      With best regards,    Catalina Blanton RN  [facility]  [address] [place] [address] , [address]  [place] [address]  Dept: [phone]

## 2025-06-16 NOTE — TELEPHONE ENCOUNTER
Please schedule the patient next available on either my or Paige's schedule. Please have the patient complete AP/Lat/Flex/Ext views of the lumbar spine prior to the visit.  I have entered the orders.  Thanks

## 2025-06-17 NOTE — TELEPHONE ENCOUNTER
I called patient to schedule an appointment with XR L Spine. I scheduled patient on 6/19/25 at 3:00 with Jeanie AUGUSTINE and XR L at [facility] at 2:00. Patient verbalized understanding of appointment date and time.
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
## 2025-06-19 ENCOUNTER — OFFICE VISIT (OUTPATIENT)
Dept: NEUROSURGERY | Facility: CLINIC | Age: 40
End: 2025-06-19
Payer: MEDICARE

## 2025-06-19 ENCOUNTER — HOSPITAL ENCOUNTER (OUTPATIENT)
Dept: RADIOLOGY | Facility: HOSPITAL | Age: 40
Discharge: HOME | End: 2025-06-19
Attending: NURSE PRACTITIONER
Payer: MEDICARE

## 2025-06-19 VITALS
HEIGHT: 71 IN | WEIGHT: 246.38 LBS | RESPIRATION RATE: 16 BRPM | BODY MASS INDEX: 34.49 KG/M2 | HEART RATE: 74 BPM | DIASTOLIC BLOOD PRESSURE: 82 MMHG | SYSTOLIC BLOOD PRESSURE: 119 MMHG

## 2025-06-19 DIAGNOSIS — T84.498A LOOSENING OF HARDWARE IN SPINE: ICD-10-CM

## 2025-06-19 DIAGNOSIS — M47.816 LUMBAR SPONDYLOSIS: Primary | ICD-10-CM

## 2025-06-19 DIAGNOSIS — Z98.1 STATUS POST LUMBAR SPINAL FUSION: ICD-10-CM

## 2025-06-19 PROCEDURE — 99215 OFFICE O/P EST HI 40 MIN: CPT | Mod: ,,, | Performed by: NURSE PRACTITIONER

## 2025-06-19 PROCEDURE — 3074F SYST BP LT 130 MM HG: CPT | Mod: CPTII,,, | Performed by: NURSE PRACTITIONER

## 2025-06-19 PROCEDURE — 1160F RVW MEDS BY RX/DR IN RCRD: CPT | Mod: CPTII,,, | Performed by: NURSE PRACTITIONER

## 2025-06-19 PROCEDURE — 4010F ACE/ARB THERAPY RXD/TAKEN: CPT | Mod: CPTII,,, | Performed by: NURSE PRACTITIONER

## 2025-06-19 PROCEDURE — 3079F DIAST BP 80-89 MM HG: CPT | Mod: CPTII,,, | Performed by: NURSE PRACTITIONER

## 2025-06-19 PROCEDURE — 3008F BODY MASS INDEX DOCD: CPT | Mod: CPTII,,, | Performed by: NURSE PRACTITIONER

## 2025-06-19 PROCEDURE — 1159F MED LIST DOCD IN RCRD: CPT | Mod: CPTII,,, | Performed by: NURSE PRACTITIONER

## 2025-06-19 PROCEDURE — 72110 X-RAY EXAM L-2 SPINE 4/>VWS: CPT | Mod: TC

## 2025-06-19 RX ORDER — PEN NEEDLE, DIABETIC 31 GX5/16"
NEEDLE, DISPOSABLE MISCELLANEOUS
COMMUNITY
Start: 2025-02-01

## 2025-06-19 RX ORDER — SULFAMETHOXAZOLE AND TRIMETHOPRIM 800; 160 MG/1; MG/1
TABLET ORAL
COMMUNITY
Start: 2023-09-16 | End: 2025-06-19

## 2025-06-19 RX ORDER — LIDOCAINE 50 MG/G
1 PATCH TOPICAL
COMMUNITY
Start: 2025-02-03 | End: 2025-06-19

## 2025-06-19 RX ORDER — OXYCODONE AND ACETAMINOPHEN 7.5; 325 MG/1; MG/1
1 TABLET ORAL 2 TIMES DAILY PRN
COMMUNITY
Start: 2025-03-14 | End: 2025-06-19

## 2025-06-19 RX ORDER — HYDROCODONE BITARTRATE AND ACETAMINOPHEN 5; 325 MG/1; MG/1
TABLET ORAL
COMMUNITY
Start: 2023-08-27 | End: 2025-06-19

## 2025-06-19 RX ORDER — LAMOTRIGINE 100 MG/1
TABLET ORAL
COMMUNITY
End: 2025-06-19

## 2025-06-19 RX ORDER — GLUCOSAM/CHON-MSM1/C/MANG/BOSW 500-416.6
TABLET ORAL
COMMUNITY
Start: 2025-02-01

## 2025-06-19 RX ORDER — LEVOFLOXACIN 500 MG/1
500 TABLET, FILM COATED ORAL
COMMUNITY
Start: 2025-05-05 | End: 2025-06-19

## 2025-06-19 RX ORDER — FLUOXETINE 20 MG/1
20 CAPSULE ORAL
COMMUNITY
Start: 2025-01-07 | End: 2025-06-19

## 2025-06-19 RX ORDER — OXYCODONE AND ACETAMINOPHEN 10; 325 MG/1; MG/1
1 TABLET ORAL 2 TIMES DAILY PRN
COMMUNITY

## 2025-06-19 RX ORDER — DOXYCYCLINE 100 MG/1
100 CAPSULE ORAL 2 TIMES DAILY
COMMUNITY
Start: 2025-04-09 | End: 2025-06-19

## 2025-06-19 RX ORDER — TIRZEPATIDE 10 MG/.5ML
10 INJECTION, SOLUTION SUBCUTANEOUS
COMMUNITY
Start: 2025-06-10

## 2025-06-19 RX ORDER — ZOLPIDEM TARTRATE 5 MG/1
5 TABLET ORAL NIGHTLY PRN
COMMUNITY
Start: 2024-07-11 | End: 2025-06-19

## 2025-06-19 RX ORDER — CYCLOBENZAPRINE HCL 10 MG
10 TABLET ORAL 3 TIMES DAILY
COMMUNITY
Start: 2025-04-15 | End: 2025-06-19

## 2025-06-19 RX ORDER — MUPIROCIN 20 MG/G
OINTMENT TOPICAL
COMMUNITY
Start: 2025-04-09

## 2025-06-19 RX ORDER — BRIMONIDINE TARTRATE, TIMOLOL MALEATE 2; 5 MG/ML; MG/ML
SOLUTION/ DROPS OPHTHALMIC
COMMUNITY
Start: 2025-06-02

## 2025-06-19 RX ORDER — LISINOPRIL 40 MG/1
40 TABLET ORAL
COMMUNITY

## 2025-06-19 RX ORDER — CARIPRAZINE 1.5 MG/1
3 CAPSULE, GELATIN COATED ORAL
COMMUNITY
Start: 2024-07-11 | End: 2025-06-19

## 2025-06-19 RX ORDER — DICLOFENAC POTASSIUM 50 MG/1
50 TABLET, FILM COATED ORAL
COMMUNITY
Start: 2025-02-03 | End: 2025-06-19

## 2025-06-19 RX ORDER — BACLOFEN 10 MG/1
10 TABLET ORAL 2 TIMES DAILY
COMMUNITY
Start: 2025-01-31 | End: 2025-06-19

## 2025-06-19 RX ORDER — CARIPRAZINE 3 MG/1
3 CAPSULE, GELATIN COATED ORAL
COMMUNITY
Start: 2025-06-06

## 2025-06-19 RX ORDER — HYDROCODONE BITARTRATE AND ACETAMINOPHEN 7.5; 325 MG/1; MG/1
TABLET ORAL
COMMUNITY
Start: 2023-09-09 | End: 2025-06-19

## 2025-06-19 RX ORDER — ROSUVASTATIN CALCIUM 20 MG/1
20 TABLET, COATED ORAL
COMMUNITY
Start: 2025-06-19 | End: 2025-06-19

## 2025-06-19 RX ORDER — CALCIUM CITRATE/VITAMIN D3 200MG-6.25
TABLET ORAL
COMMUNITY
Start: 2025-03-11

## 2025-06-19 RX ORDER — AMLODIPINE BESYLATE 5 MG/1
TABLET ORAL
COMMUNITY
End: 2025-06-19

## 2025-06-19 RX ORDER — CLONAZEPAM 1 MG/1
TABLET ORAL
COMMUNITY
End: 2025-06-19

## 2025-06-19 RX ORDER — TIRZEPATIDE 12.5 MG/.5ML
12.5 INJECTION, SOLUTION SUBCUTANEOUS
COMMUNITY
Start: 2025-06-18 | End: 2025-06-19

## 2025-06-19 RX ORDER — DOCUSATE SODIUM 100 MG/1
CAPSULE, LIQUID FILLED ORAL
COMMUNITY
Start: 2023-07-16 | End: 2025-06-19

## 2025-06-19 RX ORDER — TIRZEPATIDE 7.5 MG/.5ML
7.5 INJECTION, SOLUTION SUBCUTANEOUS WEEKLY
COMMUNITY
Start: 2025-03-22 | End: 2025-06-19

## (undated) DEVICE — Device

## (undated) DEVICE — GLOVE PROTEXIS BLUE LATEX 9

## (undated) DEVICE — DEVICE CLSR PDS PLUS 1 CT 18IN

## (undated) DEVICE — SUT VICRYL PLUS 0 CT-1 18IN

## (undated) DEVICE — DRESSING TELFA + RECT 6X10IN

## (undated) DEVICE — PADDING WYTEX UNDRCST 2INX4YD

## (undated) DEVICE — SUT BLK MONO 3-0 CUT 18IN F

## (undated) DEVICE — DRESSING XEROFORM FOIL PK 1X8

## (undated) DEVICE — DRESSING XEROFORM 1X8IN

## (undated) DEVICE — TRAY CATH FOL SIL URIMTR 16FR

## (undated) DEVICE — DRESSING LEUKOPLAST FLEX 1X3IN

## (undated) DEVICE — GLOVE PROTEXIS HYDROGEL SZ6.5

## (undated) DEVICE — SUT VICRYL+ 1 CT1 18IN

## (undated) DEVICE — CELL SAVER KIT

## (undated) DEVICE — ELECTRODE PATIENT RETURN DISP

## (undated) DEVICE — SUT VICRYL BR 1 GEN 27 CT-1

## (undated) DEVICE — DRAPE HAND STERILE

## (undated) DEVICE — SUT MCRYL PLUS 2-0 CT-1 36IN

## (undated) DEVICE — ELECTRODE REM POLYHESIVE II

## (undated) DEVICE — COVER FULLGUARD SHOE HIGH-TOP

## (undated) DEVICE — DRESSING GAUZE XEROFORM 5X9

## (undated) DEVICE — DRAPE STERI U-SHAPED 47X51IN

## (undated) DEVICE — COVER C-ARM STRAP BAND 44X80IN

## (undated) DEVICE — APPLICATOR CHLORAPREP ORN 26ML

## (undated) DEVICE — PADDING 4X4YD SPECIALIST100

## (undated) DEVICE — DRESSING TELFA + BARR 4X6IN

## (undated) DEVICE — BIT DRILL CALIBRATED 2.5MM

## (undated) DEVICE — KIT SURGIFLO HEMOSTATIC MATRIX

## (undated) DEVICE — COVER TABLE HVY DTY 60X90IN

## (undated) DEVICE — GAUZE SPONGE 4X4 12PLY

## (undated) DEVICE — BANDAGE ESMARK 4INX3YD

## (undated) DEVICE — KIT SURGICAL TURNOVER

## (undated) DEVICE — GLOVE PROTEXIS PI CRM 7

## (undated) DEVICE — BOWL STERILE LARGE 32OZ

## (undated) DEVICE — SUT VICRYL 2-0 8-18 CP-2

## (undated) DEVICE — STOCKINETTE IMPERVIOUS 16X54IN

## (undated) DEVICE — GLOVE PROTEXIS PI SYN SURG 6.5

## (undated) DEVICE — DRAPE T EXTRM SURG 121X128X90

## (undated) DEVICE — DRAPE ORTH SPLIT 77X108IN

## (undated) DEVICE — MARKER WRITESITE SKIN CHLRAPRP

## (undated) DEVICE — GLOVE 8 PROTEXIS PI ORTHO

## (undated) DEVICE — ADHESIVE DERMABOND ADVANCED

## (undated) DEVICE — GOWN SURGICAL XX LARGE X LONG

## (undated) DEVICE — GLOVE PROTEXIS HYDROGEL SZ6

## (undated) DEVICE — GLOVE PROTEXIS HYDROGEL SZ9

## (undated) DEVICE — GOWN SMARTGOWN 3XL XLONG

## (undated) DEVICE — SPONGE GAUZE 4X4 12 PLY STRL

## (undated) DEVICE — GLOVE PROTEXIS BLUE LATEX 7

## (undated) DEVICE — DISH PETRI MED 3.5IN

## (undated) DEVICE — SUT CTD VICRYL CT-1 27

## (undated) DEVICE — DRAPE TOP 53X102IN

## (undated) DEVICE — GLOVE PROTEXIS PI SYN SURG 7

## (undated) DEVICE — DRESSING XEROFORM 5X9IN

## (undated) DEVICE — DRESSING SURGICAL 1/2X1/2

## (undated) DEVICE — GLOVE PROTEXIS LTX MICRO 8

## (undated) DEVICE — GOWN X-LG STERILE BACK

## (undated) DEVICE — KIT POS JACKSON TABLE NO HDRST

## (undated) DEVICE — BANDAGE MATRIX HK LOOP 4IN 5YD

## (undated) DEVICE — ELECTRODE BLADE E-Z CLEAN 4IN

## (undated) DEVICE — DRAPE INCISE IOBAN 2 13X13IN

## (undated) DEVICE — BIT DRILL 4.2MM 3 FLUTD 145MM

## (undated) DEVICE — SUT GORETEX CV-6 TTC-09 24IN

## (undated) DEVICE — PILLOW HEAD REST

## (undated) DEVICE — TAPE SILK 3IN

## (undated) DEVICE — GOWN SMARTSLEEVE AAMI LVL4 XL

## (undated) DEVICE — GOWN POLY REINF X-LONG 2XL

## (undated) DEVICE — BANDAGE ELASTIC 3X5 VELCRO ST

## (undated) DEVICE — DRESSING XEROFORM NONADH 1X8IN

## (undated) DEVICE — DRAPE SURG W/TWL 17 5/8X23

## (undated) DEVICE — GLOVE 6.5 PROTEXIS PI BLUE

## (undated) DEVICE — NDL HYPO 22GX1 1/2 SYR 10ML LL

## (undated) DEVICE — TAPE CLOTH SOFT MEDIPORE 4IN

## (undated) DEVICE — GLOVE BIOGEL SZ 8 1/2

## (undated) DEVICE — COVER EQUIPMENT 36X25

## (undated) DEVICE — DRAPE C-ARM COVER EZ 36X28IN

## (undated) DEVICE — GLOVE PROTEXIS BLUE LATEX 8.5

## (undated) DEVICE — DRAPE IOBAN 2 STERI

## (undated) DEVICE — BLADE SURG CARBON STEEL #10

## (undated) DEVICE — SUT VICRYL 2 0 CT 2

## (undated) DEVICE — GLOVE SURG PLYSPHRN ORTH SZ 8

## (undated) DEVICE — SPHERE NDI PASSIVE

## (undated) DEVICE — APPLIER LIGACLIP MED 11IN

## (undated) DEVICE — STAPLER SKIN PROXIMATE WIDE

## (undated) DEVICE — GLOVE PROTEXIS NEU-THERA SZ6

## (undated) DEVICE — DRILL GEMINUS 2.3X40MM

## (undated) DEVICE — SOL NACL IRR 1000ML BTL

## (undated) DEVICE — BLADE MILL BONE MEDIUM

## (undated) DEVICE — DRESSING ABSRBNT ISLAND 3.6X8

## (undated) DEVICE — COVER STERILE-Z BACK TABLE XL

## (undated) DEVICE — SPONGE SURGIFOAM 100 8.5X12X10

## (undated) DEVICE — DRAPE FULL SHEET 70X100IN

## (undated) DEVICE — BENZOIN TINCTURE 0.66ML

## (undated) DEVICE — SEALER BIPOLAR TISSUE 6.0

## (undated) DEVICE — DRAPE C-ARMOR EQUIPMENT COVER

## (undated) DEVICE — BLADE PEAK PLASMA

## (undated) DEVICE — SUT STRATAFIX 3-0 SH 8IN

## (undated) DEVICE — SPONGE LAP XRAY STERILE 18X18

## (undated) DEVICE — BNDG COFLEX FOAM LF2 ST 6X5YD

## (undated) DEVICE — CUFF ATS 2 PORT SNGL BLDR 34IN

## (undated) DEVICE — SCREW T10 SHAFT DRIVER N CANN

## (undated) DEVICE — BUR BONE CUT MICRO TPS 3X3.8MM

## (undated) DEVICE — BIT DRILL QCK-CONN 3.5MMX110MM

## (undated) DEVICE — BANDAGE ELASTIC 3IN ACE NS

## (undated) DEVICE — GAUZE SPONGE XRAY 4X4

## (undated) DEVICE — PAD CAST SOF-ROL RAYON 4INX4YD

## (undated) DEVICE — TUBING SILICON CLR 3/16IN 10FT

## (undated) DEVICE — CUFF ATS 2 PORT SNGL BLDR 18IN

## (undated) DEVICE — SUT VICRYL PLUS 2-0 CT1 18

## (undated) DEVICE — BIT DRILL CANN STND 2X175MM

## (undated) DEVICE — 5.0MM DRILL BIT

## (undated) DEVICE — KIT DRAIN WOUND RND SPRNG RESV

## (undated) DEVICE — SPLINT FIBERGLASS PAD 3X15

## (undated) DEVICE — COVER HD BACK TABLE 5FT

## (undated) DEVICE — BANDAGE STRETCH COHES 6INX5YD

## (undated) DEVICE — SUT CTD VICRYL BR CR/CT-2

## (undated) DEVICE — COVER HD BACK TABLE 6FT

## (undated) DEVICE — SEALANT ADHERUS DURAL AUTOSPRY

## (undated) DEVICE — APPLIER LIGACLIP LG 13IN

## (undated) DEVICE — PROBE BALL TIP 2.3MM

## (undated) DEVICE — BIT DRILL QC 2.5MMDX110MM

## (undated) DEVICE — SUT CTD VICRYL 0 UND BR

## (undated) DEVICE — SPONGE PATTY NEURO SGL 0.5X6

## (undated) DEVICE — BLADE SURG STAINLESS STEEL #15

## (undated) DEVICE — TRAY SKIN SCRUB WET PREMIUM

## (undated) DEVICE — DRAPE OPMI STERILE